# Patient Record
Sex: FEMALE | Race: WHITE | NOT HISPANIC OR LATINO | Employment: OTHER | URBAN - NONMETROPOLITAN AREA
[De-identification: names, ages, dates, MRNs, and addresses within clinical notes are randomized per-mention and may not be internally consistent; named-entity substitution may affect disease eponyms.]

---

## 2018-07-18 ENCOUNTER — APPOINTMENT (OUTPATIENT)
Dept: CT IMAGING | Facility: HOSPITAL | Age: 83
End: 2018-07-18

## 2018-07-18 ENCOUNTER — APPOINTMENT (OUTPATIENT)
Dept: GENERAL RADIOLOGY | Facility: HOSPITAL | Age: 83
End: 2018-07-18

## 2018-07-18 ENCOUNTER — HOSPITAL ENCOUNTER (INPATIENT)
Facility: HOSPITAL | Age: 83
LOS: 4 days | Discharge: HOME HEALTH | End: 2018-07-22
Attending: EMERGENCY MEDICINE | Admitting: FAMILY MEDICINE

## 2018-07-18 DIAGNOSIS — R11.2 NAUSEA AND VOMITING, INTRACTABILITY OF VOMITING NOT SPECIFIED, UNSPECIFIED VOMITING TYPE: ICD-10-CM

## 2018-07-18 DIAGNOSIS — Z74.09 IMPAIRED MOBILITY: ICD-10-CM

## 2018-07-18 DIAGNOSIS — Z78.9 IMPAIRED MOBILITY AND ADLS: ICD-10-CM

## 2018-07-18 DIAGNOSIS — Z74.09 IMPAIRED MOBILITY AND ADLS: ICD-10-CM

## 2018-07-18 DIAGNOSIS — J18.9 PNEUMONIA DUE TO INFECTIOUS ORGANISM, UNSPECIFIED LATERALITY, UNSPECIFIED PART OF LUNG: Primary | ICD-10-CM

## 2018-07-18 LAB
ALBUMIN SERPL-MCNC: 4 G/DL (ref 3.5–5)
ALBUMIN/GLOB SERPL: 1.1 G/DL (ref 1.1–2.5)
ALP SERPL-CCNC: 43 U/L (ref 24–120)
ALT SERPL W P-5'-P-CCNC: <15 U/L (ref 0–54)
AMYLASE SERPL-CCNC: 60 U/L (ref 30–110)
ANION GAP SERPL CALCULATED.3IONS-SCNC: 13 MMOL/L (ref 4–13)
APTT PPP: 33 SECONDS (ref 24.1–34.8)
ARTERIAL PATENCY WRIST A: ABNORMAL
AST SERPL-CCNC: 32 U/L (ref 7–45)
ATMOSPHERIC PRESS: 750 MMHG
BASE EXCESS BLDA CALC-SCNC: 5.1 MMOL/L (ref 0–2)
BASOPHILS # BLD AUTO: 0.05 10*3/MM3 (ref 0–0.2)
BASOPHILS NFR BLD AUTO: 0.4 % (ref 0–2)
BDY SITE: ABNORMAL
BILIRUB SERPL-MCNC: 0.2 MG/DL (ref 0.1–1)
BILIRUB UR QL STRIP: NEGATIVE
BODY TEMPERATURE: 37 C
BUN BLD-MCNC: 24 MG/DL (ref 5–21)
BUN/CREAT SERPL: 28.6 (ref 7–25)
CA-I BLD-MCNC: 4.55 MG/DL (ref 4.6–5.4)
CALCIUM SPEC-SCNC: 9.6 MG/DL (ref 8.4–10.4)
CHLORIDE SERPL-SCNC: 96 MMOL/L (ref 98–110)
CLARITY UR: CLEAR
CO2 SERPL-SCNC: 30 MMOL/L (ref 24–31)
COLOR UR: YELLOW
CREAT BLD-MCNC: 0.84 MG/DL (ref 0.5–1.4)
CRP SERPL-MCNC: 2.71 MG/DL (ref 0–0.99)
D-LACTATE SERPL-SCNC: 1.9 MMOL/L (ref 0.5–2)
DEPRECATED RDW RBC AUTO: 47.5 FL (ref 40–54)
EOSINOPHIL # BLD AUTO: 0.06 10*3/MM3 (ref 0–0.7)
EOSINOPHIL NFR BLD AUTO: 0.4 % (ref 0–4)
ERYTHROCYTE [DISTWIDTH] IN BLOOD BY AUTOMATED COUNT: 14 % (ref 12–15)
GAS FLOW AIRWAY: 2 LPM
GFR SERPL CREATININE-BSD FRML MDRD: 63 ML/MIN/1.73
GLOBULIN UR ELPH-MCNC: 3.6 GM/DL
GLUCOSE BLD-MCNC: 197 MG/DL (ref 70–100)
GLUCOSE UR STRIP-MCNC: NEGATIVE MG/DL
HCO3 BLDA-SCNC: 29.4 MMOL/L (ref 20–26)
HCT VFR BLD AUTO: 35.1 % (ref 37–47)
HGB BLD-MCNC: 11.5 G/DL (ref 12–16)
HGB UR QL STRIP.AUTO: NEGATIVE
HOLD SPECIMEN: NORMAL
IMM GRANULOCYTES # BLD: 0.09 10*3/MM3 (ref 0–0.03)
IMM GRANULOCYTES NFR BLD: 0.7 % (ref 0–5)
INR PPP: 1 (ref 0.91–1.09)
KETONES UR QL STRIP: NEGATIVE
LEUKOCYTE ESTERASE UR QL STRIP.AUTO: NEGATIVE
LIPASE SERPL-CCNC: 74 U/L (ref 23–203)
LYMPHOCYTES # BLD AUTO: 1.74 10*3/MM3 (ref 0.72–4.86)
LYMPHOCYTES NFR BLD AUTO: 12.8 % (ref 15–45)
Lab: ABNORMAL
Lab: ABNORMAL
MAGNESIUM SERPL-MCNC: 1.9 MG/DL (ref 1.4–2.2)
MCH RBC QN AUTO: 30.3 PG (ref 28–32)
MCHC RBC AUTO-ENTMCNC: 32.8 G/DL (ref 33–36)
MCV RBC AUTO: 92.4 FL (ref 82–98)
MODALITY: ABNORMAL
MONOCYTES # BLD AUTO: 0.82 10*3/MM3 (ref 0.19–1.3)
MONOCYTES NFR BLD AUTO: 6 % (ref 4–12)
NEUTROPHILS # BLD AUTO: 10.8 10*3/MM3 (ref 1.87–8.4)
NEUTROPHILS NFR BLD AUTO: 79.7 % (ref 39–78)
NITRITE UR QL STRIP: NEGATIVE
NRBC BLD MANUAL-RTO: 0 /100 WBC (ref 0–0)
PCO2 BLDA: 41.2 MM HG (ref 35–45)
PH BLDA: 7.46 PH UNITS (ref 7.35–7.45)
PH UR STRIP.AUTO: <=5 [PH] (ref 5–8)
PHOSPHATE SERPL-MCNC: 3.2 MG/DL (ref 2.5–4.5)
PLATELET # BLD AUTO: 321 10*3/MM3 (ref 130–400)
PMV BLD AUTO: 10.2 FL (ref 6–12)
PO2 BLDA: 97 MM HG (ref 83–108)
POTASSIUM BLD-SCNC: 3.1 MMOL/L (ref 3.5–5.3)
PROT SERPL-MCNC: 7.6 G/DL (ref 6.3–8.7)
PROT UR QL STRIP: NEGATIVE
PROTHROMBIN TIME: 13.5 SECONDS (ref 11.9–14.6)
RBC # BLD AUTO: 3.8 10*6/MM3 (ref 4.2–5.4)
SAO2 % BLDCOA: 98.5 % (ref 94–99)
SODIUM BLD-SCNC: 139 MMOL/L (ref 135–145)
SP GR UR STRIP: 1.02 (ref 1–1.03)
UROBILINOGEN UR QL STRIP: NORMAL
VENTILATOR MODE: ABNORMAL
WBC NRBC COR # BLD: 13.56 10*3/MM3 (ref 4.8–10.8)
WHOLE BLOOD HOLD SPECIMEN: NORMAL
WHOLE BLOOD HOLD SPECIMEN: NORMAL

## 2018-07-18 PROCEDURE — 25010000002 ENOXAPARIN PER 10 MG: Performed by: NURSE PRACTITIONER

## 2018-07-18 PROCEDURE — 25010000003 POTASSIUM CHLORIDE PER 2 MEQ: Performed by: EMERGENCY MEDICINE

## 2018-07-18 PROCEDURE — 85730 THROMBOPLASTIN TIME PARTIAL: CPT | Performed by: EMERGENCY MEDICINE

## 2018-07-18 PROCEDURE — 80053 COMPREHEN METABOLIC PANEL: CPT | Performed by: EMERGENCY MEDICINE

## 2018-07-18 PROCEDURE — 25010000002 AZITHROMYCIN PER 500 MG: Performed by: NURSE PRACTITIONER

## 2018-07-18 PROCEDURE — 82803 BLOOD GASES ANY COMBINATION: CPT

## 2018-07-18 PROCEDURE — 83690 ASSAY OF LIPASE: CPT | Performed by: FAMILY MEDICINE

## 2018-07-18 PROCEDURE — 82330 ASSAY OF CALCIUM: CPT

## 2018-07-18 PROCEDURE — 94799 UNLISTED PULMONARY SVC/PX: CPT

## 2018-07-18 PROCEDURE — 86140 C-REACTIVE PROTEIN: CPT | Performed by: EMERGENCY MEDICINE

## 2018-07-18 PROCEDURE — 81003 URINALYSIS AUTO W/O SCOPE: CPT | Performed by: EMERGENCY MEDICINE

## 2018-07-18 PROCEDURE — 99285 EMERGENCY DEPT VISIT HI MDM: CPT

## 2018-07-18 PROCEDURE — 71045 X-RAY EXAM CHEST 1 VIEW: CPT

## 2018-07-18 PROCEDURE — 74177 CT ABD & PELVIS W/CONTRAST: CPT

## 2018-07-18 PROCEDURE — 25010000002 IOPAMIDOL 61 % SOLUTION: Performed by: EMERGENCY MEDICINE

## 2018-07-18 PROCEDURE — 83735 ASSAY OF MAGNESIUM: CPT | Performed by: EMERGENCY MEDICINE

## 2018-07-18 PROCEDURE — 25010000002 POTASSIUM CHLORIDE PER 2 MEQ: Performed by: NURSE PRACTITIONER

## 2018-07-18 PROCEDURE — 93010 ELECTROCARDIOGRAM REPORT: CPT | Performed by: INTERNAL MEDICINE

## 2018-07-18 PROCEDURE — 25010000002 CEFTRIAXONE PER 250 MG: Performed by: EMERGENCY MEDICINE

## 2018-07-18 PROCEDURE — 84100 ASSAY OF PHOSPHORUS: CPT | Performed by: EMERGENCY MEDICINE

## 2018-07-18 PROCEDURE — 85610 PROTHROMBIN TIME: CPT | Performed by: EMERGENCY MEDICINE

## 2018-07-18 PROCEDURE — 87040 BLOOD CULTURE FOR BACTERIA: CPT | Performed by: EMERGENCY MEDICINE

## 2018-07-18 PROCEDURE — 25810000003 SODIUM CHLORIDE 0.9 % WITH KCL 20 MEQ 20-0.9 MEQ/L-% SOLUTION: Performed by: NURSE PRACTITIONER

## 2018-07-18 PROCEDURE — 94640 AIRWAY INHALATION TREATMENT: CPT

## 2018-07-18 PROCEDURE — P9612 CATHETERIZE FOR URINE SPEC: HCPCS

## 2018-07-18 PROCEDURE — 83605 ASSAY OF LACTIC ACID: CPT | Performed by: EMERGENCY MEDICINE

## 2018-07-18 PROCEDURE — 93005 ELECTROCARDIOGRAM TRACING: CPT | Performed by: EMERGENCY MEDICINE

## 2018-07-18 PROCEDURE — 36600 WITHDRAWAL OF ARTERIAL BLOOD: CPT

## 2018-07-18 PROCEDURE — 85025 COMPLETE CBC W/AUTO DIFF WBC: CPT | Performed by: EMERGENCY MEDICINE

## 2018-07-18 PROCEDURE — 82150 ASSAY OF AMYLASE: CPT | Performed by: FAMILY MEDICINE

## 2018-07-18 RX ORDER — LEVOFLOXACIN 500 MG/1
500 TABLET, FILM COATED ORAL DAILY
COMMUNITY
Start: 2018-07-11 | End: 2018-07-22 | Stop reason: HOSPADM

## 2018-07-18 RX ORDER — SUCRALFATE ORAL 1 G/10ML
1 SUSPENSION ORAL EVERY 6 HOURS SCHEDULED
Status: DISCONTINUED | OUTPATIENT
Start: 2018-07-18 | End: 2018-07-22 | Stop reason: HOSPADM

## 2018-07-18 RX ORDER — ONDANSETRON 2 MG/ML
4 INJECTION INTRAMUSCULAR; INTRAVENOUS EVERY 6 HOURS PRN
Status: DISCONTINUED | OUTPATIENT
Start: 2018-07-18 | End: 2018-07-19

## 2018-07-18 RX ORDER — SACCHAROMYCES BOULARDII 250 MG
250 CAPSULE ORAL 2 TIMES DAILY
Status: DISCONTINUED | OUTPATIENT
Start: 2018-07-18 | End: 2018-07-22 | Stop reason: HOSPADM

## 2018-07-18 RX ORDER — LISINOPRIL AND HYDROCHLOROTHIAZIDE 12.5; 1 MG/1; MG/1
1 TABLET ORAL DAILY
Status: ON HOLD | COMMUNITY
End: 2022-04-24

## 2018-07-18 RX ORDER — MULTIVIT WITH CALCIUM,IRON,MIN 27MG-0.4MG
1 TABLET ORAL DAILY
COMMUNITY

## 2018-07-18 RX ORDER — POTASSIUM CHLORIDE 29.8 MG/ML
20 INJECTION INTRAVENOUS ONCE
Status: COMPLETED | OUTPATIENT
Start: 2018-07-18 | End: 2018-07-18

## 2018-07-18 RX ORDER — SODIUM CHLORIDE AND POTASSIUM CHLORIDE 150; 900 MG/100ML; MG/100ML
125 INJECTION, SOLUTION INTRAVENOUS CONTINUOUS
Status: DISCONTINUED | OUTPATIENT
Start: 2018-07-18 | End: 2018-07-20

## 2018-07-18 RX ORDER — ONDANSETRON 4 MG/1
4 TABLET, FILM COATED ORAL EVERY 6 HOURS PRN
Status: DISCONTINUED | OUTPATIENT
Start: 2018-07-18 | End: 2018-07-19

## 2018-07-18 RX ORDER — GUAIFENESIN 600 MG/1
600 TABLET, EXTENDED RELEASE ORAL 2 TIMES DAILY
Status: DISCONTINUED | OUTPATIENT
Start: 2018-07-18 | End: 2018-07-22 | Stop reason: HOSPADM

## 2018-07-18 RX ORDER — IPRATROPIUM BROMIDE AND ALBUTEROL SULFATE 2.5; .5 MG/3ML; MG/3ML
3 SOLUTION RESPIRATORY (INHALATION) ONCE
Status: COMPLETED | OUTPATIENT
Start: 2018-07-18 | End: 2018-07-18

## 2018-07-18 RX ORDER — PANTOPRAZOLE SODIUM 40 MG/1
40 TABLET, DELAYED RELEASE ORAL EVERY MORNING
Status: DISCONTINUED | OUTPATIENT
Start: 2018-07-19 | End: 2018-07-22 | Stop reason: HOSPADM

## 2018-07-18 RX ORDER — ONDANSETRON 4 MG/1
4 TABLET, ORALLY DISINTEGRATING ORAL EVERY 6 HOURS PRN
Status: DISCONTINUED | OUTPATIENT
Start: 2018-07-18 | End: 2018-07-19

## 2018-07-18 RX ORDER — MORPHINE SULFATE 2 MG/ML
2 INJECTION, SOLUTION INTRAMUSCULAR; INTRAVENOUS EVERY 4 HOURS PRN
Status: DISCONTINUED | OUTPATIENT
Start: 2018-07-18 | End: 2018-07-21 | Stop reason: RX

## 2018-07-18 RX ORDER — VIT A/VIT C/VIT E/ZINC/COPPER 2148-113
2 TABLET ORAL 2 TIMES DAILY
COMMUNITY
End: 2022-05-26 | Stop reason: HOSPADM

## 2018-07-18 RX ORDER — ESOMEPRAZOLE MAGNESIUM 40 MG/1
40 CAPSULE, DELAYED RELEASE ORAL
Status: ON HOLD | COMMUNITY
End: 2019-09-03

## 2018-07-18 RX ORDER — POTASSIUM CHLORIDE 14.9 MG/ML
20 INJECTION INTRAVENOUS
Status: COMPLETED | OUTPATIENT
Start: 2018-07-18 | End: 2018-07-19

## 2018-07-18 RX ORDER — METOPROLOL SUCCINATE 25 MG/1
25 TABLET, EXTENDED RELEASE ORAL DAILY
Status: DISCONTINUED | OUTPATIENT
Start: 2018-07-18 | End: 2018-07-22 | Stop reason: HOSPADM

## 2018-07-18 RX ORDER — ACETAMINOPHEN 325 MG/1
650 TABLET ORAL EVERY 4 HOURS PRN
Status: DISCONTINUED | OUTPATIENT
Start: 2018-07-18 | End: 2018-07-22 | Stop reason: HOSPADM

## 2018-07-18 RX ORDER — METOPROLOL SUCCINATE 25 MG/1
25 TABLET, EXTENDED RELEASE ORAL DAILY
Status: ON HOLD | COMMUNITY
End: 2019-09-03

## 2018-07-18 RX ORDER — SODIUM CHLORIDE 0.9 % (FLUSH) 0.9 %
10 SYRINGE (ML) INJECTION AS NEEDED
Status: DISCONTINUED | OUTPATIENT
Start: 2018-07-18 | End: 2018-07-22 | Stop reason: HOSPADM

## 2018-07-18 RX ORDER — IPRATROPIUM BROMIDE AND ALBUTEROL SULFATE 2.5; .5 MG/3ML; MG/3ML
3 SOLUTION RESPIRATORY (INHALATION)
Status: DISCONTINUED | OUTPATIENT
Start: 2018-07-18 | End: 2018-07-21

## 2018-07-18 RX ORDER — SODIUM CHLORIDE 0.9 % (FLUSH) 0.9 %
1-10 SYRINGE (ML) INJECTION AS NEEDED
Status: DISCONTINUED | OUTPATIENT
Start: 2018-07-18 | End: 2018-07-22 | Stop reason: HOSPADM

## 2018-07-18 RX ADMIN — IPRATROPIUM BROMIDE AND ALBUTEROL SULFATE 3 ML: 2.5; .5 SOLUTION RESPIRATORY (INHALATION) at 23:41

## 2018-07-18 RX ADMIN — IPRATROPIUM BROMIDE AND ALBUTEROL SULFATE 3 ML: 2.5; .5 SOLUTION RESPIRATORY (INHALATION) at 16:39

## 2018-07-18 RX ADMIN — ACETAMINOPHEN 650 MG: 325 TABLET, FILM COATED ORAL at 23:46

## 2018-07-18 RX ADMIN — SODIUM CHLORIDE 1632 ML: 9 INJECTION, SOLUTION INTRAVENOUS at 16:23

## 2018-07-18 RX ADMIN — POTASSIUM CHLORIDE AND SODIUM CHLORIDE 125 ML/HR: 900; 150 INJECTION, SOLUTION INTRAVENOUS at 22:14

## 2018-07-18 RX ADMIN — POTASSIUM CHLORIDE 20 MEQ: 200 INJECTION, SOLUTION INTRAVENOUS at 22:14

## 2018-07-18 RX ADMIN — Medication 250 MG: at 22:20

## 2018-07-18 RX ADMIN — POTASSIUM CHLORIDE 20 MEQ: 400 INJECTION, SOLUTION INTRAVENOUS at 18:45

## 2018-07-18 RX ADMIN — CEFTRIAXONE SODIUM 1 G: 1 INJECTION, POWDER, FOR SOLUTION INTRAMUSCULAR; INTRAVENOUS at 16:26

## 2018-07-18 RX ADMIN — AZITHROMYCIN MONOHYDRATE 500 MG: 500 INJECTION, POWDER, LYOPHILIZED, FOR SOLUTION INTRAVENOUS at 22:42

## 2018-07-18 RX ADMIN — ENOXAPARIN SODIUM 40 MG: 40 INJECTION SUBCUTANEOUS at 22:20

## 2018-07-18 RX ADMIN — IOPAMIDOL 100 ML: 612 INJECTION, SOLUTION INTRAVENOUS at 17:19

## 2018-07-18 RX ADMIN — GUAIFENESIN 600 MG: 600 TABLET, EXTENDED RELEASE ORAL at 22:21

## 2018-07-18 RX ADMIN — SUCRALFATE 1 G: 1 SUSPENSION ORAL at 22:21

## 2018-07-18 NOTE — ED PROVIDER NOTES
Subjective     Abdominal Pain   Pain location:  Generalized  Pain quality: fullness    Pain quality: not aching, not bloating, not burning, not dull, not heavy, no pressure, not shooting, not squeezing, no stiffness and not tearing    Pain radiates to:  Does not radiate  Pain severity:  Moderate  Onset quality:  Gradual  Timing:  Intermittent  Progression:  Waxing and waning  Chronicity:  New  Context: not alcohol use, not awakening from sleep, not diet changes, not eating, not laxative use, not recent illness, not recent sexual activity, not recent travel, not sick contacts and not suspicious food intake    Relieved by:  Nothing  Worsened by:  Nothing  Ineffective treatments:  None tried  Associated symptoms: vomiting    Associated symptoms: no anorexia, no belching, no chills, no constipation, no diarrhea, no dysuria, no fatigue, no fever, no flatus, no hematemesis, no hematochezia, no hematuria, no melena, no nausea, no shortness of breath, no sore throat, no vaginal bleeding and no vaginal discharge    Risk factors: no NSAID use    Vomiting   The primary symptoms include abdominal pain and vomiting. Primary symptoms do not include fever, fatigue, nausea, diarrhea, melena, hematemesis, hematochezia, dysuria, myalgias, arthralgias or rash.   The illness does not include chills, anorexia, constipation or back pain.       Review of Systems   Constitutional: Negative.  Negative for activity change, appetite change, chills, diaphoresis, fatigue and fever.   HENT: Negative for congestion, drooling, ear pain, facial swelling, hearing loss, sinus pressure and sore throat.    Eyes: Negative.  Negative for discharge.   Respiratory: Negative for shortness of breath.    Gastrointestinal: Positive for abdominal pain and vomiting. Negative for abdominal distention, anorexia, blood in stool, constipation, diarrhea, flatus, hematemesis, hematochezia, melena and nausea.   Endocrine: Negative.  Negative for cold intolerance, heat  intolerance, polydipsia, polyphagia and polyuria.   Genitourinary: Negative.  Negative for dysuria, flank pain, hematuria, urgency, vaginal bleeding and vaginal discharge.   Musculoskeletal: Negative.  Negative for arthralgias, back pain, myalgias and neck stiffness.   Skin: Negative.  Negative for color change, pallor and rash.   Allergic/Immunologic: Negative.    Neurological: Negative.  Negative for dizziness, seizures, speech difficulty, weakness, numbness and headaches.   Hematological: Negative.  Negative for adenopathy.   All other systems reviewed and are negative.      Past Medical History:   Diagnosis Date   • COPD (chronic obstructive pulmonary disease) (CMS/[facility])    • GERD (gastroesophageal reflux disease)    • Hypertension    • Pneumonia    • Tachycardia        No Known Allergies    History reviewed. No pertinent surgical history.    History reviewed. No pertinent family history.    Social History     Social History   • Marital status:      Social History Main Topics   • Smoking status: Never Smoker   • Alcohol use No   • Drug use: No   • Sexual activity: Defer     Other Topics Concern   • Not on file           Objective   Physical Exam   Constitutional: She is oriented to person, place, and time. She appears well-developed and well-nourished.   HENT:   Head: Normocephalic.   Right Ear: External ear normal.   Eyes: Pupils are equal, round, and reactive to light. Conjunctivae are normal.   Neck: Normal range of motion. Neck supple.   Cardiovascular: Normal rate, regular rhythm, normal heart sounds and intact distal pulses.  PMI is not displaced.  Exam reveals no decreased pulses.    No murmur heard.  Pulmonary/Chest: Effort normal. No accessory muscle usage. No tachypnea. No respiratory distress. She has no decreased breath sounds. She has rales. She exhibits no tenderness.   Abdominal: Soft. Bowel sounds are normal. There is tenderness.   Musculoskeletal: Normal range of motion. She exhibits no  edema or tenderness.   Lower extremity exam bilaterally is unremarkable.  There is no right or left calf tenderness .  There is no palpable venous cord.  No obvious difference in the size of the legs.  No pitting edema.  The dorsalis pedis and posterior tibial femoral and popliteal pulses are palpable and +2 bilaterally.  Homans sign is negative   Neurological: She is alert and oriented to person, place, and time. She has normal reflexes. No cranial nerve deficit. Coordination normal.   Skin: Skin is warm. No rash noted. No erythema.   Nursing note and vitals reviewed.      Procedures           ED Course  ED Course as of Jul 18 1749 Wed Jul 18, 2018   1746 Pt with failed out patient treatment  [TS]      ED Course User Index  [TS] Sam Jackson MD                  [facility]      Final diagnoses:   Pneumonia due to infectious organism, unspecified laterality, unspecified part of lung   Nausea and vomiting, intractability of vomiting not specified, unspecified vomiting type            Sam Jackson MD  07/18/18 8908

## 2018-07-18 NOTE — H&P
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  Patient was in her usual state of health until 2-3 weeks ago at which time she was treated by PCP for \"lung inflammation\".  She returned to PCP on 7/11/2018 chest xray revealed pneumonia, ABX was changed to Levaquin.  She then developed nausea with vomiting, abdominal pain, and diarrhea.  Pain is located epigastric region radiating down to lower quads, described as constant, aching. Nausea is constant, vomiting episodic, having trouble keeping down food and liquids. Diarrhea occurs 3-4 times daily. She reports feeling so weak she cant get off stretcher. Patient is admitted for treatment of failed outpatient therapy pneumonia.     Review of Systems   Constitutional: Negative for activity change, appetite change, fatigue and fever.   HENT: Negative for congestion, mouth sores, rhinorrhea, sinus pressure and trouble swallowing.    Respiratory: Positive for shortness of breath (chronic). Negative for cough, chest tightness and wheezing.    Cardiovascular: Negative for chest pain, palpitations and leg swelling.   Gastrointestinal: Positive for abdominal pain (epigastric down to lower abdomen), diarrhea, nausea and vomiting. Negative for abdominal distention and constipation.   Genitourinary: Negative for difficulty urinating, dysuria and frequency.   Musculoskeletal: Negative for arthralgias and myalgias.        Generalized weakness from prolonged illness   Neurological: Negative for dizziness, weakness and light-headedness.   Psychiatric/Behavioral: Negative for agitation and sleep " "disturbance. The patient is not nervous/anxious.         Past Medical History:   Past Medical History:   Diagnosis Date   • COPD (chronic obstructive pulmonary disease) (CMS/HCC)    • GERD (gastroesophageal reflux disease)    • Hypertension    • Pneumonia        Past Surgical History:   Past Surgical History:   Procedure Laterality Date   • CATARACT EXTRACTION, BILATERAL     • THYROID SURGERY         Family History: family history includes Alzheimer's disease in her mother; Heart disease in her father.    Social History:  reports that she has never smoked. She does not have any smokeless tobacco history on file. She reports that she does not drink alcohol or use drugs.    Code Status: Full, if unable to speak for herself her son Nicholas Haddad is POA      Allergies:  No Known Allergies    Medications:  Prior to Admission medications    Medication Sig Start Date End Date Taking? Authorizing Provider   Calcium-Magnesium-Vitamin D (CALCIUM 1200+D3 PO) Take  by mouth.   Yes Historical Provider, MD   esomeprazole (nexIUM) 40 MG capsule Take 40 mg by mouth Every Morning Before Breakfast.   Yes Historical Provider, MD   levoFLOXacin (LEVAQUIN) 500 MG tablet Take 500 mg by mouth Daily. 7/11/18 7/20/18 Yes Historical Provider, MD   lisinopril-hydrochlorothiazide (PRINZIDE,ZESTORETIC) 10-12.5 MG per tablet Take 1 tablet by mouth Daily.   Yes Historical Provider, MD   metoprolol succinate XL (TOPROL-XL) 25 MG 24 hr tablet Take 25 mg by mouth Daily.   Yes Historical Provider, MD   Multiple Vitamins-Minerals (PRESERVISION AREDS PO) Take  by mouth 4 (Four) Times a Day. Twice AM and Twice PM.   Yes Historical Provider, MD   Multiple Vitamins-Minerals (WOMENS ONE DAILY PO) Take  by mouth.   Yes Historical Provider, MD       Objective     BP (!) 112/35   Pulse (!) 126   Temp 99.3 °F (37.4 °C)   Resp 19   Ht 167.6 cm (66\")   Wt 54.4 kg (120 lb)   SpO2 99%   BMI 19.37 kg/m²      Physical Exam   Constitutional: She is " oriented to person, place, and time. She appears well-developed. No distress.   frail   HENT:   Head: Normocephalic and atraumatic.   Eyes: Pupils are equal, round, and reactive to light. Conjunctivae and EOM are normal. No scleral icterus.   Neck: Normal range of motion. No JVD present. No tracheal deviation present.   Cardiovascular: Normal rate, regular rhythm, normal heart sounds and intact distal pulses.  Exam reveals no gallop.    No murmur heard.  Pulmonary/Chest: Effort normal and breath sounds normal. No respiratory distress. She has no wheezes. She has no rales.   Diminished    Abdominal: Soft. Bowel sounds are normal. She exhibits no distension. There is no tenderness. There is no guarding.   Musculoskeletal: Normal range of motion. She exhibits no edema.   Generalized weakness   Neurological: She is alert and oriented to person, place, and time.   No obvious deficits noted.   Skin: Skin is warm and dry. No rash noted. She is not diaphoretic. No erythema. No pallor.   Psychiatric: She has a normal mood and affect. Her behavior is normal.   Vitals reviewed.        Pertinent Data:   Lab Results (last 72 hours)     Procedure Component Value Units Date/Time    Lactic Acid, Plasma [[id number]]  (Normal) Collected:  07/18/18 1559    Specimen:  Blood Updated:  07/18/18 1803     Lactate 1.9 mmol/L     Urinalysis With Culture If Indicated - Urine, Catheter [[id number]]  (Normal) Collected:  07/18/18 1724    Specimen:  Urine from Urine, Catheter Updated:  07/18/18 1751     Color, UA Yellow     Appearance, UA Clear     pH, UA <=5.0     Specific Gravity, UA 1.020     Glucose, UA Negative     Ketones, UA Negative     Bilirubin, UA Negative     Blood, UA Negative     Protein, UA Negative     Leuk Esterase, UA Negative     Nitrite, UA Negative     Urobilinogen, UA 0.2 E.U./dL    Blood Culture Bottle Set [[id number]] Collected:  07/18/18 1559    Specimen:  Blood from Arm, Right Updated:  07/18/18 1700     Extra Tube Hold  for add-ons.     Comment: Auto resulted.       Light Blue Top [[id number]] Collected:  07/18/18 1559    Specimen:  Blood Updated:  07/18/18 1700     Extra Tube hold for add-on     Comment: Auto resulted       Green Top (Gel) [[id number]] Collected:  07/18/18 1559    Specimen:  Blood Updated:  07/18/18 1700     Extra Tube Hold for add-ons.     Comment: Auto resulted.       Lavender Top [[id number]] Collected:  07/18/18 1559    Specimen:  Blood Updated:  07/18/18 1700     Extra Tube hold for add-on     Comment: Auto resulted       Red Top [[id number]] Collected:  07/18/18 1559    Specimen:  Blood Updated:  07/18/18 1700     Extra Tube Hold for add-ons.     Comment: Auto resulted.       Calcium, Ionized [[id number]]  (Abnormal) Collected:  07/18/18 1630    Specimen:  Blood Updated:  07/18/18 1636     Ionized Calcium 4.55 (L) mg/dL      Collected by [id number]    Blood Gas, Arterial [[id number]]  (Abnormal) Collected:  07/18/18 1630    Specimen:  Arterial Blood Updated:  07/18/18 1635     Site Right Brachial     Don's Test N/A     pH, Arterial 7.462 (H) pH units      pCO2, Arterial 41.2 mm Hg      pO2, Arterial 97.0 mm Hg      HCO3, Arterial 29.4 (H) mmol/L      Base Excess, Arterial 5.1 (H) mmol/L      O2 Saturation, Arterial 98.5 %      Temperature 37.0 C      Barometric Pressure for Blood Gas 750 mmHg      Modality Nasal Cannula     Flow Rate 2.0 lpm      Ventilator Mode NA     Collected by [id number]    Blood Culture - Blood, [[id number]] Collected:  07/18/18 1620    Specimen:  Blood from Arm, Left Updated:  07/18/18 1634    Comprehensive Metabolic Panel [[id number]]  (Abnormal) Collected:  07/18/18 1559    Specimen:  Blood Updated:  07/18/18 1627     Glucose 197 (H) mg/dL      BUN 24 (H) mg/dL      Creatinine 0.84 mg/dL      Sodium 139 mmol/L      Potassium 3.1 (L) mmol/L      Chloride 96 (L) mmol/L      CO2 30.0 mmol/L      Calcium 9.6 mg/dL      Total Protein 7.6 g/dL      Albumin 4.00 g/dL      ALT (SGPT) <15 U/L      AST  (SGOT) 32 U/L      Alkaline Phosphatase 43 U/L      Total Bilirubin 0.2 mg/dL      eGFR Non African Amer 63 mL/min/1.73      Globulin 3.6 gm/dL      A/G Ratio 1.1 g/dL      BUN/Creatinine Ratio 28.6 (H)     Anion Gap 13.0 mmol/L     Narrative:       The MDRD GFR formula is only valid for adults with stable renal function between ages 18 and 70.    Magnesium [[id number]]  (Normal) Collected:  07/18/18 1559    Specimen:  Blood Updated:  07/18/18 1627     Magnesium 1.9 mg/dL     Phosphorus [[id number]]  (Normal) Collected:  07/18/18 1559    Specimen:  Blood Updated:  07/18/18 1627     Phosphorus 3.2 mg/dL     C-reactive Protein [[id number]]  (Abnormal) Collected:  07/18/18 1559    Specimen:  Blood Updated:  07/18/18 1627     C-Reactive Protein 2.71 (H) mg/dL     Protime-INR [[id number]]  (Normal) Collected:  07/18/18 1559    Specimen:  Blood Updated:  07/18/18 1619     Protime 13.5 Seconds      INR 1.00    aPTT [[id number]]  (Normal) Collected:  07/18/18 1559    Specimen:  Blood Updated:  07/18/18 1619     PTT 33.0 seconds     CBC Auto Differential [[id number]]  (Abnormal) Collected:  07/18/18 1559    Specimen:  Blood Updated:  07/18/18 1616     WBC 13.56 (H) 10*3/mm3      RBC 3.80 (L) 10*6/mm3      Hemoglobin 11.5 (L) g/dL      Hematocrit 35.1 (L) %      MCV 92.4 fL      MCH 30.3 pg      MCHC 32.8 (L) g/dL      RDW 14.0 %      RDW-SD 47.5 fl      MPV 10.2 fL      Platelets 321 10*3/mm3      Neutrophil % 79.7 (H) %      Lymphocyte % 12.8 (L) %      Monocyte % 6.0 %      Eosinophil % 0.4 %      Basophil % 0.4 %      Immature Grans % 0.7 %      Neutrophils, Absolute 10.80 (H) 10*3/mm3      Lymphocytes, Absolute 1.74 10*3/mm3      Monocytes, Absolute 0.82 10*3/mm3      Eosinophils, Absolute 0.06 10*3/mm3      Basophils, Absolute 0.05 10*3/mm3      Immature Grans, Absolute 0.09 (H) 10*3/mm3      nRBC 0.0 /100 WBC         Imaging Results (last 24 hours)     Procedure Component Value Units Date/Time    CT Abdomen Pelvis With  Contrast [[id number]] Collected:  07/18/18 1720     Updated:  07/18/18 1725    Narrative:       CT ABDOMEN PELVIS W CONTRAST- 7/18/2018 5:05 PM CDT     HISTORY: Abd pain, fever, abscess suspected       COMPARISON: None.      DOSE LENGTH PRODUCT: 242 mGy cm. Automated exposure control was also  utilized to decrease patient radiation dose.     TECHNIQUE: Following the intravenous administration of contrast, helical  CT tomographic images of the abdomen and pelvis were acquired.  Multiplanar reformatted images were provided for review.      FINDINGS:   Dependent changes are seen in the LEFT lung base. Bronchiectasis is  noted in the RIGHT middle lobe.      LIVER: No focal liver lesion. The hepatic vasculature is patent.      BILIARY SYSTEM: Multiple stones are seen in the gallbladder. There is no  pericholecystic fluid or cold bladder distention.      PANCREAS: No focal pancreatic lesion.      SPLEEN: Unremarkable.      KIDNEYS AND ADRENALS: Numerous low-density lesions in bilateral kidneys  most likely represent cysts but some are too small to characterize. The  adrenal glands are unremarkable The ureters are decompressed and normal  in appearance.     RETROPERITONEUM: No mass, lymphadenopathy or hemorrhage.      GI TRACT: Multiple colonic diverticula are present, but there is no wall  thickening or pericolonic stranding to suggest diverticulitis. No  obstruction or wall thickening is seen in the remainder of the  visualized GI tract. The appendix is visualized and unremarkable.     OTHER: There is no mesenteric mass, lymphadenopathy or fluid collection.  The abdominopelvic vasculature is patent. The osseous structures and  soft tissues demonstrate no worrisome lesions.          PELVIS: No mass lesion, fluid collection or significant lymphadenopathy  is seen in the pelvis. The urinary bladder is normal in appearance.       Impression:       1. Cholelithiasis without evidence of acute cholecystitis.  2. Diverticulosis  without evidence of diverticulitis.  3. Chronic changes in the lower lungs.         This report was finalized on 07/18/2018 17:22 by Dr. Eliazar Thomas MD.    XR Chest 1 View [[id number]] Collected:  07/18/18 1623     Updated:  07/18/18 1628    Narrative:       EXAMINATION:  XR CHEST 1 VW-  7/18/2018 4:20 PM CDT     HISTORY: Severe sepsis protocol     COMPARISON: No comparison study.     FINDINGS:  There is an area of infiltrate in the left perihilar region  and the left upper lobe. There is a linear band of scarring projected  over the right hilum. Coarse markings and bronchial wall thickening  appears stable. Heart size is normal.       Impression:       1. Probable left perihilar pneumonia. Follow-up is recommended to  document resolution.  2. Linear scarring projected over the right hilum. Coarse markings and  bronchial wall thickening, stable.        This report was finalized on 07/18/2018 16:25 by Dr. Ramsey Grover MD.          I have personally reviewed and interpreted the radiology studies and ECG obtained at time of admission.     Assessment / Plan     Assessment/Plan:      1. Left perihilar pneumonia - Rocephin, Azithromycin, sputum culture  2. Sepsis - fluid bolus 30ml/kg - 1632ml, antibiotic therapy, monitor lactic acid  3. Hypokalemia - replace potassium, CMP in am, cardiac monitoring  4. Leukocytosis - treat with abx, CBC in am  5. COPD - duonebs 4 times day, continue home O2 at 2 liters, continuous pulse ox, ABG's as needed, mucinex  6. Generalized weakness/deconditioned - PT/OT consult  7. DVT prophylaxis - lovenox ad SCD's  8. Hyperglycemia - A1c in am  9. Failure to thrive from extended illness - Dietitian assessment, lipid panel  10. Nausea/vomiting/diarrhea after starting oral antibiotic - regular diet as tolerated, Zofran, Carafate, GI panel, Florastor  11. Additional labs: TSH in am       I discussed the patient's findings and my recommendations with: Theo Wise MD  Time spent: 40  minutes      Florina Pereira, APRN  07/18/18   6:49 PM

## 2018-07-19 LAB
ADV 40+41 DNA STL QL NAA+NON-PROBE: NOT DETECTED
ALBUMIN SERPL-MCNC: 3 G/DL (ref 3.5–5)
ALBUMIN/GLOB SERPL: 1 G/DL (ref 1.1–2.5)
ALP SERPL-CCNC: 32 U/L (ref 24–120)
ALT SERPL W P-5'-P-CCNC: 19 U/L (ref 0–54)
ANION GAP SERPL CALCULATED.3IONS-SCNC: 8 MMOL/L (ref 4–13)
ARTICHOKE IGE QN: 44 MG/DL (ref 0–99)
AST SERPL-CCNC: 22 U/L (ref 7–45)
ASTRO TYP 1-8 RNA STL QL NAA+NON-PROBE: NOT DETECTED
BASOPHILS # BLD AUTO: 0.02 10*3/MM3 (ref 0–0.2)
BASOPHILS # BLD AUTO: 0.03 10*3/MM3 (ref 0–0.2)
BASOPHILS NFR BLD AUTO: 0.2 % (ref 0–2)
BASOPHILS NFR BLD AUTO: 0.3 % (ref 0–2)
BILIRUB SERPL-MCNC: 0.2 MG/DL (ref 0.1–1)
BUN BLD-MCNC: 14 MG/DL (ref 5–21)
BUN/CREAT SERPL: 19.7 (ref 7–25)
C CAYETANENSIS DNA STL QL NAA+NON-PROBE: NOT DETECTED
C DIFF TOX GENS STL QL NAA+PROBE: NOT DETECTED
CALCIUM SPEC-SCNC: 8.6 MG/DL (ref 8.4–10.4)
CAMPY SP DNA.DIARRHEA STL QL NAA+PROBE: NOT DETECTED
CHLORIDE SERPL-SCNC: 111 MMOL/L (ref 98–110)
CHOLEST SERPL-MCNC: 113 MG/DL (ref 130–200)
CO2 SERPL-SCNC: 25 MMOL/L (ref 24–31)
CREAT BLD-MCNC: 0.71 MG/DL (ref 0.5–1.4)
CRYPTOSP STL CULT: NOT DETECTED
D-LACTATE SERPL-SCNC: 1.6 MMOL/L (ref 0.5–2)
D-LACTATE SERPL-SCNC: 2.2 MMOL/L (ref 0.5–2)
DEPRECATED RDW RBC AUTO: 47.9 FL (ref 40–54)
DEPRECATED RDW RBC AUTO: 48.6 FL (ref 40–54)
E COLI DNA SPEC QL NAA+PROBE: NOT DETECTED
E HISTOLYT AG STL-ACNC: NOT DETECTED
EAEC PAA PLAS AGGR+AATA ST NAA+NON-PRB: NOT DETECTED
EC STX1 + STX2 GENES STL NAA+PROBE: NOT DETECTED
EOSINOPHIL # BLD AUTO: 0.01 10*3/MM3 (ref 0–0.7)
EOSINOPHIL # BLD AUTO: 0.02 10*3/MM3 (ref 0–0.7)
EOSINOPHIL NFR BLD AUTO: 0.1 % (ref 0–4)
EOSINOPHIL NFR BLD AUTO: 0.2 % (ref 0–4)
EPEC EAE GENE STL QL NAA+NON-PROBE: NOT DETECTED
ERYTHROCYTE [DISTWIDTH] IN BLOOD BY AUTOMATED COUNT: 14.1 % (ref 12–15)
ERYTHROCYTE [DISTWIDTH] IN BLOOD BY AUTOMATED COUNT: 14.2 % (ref 12–15)
ETEC LTA+ST1A+ST1B TOX ST NAA+NON-PROBE: NOT DETECTED
G LAMBLIA DNA SPEC QL NAA+PROBE: NOT DETECTED
GFR SERPL CREATININE-BSD FRML MDRD: 77 ML/MIN/1.73
GLOBULIN UR ELPH-MCNC: 2.9 GM/DL
GLUCOSE BLD-MCNC: 132 MG/DL (ref 70–100)
HBA1C MFR BLD: 6.5 %
HCT VFR BLD AUTO: 28.6 % (ref 37–47)
HCT VFR BLD AUTO: 30.3 % (ref 37–47)
HDLC SERPL-MCNC: 34 MG/DL
HGB BLD-MCNC: 9.6 G/DL (ref 12–16)
HGB BLD-MCNC: 9.7 G/DL (ref 12–16)
HOLD SPECIMEN: NORMAL
IMM GRANULOCYTES # BLD: 0.04 10*3/MM3 (ref 0–0.03)
IMM GRANULOCYTES # BLD: 0.06 10*3/MM3 (ref 0–0.03)
IMM GRANULOCYTES NFR BLD: 0.4 % (ref 0–5)
IMM GRANULOCYTES NFR BLD: 0.7 % (ref 0–5)
LDLC/HDLC SERPL: 1.84 {RATIO}
LYMPHOCYTES # BLD AUTO: 1.13 10*3/MM3 (ref 0.72–4.86)
LYMPHOCYTES # BLD AUTO: 1.91 10*3/MM3 (ref 0.72–4.86)
LYMPHOCYTES NFR BLD AUTO: 12.6 % (ref 15–45)
LYMPHOCYTES NFR BLD AUTO: 18 % (ref 15–45)
MCH RBC QN AUTO: 30 PG (ref 28–32)
MCH RBC QN AUTO: 31.5 PG (ref 28–32)
MCHC RBC AUTO-ENTMCNC: 32 G/DL (ref 33–36)
MCHC RBC AUTO-ENTMCNC: 33.6 G/DL (ref 33–36)
MCV RBC AUTO: 93.8 FL (ref 82–98)
MCV RBC AUTO: 93.8 FL (ref 82–98)
MONOCYTES # BLD AUTO: 0.64 10*3/MM3 (ref 0.19–1.3)
MONOCYTES # BLD AUTO: 0.65 10*3/MM3 (ref 0.19–1.3)
MONOCYTES NFR BLD AUTO: 6.1 % (ref 4–12)
MONOCYTES NFR BLD AUTO: 7.1 % (ref 4–12)
NEUTROPHILS # BLD AUTO: 7.11 10*3/MM3 (ref 1.87–8.4)
NEUTROPHILS # BLD AUTO: 7.95 10*3/MM3 (ref 1.87–8.4)
NEUTROPHILS NFR BLD AUTO: 75.1 % (ref 39–78)
NEUTROPHILS NFR BLD AUTO: 79.2 % (ref 39–78)
NOROVIRUS GI+II RNA STL QL NAA+NON-PROBE: NOT DETECTED
NRBC BLD MANUAL-RTO: 0 /100 WBC (ref 0–0)
NRBC BLD MANUAL-RTO: 0 /100 WBC (ref 0–0)
P SHIGELLOIDES DNA STL QL NAA+NON-PROBE: NOT DETECTED
PLATELET # BLD AUTO: 232 10*3/MM3 (ref 130–400)
PLATELET # BLD AUTO: 245 10*3/MM3 (ref 130–400)
PMV BLD AUTO: 10.1 FL (ref 6–12)
PMV BLD AUTO: 9.8 FL (ref 6–12)
POTASSIUM BLD-SCNC: 5.3 MMOL/L (ref 3.5–5.3)
PROT SERPL-MCNC: 5.9 G/DL (ref 6.3–8.7)
RBC # BLD AUTO: 3.05 10*6/MM3 (ref 4.2–5.4)
RBC # BLD AUTO: 3.23 10*6/MM3 (ref 4.2–5.4)
RV RNA STL NAA+PROBE: NOT DETECTED
SALMONELLA DNA SPEC QL NAA+PROBE: NOT DETECTED
SAPO I+II+IV+V RNA STL QL NAA+NON-PROBE: NOT DETECTED
SHIGELLA SP+EIEC IPAH STL QL NAA+PROBE: NOT DETECTED
SODIUM BLD-SCNC: 144 MMOL/L (ref 135–145)
TRIGL SERPL-MCNC: 83 MG/DL (ref 0–149)
TSH SERPL DL<=0.05 MIU/L-ACNC: 0.41 MIU/ML (ref 0.47–4.68)
V CHOLERAE DNA SPEC QL NAA+PROBE: NOT DETECTED
VIBRIO DNA SPEC NAA+PROBE: NOT DETECTED
WBC NRBC COR # BLD: 10.59 10*3/MM3 (ref 4.8–10.8)
WBC NRBC COR # BLD: 8.98 10*3/MM3 (ref 4.8–10.8)
YERSINIA STL CULT: NOT DETECTED

## 2018-07-19 PROCEDURE — 80061 LIPID PANEL: CPT | Performed by: NURSE PRACTITIONER

## 2018-07-19 PROCEDURE — 25010000002 AZITHROMYCIN: Performed by: NURSE PRACTITIONER

## 2018-07-19 PROCEDURE — 97165 OT EVAL LOW COMPLEX 30 MIN: CPT

## 2018-07-19 PROCEDURE — 83605 ASSAY OF LACTIC ACID: CPT | Performed by: FAMILY MEDICINE

## 2018-07-19 PROCEDURE — 25010000002 ENOXAPARIN PER 10 MG: Performed by: NURSE PRACTITIONER

## 2018-07-19 PROCEDURE — 25010000002 DEXAMETHASONE PER 1 MG: Performed by: FAMILY MEDICINE

## 2018-07-19 PROCEDURE — 85025 COMPLETE CBC W/AUTO DIFF WBC: CPT | Performed by: NURSE PRACTITIONER

## 2018-07-19 PROCEDURE — 80053 COMPREHEN METABOLIC PANEL: CPT | Performed by: NURSE PRACTITIONER

## 2018-07-19 PROCEDURE — 25810000003 SODIUM CHLORIDE 0.9 % WITH KCL 20 MEQ 20-0.9 MEQ/L-% SOLUTION: Performed by: NURSE PRACTITIONER

## 2018-07-19 PROCEDURE — 94799 UNLISTED PULMONARY SVC/PX: CPT

## 2018-07-19 PROCEDURE — 25010000002 CEFTRIAXONE 1 G/10ML IV PUSH SYRINGE KIT (PAD): Performed by: NURSE PRACTITIONER

## 2018-07-19 PROCEDURE — G8987 SELF CARE CURRENT STATUS: HCPCS

## 2018-07-19 PROCEDURE — 87999 UNLISTED MICROBIOLOGY PX: CPT | Performed by: NURSE PRACTITIONER

## 2018-07-19 PROCEDURE — 83036 HEMOGLOBIN GLYCOSYLATED A1C: CPT | Performed by: NURSE PRACTITIONER

## 2018-07-19 PROCEDURE — 84443 ASSAY THYROID STIM HORMONE: CPT | Performed by: NURSE PRACTITIONER

## 2018-07-19 PROCEDURE — 25010000002 POTASSIUM CHLORIDE PER 2 MEQ: Performed by: NURSE PRACTITIONER

## 2018-07-19 PROCEDURE — 25010000002 KETOROLAC TROMETHAMINE PER 15 MG: Performed by: FAMILY MEDICINE

## 2018-07-19 PROCEDURE — G8988 SELF CARE GOAL STATUS: HCPCS

## 2018-07-19 PROCEDURE — 85025 COMPLETE CBC W/AUTO DIFF WBC: CPT | Performed by: FAMILY MEDICINE

## 2018-07-19 RX ORDER — DEXAMETHASONE SODIUM PHOSPHATE 4 MG/ML
8 INJECTION, SOLUTION INTRA-ARTICULAR; INTRALESIONAL; INTRAMUSCULAR; INTRAVENOUS; SOFT TISSUE EVERY 6 HOURS
Status: DISCONTINUED | OUTPATIENT
Start: 2018-07-19 | End: 2018-07-19

## 2018-07-19 RX ORDER — ONDANSETRON 4 MG/1
4 TABLET, ORALLY DISINTEGRATING ORAL EVERY 6 HOURS PRN
Status: DISCONTINUED | OUTPATIENT
Start: 2018-07-19 | End: 2018-07-22 | Stop reason: HOSPADM

## 2018-07-19 RX ORDER — ONDANSETRON HCL IN 0.9 % NACL 8 MG/50 ML
8 INTRAVENOUS SOLUTION, PIGGYBACK (ML) INTRAVENOUS EVERY 6 HOURS PRN
Status: DISCONTINUED | OUTPATIENT
Start: 2018-07-19 | End: 2018-07-22 | Stop reason: HOSPADM

## 2018-07-19 RX ORDER — KETOROLAC TROMETHAMINE 15 MG/ML
15 INJECTION, SOLUTION INTRAMUSCULAR; INTRAVENOUS ONCE
Status: COMPLETED | OUTPATIENT
Start: 2018-07-19 | End: 2018-07-19

## 2018-07-19 RX ORDER — DEXAMETHASONE SODIUM PHOSPHATE 10 MG/ML
8 INJECTION INTRAMUSCULAR; INTRAVENOUS EVERY 6 HOURS
Status: DISCONTINUED | OUTPATIENT
Start: 2018-07-19 | End: 2018-07-22 | Stop reason: HOSPADM

## 2018-07-19 RX ORDER — ONDANSETRON 4 MG/1
4 TABLET, FILM COATED ORAL EVERY 6 HOURS PRN
Status: DISCONTINUED | OUTPATIENT
Start: 2018-07-19 | End: 2018-07-22 | Stop reason: HOSPADM

## 2018-07-19 RX ORDER — ONDANSETRON HCL IN 0.9 % NACL 8 MG/50 ML
8 INTRAVENOUS SOLUTION, PIGGYBACK (ML) INTRAVENOUS EVERY 6 HOURS PRN
Status: DISCONTINUED | OUTPATIENT
Start: 2018-07-19 | End: 2018-07-19

## 2018-07-19 RX ADMIN — AZITHROMYCIN MONOHYDRATE 500 MG: 500 INJECTION, POWDER, LYOPHILIZED, FOR SOLUTION INTRAVENOUS at 20:30

## 2018-07-19 RX ADMIN — SUCRALFATE 1 G: 1 SUSPENSION ORAL at 11:09

## 2018-07-19 RX ADMIN — Medication 250 MG: at 20:41

## 2018-07-19 RX ADMIN — SUCRALFATE 1 G: 1 SUSPENSION ORAL at 23:11

## 2018-07-19 RX ADMIN — IPRATROPIUM BROMIDE AND ALBUTEROL SULFATE 3 ML: 2.5; .5 SOLUTION RESPIRATORY (INHALATION) at 19:36

## 2018-07-19 RX ADMIN — DEXAMETHASONE SODIUM PHOSPHATE 8 MG: 10 INJECTION, SOLUTION INTRAMUSCULAR; INTRAVENOUS at 11:09

## 2018-07-19 RX ADMIN — GUAIFENESIN 600 MG: 600 TABLET, EXTENDED RELEASE ORAL at 20:40

## 2018-07-19 RX ADMIN — POTASSIUM CHLORIDE AND SODIUM CHLORIDE 125 ML/HR: 900; 150 INJECTION, SOLUTION INTRAVENOUS at 05:50

## 2018-07-19 RX ADMIN — IPRATROPIUM BROMIDE AND ALBUTEROL SULFATE 3 ML: 2.5; .5 SOLUTION RESPIRATORY (INHALATION) at 13:14

## 2018-07-19 RX ADMIN — SUCRALFATE 1 G: 1 SUSPENSION ORAL at 18:25

## 2018-07-19 RX ADMIN — GUAIFENESIN 600 MG: 600 TABLET, EXTENDED RELEASE ORAL at 10:07

## 2018-07-19 RX ADMIN — Medication 250 MG: at 10:07

## 2018-07-19 RX ADMIN — POTASSIUM CHLORIDE AND SODIUM CHLORIDE 125 ML/HR: 900; 150 INJECTION, SOLUTION INTRAVENOUS at 13:53

## 2018-07-19 RX ADMIN — DEXAMETHASONE SODIUM PHOSPHATE 8 MG: 10 INJECTION, SOLUTION INTRAMUSCULAR; INTRAVENOUS at 18:25

## 2018-07-19 RX ADMIN — IPRATROPIUM BROMIDE AND ALBUTEROL SULFATE 3 ML: 2.5; .5 SOLUTION RESPIRATORY (INHALATION) at 06:19

## 2018-07-19 RX ADMIN — POTASSIUM CHLORIDE AND SODIUM CHLORIDE 125 ML/HR: 900; 150 INJECTION, SOLUTION INTRAVENOUS at 23:08

## 2018-07-19 RX ADMIN — METOPROLOL SUCCINATE 25 MG: 25 TABLET, FILM COATED, EXTENDED RELEASE ORAL at 10:07

## 2018-07-19 RX ADMIN — ENOXAPARIN SODIUM 40 MG: 40 INJECTION SUBCUTANEOUS at 20:40

## 2018-07-19 RX ADMIN — POTASSIUM CHLORIDE 20 MEQ: 200 INJECTION, SOLUTION INTRAVENOUS at 01:09

## 2018-07-19 RX ADMIN — SUCRALFATE 1 G: 1 SUSPENSION ORAL at 05:22

## 2018-07-19 RX ADMIN — Medication 1 G: at 18:25

## 2018-07-19 RX ADMIN — DEXAMETHASONE SODIUM PHOSPHATE 8 MG: 10 INJECTION, SOLUTION INTRAMUSCULAR; INTRAVENOUS at 23:11

## 2018-07-19 RX ADMIN — POTASSIUM CHLORIDE 20 MEQ: 200 INJECTION, SOLUTION INTRAVENOUS at 03:23

## 2018-07-19 RX ADMIN — PANTOPRAZOLE SODIUM 40 MG: 40 TABLET, DELAYED RELEASE ORAL at 05:22

## 2018-07-19 RX ADMIN — KETOROLAC TROMETHAMINE 15 MG: 15 INJECTION, SOLUTION INTRAMUSCULAR; INTRAVENOUS at 04:35

## 2018-07-19 NOTE — PLAN OF CARE
Problem: Patient Care Overview  Goal: Plan of Care Review  Outcome: Ongoing (interventions implemented as appropriate)   07/19/18 2410   Coping/Psychosocial   Plan of Care Reviewed With patient   Plan of Care Review   Progress no change   OTHER   Outcome Summary Initial RDN brooks. Pt tells me her appetite has been poor for approx 2 wks secondary to her feeling ill. She c/o nausea and diarrhea. She is on a cardiac diet and is agreeable to Ensure daily. Educated Pt on importance of good nutrition, even when feeling poorly, and encourage supplement use post d/c. Advised Pt of alt selections, interviewed for preferences, and will continue to follow.

## 2018-07-19 NOTE — PROGRESS NOTES
Discharge Planning Assessment  [facility]     Patient Name: Milton Mae  MRN: [id number]  Today's Date: 7/19/2018    Admit Date: 7/18/2018          Discharge Needs Assessment     Row Name 07/19/18 1527       Living Environment    Lives With child(maty), adult    Name(s) of Who Lives With Patient Nicholas Mae    Current Living Arrangements home/apartment/condo    Primary Care Provided by self;child(maty)    Provides Primary Care For no one    Family Caregiver if Needed child(maty), adult    Family Caregiver Names Nicholas    Quality of Family Relationships involved;helpful;supportive    Able to Return to Prior Arrangements yes       Resource/Environmental Concerns    Resource/Environmental Concerns none       Transition Planning    Patient/Family Anticipates Transition to home with family    Patient/Family Anticipated Services at Transition none    Transportation Anticipated family or friend will provide       Discharge Needs Assessment    Readmission Within the Last 30 Days no previous admission in last 30 days    Concerns to be Addressed adjustment to diagnosis/illness    Equipment Currently Used at Home cane, straight;shower chair;commode;oxygen    Anticipated Changes Related to Illness none    Outpatient/Agency/Support Group Needs homecare agency    Discharge Facility/Level of Care Needs home with home health    Discharge Coordination/Progress Pt states she lives with her son, Nicholas, and plans to return there at d/c. Discussed d/c planning with her and mentioned home health. She said that she wanted her son to decide that and he will be back Friday am. Will check back with him at that time.             Discharge Plan    No documentation.       Destination     No service coordination in this encounter.      Durable Medical Equipment     No service coordination in this encounter.      Dialysis/Infusion     No service coordination in this encounter.      Home Medical Care     No service coordination in this encounter.       Social Care     No service coordination in this encounter.                Demographic Summary    No documentation.           Functional Status    No documentation.           Psychosocial    No documentation.           Abuse/Neglect    No documentation.           Legal    No documentation.           Substance Abuse    No documentation.           Patient Forms    No documentation.         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

## 2018-07-19 NOTE — PROGRESS NOTES
[facility] Medicine Services  INPATIENT PROGRESS NOTE    Patient Name: Milton Mae  Date of Admission: 7/18/2018  Today's Date: 07/19/18  Length of Stay: 1  Primary Care Physician: Javier Ng MD    Subjective   Chief Complaint: Nausea  HPI   Doing ok.  SOA iimproving  Afebrile  Diarrhea improving.  Still nauseated  No black or bloody stools.        Review of Systems   Constitutional: Positive for fatigue. Negative for fever.   HENT: Negative for congestion and ear pain.    Eyes: Negative for pain and visual disturbance.   Respiratory: Positive for cough and shortness of breath. Negative for wheezing.    Cardiovascular: Negative for chest pain and palpitations.   Gastrointestinal: Positive for diarrhea and nausea. Negative for vomiting.   Endocrine: Negative for heat intolerance.   Genitourinary: Negative for dysuria and frequency.   Musculoskeletal: Negative for arthralgias and back pain.   Skin: Negative for rash and wound.   Neurological: Negative for dizziness and light-headedness.   Psychiatric/Behavioral: Negative for confusion. The patient is not nervous/anxious.    All other systems reviewed and are negative.     All pertinent negatives and positives are as above. All other systems have been reviewed and are negative unless otherwise stated.     Objective    Temp:  [96.3 °F (35.7 °C)-99.3 °F (37.4 °C)] 97.6 °F (36.4 °C)  Heart Rate:  [] 90  Resp:  [13-20] 16  BP: (107-135)/(35-70) 120/62  Physical Exam   Constitutional: She is oriented to person, place, and time. She appears well-developed and well-nourished.   HENT:   Head: Normocephalic and atraumatic.   Right Ear: External ear normal.   Left Ear: External ear normal.   Nose: Nose normal.   Mouth/Throat: Oropharynx is clear and moist.   Eyes: Conjunctivae and EOM are normal.   Neck: Normal range of motion. Neck supple.   Cardiovascular: Normal rate, regular rhythm and normal heart sounds.    Pulmonary/Chest:  Effort normal. She has decreased breath sounds. She has rhonchi.   Abdominal: Soft. Bowel sounds are normal. She exhibits no distension. There is generalized tenderness.   Musculoskeletal: Normal range of motion.   Neurological: She is alert and oriented to person, place, and time.   Skin: Skin is warm and dry.   Psychiatric: She has a normal mood and affect. Her speech is normal and behavior is normal. Cognition and memory are normal.           Results Review:  I have reviewed the labs, radiology results, and diagnostic studies.    Laboratory Data:     Results from last 7 days  Lab Units 07/19/18  0430 07/19/18  0038 07/18/18  1559   WBC 10*3/mm3 8.98 10.59 13.56*   HEMOGLOBIN g/dL 9.7* 9.6* 11.5*   HEMATOCRIT % 30.3* 28.6* 35.1*   PLATELETS 10*3/mm3 245 232 321          Results from last 7 days  Lab Units 07/19/18  0656 07/18/18  1559   SODIUM mmol/L 144 139   POTASSIUM mmol/L 5.3 3.1*   CHLORIDE mmol/L 111* 96*   CO2 mmol/L 25.0 30.0   BUN mg/dL 14 24*   CREATININE mg/dL 0.71 0.84   CALCIUM mg/dL 8.6 9.6   BILIRUBIN mg/dL 0.2 0.2   ALK PHOS U/L 32 43   ALT (SGPT) U/L 19 <15   AST (SGOT) U/L 22 32   GLUCOSE mg/dL 132* 197*       Culture Data:   Blood Culture   Date Value Ref Range Status   07/18/2018 No growth at less than 24 hours  Preliminary       Radiology Data:   Imaging Results (last 24 hours)     Procedure Component Value Units Date/Time    CT Abdomen Pelvis With Contrast [[id number]] Collected:  07/18/18 1720     Updated:  07/18/18 1725    Narrative:       CT ABDOMEN PELVIS W CONTRAST- 7/18/2018 5:05 PM CDT     HISTORY: Abd pain, fever, abscess suspected       COMPARISON: None.      DOSE LENGTH PRODUCT: 242 mGy cm. Automated exposure control was also  utilized to decrease patient radiation dose.     TECHNIQUE: Following the intravenous administration of contrast, helical  CT tomographic images of the abdomen and pelvis were acquired.  Multiplanar reformatted images were provided for review.      FINDINGS:    Dependent changes are seen in the LEFT lung base. Bronchiectasis is  noted in the RIGHT middle lobe.      LIVER: No focal liver lesion. The hepatic vasculature is patent.      BILIARY SYSTEM: Multiple stones are seen in the gallbladder. There is no  pericholecystic fluid or cold bladder distention.      PANCREAS: No focal pancreatic lesion.      SPLEEN: Unremarkable.      KIDNEYS AND ADRENALS: Numerous low-density lesions in bilateral kidneys  most likely represent cysts but some are too small to characterize. The  adrenal glands are unremarkable The ureters are decompressed and normal  in appearance.     RETROPERITONEUM: No mass, lymphadenopathy or hemorrhage.      GI TRACT: Multiple colonic diverticula are present, but there is no wall  thickening or pericolonic stranding to suggest diverticulitis. No  obstruction or wall thickening is seen in the remainder of the  visualized GI tract. The appendix is visualized and unremarkable.     OTHER: There is no mesenteric mass, lymphadenopathy or fluid collection.  The abdominopelvic vasculature is patent. The osseous structures and  soft tissues demonstrate no worrisome lesions.          PELVIS: No mass lesion, fluid collection or significant lymphadenopathy  is seen in the pelvis. The urinary bladder is normal in appearance.       Impression:       1. Cholelithiasis without evidence of acute cholecystitis.  2. Diverticulosis without evidence of diverticulitis.  3. Chronic changes in the lower lungs.         This report was finalized on 07/18/2018 17:22 by Dr. Eliazar Thomas MD.    XR Chest 1 View [[id number]] Collected:  07/18/18 1623     Updated:  07/18/18 1628    Narrative:       EXAMINATION:  XR CHEST 1 VW-  7/18/2018 4:20 PM CDT     HISTORY: Severe sepsis protocol     COMPARISON: No comparison study.     FINDINGS:  There is an area of infiltrate in the left perihilar region  and the left upper lobe. There is a linear band of scarring projected  over the right hilum.  Coarse markings and bronchial wall thickening  appears stable. Heart size is normal.       Impression:       1. Probable left perihilar pneumonia. Follow-up is recommended to  document resolution.  2. Linear scarring projected over the right hilum. Coarse markings and  bronchial wall thickening, stable.        This report was finalized on 07/18/2018 16:25 by Dr. Ramsey Grover MD.          I have reviewed the patient's current medications.     Assessment/Plan     Hospital Problem List     Pneumonia due to infectious organism        1.  Sepsis  -IVF  -IV abx  -Blood/sputum cultures P    2.  CAP  -Rocephin/Zithromax  -Cultures P    3.  Hypokalemia  -Resolved  -Telemetry    4.  GERD  -protonix    5.  HTN  -metoprolol  -Lisinopril/HCTZ    6.  COPD  -duonebs    7.  Chronic anemia  -Monitor H&H    8.  Hyperglycemia  -Check A1C    9.  Viral GE with intractable nausea, vomiting, diarrhea  -Zofran  -IVF  -C. Diff negative  -IV Decadron x 1            Discharge Planning: I expect the patient to be discharged to home vs SNF in 2-3 days    Hans Wise MD   07/19/18   11:52 AM

## 2018-07-19 NOTE — PLAN OF CARE
Problem: Patient Care Overview  Goal: Plan of Care Review  Outcome: Ongoing (interventions implemented as appropriate)   07/19/18 0456   Coping/Psychosocial   Plan of Care Reviewed With patient   Plan of Care Review   Progress no change   OTHER   Outcome Summary Patient admitted to floor from ER this shift in stable condition. GI panel sent and Contact Spore precautions initiated. VSS and MD notified of continued positive sepsis screen.        Problem: Fall Risk (Adult)  Goal: Identify Related Risk Factors and Signs and Symptoms  Outcome: Outcome(s) achieved Date Met: 07/19/18    Goal: Absence of Fall  Outcome: Ongoing (interventions implemented as appropriate)      Problem: Skin Injury Risk (Adult)  Goal: Identify Related Risk Factors and Signs and Symptoms  Outcome: Outcome(s) achieved Date Met: 07/19/18    Goal: Skin Health and Integrity  Outcome: Ongoing (interventions implemented as appropriate)      Problem: Pneumonia (Adult)  Goal: Signs and Symptoms of Listed Potential Problems Will be Absent, Minimized or Managed (Pneumonia)  Outcome: Ongoing (interventions implemented as appropriate)

## 2018-07-19 NOTE — PLAN OF CARE
Problem: Patient Care Overview  Goal: Plan of Care Review   07/19/18 1600   Coping/Psychosocial   Plan of Care Reviewed With patient   Plan of Care Review   Progress improving   OTHER   Outcome Summary IV decadron x1, IVF continue, up to BSC, will continue to monitor       Problem: Fall Risk (Adult)  Goal: Absence of Fall  Outcome: Ongoing (interventions implemented as appropriate)      Problem: Skin Injury Risk (Adult)  Goal: Skin Health and Integrity  Outcome: Ongoing (interventions implemented as appropriate)      Problem: Pneumonia (Adult)  Goal: Signs and Symptoms of Listed Potential Problems Will be Absent, Minimized or Managed (Pneumonia)  Outcome: Ongoing (interventions implemented as appropriate)

## 2018-07-19 NOTE — PLAN OF CARE
Problem: Patient Care Overview  Goal: Plan of Care Review  Outcome: Ongoing (interventions implemented as appropriate)   07/19/18 1670   Coping/Psychosocial   Plan of Care Reviewed With patient   Plan of Care Review   Progress no change   OTHER   Outcome Summary OT eval completed. Pt awake and alert in fowlers, reports feeling much weaker than her baseline. She demos decreased strength and endurance w LB ADL at EOB and w transfer to chair. She is unable to walk more than a few feet at this time due to weakness, fatigue, and decreased activity tolerance. Skilled OT required to address these deficits limiting pt safety and independence from her PLOF. OT recommends d/c home w assist and HH for home eval.

## 2018-07-19 NOTE — THERAPY EVALUATION
Acute Care - Occupational Therapy Initial Evaluation  [facility]     Patient Name: Milton Mae  : 1926  MRN: [id number]  Today's Date: 2018  Onset of Illness/Injury or Date of Surgery: 18  Date of Referral to OT: 18  Referring Physician: LUIS MIGUEL Mcpherson    Admit Date: 2018       ICD-10-CM ICD-9-CM   1. Pneumonia due to infectious organism, unspecified laterality, unspecified part of lung J18.9 136.9     484.8   2. Nausea and vomiting, intractability of vomiting not specified, unspecified vomiting type R11.2 787.01   3. Impaired mobility and ADLs Z74.09 799.89     Patient Active Problem List   Diagnosis   • Pneumonia due to infectious organism     Past Medical History:   Diagnosis Date   • COPD (chronic obstructive pulmonary disease) (CMS/HCC)    • GERD (gastroesophageal reflux disease)    • Hypertension    • Pneumonia      Past Surgical History:   Procedure Laterality Date   • CATARACT EXTRACTION, BILATERAL     • THYROID SURGERY            OT ASSESSMENT FLOWSHEET (last 72 hours)      Occupational Therapy Evaluation     Row Name 18 0700                   OT Evaluation Time/Intention    Subjective Information complains of;pain;dyspnea;fatigue;weakness  -        Document Type evaluation  -        Mode of Treatment occupational therapy  -           General Information    Patient Profile Reviewed? yes  -        Onset of Illness/Injury or Date of Surgery 18  -        Referring Physician LUIS MIGUEL Mcpherson  -        Patient Observations alert;cooperative;agree to therapy  -        Patient/Family Observations no family present  -        General Observations of Patient awake and alert in fowlers, no apparent distress, supplemental O2 nc  -        Prior Level of Function independent:;all household mobility;community mobility;ADL's;max assist:;cooking;cleaning  -        Equipment Currently Used at Home commode, bedside;cane, straight;walker,  rolling;oxygen;bath bench  -        Pertinent History of Current Functional Problem presented to ED due to increasing weakness and stomach pain, dx: pneumonia  -        Existing Precautions/Restrictions fall;oxygen therapy device and L/min  -        Limitations/Impairments hearing  -        Equipment Issued to Patient gait belt  -        Risks Reviewed patient:;LOB;nausea/vomiting;dizziness;increased discomfort;change in vital signs;lines disloged  -        Benefits Reviewed patient:;improve function;increase independence;increase strength;increase balance;decrease pain;decrease risk of DVT;improve skin integrity;increase knowledge  -        Barriers to Rehab previous functional deficit  -           Relationship/Environment    Primary Source of Support/Comfort child(maty)  -MK        Lives With child(maty), adult   son  -           Resource/Environmental Concerns    Current Living Arrangements home/apartment/condo  -           Home Main Entrance    Number of Stairs, Main Entrance two  -        Stair Railings, Main Entrance railings on both sides of stairs  -           Cognitive Assessment/Interventions    Additional Documentation Cognitive Assessment/Intervention (Group)  -           Cognitive Assessment/Intervention- PT/OT    Affect/Mental Status (Cognitive) WFL  -        Orientation Status (Cognition) oriented x 4  -MK        Follows Commands (Cognition) WFL  -        Cognitive Function (Cognitive) WFL  -        Personal Safety Interventions fall prevention program maintained;gait belt;nonskid shoes/slippers when out of bed;supervised activity  -           Safety Issues, Functional Mobility    Impairments Affecting Function (Mobility) balance;endurance/activity tolerance;pain;strength;shortness of breath  -           Bed Mobility Assessment/Treatment    Bed Mobility Assessment/Treatment scooting/bridging;supine-sit  -        Scooting/Bridging [place] (Bed Mobility) minimum  assist (75% patient effort)  -        Supine-Sit [place] (Bed Mobility) moderate assist (50% patient effort)  -        Assistive Device (Bed Mobility) bed rails;head of bed elevated  -           Functional Mobility    Functional Mobility- Ind. Level minimum assist (75% patient effort)  -        Functional Mobility- Comment took 4 steps to chair, stated she was unable to walk further due to weakness/fatigue  -           Transfer Assessment/Treatment    Transfer Assessment/Treatment sit-stand transfer;stand-sit transfer  -           Sit-Stand Transfer    Sit-Stand [place] (Transfers) contact guard;minimum assist (75% patient effort)  -           Stand-Sit Transfer    Stand-Sit [place] (Transfers) contact guard  -           ADL Assessment/Intervention    BADL Assessment/Intervention lower body dressing  -           Lower Body Dressing Assessment/Training    Lower Body Dressing Nelson Level don;doff;socks;minimum assist (75% patient effort)  -        Lower Body Dressing Position edge of bed sitting  -           BADL Safety/Performance    Impairments, BADL Safety/Performance balance;endurance/activity tolerance;pain;shortness of breath;strength  -           General ROM    GENERAL ROM COMMENTS AROM WFL BUE  -           General Assessment (Manual Muscle Testing)    Comment, General Manual Muscle Testing (MMT) Assessment 4/5 E  -           Motor Assessment/Interventions    Additional Documentation Balance (Group)  -           Balance    Balance static sitting balance;static standing balance;dynamic sitting balance;dynamic standing balance  -           Static Sitting Balance    Level of [place] (Unsupported Sitting, Static Balance) supervision  -        Sitting Position (Unsupported Sitting, Static Balance) sitting on edge of bed  -           Dynamic Sitting Balance    Level of [place], Reaches Outside Midline (Sitting, Dynamic Balance) contact guard assist   -        Sitting Position, Reaches Outside Midline (Sitting, Dynamic Balance) sitting on edge of bed  -           Static Standing Balance    Level of [place] (Supported Standing, Static Balance) contact guard assist  -           Dynamic Standing Balance    Level of [place], Reaches Outside Midline (Standing, Dynamic Balance) minimal assist, 75% patient effort  -           Sensory Assessment/Intervention    Sensory General Assessment no sensation deficits identified  -           Positioning and Restraints    Pre-Treatment Position in bed  -MK        Post Treatment Position chair  -        In Chair sitting;call light within reach;encouraged to call for assist;with nsg  -           Pain Assessment    Additional Documentation Pain Scale: Numbers Pre/Post-Treatment (Group)  -           Pain Scale: Numbers Pre/Post-Treatment    Pain Scale: Numbers, Pretreatment 5/10  -MK        Pain Scale: Numbers, Post-Treatment 5/10  -MK        Pre/Post Treatment Pain Comment stomach  -        Pain Intervention(s) Repositioned;Ambulation/increased activity  -           Plan of Care Review    Plan of Care Reviewed With patient  -           Clinical Impression (OT)    Date of Referral to OT 07/18/18  -        OT Diagnosis decreased ADL  -        Prognosis (OT Eval) good  -        Patient/Family Goals Statement (OT Eval) increase strength for independence and safety  -        Criteria for Skilled Therapeutic Interventions Met (OT Eval) yes;treatment indicated  -        Rehab Potential (OT Eval) good, to achieve stated therapy goals  -        Therapy Frequency (OT Eval) 5 times/wk  -        Predicted Duration of Therapy Intervention (Therapy Eval) 10 days  -        Care Plan Review (OT) evaluation/treatment results reviewed;care plan/treatment goals reviewed;risks/benefits reviewed;current/potential barriers reviewed;patient/other agree to care plan  -        Anticipated Discharge  Disposition (OT) home with assist;home with 24/7 care  -           Planned OT Interventions    Planned Therapy Interventions (OT Eval) activity tolerance training;BADL retraining;functional balance retraining;occupation/activity based interventions;patient/caregiver education/training;strengthening exercise;transfer/mobility retraining  -           OT Goals    Transfer Goal Selection (OT) transfer, OT goal 1  -MK        Bathing Goal Selection (OT) bathing, OT goal 1  -MK        Dressing Goal Selection (OT) dressing, OT goal 1  -MK           Transfer Goal 1 (OT)    Activity/Assistive Device (Transfer Goal 1, OT) toilet;tub;tub bench  -MK        [place] Level/Cues Needed (Transfer Goal 1, OT) conditional independence  -MK        Time Frame (Transfer Goal 1, OT) 10 days  -MK        Progress/Outcome (Transfer Goal 1, OT) goal ongoing  -           Bathing Goal 1 (OT)    Activity/Assistive Device (Bathing Goal 1, OT) bathing skills, all  -MK        [place] Level/Cues Needed (Bathing Goal 1, OT) contact guard assist  -MK        Time Frame (Bathing Goal 1, OT) 10 days  -MK        Progress/Outcomes (Bathing Goal 1, OT) goal ongoing  -MK           Dressing Goal 1 (OT)    Activity/Assistive Device (Dressing Goal 1, OT) dressing skills, all  -MK        [place]/Cues Needed (Dressing Goal 1, OT) supervision required  -MK        Time Frame (Dressing Goal 1, OT) 10 days  -MK        Progress/Outcome (Dressing Goal 1, OT) goal ongoing  -MK           Living Environment    Home Accessibility stairs to enter home;tub/shower is not walk in  -          User Key  (r) = Recorded By, (t) = Taken By, (c) = Cosigned By    Initials Name Effective Dates    CHRISTIAN Roy OT 05/09/18 -            Occupational Therapy Education     Title: PT OT SLP Therapies (Done)     Topic: Occupational Therapy (Done)     Point: ADL training (Done)     Description: Instruct learner(s) on proper safety adaptation and remediation techniques  during self care or transfers.   Instruct in proper use of assistive devices.   Learning Progress Summary     Learner Status Readiness Method Response Comment Documented by    Patient Done Acceptance E VU transfer training, benefit of increased activity, benefit of OT  07/19/18 0737                      User Key     Initials Effective Dates Name Provider Type Discipline     05/09/18 -  Madyson Roy OT Occupational Therapist OT                  OT Recommendation and Plan  Outcome Summary/Treatment Plan (OT)  Anticipated Discharge Disposition (OT): home with assist, home with 24/7 care  Planned Therapy Interventions (OT Eval): activity tolerance training, BADL retraining, functional balance retraining, occupation/activity based interventions, patient/caregiver education/training, strengthening exercise, transfer/mobility retraining  Therapy Frequency (OT Eval): 5 times/wk  Plan of Care Review  Plan of Care Reviewed With: patient  Plan of Care Reviewed With: patient  Outcome Summary: OT eval completed. Pt awake and alert in fowlers, reports feeling much weaker than her baseline. She demos decreased strength and endurance w LB ADL at EOB and w transfer to chair. She is unable to walk more than a few feet at this time due to weakness, fatigue, and decreased activity tolerance. Skilled OT required to address these deficits limiting pt safety and independence from her PLOF. OT recommends d/c home w assist and HH for home eval.           Outcome Measures     Row Name 07/19/18 0700             How much help from another is currently needed...    Putting on and taking off regular lower body clothing? 3  -MK      Bathing (including washing, rinsing, and drying) 3  -MK      Toileting (which includes using toilet bed pan or urinal) 3  -MK      Putting on and taking off regular upper body clothing 3  -MK      Taking care of personal grooming (such as brushing teeth) 4  -MK      Eating meals 4  -MK      Score 20  -MK          Functional Assessment    Outcome Measure Options AM-PAC 6 Clicks Daily Activity (OT)  -        User Key  (r) = Recorded By, (t) = Taken By, (c) = Cosigned By    Initials Name Provider Type    CHRISTIAN Roy OT Occupational Therapist          Time Calculation:   OT Start Time: 0658  OT Stop Time: 0736  OT Time Calculation (min): 38 min  Therapy Suggested Charges     Code   Minutes Charges    None           Therapy Charges for Today     Code Description Service Date Service Provider Modifiers Qty    81027997460 HC OT EVAL LOW COMPLEXITY 3 7/19/2018 Madyson Roy OT GO 1    [id number]  OT SELFCARE CURRENT 7/19/2018 Madyson Roy OT GO, CJ 1    [id number]  OT SELFCARE PROJECTED 7/19/2018 Madyson Roy OT GO, CI 1          OT G-codes  OT Professional Judgement Used?: Yes  OT Functional Scales Options: AM-PAC 6 Clicks Daily Activity (OT)  Score: 20  Functional Limitation: Self care  Self Care Current Status (): At least 20 percent but less than 40 percent impaired, limited or restricted  Self Care Goal Status (): At least 1 percent but less than 20 percent impaired, limited or restricted    Madyson Roy OT  7/19/2018

## 2018-07-20 LAB
ALBUMIN SERPL-MCNC: 3.1 G/DL (ref 3.5–5)
ALBUMIN/GLOB SERPL: 1.1 G/DL (ref 1.1–2.5)
ALP SERPL-CCNC: 33 U/L (ref 24–120)
ALT SERPL W P-5'-P-CCNC: 21 U/L (ref 0–54)
ANION GAP SERPL CALCULATED.3IONS-SCNC: 10 MMOL/L (ref 4–13)
AST SERPL-CCNC: 39 U/L (ref 7–45)
BACTERIA SPEC RESP CULT: NORMAL
BASOPHILS # BLD AUTO: 0.02 10*3/MM3 (ref 0–0.2)
BASOPHILS NFR BLD AUTO: 0.1 % (ref 0–2)
BILIRUB SERPL-MCNC: <0.1 MG/DL (ref 0.1–1)
BUN BLD-MCNC: 17 MG/DL (ref 5–21)
BUN/CREAT SERPL: 28.3 (ref 7–25)
CALCIUM SPEC-SCNC: 8.7 MG/DL (ref 8.4–10.4)
CHLORIDE SERPL-SCNC: 111 MMOL/L (ref 98–110)
CO2 SERPL-SCNC: 20 MMOL/L (ref 24–31)
CREAT BLD-MCNC: 0.6 MG/DL (ref 0.5–1.4)
DEPRECATED RDW RBC AUTO: 51.2 FL (ref 40–54)
EOSINOPHIL # BLD AUTO: 0 10*3/MM3 (ref 0–0.7)
EOSINOPHIL NFR BLD AUTO: 0 % (ref 0–4)
ERYTHROCYTE [DISTWIDTH] IN BLOOD BY AUTOMATED COUNT: 14.7 % (ref 12–15)
GFR SERPL CREATININE-BSD FRML MDRD: 93 ML/MIN/1.73
GLOBULIN UR ELPH-MCNC: 2.9 GM/DL
GLUCOSE BLD-MCNC: 167 MG/DL (ref 70–100)
GRAM STN SPEC: NORMAL
GRAM STN SPEC: NORMAL
HCT VFR BLD AUTO: 30.6 % (ref 37–47)
HGB BLD-MCNC: 9.6 G/DL (ref 12–16)
IMM GRANULOCYTES # BLD: 0.18 10*3/MM3 (ref 0–0.03)
IMM GRANULOCYTES NFR BLD: 1.2 % (ref 0–5)
L PNEUMO1 AG UR QL IA: NEGATIVE
LYMPHOCYTES # BLD AUTO: 0.69 10*3/MM3 (ref 0.72–4.86)
LYMPHOCYTES NFR BLD AUTO: 4.7 % (ref 15–45)
MCH RBC QN AUTO: 29.6 PG (ref 28–32)
MCHC RBC AUTO-ENTMCNC: 31.4 G/DL (ref 33–36)
MCV RBC AUTO: 94.4 FL (ref 82–98)
MONOCYTES # BLD AUTO: 0.31 10*3/MM3 (ref 0.19–1.3)
MONOCYTES NFR BLD AUTO: 2.1 % (ref 4–12)
NEUTROPHILS # BLD AUTO: 13.54 10*3/MM3 (ref 1.87–8.4)
NEUTROPHILS NFR BLD AUTO: 91.9 % (ref 39–78)
NRBC BLD MANUAL-RTO: 0 /100 WBC (ref 0–0)
PLATELET # BLD AUTO: 263 10*3/MM3 (ref 130–400)
PMV BLD AUTO: 10.2 FL (ref 6–12)
POTASSIUM BLD-SCNC: 4.9 MMOL/L (ref 3.5–5.3)
PROT SERPL-MCNC: 6 G/DL (ref 6.3–8.7)
RBC # BLD AUTO: 3.24 10*6/MM3 (ref 4.2–5.4)
S PNEUM AG SPEC QL LA: NEGATIVE
SODIUM BLD-SCNC: 141 MMOL/L (ref 135–145)
WBC NRBC COR # BLD: 14.74 10*3/MM3 (ref 4.8–10.8)

## 2018-07-20 PROCEDURE — 97535 SELF CARE MNGMENT TRAINING: CPT

## 2018-07-20 PROCEDURE — 94760 N-INVAS EAR/PLS OXIMETRY 1: CPT

## 2018-07-20 PROCEDURE — 87899 AGENT NOS ASSAY W/OPTIC: CPT | Performed by: FAMILY MEDICINE

## 2018-07-20 PROCEDURE — 25010000002 DEXAMETHASONE PER 1 MG: Performed by: FAMILY MEDICINE

## 2018-07-20 PROCEDURE — 94799 UNLISTED PULMONARY SVC/PX: CPT

## 2018-07-20 PROCEDURE — 87205 SMEAR GRAM STAIN: CPT | Performed by: NURSE PRACTITIONER

## 2018-07-20 PROCEDURE — 80053 COMPREHEN METABOLIC PANEL: CPT | Performed by: FAMILY MEDICINE

## 2018-07-20 PROCEDURE — 25010000002 FUROSEMIDE PER 20 MG: Performed by: FAMILY MEDICINE

## 2018-07-20 PROCEDURE — 25010000002 ENOXAPARIN PER 10 MG: Performed by: NURSE PRACTITIONER

## 2018-07-20 PROCEDURE — 25810000003 SODIUM CHLORIDE 0.9 % WITH KCL 20 MEQ 20-0.9 MEQ/L-% SOLUTION: Performed by: NURSE PRACTITIONER

## 2018-07-20 PROCEDURE — 85025 COMPLETE CBC W/AUTO DIFF WBC: CPT | Performed by: FAMILY MEDICINE

## 2018-07-20 RX ORDER — FUROSEMIDE 10 MG/ML
20 INJECTION INTRAMUSCULAR; INTRAVENOUS ONCE
Status: COMPLETED | OUTPATIENT
Start: 2018-07-20 | End: 2018-07-20

## 2018-07-20 RX ORDER — CEFDINIR 300 MG/1
300 CAPSULE ORAL EVERY 12 HOURS SCHEDULED
Status: DISCONTINUED | OUTPATIENT
Start: 2018-07-20 | End: 2018-07-22 | Stop reason: HOSPADM

## 2018-07-20 RX ADMIN — DEXAMETHASONE SODIUM PHOSPHATE 8 MG: 10 INJECTION, SOLUTION INTRAMUSCULAR; INTRAVENOUS at 17:29

## 2018-07-20 RX ADMIN — SUCRALFATE 1 G: 1 SUSPENSION ORAL at 05:29

## 2018-07-20 RX ADMIN — CEFDINIR 300 MG: 300 CAPSULE ORAL at 20:52

## 2018-07-20 RX ADMIN — METOPROLOL SUCCINATE 25 MG: 25 TABLET, FILM COATED, EXTENDED RELEASE ORAL at 08:16

## 2018-07-20 RX ADMIN — GUAIFENESIN 600 MG: 600 TABLET, EXTENDED RELEASE ORAL at 20:52

## 2018-07-20 RX ADMIN — POTASSIUM CHLORIDE AND SODIUM CHLORIDE 125 ML/HR: 900; 150 INJECTION, SOLUTION INTRAVENOUS at 08:17

## 2018-07-20 RX ADMIN — IPRATROPIUM BROMIDE AND ALBUTEROL SULFATE 3 ML: 2.5; .5 SOLUTION RESPIRATORY (INHALATION) at 06:31

## 2018-07-20 RX ADMIN — DEXAMETHASONE SODIUM PHOSPHATE 8 MG: 10 INJECTION, SOLUTION INTRAMUSCULAR; INTRAVENOUS at 11:53

## 2018-07-20 RX ADMIN — Medication 250 MG: at 08:17

## 2018-07-20 RX ADMIN — ENOXAPARIN SODIUM 40 MG: 40 INJECTION SUBCUTANEOUS at 20:52

## 2018-07-20 RX ADMIN — SUCRALFATE 1 G: 1 SUSPENSION ORAL at 17:28

## 2018-07-20 RX ADMIN — PANTOPRAZOLE SODIUM 40 MG: 40 TABLET, DELAYED RELEASE ORAL at 05:31

## 2018-07-20 RX ADMIN — IPRATROPIUM BROMIDE AND ALBUTEROL SULFATE 3 ML: 2.5; .5 SOLUTION RESPIRATORY (INHALATION) at 19:58

## 2018-07-20 RX ADMIN — FUROSEMIDE 20 MG: 10 INJECTION, SOLUTION INTRAVENOUS at 15:43

## 2018-07-20 RX ADMIN — DEXAMETHASONE SODIUM PHOSPHATE 8 MG: 10 INJECTION, SOLUTION INTRAMUSCULAR; INTRAVENOUS at 05:29

## 2018-07-20 RX ADMIN — SUCRALFATE 1 G: 1 SUSPENSION ORAL at 12:26

## 2018-07-20 RX ADMIN — GUAIFENESIN 600 MG: 600 TABLET, EXTENDED RELEASE ORAL at 08:16

## 2018-07-20 RX ADMIN — Medication 250 MG: at 20:52

## 2018-07-20 NOTE — PLAN OF CARE
Problem: Patient Care Overview  Goal: Plan of Care Review  Outcome: Ongoing (interventions implemented as appropriate)   07/20/18 1203   Coping/Psychosocial   Plan of Care Reviewed With patient   Plan of Care Review   Progress improving   OTHER   Outcome Summary Pt CGA for bed mobility and ambulation with RW. Pt min A for transfers. Pt c/o weakness and fatigue. PARKS educated pt and son on benefits of activity and building strength and activity tolerance of pt by allowing pt to assist with simple tasks at home and encouraging more movement. Pt would benefit from RW at discharge and HH OT/PT. Continue OT POC           Home

## 2018-07-20 NOTE — PLAN OF CARE
Problem: Patient Care Overview  Goal: Plan of Care Review  Outcome: Ongoing (interventions implemented as appropriate)   07/20/18 0615   Coping/Psychosocial   Plan of Care Reviewed With patient   Plan of Care Review   Progress no change   OTHER   Outcome Summary VSS, UA still needed, as well as sputum. IVF plus K+ at 125. No c/o pain this shift. Will continue to monitor

## 2018-07-20 NOTE — PROGRESS NOTES
[facility] Medicine Services  INPATIENT PROGRESS NOTE    Patient Name: Milton Mae  Date of Admission: 7/18/2018  Today's Date: 07/20/18  Length of Stay: 2  Primary Care Physician: Javier Ng MD    Subjective   Chief Complaint: Nausea  HPI   Doing well.  Less nauseated.  Ate well for breakfast and lunch.  No SOA.  Tolerating home oxygen  Afebrile.  Bowel movements are more solid  Wants to go home.        Review of Systems   Constitutional: Positive for fatigue. Negative for fever.   HENT: Negative for congestion and ear pain.    Eyes: Negative for pain and visual disturbance.   Respiratory: Negative for cough, shortness of breath and wheezing.    Cardiovascular: Negative for chest pain and palpitations.   Gastrointestinal: Negative for diarrhea, nausea and vomiting.   Endocrine: Negative for heat intolerance.   Genitourinary: Negative for dysuria and frequency.   Musculoskeletal: Negative for arthralgias and back pain.   Skin: Negative for rash and wound.   Neurological: Positive for weakness. Negative for dizziness and light-headedness.   Psychiatric/Behavioral: Negative for confusion. The patient is not nervous/anxious.    All other systems reviewed and are negative.       All pertinent negatives and positives are as above. All other systems have been reviewed and are negative unless otherwise stated.     Objective    Temp:  [97.7 °F (36.5 °C)-99.3 °F (37.4 °C)] 99.3 °F (37.4 °C)  Heart Rate:  [] 96  Resp:  [14-20] 20  BP: (123-135)/(43-49) 135/43  Physical Exam   Constitutional: She is oriented to person, place, and time. She appears well-developed and well-nourished.   HENT:   Head: Normocephalic and atraumatic.   Right Ear: External ear normal.   Left Ear: External ear normal.   Nose: Nose normal.   Mouth/Throat: Oropharynx is clear and moist.   Eyes: Conjunctivae and EOM are normal.   Neck: Normal range of motion. Neck supple.   Cardiovascular: Normal rate,  regular rhythm and normal heart sounds.    Pulmonary/Chest: Effort normal. She has decreased breath sounds. She has rhonchi.   Abdominal: Soft. Bowel sounds are normal. She exhibits no distension. There is no tenderness.   Musculoskeletal: Normal range of motion.   Neurological: She is alert and oriented to person, place, and time.   Skin: Skin is warm and dry.   Psychiatric: She has a normal mood and affect. Her speech is normal and behavior is normal. Cognition and memory are normal.           Results Review:  I have reviewed the labs, radiology results, and diagnostic studies.    Laboratory Data:     Results from last 7 days  Lab Units 07/20/18  1525 07/19/18  0430 07/19/18  0038   WBC 10*3/mm3 14.74* 8.98 10.59   HEMOGLOBIN g/dL 9.6* 9.7* 9.6*   HEMATOCRIT % 30.6* 30.3* 28.6*   PLATELETS 10*3/mm3 263 245 232          Results from last 7 days  Lab Units 07/20/18  1525 07/19/18  0656 07/18/18  1559   SODIUM mmol/L 141 144 139   POTASSIUM mmol/L 4.9 5.3 3.1*   CHLORIDE mmol/L 111* 111* 96*   CO2 mmol/L 20.0* 25.0 30.0   BUN mg/dL 17 14 24*   CREATININE mg/dL 0.60 0.71 0.84   CALCIUM mg/dL 8.7 8.6 9.6   BILIRUBIN mg/dL <0.1* 0.2 0.2   ALK PHOS U/L 33 32 43   ALT (SGPT) U/L 21 19 <15   AST (SGOT) U/L 39 22 32   GLUCOSE mg/dL 167* 132* 197*       Culture Data:   Blood Culture   Date Value Ref Range Status   07/18/2018 No growth at 2 days  Preliminary     Respiratory Culture   Date Value Ref Range Status   07/20/2018 Rejected  Final       Radiology Data:   Imaging Results (last 24 hours)     ** No results found for the last 24 hours. **          I have reviewed the patient's current medications.     Assessment/Plan          1.  Sepsis  -IVF d/cd  -PO abx  -Blood/sputum cultures P, NTD     2.  CAP  -Omnicef  -Cultures P, NTD     3.  Hypokalemia  -Resolved  -Telemetry     4.  GERD  -protonix     5.  HTN  -metoprolol  -Lisinopril/HCTZ     6.  COPD  -duonebs     7.  Chronic anemia  -Monitor H&H     8.   Hyperglycemia  -Check A1C     9.  Viral GE with intractable nausea, vomiting, diarrhea  -Zofran  -IVF  -C. Diff negative  -IV Decadron x 1                         Discharge Planning: I expect the patient to be discharged to home in 1-2 days with home health    Hans Wise MD   07/20/18   5:10 PM

## 2018-07-20 NOTE — PROGRESS NOTES
Continued Stay Note  [facility]     Patient Name: Milton Mae  MRN: [id number]  Today's Date: 7/20/2018    Admit Date: 7/18/2018          Discharge Plan     Row Name 07/20/18 1408       Plan    Plan Home with home health    Patient/Family in Agreement with Plan yes    Plan Comments Met with pt and her son, Nicholas, in the room. Plan is for pt to return home at d/c. They are requesting home health. Discussed options and they want [facility]. Requested orders from Dr. Wise.               Discharge Codes    No documentation.           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

## 2018-07-20 NOTE — THERAPY TREATMENT NOTE
Acute Care - Occupational Therapy Treatment Note  [facility]     Patient Name: Milton Mae  : 1926  MRN: [id number]  Today's Date: 2018  Onset of Illness/Injury or Date of Surgery: 18  Date of Referral to OT: 18  Referring Physician: LUIS MIGUEL Mcpherson    Admit Date: 2018       ICD-10-CM ICD-9-CM   1. Pneumonia due to infectious organism, unspecified laterality, unspecified part of lung J18.9 136.9     484.8   2. Nausea and vomiting, intractability of vomiting not specified, unspecified vomiting type R11.2 787.01   3. Impaired mobility and ADLs Z74.09 799.89     Patient Active Problem List   Diagnosis   • Pneumonia due to infectious organism     Past Medical History:   Diagnosis Date   • COPD (chronic obstructive pulmonary disease) (CMS/HCC)    • GERD (gastroesophageal reflux disease)    • Hypertension    • Pneumonia      Past Surgical History:   Procedure Laterality Date   • CATARACT EXTRACTION, BILATERAL     • THYROID SURGERY         Therapy Treatment          Rehabilitation Treatment Summary     Row Name 18 1106             Treatment Time/Intention    Discipline occupational therapy assistant  -TS      Document Type therapy note (daily note)  -TS      Subjective Information complains of;weakness;fatigue  -TS2      Existing Precautions/Restrictions fall;oxygen therapy device and L/min   3L  -TS3      Equipment Issued to Patient walker, front wheeled  -TS3      Recorded by [TS] CHARLY Hutton/L 18 1106  [TS2] ROXY HuttonA/L 18 1138  [TS3] CHARLY Hutton/L 18 1203      Row Name 18 1106             Cognitive Assessment/Intervention- PT/OT    Personal Safety Interventions fall prevention program maintained;gait belt;nonskid shoes/slippers when out of bed  -TS      Recorded by [TS] CHARLY Hutton/L 18 1203      Row Name 18 1106             Bed Mobility Assessment/Treatment    Bed Mobility  Assessment/Treatment scooting/bridging;supine-sit  -TS      Scooting/Bridging Mineral (Bed Mobility) contact guard  -TS      Supine-Sit Mineral (Bed Mobility) contact guard  -TS      Assistive Device (Bed Mobility) bed rails;head of bed elevated  -TS      Recorded by [TS] CHARLY Hutton/L 07/20/18 1203      Row Name 07/20/18 1106             Functional Mobility    Functional Mobility- Ind. Level contact guard assist  -TS      Functional Mobility- Device rolling walker  -TS      Functional Mobility- Comment pt ambulated toward room door from EOB with RW. Pt made it detention to door but refused to ambulate any further stating she was too weak and just couldn't do it.  -TS      Recorded by [TS] CHARLY Hutton/L 07/20/18 1203      Row Name 07/20/18 1106             Transfer Assessment/Treatment    Transfer Assessment/Treatment sit-stand transfer;stand-sit transfer  -TS      Recorded by [TS] CHARLY Hutton/L 07/20/18 1203      Row Name 07/20/18 1106             Sit-Stand Transfer    Sit-Stand Mineral (Transfers) minimum assist (75% patient effort)  -TS      Assistive Device (Sit-Stand Transfers) walker, front-wheeled  -TS      Recorded by [TS] ROXY HuttonA/L 07/20/18 1203      Row Name 07/20/18 1106             Stand-Sit Transfer    Stand-Sit Mineral (Transfers) stand by assist  -TS      Assistive Device (Stand-Sit Transfers) walker, front-wheeled  -TS      Recorded by [TS] CHARLY Hutton/L 07/20/18 1203      Row Name 07/20/18 1106             ADL Assessment/Intervention    29340 - OT Self Care/Mgmt Minutes 27  -TS      Recorded by [TS] ROXY HuttonA/L 07/20/18 1203      Row Name 07/20/18 1106             Positioning and Restraints    Pre-Treatment Position in bed  -TS      Post Treatment Position chair  -TS      In Chair sitting;call light within reach;encouraged to call for assist;with family/caregiver;with other staff  -TS       Recorded by [TS] CALI Hutton 07/20/18 1203      Row Name 07/20/18 1106             Pain Scale: Numbers Pre/Post-Treatment    Pain Scale: Numbers, Pretreatment 0/10 - no pain  -TS      Recorded by [TS] CHARLY Hutton/L 07/20/18 1203      Row Name 07/20/18 1106             Outcome Summary/Treatment Plan (OT)    Daily Summary of Progress (OT) progress towards functional goals is fair  -TS      Recorded by [TS] CHARLY Hutton/MELCHOR 07/20/18 1203        User Key  (r) = Recorded By, (t) = Taken By, (c) = Cosigned By    Initials Name Effective Dates Discipline    TS CHARLY Hutton/L 08/02/16 -  OT               Occupational Therapy Education     Title: PT OT SLP Therapies (Done)     Topic: Occupational Therapy (Done)     Point: ADL training (Done)     Description: Instruct learner(s) on proper safety adaptation and remediation techniques during self care or transfers.   Instruct in proper use of assistive devices.   Learning Progress Summary     Learner Status Readiness Method Response Comment Documented by    Patient Done Acceptance E VU transfer training, benefit of increased activity, benefit of OT  07/19/18 0747                      User Key     Initials Effective Dates Name Provider Type Discipline     05/09/18 -  Madyson Roy OT Occupational Therapist OT                OT Recommendation and Plan  Outcome Summary/Treatment Plan (OT)  Daily Summary of Progress (OT): progress towards functional goals is fair  Daily Summary of Progress (OT): progress towards functional goals is fair  Plan of Care Review  Plan of Care Reviewed With: patient  Plan of Care Reviewed With: patient  Outcome Summary: Pt CGA for bed mobility and ambulation with RW. Pt min A for transfers. Pt c/o weakness and fatigue. PARKS educated pt and son on benefits of activity and building strength and activity tolerance of pt by allowing pt to assist with simple tasks at home and encouraging more movement. Pt  would benefit from RW at discharge and HH OT/PT. Continue OT POC         Outcome Measures     Row Name 07/20/18 1200 07/19/18 0700          How much help from another is currently needed...    Putting on and taking off regular lower body clothing? 3  -TS 3  -MK     Bathing (including washing, rinsing, and drying) 3  -TS 3  -MK     Toileting (which includes using toilet bed pan or urinal) 3  -TS 3  -MK     Putting on and taking off regular upper body clothing 3  -TS 3  -MK     Taking care of personal grooming (such as brushing teeth) 4  -TS 4  -MK     Eating meals 4  -TS 4  -MK     Score 20  -TS 20  -MK        Functional Assessment    Outcome Measure Options AM-PAC 6 Clicks Daily Activity (OT)  -TS AM-PAC 6 Clicks Daily Activity (OT)  -MK       User Key  (r) = Recorded By, (t) = Taken By, (c) = Cosigned By    Initials Name Provider Type    CHARLY Peterson/L Occupational Therapy Assistant    CHRISTIAN Roy OT Occupational Therapist           Time Calculation:         Time Calculation- OT     Row Name 07/20/18 1205 07/20/18 1106          Time Calculation- OT    OT Start Time 1106  -TS  --     OT Stop Time 1133  -TS  --     OT Time Calculation (min) 27 min  -TS  --     Total Timed Code Minutes- OT 27 minute(s)  -TS  --     OT Received On 07/20/18  -TS  --        Timed Charges    09731 - OT Self Care/Mgmt Minutes  -- 27  -TS       User Key  (r) = Recorded By, (t) = Taken By, (c) = Cosigned By    Initials Name Provider Type     CHARLY Hutton/L Occupational Therapy Assistant           Therapy Suggested Charges     Code   Minutes Charges    97129 (CPT®) Hc Ot Neuromusc Re Education Ea 15 Min      19190 (CPT®) Hc Ot Ther Proc Ea 15 Min      81424 (CPT®) Hc Ot Therapeutic Act Ea 15 Min      29471 (CPT®) Hc Ot Manual Therapy Ea 15 Min      99709 (CPT®) Hc Ot Iontophoresis Ea 15 Min      00884 (CPT®) Hc Ot Elec Stim Ea-Per 15 Min      79574 (CPT®) Hc Ot Ultrasound Ea 15 Min      35273 (CPT®) Hc Ot  Self Care/Mgmt/Train Ea 15 Min 27 2    Total  27 2        Therapy Charges for Today     Code Description Service Date Service Provider Modifiers Qty    27656345074 HC OT SELF CARE/MGMT/TRAIN EA 15 MIN 7/20/2018 CALI Hutton KX 2          OT G-codes  OT Professional Judgement Used?: Yes  OT Functional Scales Options: AM-PAC 6 Clicks Daily Activity (OT)  Score: 20  Functional Limitation: Self care  Self Care Current Status (): At least 20 percent but less than 40 percent impaired, limited or restricted  Self Care Goal Status (): At least 1 percent but less than 20 percent impaired, limited or restricted    CALI Mensah  7/20/2018

## 2018-07-20 NOTE — PLAN OF CARE
Problem: Patient Care Overview  Goal: Plan of Care Review  Outcome: Ongoing (interventions implemented as appropriate)   07/20/18 5900   Coping/Psychosocial   Plan of Care Reviewed With patient   OTHER   Outcome Summary VSS, no c/o of pain, IV abx changed to po, does get SOB after acitivity, had shower and up in chair for a couple of hours today, still having soft loose stools, gi panel neg on 07/19, up x 1 assist, safety maintained       Problem: Fall Risk (Adult)  Goal: Absence of Fall  Outcome: Ongoing (interventions implemented as appropriate)      Problem: Skin Injury Risk (Adult)  Goal: Skin Health and Integrity  Outcome: Ongoing (interventions implemented as appropriate)      Problem: Pneumonia (Adult)  Goal: Signs and Symptoms of Listed Potential Problems Will be Absent, Minimized or Managed (Pneumonia)  Outcome: Ongoing (interventions implemented as appropriate)

## 2018-07-21 LAB
ALBUMIN SERPL-MCNC: 3.3 G/DL (ref 3.5–5)
ALBUMIN/GLOB SERPL: 1.2 G/DL (ref 1.1–2.5)
ALP SERPL-CCNC: 34 U/L (ref 24–120)
ALT SERPL W P-5'-P-CCNC: 24 U/L (ref 0–54)
ANION GAP SERPL CALCULATED.3IONS-SCNC: 12 MMOL/L (ref 4–13)
AST SERPL-CCNC: 45 U/L (ref 7–45)
BASOPHILS # BLD AUTO: 0.02 10*3/MM3 (ref 0–0.2)
BASOPHILS NFR BLD AUTO: 0.1 % (ref 0–2)
BILIRUB SERPL-MCNC: 0.1 MG/DL (ref 0.1–1)
BUN BLD-MCNC: 25 MG/DL (ref 5–21)
BUN/CREAT SERPL: 34.2 (ref 7–25)
CALCIUM SPEC-SCNC: 9.2 MG/DL (ref 8.4–10.4)
CHLORIDE SERPL-SCNC: 107 MMOL/L (ref 98–110)
CO2 SERPL-SCNC: 23 MMOL/L (ref 24–31)
CREAT BLD-MCNC: 0.73 MG/DL (ref 0.5–1.4)
DEPRECATED RDW RBC AUTO: 49.6 FL (ref 40–54)
EOSINOPHIL # BLD AUTO: 0 10*3/MM3 (ref 0–0.7)
EOSINOPHIL NFR BLD AUTO: 0 % (ref 0–4)
ERYTHROCYTE [DISTWIDTH] IN BLOOD BY AUTOMATED COUNT: 14.4 % (ref 12–15)
GFR SERPL CREATININE-BSD FRML MDRD: 75 ML/MIN/1.73
GLOBULIN UR ELPH-MCNC: 2.8 GM/DL
GLUCOSE BLD-MCNC: 242 MG/DL (ref 70–100)
HCT VFR BLD AUTO: 30.4 % (ref 37–47)
HGB BLD-MCNC: 9.8 G/DL (ref 12–16)
IMM GRANULOCYTES # BLD: 0.17 10*3/MM3 (ref 0–0.03)
IMM GRANULOCYTES NFR BLD: 1.2 % (ref 0–5)
LYMPHOCYTES # BLD AUTO: 0.57 10*3/MM3 (ref 0.72–4.86)
LYMPHOCYTES NFR BLD AUTO: 4 % (ref 15–45)
MCH RBC QN AUTO: 29.9 PG (ref 28–32)
MCHC RBC AUTO-ENTMCNC: 32.2 G/DL (ref 33–36)
MCV RBC AUTO: 92.7 FL (ref 82–98)
MONOCYTES # BLD AUTO: 0.22 10*3/MM3 (ref 0.19–1.3)
MONOCYTES NFR BLD AUTO: 1.6 % (ref 4–12)
NEUTROPHILS # BLD AUTO: 13.16 10*3/MM3 (ref 1.87–8.4)
NEUTROPHILS NFR BLD AUTO: 93.1 % (ref 39–78)
NRBC BLD MANUAL-RTO: 0 /100 WBC (ref 0–0)
PLATELET # BLD AUTO: 256 10*3/MM3 (ref 130–400)
PMV BLD AUTO: 10 FL (ref 6–12)
POTASSIUM BLD-SCNC: 4 MMOL/L (ref 3.5–5.3)
PROT SERPL-MCNC: 6.1 G/DL (ref 6.3–8.7)
RBC # BLD AUTO: 3.28 10*6/MM3 (ref 4.2–5.4)
SODIUM BLD-SCNC: 142 MMOL/L (ref 135–145)
WBC NRBC COR # BLD: 14.14 10*3/MM3 (ref 4.8–10.8)

## 2018-07-21 PROCEDURE — 94799 UNLISTED PULMONARY SVC/PX: CPT

## 2018-07-21 PROCEDURE — 87581 M.PNEUMON DNA AMP PROBE: CPT | Performed by: FAMILY MEDICINE

## 2018-07-21 PROCEDURE — 80053 COMPREHEN METABOLIC PANEL: CPT | Performed by: FAMILY MEDICINE

## 2018-07-21 PROCEDURE — 85025 COMPLETE CBC W/AUTO DIFF WBC: CPT | Performed by: FAMILY MEDICINE

## 2018-07-21 PROCEDURE — 25010000002 DEXAMETHASONE PER 1 MG: Performed by: FAMILY MEDICINE

## 2018-07-21 PROCEDURE — G8979 MOBILITY GOAL STATUS: HCPCS

## 2018-07-21 PROCEDURE — 94760 N-INVAS EAR/PLS OXIMETRY 1: CPT

## 2018-07-21 PROCEDURE — 25010000002 ENOXAPARIN PER 10 MG: Performed by: NURSE PRACTITIONER

## 2018-07-21 PROCEDURE — G8978 MOBILITY CURRENT STATUS: HCPCS

## 2018-07-21 PROCEDURE — 97162 PT EVAL MOD COMPLEX 30 MIN: CPT

## 2018-07-21 RX ORDER — FAMOTIDINE 10 MG/ML
20 INJECTION, SOLUTION INTRAVENOUS DAILY
Status: DISCONTINUED | OUTPATIENT
Start: 2018-07-21 | End: 2018-07-22 | Stop reason: HOSPADM

## 2018-07-21 RX ORDER — IPRATROPIUM BROMIDE AND ALBUTEROL SULFATE 2.5; .5 MG/3ML; MG/3ML
3 SOLUTION RESPIRATORY (INHALATION)
Status: DISCONTINUED | OUTPATIENT
Start: 2018-07-21 | End: 2018-07-22 | Stop reason: HOSPADM

## 2018-07-21 RX ORDER — ALUMINA, MAGNESIA, AND SIMETHICONE 2400; 2400; 240 MG/30ML; MG/30ML; MG/30ML
15 SUSPENSION ORAL EVERY 6 HOURS PRN
Status: DISCONTINUED | OUTPATIENT
Start: 2018-07-21 | End: 2018-07-22 | Stop reason: HOSPADM

## 2018-07-21 RX ADMIN — Medication 250 MG: at 08:51

## 2018-07-21 RX ADMIN — GUAIFENESIN 600 MG: 600 TABLET, EXTENDED RELEASE ORAL at 08:51

## 2018-07-21 RX ADMIN — IPRATROPIUM BROMIDE AND ALBUTEROL SULFATE 3 ML: 2.5; .5 SOLUTION RESPIRATORY (INHALATION) at 06:39

## 2018-07-21 RX ADMIN — DEXAMETHASONE SODIUM PHOSPHATE 8 MG: 10 INJECTION, SOLUTION INTRAMUSCULAR; INTRAVENOUS at 10:02

## 2018-07-21 RX ADMIN — Medication 250 MG: at 20:48

## 2018-07-21 RX ADMIN — GUAIFENESIN 600 MG: 600 TABLET, EXTENDED RELEASE ORAL at 20:48

## 2018-07-21 RX ADMIN — IPRATROPIUM BROMIDE AND ALBUTEROL SULFATE 3 ML: 2.5; .5 SOLUTION RESPIRATORY (INHALATION) at 14:14

## 2018-07-21 RX ADMIN — SUCRALFATE 1 G: 1 SUSPENSION ORAL at 16:54

## 2018-07-21 RX ADMIN — DEXAMETHASONE SODIUM PHOSPHATE 8 MG: 10 INJECTION, SOLUTION INTRAMUSCULAR; INTRAVENOUS at 05:42

## 2018-07-21 RX ADMIN — ENOXAPARIN SODIUM 40 MG: 40 INJECTION SUBCUTANEOUS at 20:48

## 2018-07-21 RX ADMIN — PANTOPRAZOLE SODIUM 40 MG: 40 TABLET, DELAYED RELEASE ORAL at 05:42

## 2018-07-21 RX ADMIN — METOPROLOL SUCCINATE 25 MG: 25 TABLET, FILM COATED, EXTENDED RELEASE ORAL at 08:51

## 2018-07-21 RX ADMIN — DEXAMETHASONE SODIUM PHOSPHATE 8 MG: 10 INJECTION, SOLUTION INTRAMUSCULAR; INTRAVENOUS at 16:51

## 2018-07-21 RX ADMIN — SUCRALFATE 1 G: 1 SUSPENSION ORAL at 05:42

## 2018-07-21 RX ADMIN — FAMOTIDINE 20 MG: 10 INJECTION, SOLUTION INTRAVENOUS at 10:02

## 2018-07-21 RX ADMIN — SUCRALFATE 1 G: 1 SUSPENSION ORAL at 11:37

## 2018-07-21 RX ADMIN — CEFDINIR 300 MG: 300 CAPSULE ORAL at 08:51

## 2018-07-21 RX ADMIN — ACETAMINOPHEN 650 MG: 325 TABLET, FILM COATED ORAL at 23:23

## 2018-07-21 RX ADMIN — ONDANSETRON 4 MG: 4 TABLET, FILM COATED ORAL at 23:23

## 2018-07-21 RX ADMIN — DEXAMETHASONE SODIUM PHOSPHATE 8 MG: 10 INJECTION, SOLUTION INTRAMUSCULAR; INTRAVENOUS at 00:05

## 2018-07-21 RX ADMIN — DEXAMETHASONE SODIUM PHOSPHATE 8 MG: 10 INJECTION, SOLUTION INTRAMUSCULAR; INTRAVENOUS at 23:22

## 2018-07-21 RX ADMIN — CEFDINIR 300 MG: 300 CAPSULE ORAL at 20:48

## 2018-07-21 RX ADMIN — IPRATROPIUM BROMIDE AND ALBUTEROL SULFATE 3 ML: 2.5; .5 SOLUTION RESPIRATORY (INHALATION) at 19:29

## 2018-07-21 RX ADMIN — SUCRALFATE 1 G: 1 SUSPENSION ORAL at 00:05

## 2018-07-21 RX ADMIN — SUCRALFATE 1 G: 1 SUSPENSION ORAL at 23:23

## 2018-07-21 NOTE — PROGRESS NOTES
[facility] Medicine Services  INPATIENT PROGRESS NOTE    Patient Name: Milton Mae  Date of Admission: 7/18/2018  Today's Date: 07/21/18  Length of Stay: 3  Primary Care Physician: Javier Ng MD    Subjective   Chief Complaint: Nausea  HPI   Nauseated again this AM, unable to eat breakfast  Not in pain  Not SOA, tolerating home oxygen  Not much of a cough, afebrile        Review of Systems   Constitutional: Positive for fatigue. Negative for fever.   HENT: Negative for congestion and ear pain.    Eyes: Negative for pain and visual disturbance.   Respiratory: Negative for cough, shortness of breath and wheezing.    Cardiovascular: Negative for chest pain and palpitations.   Gastrointestinal: Positive for nausea. Negative for diarrhea and vomiting.   Endocrine: Negative for heat intolerance.   Genitourinary: Negative for dysuria and frequency.   Musculoskeletal: Negative for arthralgias and back pain.   Skin: Negative for rash and wound.   Neurological: Negative for dizziness and light-headedness.   Psychiatric/Behavioral: Negative for confusion. The patient is not nervous/anxious.    All other systems reviewed and are negative.       All pertinent negatives and positives are as above. All other systems have been reviewed and are negative unless otherwise stated.     Objective    Temp:  [97.6 °F (36.4 °C)-98.5 °F (36.9 °C)] 97.6 °F (36.4 °C)  Heart Rate:  [73-98] 79  Resp:  [16-20] 16  BP: (110-128)/(45-63) 119/63  Physical Exam   Constitutional: She is oriented to person, place, and time. She appears well-developed and well-nourished.   HENT:   Head: Normocephalic and atraumatic.   Right Ear: External ear normal.   Left Ear: External ear normal.   Nose: Nose normal.   Mouth/Throat: Oropharynx is clear and moist.   Eyes: Conjunctivae and EOM are normal.   Neck: Normal range of motion. Neck supple.   Cardiovascular: Normal rate, regular rhythm and normal heart sounds.     Pulmonary/Chest: Effort normal. She has decreased breath sounds. She has rhonchi.   Abdominal: Soft. Bowel sounds are normal. She exhibits no distension. There is no tenderness.   Musculoskeletal: Normal range of motion.   Neurological: She is alert and oriented to person, place, and time.   Skin: Skin is warm and dry.   Psychiatric: She has a normal mood and affect. Her speech is normal and behavior is normal. Cognition and memory are normal.           Results Review:  I have reviewed the labs, radiology results, and diagnostic studies.    Laboratory Data:     Results from last 7 days  Lab Units 07/21/18  0843 07/20/18  1525 07/19/18  0430   WBC 10*3/mm3 14.14* 14.74* 8.98   HEMOGLOBIN g/dL 9.8* 9.6* 9.7*   HEMATOCRIT % 30.4* 30.6* 30.3*   PLATELETS 10*3/mm3 256 263 245          Results from last 7 days  Lab Units 07/21/18  0843 07/20/18  1525 07/19/18  0656   SODIUM mmol/L 142 141 144   POTASSIUM mmol/L 4.0 4.9 5.3   CHLORIDE mmol/L 107 111* 111*   CO2 mmol/L 23.0* 20.0* 25.0   BUN mg/dL 25* 17 14   CREATININE mg/dL 0.73 0.60 0.71   CALCIUM mg/dL 9.2 8.7 8.6   BILIRUBIN mg/dL 0.1 <0.1* 0.2   ALK PHOS U/L 34 33 32   ALT (SGPT) U/L 24 21 19   AST (SGOT) U/L 45 39 22   GLUCOSE mg/dL 242* 167* 132*       Culture Data:   Blood Culture   Date Value Ref Range Status   07/18/2018 No growth at 2 days  Preliminary     Respiratory Culture   Date Value Ref Range Status   07/20/2018 Rejected  Final       Radiology Data:   Imaging Results (last 24 hours)     ** No results found for the last 24 hours. **          I have reviewed the patient's current medications.     Assessment/Plan     Hospital Problem List     Pneumonia due to infectious organism        1.  Sepsis  -IVF d/cd  -PO abx  -Blood/sputum cultures P, NTD     2.  CAP  -Omnicef  -Cultures P, NTD     3.  Hypokalemia  -Resolved  -Telemetry     4.  GERD  -protonix     5.  HTN  -metoprolol  -Lisinopril/HCTZ     6.  COPD  -duonebs     7.  Chronic anemia  -Monitor  H&H     8.  Hyperglycemia  -Check A1C     9.  Viral GE with intractable nausea, vomiting, diarrhea  -Zofran  -IVF  -C. Diff negative  -IV Decadron x 1                         Discharge Planning: I expect the patient to be discharged to home in AM    Hans Wise MD   07/21/18   4:00 PM

## 2018-07-21 NOTE — PLAN OF CARE
Problem: Patient Care Overview  Goal: Plan of Care Review  Outcome: Ongoing (interventions implemented as appropriate)   07/21/18 1040   Coping/Psychosocial   Plan of Care Reviewed With patient   OTHER   Outcome Summary PT eval complete. Pt able to ambulatae 20 feet in room using rolling walker. No LOB no rest breaks. Pt conversational during ambulation. Supplemental O2 used. No complaints after return to chair. Pt very pleased with her progress. Skilled PT indicated to improve endurance and increase strength. PT recommend d/c home with assist and HH.

## 2018-07-21 NOTE — PLAN OF CARE
Problem: Patient Care Overview  Goal: Plan of Care Review  Outcome: Ongoing (interventions implemented as appropriate)   07/21/18 0629   Coping/Psychosocial   Plan of Care Reviewed With patient   Plan of Care Review   Progress improving   OTHER   Outcome Summary Patient states feeling better and no c/o pain. VSS. Will continue to monitor.       Problem: Fall Risk (Adult)  Goal: Absence of Fall  Outcome: Ongoing (interventions implemented as appropriate)      Problem: Skin Injury Risk (Adult)  Goal: Skin Health and Integrity  Outcome: Ongoing (interventions implemented as appropriate)      Problem: Pneumonia (Adult)  Goal: Signs and Symptoms of Listed Potential Problems Will be Absent, Minimized or Managed (Pneumonia)  Outcome: Ongoing (interventions implemented as appropriate)

## 2018-07-21 NOTE — THERAPY EVALUATION
Acute Care - Physical Therapy Initial Evaluation  [facility]     Patient Name: Milton Mae  : 1926  MRN: [id number]  Today's Date: 2018   Onset of Illness/Injury or Date of Surgery: 18  Date of Referral to PT: 18  Referring Physician: Dr. Wise      Admit Date: 2018    Visit Dx:     ICD-10-CM ICD-9-CM   1. Pneumonia due to infectious organism, unspecified laterality, unspecified part of lung J18.9 136.9     484.8   2. Nausea and vomiting, intractability of vomiting not specified, unspecified vomiting type R11.2 787.01   3. Impaired mobility and ADLs Z74.09 799.89   4. Impaired mobility Z74.09 799.89     Patient Active Problem List   Diagnosis   • Pneumonia due to infectious organism     Past Medical History:   Diagnosis Date   • COPD (chronic obstructive pulmonary disease) (CMS/HCC)    • GERD (gastroesophageal reflux disease)    • Hypertension    • Pneumonia      Past Surgical History:   Procedure Laterality Date   • CATARACT EXTRACTION, BILATERAL     • THYROID SURGERY          PT ASSESSMENT (last 12 hours)      Physical Therapy Evaluation     Row Name 18 1000          PT Evaluation Time/Intention    Subjective Information nausea/vomiting;weakness;fatigue;dyspnea  -RA     Document Type evaluation  -RA     Mode of Treatment physical therapy  -RA     Row Name 18 1000          General Information    Patient Profile Reviewed? yes  -RA     Onset of Illness/Injury or Date of Surgery 18  -RA     Referring Physician Dr. Wise  -RA     Patient Observations alert;cooperative;agree to therapy  -RA     Patient/Family Observations No famly at bedside  -RA     General Observations of Patient Awake, alert, supplemental O2, sittng in chair  -RA     Prior Level of Function independent:;all household mobility;dressing  -RA     Equipment Currently Used at Home walker, rolling;cane, straight;commode  -RA     Pertinent History of Current Functional Problem presented to ED due to increasing  weakness and stomach pain, dx: pneumonia  -RA     Existing Precautions/Restrictions fall;oxygen therapy device and L/min  -RA     Limitations/Impairments hearing  -RA     Risks Reviewed patient:;LOB;nausea/vomiting;dizziness;increased discomfort;change in vital signs  -RA     Benefits Reviewed patient:;increase independence;improve function;increase strength;increase balance;decrease pain  -RA     Barriers to Rehab none identified  -RA     Row Name 07/21/18 1000          Relationship/Environment    Primary Source of Support/Comfort child(maty)  -RA     Lives With child(maty), adult  -RA     Row Name 07/21/18 1000          Resource/Environmental Concerns    Current Living Arrangements home/apartment/condo  -RA     Row Name 07/21/18 1000          Home Main Entrance    Number of Stairs, Main Entrance two  -RA     Stair Railings, Main Entrance railings on both sides of stairs  -RA     Row Name 07/21/18 1000          Cognitive Assessment/Intervention- PT/OT    Affect/Mental Status (Cognitive) WFL  -RA     Row Name 07/21/18 1000          Transfer Assessment/Treatment    Transfer Assessment/Treatment sit-stand transfer  -RA     Sit-Stand [place] (Transfers) stand by assist  -RA     Stand-Sit [place] (Transfers) stand by assist  -RA     Row Name 07/21/18 1000          Sit-Stand Transfer    Assistive Device (Sit-Stand Transfers) walker, front-wheeled  -RA     Row Name 07/21/18 1000          Stand-Sit Transfer    Assistive Device (Stand-Sit Transfers) walker, front-wheeled  -RA     Row Name 07/21/18 1000          Gait/Stairs Assessment/Training    Gait/Stairs Assessment/Training gait/ambulation independence;gait/ambulation assistive device;distance ambulated  -RA     [place] Level (Gait) contact guard  -RA     Assistive Device (Gait) walker, front-wheeled  -RA     Distance in Feet (Gait) 20  -RA     Row Name 07/21/18 1000          General ROM    GENERAL ROM COMMENTS BLE WFL AROM  -RA     Row Name 07/21/18 1000           General Assessment (Manual Muscle Testing)    Comment, General Manual Muscle Testing (MMT) Assessment BLE 4/5  -RA     Row Name 07/21/18 1000          Static Standing Balance    Level of [place] (Supported Standing, Static Balance) supervision  -RA     Row Name 07/21/18 1000          Pain Scale: Numbers Pre/Post-Treatment    Pain Scale: Numbers, Pretreatment 0/10 - no pain  -RA     Pain Scale: Numbers, Post-Treatment 0/10 - no pain  -RA     Row Name 07/21/18 1000          Plan of Care Review    Plan of Care Reviewed With patient  -RA     Row Name 07/21/18 1000          Physical Therapy Clinical Impression    Date of Referral to PT 07/18/18  -RA     PT Diagnosis (PT Clinical Impression) Generalized weakness  -RA     Prognosis (PT Clinical Impression) Good  -RA     Patient/Family Goals Statement (PT Clinical Impression) Pt wishes to d/c home with HH  -RA     Criteria for Skilled Interventions Met (PT Clinical Impression) yes;treatment indicated  -RA     Pathology/Pathophysiology Noted (Describe Specifically for Each System) musculoskeletal  -RA     Impairments Found (describe specific impairments) aerobic capacity/endurance;gait, locomotion, and balance  -RA     Functional Limitations in Following Categories (Describe Specific Limitations) self-care;home management  -RA     Rehab Potential (PT Clinical Summary) good, to achieve stated therapy goals  -RA     Predicted Duration of Therapy (PT) until d/c  -RA     Care Plan Review (PT) evaluation/treatment results reviewed;patient/other agree to care plan  -RA     Row Name 07/21/18 1000          Physical Therapy Goals    Bed Mobility Goal Selection (PT) bed mobility, PT goal 1  -RA     Gait Training Goal Selection (PT) gait training, PT goal 1  -RA     Row Name 07/21/18 1000          Bed Mobility Goal 1 (PT)    Activity/Assistive Device (Bed Mobility Goal 1, PT) sit to supine;supine to sit  -RA     [place] Level/Cues Needed (Bed Mobility Goal 1, PT)  independent  -RA     Time Frame (Bed Mobility Goal 1, PT) by discharge  -RA     Progress/Outcomes (Bed Mobility Goal 1, PT) goal ongoing  -RA     Row Name 07/21/18 1000          Gait Training Goal 1 (PT)    Activity/Assistive Device (Gait Training Goal 1, PT) gait (walking locomotion);assistive device use  -RA     [place] Level (Gait Training Goal 1, PT) standby assist  -RA     Distance (Gait Goal 1, PT) 100 feet  -RA     Time Frame (Gait Training Goal 1, PT) by discharge  -RA     Progress/Outcome (Gait Training Goal 1, PT) goal ongoing  -RA     Row Name 07/21/18 1000          Positioning and Restraints    Pre-Treatment Position sitting in chair/recliner  -RA     Post Treatment Position chair  -RA     In Chair call light within reach;encouraged to call for assist;sitting  -RA     Row Name 07/21/18 1000          Living Environment    Home Accessibility stairs to enter home  -RA       User Key  (r) = Recorded By, (t) = Taken By, (c) = Cosigned By    Initials Name Provider Type    KADEEM Diaz PT Physical Therapist          Physical Therapy Education     Title: PT OT SLP Therapies (Done)     Topic: Physical Therapy (Done)     Point: Mobility training (Done)    Learning Progress Summary     Learner Status Readiness Method Response Comment Documented by    Patient Done Acceptance E VU Pt educated on importance of therapy. Pt instructed to call for help for OOB activity.  07/21/18 1040          Point: Precautions (Done)    Learning Progress Summary     Learner Status Readiness Method Response Comment Documented by    Patient Done Acceptance E VU Pt educated on importance of therapy. Pt instructed to call for help for OOB activity.  07/21/18 1040                      User Key     Initials Effective Dates Name Provider Type Discipline     04/03/18 -  Maria Diaz PT Physical Therapist PT                PT Recommendation and Plan  Anticipated Discharge Disposition (PT): home with home health, home  with assist  Planned Therapy Interventions (PT Eval): gait training, strengthening, transfer training, bed mobility training, balance training  Therapy Frequency (PT Clinical Impression): daily  Outcome Summary/Treatment Plan (PT)  Anticipated Discharge Disposition (PT): home with home health, home with assist  Plan of Care Reviewed With: patient  Outcome Summary: PT eval complete. Pt able to ambulatae 20 feet in room using rolling walker. No LOB no rest breaks. Pt conversational during ambulation. Supplemental O2 used. No complaints after return to chair. Pt very pleased with her progress. Skilled PT indicated to improve endurance and increase strength. PT recommend d/c home with assist and HH.           Outcome Measures     Row Name 07/21/18 1000 07/20/18 1200 07/19/18 0700       How much help from another person do you currently need...    Turning from your back to your side while in flat bed without using bedrails? 3  -RA  --  --    Moving from lying on back to sitting on the side of a flat bed without bedrails? 3  -RA  --  --    Moving to and from a bed to a chair (including a wheelchair)? 3  -RA  --  --    Standing up from a chair using your arms (e.g., wheelchair, bedside chair)? 3  -RA  --  --    Climbing 3-5 steps with a railing? 2  -RA  --  --    To walk in hospital room? 3  -RA  --  --    AM-PAC 6 Clicks Score 17  -RA  --  --       How much help from another is currently needed...    Putting on and taking off regular lower body clothing?  -- 3  -TS 3  -MK    Bathing (including washing, rinsing, and drying)  -- 3  -TS 3  -MK    Toileting (which includes using toilet bed pan or urinal)  -- 3  -TS 3  -MK    Putting on and taking off regular upper body clothing  -- 3  -TS 3  -MK    Taking care of personal grooming (such as brushing teeth)  -- 4  -TS 4  -MK    Eating meals  -- 4  -TS 4  -MK    Score  -- 20  -TS 20  -MK       Functional Assessment    Outcome Measure Options AM-PAC 6 Clicks Basic Mobility (PT)   -RA AM-PAC 6 Clicks Daily Activity (OT)  -TS AM-PAC 6 Clicks Daily Activity (OT)  -      User Key  (r) = Recorded By, (t) = Taken By, (c) = Cosigned By    Initials Name Provider Type    TS Janae Santa, PARKS/L Occupational Therapy Assistant    CHRISTIAN Roy, OT Occupational Therapist    RA Maria S Joe, PT Physical Therapist           Time Calculation:         PT Charges     Row Name 07/21/18 1043             Time Calculation    Start Time 0951  -RA      Stop Time 1030  -RA      Time Calculation (min) 39 min  -RA        User Key  (r) = Recorded By, (t) = Taken By, (c) = Cosigned By    Initials Name Provider Type    RA Maria S Joe, PT Physical Therapist        Therapy Suggested Charges     Code   Minutes Charges    None           Therapy Charges for Today     Code Description Service Date Service Provider Modifiers Qty    01354884256 HC PT MOBILITY CURRENT 7/21/2018 Maria S Joe, PT GP, CK 1    48775897950 HC PT MOBILITY PROJECTED 7/21/2018 Maria S Joe, PT GP, CJ 1    52000138118 HC PT EVAL MOD COMPLEXITY 3 7/21/2018 Maria S Joe, PT GP, KX 1          PT G-Codes  Outcome Measure Options: AM-PAC 6 Clicks Basic Mobility (PT)  Score: 17  Functional Limitation: Mobility: Walking and moving around  Mobility: Walking and Moving Around Current Status (): At least 40 percent but less than 60 percent impaired, limited or restricted  Mobility: Walking and Moving Around Goal Status (): At least 20 percent but less than 40 percent impaired, limited or restricted      Maria S Joe, PT  7/21/2018

## 2018-07-21 NOTE — PLAN OF CARE
Problem: Patient Care Overview  Goal: Plan of Care Review  Outcome: Ongoing (interventions implemented as appropriate)   07/21/18 9366   Coping/Psychosocial   Plan of Care Reviewed With patient   Plan of Care Review   Progress improving   OTHER   Outcome Summary VSS: no c/o pain; pt had some nausea this am with resolution; pt is up with stand-by and has increased strength; home in am; continue to monitor pt       Problem: Fall Risk (Adult)  Goal: Absence of Fall  Outcome: Ongoing (interventions implemented as appropriate)      Problem: Skin Injury Risk (Adult)  Goal: Skin Health and Integrity  Outcome: Ongoing (interventions implemented as appropriate)      Problem: Pneumonia (Adult)  Goal: Signs and Symptoms of Listed Potential Problems Will be Absent, Minimized or Managed (Pneumonia)  Outcome: Ongoing (interventions implemented as appropriate)

## 2018-07-22 VITALS
WEIGHT: 128.3 LBS | HEIGHT: 66 IN | SYSTOLIC BLOOD PRESSURE: 137 MMHG | RESPIRATION RATE: 16 BRPM | DIASTOLIC BLOOD PRESSURE: 56 MMHG | TEMPERATURE: 98 F | HEART RATE: 82 BPM | BODY MASS INDEX: 20.62 KG/M2 | OXYGEN SATURATION: 98 %

## 2018-07-22 LAB
ALBUMIN SERPL-MCNC: 3.1 G/DL (ref 3.5–5)
ALBUMIN/GLOB SERPL: 1.1 G/DL (ref 1.1–2.5)
ALP SERPL-CCNC: 29 U/L (ref 24–120)
ALT SERPL W P-5'-P-CCNC: 25 U/L (ref 0–54)
ANION GAP SERPL CALCULATED.3IONS-SCNC: 12 MMOL/L (ref 4–13)
AST SERPL-CCNC: 32 U/L (ref 7–45)
BASOPHILS # BLD AUTO: 0.01 10*3/MM3 (ref 0–0.2)
BASOPHILS NFR BLD AUTO: 0.1 % (ref 0–2)
BILIRUB SERPL-MCNC: <0.1 MG/DL (ref 0.1–1)
BUN BLD-MCNC: 32 MG/DL (ref 5–21)
BUN/CREAT SERPL: 43.2 (ref 7–25)
CALCIUM SPEC-SCNC: 8.8 MG/DL (ref 8.4–10.4)
CHLORIDE SERPL-SCNC: 105 MMOL/L (ref 98–110)
CO2 SERPL-SCNC: 23 MMOL/L (ref 24–31)
CREAT BLD-MCNC: 0.74 MG/DL (ref 0.5–1.4)
DEPRECATED RDW RBC AUTO: 48.1 FL (ref 40–54)
EOSINOPHIL # BLD AUTO: 0 10*3/MM3 (ref 0–0.7)
EOSINOPHIL NFR BLD AUTO: 0 % (ref 0–4)
ERYTHROCYTE [DISTWIDTH] IN BLOOD BY AUTOMATED COUNT: 14.2 % (ref 12–15)
GFR SERPL CREATININE-BSD FRML MDRD: 73 ML/MIN/1.73
GLOBULIN UR ELPH-MCNC: 2.9 GM/DL
GLUCOSE BLD-MCNC: 303 MG/DL (ref 70–100)
HCT VFR BLD AUTO: 29.7 % (ref 37–47)
HGB BLD-MCNC: 9.8 G/DL (ref 12–16)
IMM GRANULOCYTES # BLD: 0.14 10*3/MM3 (ref 0–0.03)
IMM GRANULOCYTES NFR BLD: 1.1 % (ref 0–5)
LYMPHOCYTES # BLD AUTO: 0.48 10*3/MM3 (ref 0.72–4.86)
LYMPHOCYTES NFR BLD AUTO: 3.8 % (ref 15–45)
MCH RBC QN AUTO: 30.4 PG (ref 28–32)
MCHC RBC AUTO-ENTMCNC: 33 G/DL (ref 33–36)
MCV RBC AUTO: 92.2 FL (ref 82–98)
MONOCYTES # BLD AUTO: 0.35 10*3/MM3 (ref 0.19–1.3)
MONOCYTES NFR BLD AUTO: 2.8 % (ref 4–12)
NEUTROPHILS # BLD AUTO: 11.68 10*3/MM3 (ref 1.87–8.4)
NEUTROPHILS NFR BLD AUTO: 92.2 % (ref 39–78)
NRBC BLD MANUAL-RTO: 0 /100 WBC (ref 0–0)
PLATELET # BLD AUTO: 237 10*3/MM3 (ref 130–400)
PMV BLD AUTO: 9.8 FL (ref 6–12)
POTASSIUM BLD-SCNC: 3.7 MMOL/L (ref 3.5–5.3)
PROT SERPL-MCNC: 6 G/DL (ref 6.3–8.7)
RBC # BLD AUTO: 3.22 10*6/MM3 (ref 4.2–5.4)
SODIUM BLD-SCNC: 140 MMOL/L (ref 135–145)
WBC NRBC COR # BLD: 12.66 10*3/MM3 (ref 4.8–10.8)

## 2018-07-22 PROCEDURE — 25010000002 DEXAMETHASONE PER 1 MG: Performed by: FAMILY MEDICINE

## 2018-07-22 PROCEDURE — 94760 N-INVAS EAR/PLS OXIMETRY 1: CPT

## 2018-07-22 PROCEDURE — 94799 UNLISTED PULMONARY SVC/PX: CPT

## 2018-07-22 PROCEDURE — 85025 COMPLETE CBC W/AUTO DIFF WBC: CPT | Performed by: FAMILY MEDICINE

## 2018-07-22 PROCEDURE — 80053 COMPREHEN METABOLIC PANEL: CPT | Performed by: FAMILY MEDICINE

## 2018-07-22 RX ORDER — CEFDINIR 300 MG/1
300 CAPSULE ORAL EVERY 12 HOURS SCHEDULED
Qty: 12 CAPSULE | Refills: 0 | Status: SHIPPED | OUTPATIENT
Start: 2018-07-22 | End: 2018-07-27

## 2018-07-22 RX ORDER — ONDANSETRON 4 MG/1
4 TABLET, ORALLY DISINTEGRATING ORAL EVERY 6 HOURS PRN
Qty: 28 TABLET | Refills: 0 | Status: SHIPPED | OUTPATIENT
Start: 2018-07-22

## 2018-07-22 RX ADMIN — PANTOPRAZOLE SODIUM 40 MG: 40 TABLET, DELAYED RELEASE ORAL at 05:24

## 2018-07-22 RX ADMIN — GUAIFENESIN 600 MG: 600 TABLET, EXTENDED RELEASE ORAL at 08:37

## 2018-07-22 RX ADMIN — DEXAMETHASONE SODIUM PHOSPHATE 8 MG: 10 INJECTION, SOLUTION INTRAMUSCULAR; INTRAVENOUS at 05:24

## 2018-07-22 RX ADMIN — FAMOTIDINE 20 MG: 10 INJECTION, SOLUTION INTRAVENOUS at 08:37

## 2018-07-22 RX ADMIN — CEFDINIR 300 MG: 300 CAPSULE ORAL at 08:37

## 2018-07-22 RX ADMIN — Medication 250 MG: at 08:37

## 2018-07-22 RX ADMIN — IPRATROPIUM BROMIDE AND ALBUTEROL SULFATE 3 ML: 2.5; .5 SOLUTION RESPIRATORY (INHALATION) at 06:30

## 2018-07-22 RX ADMIN — DEXAMETHASONE SODIUM PHOSPHATE 8 MG: 10 INJECTION, SOLUTION INTRAMUSCULAR; INTRAVENOUS at 10:33

## 2018-07-22 RX ADMIN — SUCRALFATE 1 G: 1 SUSPENSION ORAL at 05:25

## 2018-07-22 RX ADMIN — METOPROLOL SUCCINATE 25 MG: 25 TABLET, FILM COATED, EXTENDED RELEASE ORAL at 08:37

## 2018-07-22 NOTE — DISCHARGE SUMMARY
[facility] Medicine Services  DISCHARGE SUMMARY       Date of Admission: 7/18/2018  Date of Discharge:  7/22/2018  Primary Care Physician: Javier Ng MD    Presenting Problem/History of Present Illness:  Pneumonia due to infectious organism, unspecified laterality, unspecified part of lung [J18.9]  Pneumonia due to infectious organism, unspecified laterality, unspecified part of lung [J18.9]     Final Discharge Diagnoses:  Hospital Problem List     Pneumonia due to infectious organism      1.  Sepsis  -IVF d/cd  -PO abx  -Blood/sputum cultures P, NTD     2.  CAP  -Omnicef  -Cultures P, NTD     3.  Hypokalemia  -Resolved  -Telemetry     4.  GERD  -protonix     5.  HTN  -metoprolol  -Lisinopril/HCTZ     6.  COPD  -duonebs     7.  Chronic anemia  -Monitor H&H     8.  Hyperglycemia  -Check A1C     9.  Viral GE with intractable nausea, vomiting, diarrhea  -Zofran  -IVF  -C. Diff negative  -IV Decadron x 1    Consults: NA    Procedures Performed: NA    Pertinent Test Results: NA    Chief Complaint on Day of Discharge: Nausea    History of Present Illness on Day of Discharge:   Doing well  No N/V  Tolerating home O2  Wants to go home.    Hospital Course:  The patient is a 89 y.o. female who presented to [facility] with shortness of breath, nausea, vomiting and diarrhea.  She was found to pneumonia.  She was started on IV antibiotics.    She had nausea and abdominal pain.  She was given IV fluids, IV Zofran, and 1 dose of IV decadron.    She improved and was transitioned over to oral antibiotics.  She is tolerating her home oxygen.  She is afebrile.      She and her son are comfortable with being discharged and with the discharge plan.  They were given the chance to ask questions and all of their questions have been answered to their complete satisfaction.    Condition on Discharge:  Stable    Physical Exam on Discharge:  /56 (BP Location: Right arm, Patient  "Position: Sitting)   Pulse 82   Temp 98 °F (36.7 °C) (Temporal Artery )   Resp 16   Ht 167.6 cm (66\")   Wt 58.2 kg (128 lb 4.8 oz)   SpO2 98%   BMI 20.71 kg/m²   Physical Exam   Constitutional: She is oriented to person, place, and time. She appears well-developed and well-nourished. Nasal cannula in place.   HENT:   Head: Normocephalic and atraumatic.   Right Ear: External ear normal.   Left Ear: External ear normal.   Nose: Nose normal.   Mouth/Throat: Oropharynx is clear and moist.   Eyes: Conjunctivae and EOM are normal.   Neck: Normal range of motion. Neck supple.   Cardiovascular: Normal rate, regular rhythm and normal heart sounds.    Pulmonary/Chest: Effort normal. She has decreased breath sounds.   Abdominal: Soft. Bowel sounds are normal. She exhibits no distension. There is no tenderness.   Musculoskeletal: Normal range of motion.   Neurological: She is alert and oriented to person, place, and time.   Skin: Skin is warm and dry.   Psychiatric: She has a normal mood and affect. Her speech is normal and behavior is normal. Cognition and memory are normal.         Discharge Disposition:  Home or Self Care    Discharge Medications:     Discharge Medications      New Medications      Instructions Start Date   cefdinir 300 MG capsule  Commonly known as:  OMNICEF   300 mg, Oral, Every 12 Hours Scheduled      ondansetron ODT 4 MG disintegrating tablet  Commonly known as:  ZOFRAN-ODT   4 mg, Oral, Every 6 Hours PRN         Continue These Medications      Instructions Start Date   CALCIUM 1200+D3 PO   1 tablet, Oral, Daily      esomeprazole 40 MG capsule  Commonly known as:  nexIUM   40 mg, Oral, Every Morning Before Breakfast      lisinopril-hydrochlorothiazide 10-12.5 MG per tablet  Commonly known as:  PRINZIDE,ZESTORETIC   1 tablet, Oral, Daily      metoprolol succinate XL 25 MG 24 hr tablet  Commonly known as:  TOPROL-XL   25 mg, Oral, Daily      WOMENS ONE DAILY PO   1 tablet, Oral, Daily    "   PRESERVISION AREDS PO   2 tablets, Oral, 2 Times Daily, Twice AM and Twice PM.         Stop These Medications    levoFLOXacin 500 MG tablet  Commonly known as:  LEVAQUIN            Discharge Diet: regular    Activity at Discharge: as tolerated    Discharge Care Plan/Instructions: Go to ER for fever or shortness of breath.    Follow-up Appointments:   No future appointments.    Test Results Pending at Discharge: MACARIO Wise MD  07/22/18  10:13 AM    Time: 35 minutes

## 2018-07-22 NOTE — PLAN OF CARE
Problem: Patient Care Overview  Goal: Plan of Care Review  Outcome: Ongoing (interventions implemented as appropriate)    Goal: Individualization and Mutuality  Outcome: Ongoing (interventions implemented as appropriate)    Goal: Discharge Needs Assessment  Outcome: Ongoing (interventions implemented as appropriate)    Goal: Interprofessional Rounds/Family Conf  Outcome: Ongoing (interventions implemented as appropriate)      Problem: Fall Risk (Adult)  Goal: Absence of Fall  Outcome: Ongoing (interventions implemented as appropriate)      Problem: Skin Injury Risk (Adult)  Goal: Skin Health and Integrity  Outcome: Ongoing (interventions implemented as appropriate)      Problem: Pneumonia (Adult)  Goal: Signs and Symptoms of Listed Potential Problems Will be Absent, Minimized or Managed (Pneumonia)  Outcome: Ongoing (interventions implemented as appropriate)

## 2018-07-23 LAB — BACTERIA SPEC AEROBE CULT: NORMAL

## 2018-07-23 NOTE — PROGRESS NOTES
Continued Stay Note   [place]     Patient Name: Milton Mae  MRN: [id number]  Today's Date: 7/23/2018    Admit Date: 7/18/2018          Discharge Plan     Row Name 07/23/18 1008       Plan    Plan [facility]    Final Discharge Disposition Code 06 - home with home health care    Final Note Pt was discharged yesterday with  care orders. Pt preferred [facility] so called Suzanne from  [phone] and provided referral and faxed needed information [phone]. They are aware pt left yesterday.               Discharge Codes    No documentation.       Expected Discharge Date and Time     Expected Discharge Date Expected Discharge Time    Jul 22, 2018             PIPO aMrtel

## 2018-07-23 NOTE — THERAPY DISCHARGE NOTE
Acute Care - Occupational Therapy Discharge Summary  [facility]     Patient Name: Milton Mae  : 1926  MRN: [id number]    Today's Date: 2018  Onset of Illness/Injury or Date of Surgery: 18    Date of Referral to OT: 18  Referring Physician: Dr. Wise      Admit Date: 2018        OT Recommendation and Plan    Visit Dx:    ICD-10-CM ICD-9-CM   1. Pneumonia due to infectious organism, unspecified laterality, unspecified part of lung J18.9 136.9     484.8   2. Nausea and vomiting, intractability of vomiting not specified, unspecified vomiting type R11.2 787.01   3. Impaired mobility and ADLs Z74.09 799.89   4. Impaired mobility Z74.09 799.89                     OT Rehab Goals     Row Name 18 0723             Transfer Goal 1 (OT)    Activity/Assistive Device (Transfer Goal 1, OT) toilet;tub;tub bench  -TS      [place] Level/Cues Needed (Transfer Goal 1, OT) conditional independence  -TS      Time Frame (Transfer Goal 1, OT) 10 days  -TS      Progress/Outcome (Transfer Goal 1, OT) goal not met  -TS         Bathing Goal 1 (OT)    Activity/Assistive Device (Bathing Goal 1, OT) bathing skills, all  -TS      [place] Level/Cues Needed (Bathing Goal 1, OT) contact guard assist  -TS      Time Frame (Bathing Goal 1, OT) 10 days  -TS      Progress/Outcomes (Bathing Goal 1, OT) goal not met  -TS         Dressing Goal 1 (OT)    Activity/Assistive Device (Dressing Goal 1, OT) dressing skills, all  -TS      [place]/Cues Needed (Dressing Goal 1, OT) supervision required  -TS      Time Frame (Dressing Goal 1, OT) 10 days  -TS      Progress/Outcome (Dressing Goal 1, OT) goal not met  -TS        User Key  (r) = Recorded By, (t) = Taken By, (c) = Cosigned By    Initials Name Provider Type Discipline    TS CHARLY Hutton/L Occupational Therapy Assistant OT                Outcome Measures     Row Name 18 1000 18 1200          How much help from another person do you  currently need...    Turning from your back to your side while in flat bed without using bedrails? 3  -RA  --     Moving from lying on back to sitting on the side of a flat bed without bedrails? 3  -RA  --     Moving to and from a bed to a chair (including a wheelchair)? 3  -RA  --     Standing up from a chair using your arms (e.g., wheelchair, bedside chair)? 3  -RA  --     Climbing 3-5 steps with a railing? 2  -RA  --     To walk in hospital room? 3  -RA  --     AM-PAC 6 Clicks Score 17  -RA  --        How much help from another is currently needed...    Putting on and taking off regular lower body clothing?  -- 3  -TS     Bathing (including washing, rinsing, and drying)  -- 3  -TS     Toileting (which includes using toilet bed pan or urinal)  -- 3  -TS     Putting on and taking off regular upper body clothing  -- 3  -TS     Taking care of personal grooming (such as brushing teeth)  -- 4  -TS     Eating meals  -- 4  -TS     Score  -- 20  -TS        Functional Assessment    Outcome Measure Options AM-PAC 6 Clicks Basic Mobility (PT)  -RA AM-PAC 6 Clicks Daily Activity (OT)  -TS       User Key  (r) = Recorded By, (t) = Taken By, (c) = Cosigned By    Initials Name Provider Type    TS CHARLY Hutton/L Occupational Therapy Assistant    RA Maria Diaz, PT Physical Therapist          Therapy Suggested Charges     Code   Minutes Charges    28115 (CPT®) Hc Ot Neuromusc Re Education Ea 15 Min      07342 (CPT®) Hc Ot Ther Proc Ea 15 Min      77314 (CPT®) Hc Ot Therapeutic Act Ea 15 Min      37871 (CPT®) Hc Ot Manual Therapy Ea 15 Min      02026 (CPT®) Hc Ot Iontophoresis Ea 15 Min      66308 (CPT®) Hc Ot Elec Stim Ea-Per 15 Min      55094 (CPT®) Hc Ot Ultrasound Ea 15 Min      36634 (CPT®) Hc Ot Self Care/Mgmt/Train Ea 15 Min 27 2    Total  27 2              OT Discharge Summary  Reason for Discharge: Discharge from facility  Outcomes Achieved: Refer to plan of care for updates on goals achieved  Discharge  Destination: Home with assist      CALI Mensah  7/23/2018

## 2018-07-23 NOTE — DISCHARGE PLACEMENT REQUEST
"Radha Mcclure [phone]  Milton Mae  (89 y.o. Female)     Date of Birth Social Security Number Address Home Phone MRN    01/25/1926  [address] [place] [address] [phone] [id number]    Roman Catholic Marital Status          Anabaptist        Admission Date Admission Type Admitting Provider Attending Provider Department, Room/Bed    7/18/18 Emergency Hans Wise MD  [facility] 4C, 463/1    Discharge Date Discharge Disposition Discharge Destination        7/22/2018 Home or Self Care Home             Attending Provider:  (none)   Allergies:  No Known Allergies    Isolation:  None   Infection:  None   Code Status:  Prior    Ht:  167.6 cm (66\")   Wt:  58.2 kg (128 lb 4.8 oz)    Admission Cmt:  None   Principal Problem:  None                Active Insurance as of 7/18/2018     Primary Coverage     Payor Plan Insurance Group Employer/Plan Group    MEDICARE MEDICARE A & B      Payor Plan Address Payor Plan Phone Number Effective From Effective To    [address] [phone] 1/1/1991     formerly [facility] [id number]       Subscriber Name Subscriber Birth Date Member ID       MILTON MAE 1/25/1926 [id number]           Secondary Coverage     Payor Plan Insurance Group Employer/Plan Group    [employer] MED SUPP AAR HEALTH CARE OPTIONS      Payor Plan Address Payor Plan Phone Number Effective From Effective To    [facility] [phone] 1/1/2018     [address]       [facility] [id number]       Subscriber Name Subscriber Birth Date Member ID       MILTON MAE 1/25/1926 [id number]                 Emergency Contacts      (Rel.) Home Phone Work Phone Mobile Phone    Nicholas Haddad (Son) [phone] -- [phone]               History & Physical      LUIS MIGUEL Lynn at 7/18/2018  5:39 PM     Attestation signed by Hans Wise MD at 7/19/2018  6:35 PM    I personally evaluated and examined the patient in conjunction with LUIS MIGUEL Mcpherson and agree with " "the assessment, treatment plan, and disposition of the patient as recorded by her. My history, exam, and further recommendations are:     S:  89 year old lady with 2-3 days of cough, shortness of breath, nausea and vomiting    O:  Lungs:  Diminished, ronchi    A:  CAP    P:  IV abx, nebs  Blood/sputum cultures        Hans Wise MD  07/19/18  6:34 PM                        [facility] Medicine Services  HISTORY AND PHYSICAL    Date of Admission: 7/18/2018  Primary Care Physician: Javier Ng MD    Subjective     Chief Complaint: nausea/vomiting/diarrhea    History of Present Illness  Milton Mae is a pleasant 89 year old  female with a past medical history of COPD, 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  Patient was in her usual state of health until 2-3 weeks ago at which time she was treated by PCP for \"lung inflammation\".  She returned to PCP on 7/11/2018 chest xray revealed pneumonia, ABX was changed to Levaquin.  She then developed nausea with vomiting, abdominal pain, and diarrhea.  Pain is located epigastric region radiating down to lower quads, described as constant, aching. Nausea is constant, vomiting episodic, having trouble keeping down food and liquids. Diarrhea occurs 3-4 times daily. She reports feeling so weak she cant get off stretcher. Patient is admitted for treatment of failed outpatient therapy pneumonia.     Review of Systems   Constitutional: Negative for activity change, appetite change, fatigue and fever.   HENT: Negative for congestion, mouth sores, rhinorrhea, sinus pressure and trouble swallowing.    Respiratory: Positive for shortness of breath (chronic). Negative for cough, chest tightness and wheezing.    Cardiovascular: Negative for chest pain, palpitations and leg swelling.   Gastrointestinal: Positive for abdominal pain (epigastric down to lower abdomen), diarrhea, nausea and vomiting. " Negative for abdominal distention and constipation.   Genitourinary: Negative for difficulty urinating, dysuria and frequency.   Musculoskeletal: Negative for arthralgias and myalgias.        Generalized weakness from prolonged illness   Neurological: Negative for dizziness, weakness and light-headedness.   Psychiatric/Behavioral: Negative for agitation and sleep disturbance. The patient is not nervous/anxious.         Past Medical History:   Past Medical History:   Diagnosis Date   • COPD (chronic obstructive pulmonary disease) (CMS/[facility])    • GERD (gastroesophageal reflux disease)    • Hypertension    • Pneumonia        Past Surgical History:   Past Surgical History:   Procedure Laterality Date   • CATARACT EXTRACTION, BILATERAL     • THYROID SURGERY         Family History: family history includes Alzheimer's disease in her mother; Heart disease in her father.    Social History:  reports that she has never smoked. She does not have any smokeless tobacco history on file. She reports that she does not drink alcohol or use drugs.    Code Status: Full, if unable to speak for herself her son Nicholas Haddad is POA      Allergies:  No Known Allergies    Medications:  Prior to Admission medications    Medication Sig Start Date End Date Taking? Authorizing Provider   Calcium-Magnesium-Vitamin D (CALCIUM 1200+D3 PO) Take  by mouth.   Yes Historical Provider, MD   esomeprazole (nexIUM) 40 MG capsule Take 40 mg by mouth Every Morning Before Breakfast.   Yes Historical Provider, MD   levoFLOXacin (LEVAQUIN) 500 MG tablet Take 500 mg by mouth Daily. 7/11/18 7/20/18 Yes Historical Provider, MD   lisinopril-hydrochlorothiazide (PRINZIDE,ZESTORETIC) 10-12.5 MG per tablet Take 1 tablet by mouth Daily.   Yes Historical Provider, MD   metoprolol succinate XL (TOPROL-XL) 25 MG 24 hr tablet Take 25 mg by mouth Daily.   Yes Historical Provider, MD   Multiple Vitamins-Minerals (PRESERVISION AREDS PO) Take  by mouth 4 (Four) Times a  "Day. Twice AM and Twice PM.   Yes Historical Provider, MD   Multiple Vitamins-Minerals (WOMENS ONE DAILY PO) Take  by mouth.   Yes Historical Provider, MD       Objective     BP (!) 112/35   Pulse (!) 126   Temp 99.3 °F (37.4 °C)   Resp 19   Ht 167.6 cm (66\")   Wt 54.4 kg (120 lb)   SpO2 99%   BMI 19.37 kg/m²       Physical Exam   Constitutional: She is oriented to person, place, and time. She appears well-developed. No distress.   frail   HENT:   Head: Normocephalic and atraumatic.   Eyes: Pupils are equal, round, and reactive to light. Conjunctivae and EOM are normal. No scleral icterus.   Neck: Normal range of motion. No JVD present. No tracheal deviation present.   Cardiovascular: Normal rate, regular rhythm, normal heart sounds and intact distal pulses.  Exam reveals no gallop.    No murmur heard.  Pulmonary/Chest: Effort normal and breath sounds normal. No respiratory distress. She has no wheezes. She has no rales.   Diminished    Abdominal: Soft. Bowel sounds are normal. She exhibits no distension. There is no tenderness. There is no guarding.   Musculoskeletal: Normal range of motion. She exhibits no edema.   Generalized weakness   Neurological: She is alert and oriented to person, place, and time.   No obvious deficits noted.   Skin: Skin is warm and dry. No rash noted. She is not diaphoretic. No erythema. No pallor.   Psychiatric: She has a normal mood and affect. Her behavior is normal.   Vitals reviewed.        Pertinent Data:   Lab Results (last 72 hours)     Procedure Component Value Units Date/Time    Lactic Acid, Plasma [[id number]]  (Normal) Collected:  07/18/18 1559    Specimen:  Blood Updated:  07/18/18 1803     Lactate 1.9 mmol/L     Urinalysis With Culture If Indicated - Urine, Catheter [[id number]]  (Normal) Collected:  07/18/18 1724    Specimen:  Urine from Urine, Catheter Updated:  07/18/18 1751     Color, UA Yellow     Appearance, UA Clear     pH, UA <=5.0     Specific Gravity, UA 1.020 "     Glucose, UA Negative     Ketones, UA Negative     Bilirubin, UA Negative     Blood, UA Negative     Protein, UA Negative     Leuk Esterase, UA Negative     Nitrite, UA Negative     Urobilinogen, UA 0.2 E.U./dL    Blood Culture Bottle Set [[id number]] Collected:  07/18/18 1559    Specimen:  Blood from Arm, Right Updated:  07/18/18 1700     Extra Tube Hold for add-ons.     Comment: Auto resulted.       Light Blue Top [[id number]] Collected:  07/18/18 1559    Specimen:  Blood Updated:  07/18/18 1700     Extra Tube hold for add-on     Comment: Auto resulted       Green Top (Gel) [[id number]] Collected:  07/18/18 1559    Specimen:  Blood Updated:  07/18/18 1700     Extra Tube Hold for add-ons.     Comment: Auto resulted.       Lavender Top [[id number]] Collected:  07/18/18 1559    Specimen:  Blood Updated:  07/18/18 1700     Extra Tube hold for add-on     Comment: Auto resulted       Red Top [[id number]] Collected:  07/18/18 1559    Specimen:  Blood Updated:  07/18/18 1700     Extra Tube Hold for add-ons.     Comment: Auto resulted.       Calcium, Ionized [[id number]]  (Abnormal) Collected:  07/18/18 1630    Specimen:  Blood Updated:  07/18/18 1636     Ionized Calcium 4.55 (L) mg/dL      Collected by [id number]    Blood Gas, Arterial [[id number]]  (Abnormal) Collected:  07/18/18 1630    Specimen:  Arterial Blood Updated:  07/18/18 1635     Site Right Brachial     Don's Test N/A     pH, Arterial 7.462 (H) pH units      pCO2, Arterial 41.2 mm Hg      pO2, Arterial 97.0 mm Hg      HCO3, Arterial 29.4 (H) mmol/L      Base Excess, Arterial 5.1 (H) mmol/L      O2 Saturation, Arterial 98.5 %      Temperature 37.0 C      Barometric Pressure for Blood Gas 750 mmHg      Modality Nasal Cannula     Flow Rate 2.0 lpm      Ventilator Mode NA     Collected by [id number]    Blood Culture - Blood, [[id number]] Collected:  07/18/18 1620    Specimen:  Blood from Arm, Left Updated:  07/18/18 1634    Comprehensive Metabolic Panel [[id number]]   (Abnormal) Collected:  07/18/18 1559    Specimen:  Blood Updated:  07/18/18 1627     Glucose 197 (H) mg/dL      BUN 24 (H) mg/dL      Creatinine 0.84 mg/dL      Sodium 139 mmol/L      Potassium 3.1 (L) mmol/L      Chloride 96 (L) mmol/L      CO2 30.0 mmol/L      Calcium 9.6 mg/dL      Total Protein 7.6 g/dL      Albumin 4.00 g/dL      ALT (SGPT) <15 U/L      AST (SGOT) 32 U/L      Alkaline Phosphatase 43 U/L      Total Bilirubin 0.2 mg/dL      eGFR Non African Amer 63 mL/min/1.73      Globulin 3.6 gm/dL      A/G Ratio 1.1 g/dL      BUN/Creatinine Ratio 28.6 (H)     Anion Gap 13.0 mmol/L     Narrative:       The MDRD GFR formula is only valid for adults with stable renal function between ages 18 and 70.    Magnesium [[id number]]  (Normal) Collected:  07/18/18 1559    Specimen:  Blood Updated:  07/18/18 1627     Magnesium 1.9 mg/dL     Phosphorus [[id number]]  (Normal) Collected:  07/18/18 1559    Specimen:  Blood Updated:  07/18/18 1627     Phosphorus 3.2 mg/dL     C-reactive Protein [[id number]]  (Abnormal) Collected:  07/18/18 1559    Specimen:  Blood Updated:  07/18/18 1627     C-Reactive Protein 2.71 (H) mg/dL     Protime-INR [[id number]]  (Normal) Collected:  07/18/18 1559    Specimen:  Blood Updated:  07/18/18 1619     Protime 13.5 Seconds      INR 1.00    aPTT [[id number]]  (Normal) Collected:  07/18/18 1559    Specimen:  Blood Updated:  07/18/18 1619     PTT 33.0 seconds     CBC Auto Differential [[id number]]  (Abnormal) Collected:  07/18/18 1559    Specimen:  Blood Updated:  07/18/18 1616     WBC 13.56 (H) 10*3/mm3      RBC 3.80 (L) 10*6/mm3      Hemoglobin 11.5 (L) g/dL      Hematocrit 35.1 (L) %      MCV 92.4 fL      MCH 30.3 pg      MCHC 32.8 (L) g/dL      RDW 14.0 %      RDW-SD 47.5 fl      MPV 10.2 fL      Platelets 321 10*3/mm3      Neutrophil % 79.7 (H) %      Lymphocyte % 12.8 (L) %      Monocyte % 6.0 %      Eosinophil % 0.4 %      Basophil % 0.4 %      Immature Grans % 0.7 %      Neutrophils, Absolute  10.80 (H) 10*3/mm3      Lymphocytes, Absolute 1.74 10*3/mm3      Monocytes, Absolute 0.82 10*3/mm3      Eosinophils, Absolute 0.06 10*3/mm3      Basophils, Absolute 0.05 10*3/mm3      Immature Grans, Absolute 0.09 (H) 10*3/mm3      nRBC 0.0 /100 WBC         Imaging Results (last 24 hours)     Procedure Component Value Units Date/Time    CT Abdomen Pelvis With Contrast [[id number]] Collected:  07/18/18 1720     Updated:  07/18/18 1725    Narrative:       CT ABDOMEN PELVIS W CONTRAST- 7/18/2018 5:05 PM CDT     HISTORY: Abd pain, fever, abscess suspected       COMPARISON: None.      DOSE LENGTH PRODUCT: 242 mGy cm. Automated exposure control was also  utilized to decrease patient radiation dose.     TECHNIQUE: Following the intravenous administration of contrast, helical  CT tomographic images of the abdomen and pelvis were acquired.  Multiplanar reformatted images were provided for review.      FINDINGS:   Dependent changes are seen in the LEFT lung base. Bronchiectasis is  noted in the RIGHT middle lobe.      LIVER: No focal liver lesion. The hepatic vasculature is patent.      BILIARY SYSTEM: Multiple stones are seen in the gallbladder. There is no  pericholecystic fluid or cold bladder distention.      PANCREAS: No focal pancreatic lesion.      SPLEEN: Unremarkable.      KIDNEYS AND ADRENALS: Numerous low-density lesions in bilateral kidneys  most likely represent cysts but some are too small to characterize. The  adrenal glands are unremarkable The ureters are decompressed and normal  in appearance.     RETROPERITONEUM: No mass, lymphadenopathy or hemorrhage.      GI TRACT: Multiple colonic diverticula are present, but there is no wall  thickening or pericolonic stranding to suggest diverticulitis. No  obstruction or wall thickening is seen in the remainder of the  visualized GI tract. The appendix is visualized and unremarkable.     OTHER: There is no mesenteric mass, lymphadenopathy or fluid collection.  The  abdominopelvic vasculature is patent. The osseous structures and  soft tissues demonstrate no worrisome lesions.          PELVIS: No mass lesion, fluid collection or significant lymphadenopathy  is seen in the pelvis. The urinary bladder is normal in appearance.       Impression:       1. Cholelithiasis without evidence of acute cholecystitis.  2. Diverticulosis without evidence of diverticulitis.  3. Chronic changes in the lower lungs.         This report was finalized on 07/18/2018 17:22 by Dr. Eliazar Thomas MD.    XR Chest 1 View [[id number]] Collected:  07/18/18 1623     Updated:  07/18/18 1628    Narrative:       EXAMINATION:  XR CHEST 1 VW-  7/18/2018 4:20 PM CDT     HISTORY: Severe sepsis protocol     COMPARISON: No comparison study.     FINDINGS:  There is an area of infiltrate in the left perihilar region  and the left upper lobe. There is a linear band of scarring projected  over the right hilum. Coarse markings and bronchial wall thickening  appears stable. Heart size is normal.       Impression:       1. Probable left perihilar pneumonia. Follow-up is recommended to  document resolution.  2. Linear scarring projected over the right hilum. Coarse markings and  bronchial wall thickening, stable.        This report was finalized on 07/18/2018 16:25 by Dr. Ramsey Grover MD.          I have personally reviewed and interpreted the radiology studies and ECG obtained at time of admission.     Assessment / Plan     Assessment/Plan:      1. Left perihilar pneumonia - Rocephin, Azithromycin, sputum culture  2. Sepsis - fluid bolus 30ml/kg - 1632ml, antibiotic therapy, monitor lactic acid  3. Hypokalemia - replace potassium, CMP in am, cardiac monitoring  4. Leukocytosis - treat with abx, CBC in am  5. COPD - duonebs 4 times day, continue home O2 at 2 liters, continuous pulse ox, ABG's as needed, mucinex  6. Generalized weakness/deconditioned - PT/OT consult  7. DVT prophylaxis - lovenox ad SCD's  8. Hyperglycemia  - A1c in am  9. Failure to thrive from extended illness - Dietitian assessment, lipid panel  10. Nausea/vomiting/diarrhea after starting oral antibiotic - regular diet as tolerated, Zofran, Carafate, GI panel, Florastor  11. Additional labs: TSH in am       I discussed the patient's findings and my recommendations with: Theo Wise MD  Time spent: 40 minutes      Florina Pereira, APRN  07/18/18   6:49 PM    Electronically signed by Hans Wise MD at 7/19/2018  6:35 PM       Operative/Procedure Notes (last 24 hours) (Notes from 7/22/2018 10:05 AM through 7/23/2018 10:05 AM)     No notes of this type exist for this encounter.        Physician Progress Notes (last 24 hours) (Notes from 7/22/2018 10:05 AM through 7/23/2018 10:05 AM)     No notes of this type exist for this encounter.        Consult Notes (last 24 hours) (Notes from 7/22/2018 10:05 AM through 7/23/2018 10:05 AM)     No notes of this type exist for this encounter.           Discharge Summary      Hans Wise MD at 7/22/2018 10:13 AM              [facility] Medicine Services  DISCHARGE SUMMARY       Date of Admission: 7/18/2018  Date of Discharge:  7/22/2018  Primary Care Physician: Javier Ng MD    Presenting Problem/History of Present Illness:  Pneumonia due to infectious organism, unspecified laterality, unspecified part of lung [J18.9]  Pneumonia due to infectious organism, unspecified laterality, unspecified part of lung [J18.9]     Final Discharge Diagnoses:  Hospital Problem List     Pneumonia due to infectious organism      1.  Sepsis  -IVF d/cd  -PO abx  -Blood/sputum cultures P, NTD     2.  CAP  -Omnicef  -Cultures P, NTD     3.  Hypokalemia  -Resolved  -Telemetry     4.  GERD  -protonix     5.  HTN  -metoprolol  -Lisinopril/HCTZ     6.  COPD  -duonebs     7.  Chronic anemia  -Monitor H&H     8.  Hyperglycemia  -Check A1C     9.  Viral GE with intractable nausea, vomiting,  "diarrhea  -Zofran  -IVF  -C. Diff negative  -IV Decadron x 1    Consults: NA    Procedures Performed: NA    Pertinent Test Results: NA    Chief Complaint on Day of Discharge: Nausea    History of Present Illness on Day of Discharge:   Doing well  No N/V  Tolerating home O2  Wants to go home.    Hospital Course:  The patient is a 89 y.o. female who [facility] to [facility] with shortness of breath, nausea, vomiting and diarrhea.  She was found to pneumonia.  She was started on IV antibiotics.    She had nausea and abdominal pain.  She was given IV fluids, IV Zofran, and 1 dose of IV decadron.    She improved and was transitioned over to oral antibiotics.  She is tolerating her home oxygen.  She is afebrile.      She and her son are comfortable with being discharged and with the discharge plan.  They were given the chance to ask questions and all of their questions have been answered to their complete satisfaction.    Condition on Discharge:  Stable    Physical Exam on Discharge:  /56 (BP Location: Right arm, Patient Position: Sitting)   Pulse 82   Temp 98 °F (36.7 °C) (Temporal Artery )   Resp 16   Ht 167.6 cm (66\")   Wt 58.2 kg (128 lb 4.8 oz)   SpO2 98%   BMI 20.71 kg/m²    Physical Exam   Constitutional: She is oriented to person, place, and time. She appears well-developed and well-nourished. Nasal cannula in place.   HENT:   Head: Normocephalic and atraumatic.   Right Ear: External ear normal.   Left Ear: External ear normal.   Nose: Nose normal.   Mouth/Throat: Oropharynx is clear and moist.   Eyes: Conjunctivae and EOM are normal.   Neck: Normal range of motion. Neck supple.   Cardiovascular: Normal rate, regular rhythm and normal heart sounds.    Pulmonary/Chest: Effort normal. She has decreased breath sounds.   Abdominal: Soft. Bowel sounds are normal. She exhibits no distension. There is no tenderness.   Musculoskeletal: Normal range of motion.   Neurological: She is alert and " oriented to person, place, and time.   Skin: Skin is warm and dry.   Psychiatric: She has a normal mood and affect. Her speech is normal and behavior is normal. Cognition and memory are normal.         Discharge Disposition:  Home or Self Care    Discharge Medications:     Discharge Medications      New Medications      Instructions Start Date   cefdinir 300 MG capsule  Commonly known as:  OMNICEF   300 mg, Oral, Every 12 Hours Scheduled      ondansetron ODT 4 MG disintegrating tablet  Commonly known as:  ZOFRAN-ODT   4 mg, Oral, Every 6 Hours PRN         Continue These Medications      Instructions Start Date   CALCIUM 1200+D3 PO   1 tablet, Oral, Daily      esomeprazole 40 MG capsule  Commonly known as:  nexIUM   40 mg, Oral, Every Morning Before Breakfast      lisinopril-hydrochlorothiazide 10-12.5 MG per tablet  Commonly known as:  PRINZIDE,ZESTORETIC   1 tablet, Oral, Daily      metoprolol succinate XL 25 MG 24 hr tablet  Commonly known as:  TOPROL-XL   25 mg, Oral, Daily      WOMENS ONE DAILY PO   1 tablet, Oral, Daily      PRESERVISION AREDS PO   2 tablets, Oral, 2 Times Daily, Twice AM and Twice PM.         Stop These Medications    levoFLOXacin 500 MG tablet  Commonly known as:  LEVAQUIN            Discharge Diet: regular    Activity at Discharge: as tolerated    Discharge Care Plan/Instructions: Go to ER for fever or shortness of breath.    Follow-up Appointments:   No future appointments.    Test Results Pending at Discharge: MACARIO Wise MD  07/22/18  10:13 AM    Time: 35 minutes          Electronically signed by Hans Wise MD at 7/22/2018 10:37 AM     Ambulatory Referral to Home Health [[id number]] (Order [id number])   Order   Date: 7/22/2018 Department: [address] Ordering/Authorizing: Hans Wise MD   Order History   Outpatient   Date/Time Action Taken User Additional Information   07/22/18 1012 Sign Hans Wise MD    Order Details     Frequency  Duration Priority Order Class   None None Routine Internal Referral   Start Date/Time     Start Date   07/22/18   Order Questions     Question Answer Comment   Face to Face Visit Date 7/22/2018    Follow-up Provider for Plan of Care? I treated the patient in an acute care facility and will not continue treatment after discharge.    Follow-up Provider TREY GARCIA    Reason/Clinical Findings weakness    Describe mobility limitations that make leaving home difficult weakness    Nursing/Therapeutic Services Requested Physical Therapy     Occupational Therapy    PT orders: Total joint pathway     Therapeutic exercise     Gait Training     Transfer training     Strengthening    Weight Bearing Status As Tolerated    Occupational orders: Strengthening     Energy conservation     Activities of daily living    Frequency: 1 Week 1           Source Order Set / Preference List     Preference List   AMB IM REFERRALS [[id number]]   Clinical Indications      ICD-10-CM ICD-9-CM   Impaired mobility and ADLs     Z74.09 799.89   Impaired mobility     Z74.09 799.89   Reprint Order Requisition     AMB REFERRAL TO HOME HEALTH (Order #[id number]) on 7/22/18   Encounter-Level Cardiology Documents:     There are no encounter-level cardiology documents.   Encounter     View Encounter       Order Provider Info         Office phone Pager E-mail   Ordering User Hans Wise [phone] --   Authorizing Provider Hans Wise [phone] --   Attending Provider When Ordered Hans Wise [phone] -- --   Linked Charges     No charges linked to this procedure   Order Transmittal Tracking     AMB REFERRAL TO HOME HEALTH (Order #[id number]) on 7/22/18   Authorized by:  Hans Wise MD  (NPI: [id number])       Lab Component SmartPhrase Guide     AMB REFERRAL TO HOME HEALTH (Order #[id number]) on 7/22/18

## 2018-07-24 NOTE — THERAPY DISCHARGE NOTE
Acute Care - Physical Therapy Discharge Summary  [facility]       Patient Name: Milton Mae  : 1926  MRN: [id number]    Today's Date: 2018  Onset of Illness/Injury or Date of Surgery: 18    Date of Referral to PT: 18  Referring Physician: Dr. Wise      Admit Date: 2018      PT Recommendation and Plan    Visit Dx:    ICD-10-CM ICD-9-CM   1. Pneumonia due to infectious organism, unspecified laterality, unspecified part of lung J18.9 136.9     484.8   2. Nausea and vomiting, intractability of vomiting not specified, unspecified vomiting type R11.2 787.01   3. Impaired mobility and ADLs Z74.09 799.89   4. Impaired mobility Z74.09 799.89             Outcome Measures     Row Name 18 1000             How much help from another person do you currently need...    Turning from your back to your side while in flat bed without using bedrails? 3  -RA      Moving from lying on back to sitting on the side of a flat bed without bedrails? 3  -RA      Moving to and from a bed to a chair (including a wheelchair)? 3  -RA      Standing up from a chair using your arms (e.g., wheelchair, bedside chair)? 3  -RA      Climbing 3-5 steps with a railing? 2  -RA      To walk in hospital room? 3  -RA      AM-PAC 6 Clicks Score 17  -RA         Functional Assessment    Outcome Measure Options AM-PAC 6 Clicks Basic Mobility (PT)  -RA        User Key  (r) = Recorded By, (t) = Taken By, (c) = Cosigned By    Initials Name Provider Type    RA Maria Diaz, PT Physical Therapist            Therapy Suggested Charges     Code   Minutes Charges    None                   PT Rehab Goals     Row Name 18 0833             Bed Mobility Goal 1 (PT)    Activity/Assistive Device (Bed Mobility Goal 1, PT) sit to supine;supine to sit  -CW      [place] Level/Cues Needed (Bed Mobility Goal 1, PT) independent  -CW      Time Frame (Bed Mobility Goal 1, PT) by discharge  -CW      Progress/Outcomes (Bed Mobility Goal 1,  PT) goal not met  -CW         Gait Training Goal 1 (PT)    Activity/Assistive Device (Gait Training Goal 1, PT) gait (walking locomotion);assistive device use  -CW      [place] Level (Gait Training Goal 1, PT) standby assist  -CW      Distance (Gait Goal 1, PT) 100 feet  -CW      Time Frame (Gait Training Goal 1, PT) by discharge  -CW      Progress/Outcome (Gait Training Goal 1, PT) goal not met  -CW        User Key  (r) = Recorded By, (t) = Taken By, (c) = Cosigned By    Initials Name Provider Type Discipline    CW Krystal Nixon PTA Physical Therapy Assistant PT              PT Discharge Summary  Reason for Discharge: Discharge from facility  Outcomes Achieved: Refer to plan of care for updates on goals achieved  Discharge Destination: Home      Krystal Nixon PTA   7/24/2018

## 2018-07-26 LAB — M PNEUMO DNA SPEC QL NAA+PROBE: NEGATIVE

## 2019-08-15 ENCOUNTER — OFFICE VISIT (OUTPATIENT)
Dept: GASTROENTEROLOGY | Facility: CLINIC | Age: 84
End: 2019-08-15

## 2019-08-15 VITALS
TEMPERATURE: 97 F | HEIGHT: 65 IN | SYSTOLIC BLOOD PRESSURE: 110 MMHG | DIASTOLIC BLOOD PRESSURE: 58 MMHG | HEART RATE: 60 BPM | BODY MASS INDEX: 15.83 KG/M2 | WEIGHT: 95 LBS | OXYGEN SATURATION: 96 %

## 2019-08-15 DIAGNOSIS — R10.84 GENERALIZED ABDOMINAL PAIN: Primary | ICD-10-CM

## 2019-08-15 PROCEDURE — 99213 OFFICE O/P EST LOW 20 MIN: CPT | Performed by: NURSE PRACTITIONER

## 2019-08-15 RX ORDER — HYOSCYAMINE SULFATE 0.125 MG
0.12 TABLET ORAL EVERY 6 HOURS PRN
Status: ON HOLD | COMMUNITY
End: 2022-04-25

## 2019-08-15 RX ORDER — TRAZODONE HYDROCHLORIDE 50 MG/1
50 TABLET ORAL NIGHTLY
COMMUNITY

## 2019-08-15 RX ORDER — CYANOCOBALAMIN (VITAMIN B-12) 500 MCG
1 TABLET ORAL DAILY
COMMUNITY

## 2019-08-15 NOTE — PROGRESS NOTES
Chief Complaint   Patient presents with   • Abdominal Pain     having abd. pain constipation black stools.       PCP: Javier Ng MD  REFER: Javier Ng MD    Subjective     HPI    Patients presents to office with complaints of generalized abdominal pain.  Pain is constant and present over 6 weeks.  Difficulty with constipation.  Eating exacerbates stomach pain.  No emesis.  Occasional nausea.  No heartburn.  No bright red blood per rectum, no melena.  She does not take anything to facilitate BM.  No previous csocpe.     Hgb (7/2019) - 10.3, MCV 93     Weight July 2019 (referral note)- 108 lb     Past Medical History:   Diagnosis Date   • COPD (chronic obstructive pulmonary disease) (CMS/[facility])    • GERD (gastroesophageal reflux disease)    • Hypertension    • Pneumonia        Past Surgical History:   Procedure Laterality Date   • CATARACT EXTRACTION, BILATERAL     • THYROID SURGERY         Outpatient Medications Marked as Taking for the 8/15/19 encounter (Office Visit) with Adeel Carranza APRN   Medication Sig Dispense Refill   • Calcium-Magnesium-Vitamin D (CALCIUM 1200+D3 PO) Take 1 tablet by mouth Daily.     • Cyanocobalamin (B-12) 1000 MCG capsule Take  by mouth.     • esomeprazole (nexIUM) 40 MG capsule Take 40 mg by mouth Every Morning Before Breakfast.     • hyoscyamine (LEVSIN) 0.125 MG/ML solution Take  by mouth Every 4 (Four) Hours As Needed for bladder spasms.     • lisinopril-hydrochlorothiazide (PRINZIDE,ZESTORETIC) 10-12.5 MG per tablet Take 1 tablet by mouth Daily.     • metoprolol succinate XL (TOPROL-XL) 25 MG 24 hr tablet Take 25 mg by mouth Daily.     • Multiple Vitamins-Minerals (PRESERVISION AREDS PO) Take 2 tablets by mouth 2 (Two) Times a Day. Twice AM and Twice PM.      • Multiple Vitamins-Minerals (WOMENS ONE DAILY PO) Take 1 tablet by mouth Daily.     • ondansetron ODT (ZOFRAN-ODT) 4 MG disintegrating tablet Take 1 tablet by mouth Every 6 (Six) Hours As Needed for Nausea or  "Vomiting. 28 tablet 0   • Probiotic Product (PROBIOTIC-10 PO) Take  by mouth.     • traZODone (DESYREL) 50 MG tablet Take 50 mg by mouth Every Night.         No Known Allergies    Social History     Socioeconomic History   • Marital status:      Spouse name: Not on file   • Number of children: Not on file   • Years of education: Not on file   • Highest education level: Not on file   Tobacco Use   • Smoking status: Never Smoker   • Smokeless tobacco: Never Used   Substance and Sexual Activity   • Alcohol use: No   • Drug use: No   • Sexual activity: Defer       Family History   Problem Relation Age of Onset   • Alzheimer's disease Mother    • Heart disease Father    • Colon polyps Child    • Colon cancer Neg Hx        Review of Systems   Constitutional: Negative for fatigue, fever and unexpected weight change.   HENT: Negative for hearing loss, sore throat and voice change.    Eyes: Negative for visual disturbance.   Respiratory: Negative for cough, shortness of breath and wheezing.    Cardiovascular: Negative for chest pain and palpitations.   Gastrointestinal: Positive for abdominal pain and constipation. Negative for blood in stool and vomiting.   Endocrine: Negative for polydipsia and polyuria.   Genitourinary: Negative for difficulty urinating, dysuria, hematuria and urgency.   Musculoskeletal: Negative for joint swelling and myalgias.   Skin: Negative for color change, rash and wound.   Neurological: Negative for dizziness, tremors, seizures and syncope.   Hematological: Does not bruise/bleed easily.   Psychiatric/Behavioral: Negative for agitation and confusion. The patient is not nervous/anxious.        Objective     Vitals:    08/15/19 1304   BP: 110/58   Pulse: 60   Temp: 97 °F (36.1 °C)   SpO2: 96%   Weight: 43.1 kg (95 lb)   Height: 165.1 cm (65\")     Body mass index is 15.81 kg/m².    Physical Exam   Constitutional: She is oriented to person, place, and time. She appears well-developed and " well-nourished. She is cooperative.   HENT:   Head: Normocephalic and atraumatic.   Eyes: Conjunctivae are normal. Pupils are equal, round, and reactive to light. No scleral icterus.   Neck: Normal range of motion. Neck supple. No JVD present. No thyroid mass and no thyromegaly present.   Cardiovascular: Normal rate, regular rhythm and normal heart sounds. Exam reveals no gallop and no friction rub.   No murmur heard.  Pulmonary/Chest: Effort normal and breath sounds normal. No accessory muscle usage. No respiratory distress. She has no wheezes. She has no rales.   Abdominal: Soft. Normal appearance and bowel sounds are normal. She exhibits no distension, no ascites and no mass. There is no hepatosplenomegaly. There is no tenderness. There is no rebound and no guarding.   Musculoskeletal: Normal range of motion. She exhibits no edema or tenderness.     Vascular Status -  Her right foot exhibits normal foot vasculature  and no edema. Her left foot exhibits normal foot vasculature  and no edema.  Lymphadenopathy:     She has no cervical adenopathy.   Neurological: She is alert and oriented to person, place, and time. She has normal strength. Gait normal.   Skin: Skin is warm, dry and intact. No rash noted.       Imaging Results (most recent)     None          Body mass index is 15.81 kg/m².    Assessment/Plan     Milton was seen today for abdominal pain.    Diagnoses and all orders for this visit:    Generalized abdominal pain  -     CT Abdomen Pelvis With Contrast; Future        * Surgery not found *    Recommend daily use of Miralax, adjust as needed  Or MOM    In light of patient age and lung disease (chronic o2) I do not feel she is appropriate endoscopic candidate.  This discussed with patient and her son.  Both were in agreement they did not wish to pursue colonoscopy evaluation.      Further recommendation pending result of CT and success of recommended treatment           There are no Patient Instructions on  file for this visit.

## 2019-08-20 ENCOUNTER — HOSPITAL ENCOUNTER (OUTPATIENT)
Dept: CT IMAGING | Facility: HOSPITAL | Age: 84
Discharge: HOME | End: 2019-08-20
Admitting: NURSE PRACTITIONER

## 2019-08-20 DIAGNOSIS — R10.84 GENERALIZED ABDOMINAL PAIN: ICD-10-CM

## 2019-08-20 PROCEDURE — 74177 CT ABD & PELVIS W/CONTRAST: CPT

## 2019-08-20 PROCEDURE — 25010000002 IOPAMIDOL 61 % SOLUTION: Performed by: NURSE PRACTITIONER

## 2019-08-20 PROCEDURE — 82565 ASSAY OF CREATININE: CPT

## 2019-08-20 RX ADMIN — IOPAMIDOL 100 ML: 612 INJECTION, SOLUTION INTRAVENOUS at 12:25

## 2019-08-21 ENCOUNTER — TELEPHONE (OUTPATIENT)
Dept: GASTROENTEROLOGY | Facility: CLINIC | Age: 84
End: 2019-08-21

## 2019-08-21 DIAGNOSIS — R10.84 GENERALIZED ABDOMINAL PAIN: Primary | ICD-10-CM

## 2019-08-21 DIAGNOSIS — R11.0 NAUSEA: ICD-10-CM

## 2019-08-21 LAB — CREAT BLDA-MCNC: 0.8 MG/DL (ref 0.6–1.3)

## 2019-08-21 NOTE — TELEPHONE ENCOUNTER
CT result given to patient     She continues to have abdominal pain, nausea    Will order UGI    Do not recommend EGD or colonoscopy in light of age/lung disease, patient and son verbalized understanding     She is having loose stool, recommend she not take dulcolax and miralax together, adjust dose of miralax if needed

## 2019-08-29 ENCOUNTER — HOSPITAL ENCOUNTER (OUTPATIENT)
Dept: GENERAL RADIOLOGY | Facility: HOSPITAL | Age: 84
Discharge: HOME | End: 2019-08-29
Admitting: NURSE PRACTITIONER

## 2019-08-29 ENCOUNTER — TELEPHONE (OUTPATIENT)
Dept: GASTROENTEROLOGY | Facility: CLINIC | Age: 84
End: 2019-08-29

## 2019-08-29 DIAGNOSIS — R11.0 NAUSEA: ICD-10-CM

## 2019-08-29 PROCEDURE — 74220 X-RAY XM ESOPHAGUS 1CNTRST: CPT

## 2019-08-29 PROCEDURE — A9270 NON-COVERED ITEM OR SERVICE: HCPCS | Performed by: NURSE PRACTITIONER

## 2019-08-29 PROCEDURE — 63710000001 SOD BICARB-CITRIC ACID-SIMETHICONE 2.21-1.53-0.04 G PACK: Performed by: NURSE PRACTITIONER

## 2019-08-29 PROCEDURE — 63710000001 BARIUM SULFATE 98 % RECONSTITUTED SUSPENSION: Performed by: NURSE PRACTITIONER

## 2019-08-29 RX ADMIN — ANTACID/ANTIFLATULENT 1 TABLET: 380; 550; 10; 10 GRANULE, EFFERVESCENT ORAL at 09:58

## 2019-08-29 RX ADMIN — BARIUM SULFATE 20 ML: 980 POWDER, FOR SUSPENSION ORAL at 09:58

## 2019-09-03 ENCOUNTER — APPOINTMENT (OUTPATIENT)
Dept: GENERAL RADIOLOGY | Facility: HOSPITAL | Age: 84
End: 2019-09-03

## 2019-09-03 ENCOUNTER — TELEPHONE (OUTPATIENT)
Dept: GASTROENTEROLOGY | Facility: CLINIC | Age: 84
End: 2019-09-03

## 2019-09-03 ENCOUNTER — APPOINTMENT (OUTPATIENT)
Dept: CT IMAGING | Facility: HOSPITAL | Age: 84
End: 2019-09-03

## 2019-09-03 ENCOUNTER — HOSPITAL ENCOUNTER (INPATIENT)
Facility: HOSPITAL | Age: 84
LOS: 2 days | Discharge: HOME | End: 2019-09-05
Attending: FAMILY MEDICINE | Admitting: FAMILY MEDICINE

## 2019-09-03 DIAGNOSIS — R13.10 DYSPHAGIA, UNSPECIFIED TYPE: Primary | ICD-10-CM

## 2019-09-03 DIAGNOSIS — J43.1 PANLOBULAR EMPHYSEMA (HCC): ICD-10-CM

## 2019-09-03 DIAGNOSIS — Z78.9 IMPAIRED MOBILITY AND ADLS: ICD-10-CM

## 2019-09-03 DIAGNOSIS — Z74.09 IMPAIRED MOBILITY AND ADLS: ICD-10-CM

## 2019-09-03 DIAGNOSIS — J18.9 PNEUMONIA OF BOTH LUNGS DUE TO INFECTIOUS ORGANISM, UNSPECIFIED PART OF LUNG: ICD-10-CM

## 2019-09-03 DIAGNOSIS — Z74.09 IMPAIRED MOBILITY: ICD-10-CM

## 2019-09-03 PROBLEM — I10 HYPERTENSION: Status: ACTIVE | Noted: 2019-09-03

## 2019-09-03 PROBLEM — J69.0 ASPIRATION PNEUMONIA (HCC): Status: ACTIVE | Noted: 2019-09-03

## 2019-09-03 PROBLEM — J47.9 BRONCHIECTASIS (HCC): Status: ACTIVE | Noted: 2019-09-03

## 2019-09-03 PROBLEM — J69.0: Status: ACTIVE | Noted: 2019-09-03

## 2019-09-03 PROBLEM — J44.9 COPD (CHRONIC OBSTRUCTIVE PULMONARY DISEASE) (HCC): Status: ACTIVE | Noted: 2019-09-03

## 2019-09-03 PROBLEM — R47.02 DYSPHASIA: Status: ACTIVE | Noted: 2019-09-03

## 2019-09-03 PROBLEM — K21.9 GERD (GASTROESOPHAGEAL REFLUX DISEASE): Status: ACTIVE | Noted: 2019-09-03

## 2019-09-03 LAB
ALBUMIN SERPL-MCNC: 4.1 G/DL (ref 3.5–5)
ALBUMIN/GLOB SERPL: 1.1 G/DL (ref 1.1–2.5)
ALP SERPL-CCNC: 53 U/L (ref 24–120)
ALT SERPL W P-5'-P-CCNC: 15 U/L (ref 0–54)
ANION GAP SERPL CALCULATED.3IONS-SCNC: 9 MMOL/L (ref 4–13)
AST SERPL-CCNC: 31 U/L (ref 7–45)
BACTERIA UR QL AUTO: ABNORMAL /HPF
BASOPHILS # BLD AUTO: 0.03 10*3/MM3 (ref 0–0.2)
BASOPHILS NFR BLD AUTO: 0.2 % (ref 0–1.5)
BILIRUB SERPL-MCNC: 0.5 MG/DL (ref 0.1–1)
BILIRUB UR QL STRIP: NEGATIVE
BUN BLD-MCNC: 36 MG/DL (ref 5–21)
BUN/CREAT SERPL: 33.3 (ref 7–25)
CALCIUM SPEC-SCNC: 9.8 MG/DL (ref 8.4–10.4)
CHLORIDE SERPL-SCNC: 97 MMOL/L (ref 98–110)
CK SERPL-CCNC: <20 U/L (ref 0–203)
CLARITY UR: CLEAR
CO2 SERPL-SCNC: 34 MMOL/L (ref 24–31)
COLOR UR: ABNORMAL
CREAT BLD-MCNC: 1.08 MG/DL (ref 0.5–1.4)
D-LACTATE SERPL-SCNC: 1.6 MMOL/L (ref 0.5–2)
DEPRECATED RDW RBC AUTO: 50.1 FL (ref 37–54)
EOSINOPHIL # BLD AUTO: 0.01 10*3/MM3 (ref 0–0.4)
EOSINOPHIL NFR BLD AUTO: 0.1 % (ref 0.3–6.2)
ERYTHROCYTE [DISTWIDTH] IN BLOOD BY AUTOMATED COUNT: 14 % (ref 12.3–15.4)
GFR SERPL CREATININE-BSD FRML MDRD: 47 ML/MIN/1.73
GLOBULIN UR ELPH-MCNC: 3.8 GM/DL
GLUCOSE BLD-MCNC: 133 MG/DL (ref 70–100)
GLUCOSE UR STRIP-MCNC: NEGATIVE MG/DL
HCT VFR BLD AUTO: 29.4 % (ref 34–46.6)
HGB BLD-MCNC: 9.3 G/DL (ref 12–15.9)
HGB UR QL STRIP.AUTO: NEGATIVE
HOLD SPECIMEN: NORMAL
HYALINE CASTS UR QL AUTO: ABNORMAL /LPF
IMM GRANULOCYTES # BLD AUTO: 0.06 10*3/MM3 (ref 0–0.05)
IMM GRANULOCYTES NFR BLD AUTO: 0.4 % (ref 0–0.5)
KETONES UR QL STRIP: ABNORMAL
L PNEUMO1 AG UR QL IA: NEGATIVE
LEUKOCYTE ESTERASE UR QL STRIP.AUTO: ABNORMAL
LIPASE SERPL-CCNC: 84 U/L (ref 23–203)
LYMPHOCYTES # BLD AUTO: 1.88 10*3/MM3 (ref 0.7–3.1)
LYMPHOCYTES NFR BLD AUTO: 13.7 % (ref 19.6–45.3)
MAGNESIUM SERPL-MCNC: 2.3 MG/DL (ref 1.4–2.2)
MCH RBC QN AUTO: 30.6 PG (ref 26.6–33)
MCHC RBC AUTO-ENTMCNC: 31.6 G/DL (ref 31.5–35.7)
MCV RBC AUTO: 96.7 FL (ref 79–97)
MONOCYTES # BLD AUTO: 0.92 10*3/MM3 (ref 0.1–0.9)
MONOCYTES NFR BLD AUTO: 6.7 % (ref 5–12)
NEUTROPHILS # BLD AUTO: 10.85 10*3/MM3 (ref 1.7–7)
NEUTROPHILS NFR BLD AUTO: 78.9 % (ref 42.7–76)
NITRITE UR QL STRIP: NEGATIVE
NRBC BLD AUTO-RTO: 0 /100 WBC (ref 0–0.2)
NT-PROBNP SERPL-MCNC: 421 PG/ML (ref 0–1800)
PH UR STRIP.AUTO: 5.5 [PH] (ref 5–8)
PLATELET # BLD AUTO: 309 10*3/MM3 (ref 140–450)
PMV BLD AUTO: 11.4 FL (ref 6–12)
POTASSIUM BLD-SCNC: 4.3 MMOL/L (ref 3.5–5.3)
PROT SERPL-MCNC: 7.9 G/DL (ref 6.3–8.7)
PROT UR QL STRIP: NEGATIVE
RBC # BLD AUTO: 3.04 10*6/MM3 (ref 3.77–5.28)
RBC # UR: ABNORMAL /HPF
REF LAB TEST METHOD: ABNORMAL
S PNEUM AG SPEC QL LA: NEGATIVE
SODIUM BLD-SCNC: 140 MMOL/L (ref 135–145)
SP GR UR STRIP: 1.02 (ref 1–1.03)
SQUAMOUS #/AREA URNS HPF: ABNORMAL /HPF
TROPONIN I SERPL-MCNC: <0.012 NG/ML (ref 0–0.03)
TSH SERPL DL<=0.05 MIU/L-ACNC: 0.45 UIU/ML (ref 0.47–4.68)
UROBILINOGEN UR QL STRIP: ABNORMAL
WBC NRBC COR # BLD: 13.75 10*3/MM3 (ref 3.4–10.8)
WBC UR QL AUTO: ABNORMAL /HPF
WHOLE BLOOD HOLD SPECIMEN: NORMAL
WHOLE BLOOD HOLD SPECIMEN: NORMAL

## 2019-09-03 PROCEDURE — 85025 COMPLETE CBC W/AUTO DIFF WBC: CPT | Performed by: PHYSICIAN ASSISTANT

## 2019-09-03 PROCEDURE — 71045 X-RAY EXAM CHEST 1 VIEW: CPT

## 2019-09-03 PROCEDURE — 99284 EMERGENCY DEPT VISIT MOD MDM: CPT

## 2019-09-03 PROCEDURE — P9612 CATHETERIZE FOR URINE SPEC: HCPCS

## 2019-09-03 PROCEDURE — 94640 AIRWAY INHALATION TREATMENT: CPT

## 2019-09-03 PROCEDURE — 25010000002 AZITHROMYCIN PER 500 MG: Performed by: PHYSICIAN ASSISTANT

## 2019-09-03 PROCEDURE — 81001 URINALYSIS AUTO W/SCOPE: CPT | Performed by: PHYSICIAN ASSISTANT

## 2019-09-03 PROCEDURE — 71275 CT ANGIOGRAPHY CHEST: CPT

## 2019-09-03 PROCEDURE — 94760 N-INVAS EAR/PLS OXIMETRY 1: CPT

## 2019-09-03 PROCEDURE — 83735 ASSAY OF MAGNESIUM: CPT | Performed by: PHYSICIAN ASSISTANT

## 2019-09-03 PROCEDURE — 93005 ELECTROCARDIOGRAM TRACING: CPT | Performed by: PHYSICIAN ASSISTANT

## 2019-09-03 PROCEDURE — 82550 ASSAY OF CK (CPK): CPT | Performed by: PHYSICIAN ASSISTANT

## 2019-09-03 PROCEDURE — 94799 UNLISTED PULMONARY SVC/PX: CPT

## 2019-09-03 PROCEDURE — 74177 CT ABD & PELVIS W/CONTRAST: CPT

## 2019-09-03 PROCEDURE — 83690 ASSAY OF LIPASE: CPT | Performed by: PHYSICIAN ASSISTANT

## 2019-09-03 PROCEDURE — 87899 AGENT NOS ASSAY W/OPTIC: CPT | Performed by: FAMILY MEDICINE

## 2019-09-03 PROCEDURE — 25010000002 IOPAMIDOL 61 % SOLUTION: Performed by: PHYSICIAN ASSISTANT

## 2019-09-03 PROCEDURE — 87040 BLOOD CULTURE FOR BACTERIA: CPT | Performed by: PHYSICIAN ASSISTANT

## 2019-09-03 PROCEDURE — 83880 ASSAY OF NATRIURETIC PEPTIDE: CPT | Performed by: PHYSICIAN ASSISTANT

## 2019-09-03 PROCEDURE — 25010000002 CEFTRIAXONE PER 250 MG: Performed by: PHYSICIAN ASSISTANT

## 2019-09-03 PROCEDURE — 80053 COMPREHEN METABOLIC PANEL: CPT | Performed by: PHYSICIAN ASSISTANT

## 2019-09-03 PROCEDURE — 84484 ASSAY OF TROPONIN QUANT: CPT | Performed by: PHYSICIAN ASSISTANT

## 2019-09-03 PROCEDURE — 93010 ELECTROCARDIOGRAM REPORT: CPT | Performed by: INTERNAL MEDICINE

## 2019-09-03 PROCEDURE — 83605 ASSAY OF LACTIC ACID: CPT | Performed by: PHYSICIAN ASSISTANT

## 2019-09-03 PROCEDURE — 84443 ASSAY THYROID STIM HORMONE: CPT | Performed by: PHYSICIAN ASSISTANT

## 2019-09-03 PROCEDURE — 25010000002 PIPERACILLIN SOD-TAZOBACTAM PER 1 G: Performed by: NURSE PRACTITIONER

## 2019-09-03 RX ORDER — SODIUM CHLORIDE 0.9 % (FLUSH) 0.9 %
10 SYRINGE (ML) INJECTION AS NEEDED
Status: DISCONTINUED | OUTPATIENT
Start: 2019-09-03 | End: 2019-09-05 | Stop reason: HOSPADM

## 2019-09-03 RX ORDER — ACETAMINOPHEN 160 MG/5ML
650 SOLUTION ORAL EVERY 4 HOURS PRN
Status: DISCONTINUED | OUTPATIENT
Start: 2019-09-03 | End: 2019-09-05 | Stop reason: HOSPADM

## 2019-09-03 RX ORDER — ASPIRIN 81 MG/1
81 TABLET ORAL DAILY
Status: ON HOLD | COMMUNITY
End: 2019-09-05

## 2019-09-03 RX ORDER — SODIUM CHLORIDE 9 MG/ML
75 INJECTION, SOLUTION INTRAVENOUS CONTINUOUS
Status: DISCONTINUED | OUTPATIENT
Start: 2019-09-03 | End: 2019-09-04

## 2019-09-03 RX ORDER — NITROFURANTOIN MACROCRYSTALS 100 MG/1
100 CAPSULE ORAL 2 TIMES DAILY
Status: ON HOLD | COMMUNITY
End: 2019-09-05

## 2019-09-03 RX ORDER — METOPROLOL SUCCINATE 25 MG/1
25 TABLET, EXTENDED RELEASE ORAL DAILY
Status: DISCONTINUED | OUTPATIENT
Start: 2019-09-03 | End: 2019-09-05 | Stop reason: HOSPADM

## 2019-09-03 RX ORDER — ACETAMINOPHEN 325 MG/1
650 TABLET ORAL EVERY 4 HOURS PRN
Status: DISCONTINUED | OUTPATIENT
Start: 2019-09-03 | End: 2019-09-05 | Stop reason: HOSPADM

## 2019-09-03 RX ORDER — ONDANSETRON 2 MG/ML
4 INJECTION INTRAMUSCULAR; INTRAVENOUS EVERY 6 HOURS PRN
Status: DISCONTINUED | OUTPATIENT
Start: 2019-09-03 | End: 2019-09-05 | Stop reason: HOSPADM

## 2019-09-03 RX ORDER — HYOSCYAMINE SULFATE 0.125 MG
125 TABLET,DISINTEGRATING ORAL EVERY 4 HOURS PRN
Status: DISCONTINUED | OUTPATIENT
Start: 2019-09-03 | End: 2019-09-05 | Stop reason: HOSPADM

## 2019-09-03 RX ORDER — TRAZODONE HYDROCHLORIDE 50 MG/1
50 TABLET ORAL NIGHTLY
Status: DISCONTINUED | OUTPATIENT
Start: 2019-09-03 | End: 2019-09-05 | Stop reason: HOSPADM

## 2019-09-03 RX ORDER — ALUMINA, MAGNESIA, AND SIMETHICONE 2400; 2400; 240 MG/30ML; MG/30ML; MG/30ML
15 SUSPENSION ORAL EVERY 6 HOURS PRN
Status: DISCONTINUED | OUTPATIENT
Start: 2019-09-03 | End: 2019-09-05 | Stop reason: HOSPADM

## 2019-09-03 RX ORDER — SENNA AND DOCUSATE SODIUM 50; 8.6 MG/1; MG/1
1 TABLET, FILM COATED ORAL 2 TIMES DAILY
Status: DISCONTINUED | OUTPATIENT
Start: 2019-09-03 | End: 2019-09-05 | Stop reason: HOSPADM

## 2019-09-03 RX ORDER — GUAIFENESIN 600 MG/1
1200 TABLET, EXTENDED RELEASE ORAL EVERY 12 HOURS SCHEDULED
Status: DISCONTINUED | OUTPATIENT
Start: 2019-09-03 | End: 2019-09-05 | Stop reason: HOSPADM

## 2019-09-03 RX ORDER — ACETAMINOPHEN 650 MG/1
650 SUPPOSITORY RECTAL EVERY 4 HOURS PRN
Status: DISCONTINUED | OUTPATIENT
Start: 2019-09-03 | End: 2019-09-05 | Stop reason: HOSPADM

## 2019-09-03 RX ORDER — IPRATROPIUM BROMIDE AND ALBUTEROL SULFATE 2.5; .5 MG/3ML; MG/3ML
3 SOLUTION RESPIRATORY (INHALATION)
Status: DISCONTINUED | OUTPATIENT
Start: 2019-09-03 | End: 2019-09-05 | Stop reason: HOSPADM

## 2019-09-03 RX ORDER — PANTOPRAZOLE SODIUM 40 MG/1
40 TABLET, DELAYED RELEASE ORAL
Status: DISCONTINUED | OUTPATIENT
Start: 2019-09-04 | End: 2019-09-05 | Stop reason: HOSPADM

## 2019-09-03 RX ORDER — SODIUM CHLORIDE 0.9 % (FLUSH) 0.9 %
10 SYRINGE (ML) INJECTION EVERY 12 HOURS SCHEDULED
Status: DISCONTINUED | OUTPATIENT
Start: 2019-09-03 | End: 2019-09-05 | Stop reason: HOSPADM

## 2019-09-03 RX ADMIN — SODIUM CHLORIDE 1000 ML: 9 INJECTION, SOLUTION INTRAVENOUS at 22:31

## 2019-09-03 RX ADMIN — SODIUM CHLORIDE 1000 ML: 9 INJECTION, SOLUTION INTRAVENOUS at 18:40

## 2019-09-03 RX ADMIN — TAZOBACTAM SODIUM AND PIPERACILLIN SODIUM 3.38 G: 375; 3 INJECTION, SOLUTION INTRAVENOUS at 20:14

## 2019-09-03 RX ADMIN — IOPAMIDOL 100 ML: 612 INJECTION, SOLUTION INTRAVENOUS at 15:55

## 2019-09-03 RX ADMIN — CEFTRIAXONE SODIUM 1 G: 1 INJECTION, POWDER, FOR SOLUTION INTRAMUSCULAR; INTRAVENOUS at 16:32

## 2019-09-03 RX ADMIN — AZITHROMYCIN DIHYDRATE 500 MG: 500 INJECTION, POWDER, LYOPHILIZED, FOR SOLUTION INTRAVENOUS at 16:46

## 2019-09-03 RX ADMIN — IPRATROPIUM BROMIDE AND ALBUTEROL SULFATE 3 ML: 2.5; .5 SOLUTION RESPIRATORY (INHALATION) at 22:18

## 2019-09-03 NOTE — TELEPHONE ENCOUNTER
Discussed patient with Nicholas (son)    Recommend speech therapy consult due to aspiration during UGI.  Nicholas states Ms Mae has worsening cough since undergoing UGI.  Encouraged him to take her to ER.  Concerns for pneumonia.  He will be taking her to [facility] ER    He wishes to wait on ST consult until patient is stronger  He will call office when he wishes to proceed

## 2019-09-03 NOTE — ED PROVIDER NOTES
Subjective   History of Present Illness  89-year-old female presents with a chief complaint of cough, shortness of breath, nausea.  Patient reports she has been following up with GI for this nausea and dry heaves.  She reports she had a barium swallow and aspirated.  This is roughly 2 weeks ago.  She has had blood-tinged sputum and cough ever since.  No fevers vomiting or diarrhea.  Review of Systems   All other systems reviewed and are negative.      Past Medical History:   Diagnosis Date   • COPD (chronic obstructive pulmonary disease) (CMS/HCC)    • GERD (gastroesophageal reflux disease)    • Hypertension    • Pneumonia        No Known Allergies    Past Surgical History:   Procedure Laterality Date   • CATARACT EXTRACTION, BILATERAL     • THYROID SURGERY         Family History   Problem Relation Age of Onset   • Alzheimer's disease Mother    • Heart disease Father    • Colon polyps Child    • Colon cancer Neg Hx        Social History     Socioeconomic History   • Marital status:      Spouse name: Not on file   • Number of children: Not on file   • Years of education: Not on file   • Highest education level: Not on file   Tobacco Use   • Smoking status: Never Smoker   • Smokeless tobacco: Never Used   Substance and Sexual Activity   • Alcohol use: No   • Drug use: No   • Sexual activity: Defer           Objective   Physical Exam   Constitutional: She is oriented to person, place, and time. She appears well-developed and well-nourished.   HENT:   Head: Normocephalic and atraumatic.   Eyes: EOM are normal. Pupils are equal, round, and reactive to light.   Cardiovascular: Normal rate and regular rhythm.   Pulmonary/Chest: Effort normal. She has rhonchi.   Abdominal: Normal appearance, normal aorta and bowel sounds are normal.   Neurological: She is alert and oriented to person, place, and time.   Skin: Skin is warm and dry. Capillary refill takes less than 2 seconds.   Psychiatric: She has a normal mood and  affect. Her behavior is normal.   Nursing note and vitals reviewed.      Procedures           ED Course  Final Diagnosis: as of Sep 04 1443   Pneumonia of both lungs due to infectious organism, unspecified part of lung        Labs Reviewed   COMPREHENSIVE METABOLIC PANEL - Abnormal; Notable for the following components:       Result Value    Glucose 133 (*)     BUN 36 (*)     Chloride 97 (*)     CO2 34.0 (*)     eGFR Non African Amer 47 (*)     BUN/Creatinine Ratio 33.3 (*)     All other components within normal limits    Narrative:     GFR Normal >60  Chronic Kidney Disease <60  Kidney Failure <15   URINALYSIS W/ CULTURE IF INDICATED - Abnormal; Notable for the following components:    Color, UA Dark Yellow (*)     Ketones, UA 15 mg/dL (1+) (*)     Leuk Esterase, UA Trace (*)     All other components within normal limits   TSH - Abnormal; Notable for the following components:    TSH 0.452 (*)     All other components within normal limits   MAGNESIUM - Abnormal; Notable for the following components:    Magnesium 2.3 (*)     All other components within normal limits   CBC WITH AUTO DIFFERENTIAL - Abnormal; Notable for the following components:    WBC 13.75 (*)     RBC 3.04 (*)     Hemoglobin 9.3 (*)     Hematocrit 29.4 (*)     Neutrophil % 78.9 (*)     Lymphocyte % 13.7 (*)     Eosinophil % 0.1 (*)     Neutrophils, Absolute 10.85 (*)     Monocytes, Absolute 0.92 (*)     Immature Grans, Absolute 0.06 (*)     All other components within normal limits   URINALYSIS, MICROSCOPIC ONLY - Abnormal; Notable for the following components:    RBC, UA 0-2 (*)     WBC, UA 0-2 (*)     All other components within normal limits   BASIC METABOLIC PANEL - Abnormal; Notable for the following components:    Glucose 121 (*)     BUN 26 (*)     Potassium 3.4 (*)     CO2 32.0 (*)     BUN/Creatinine Ratio 30.6 (*)     All other components within normal limits    Narrative:     GFR Normal >60  Chronic Kidney Disease <60  Kidney Failure <15    CBC WITH AUTO DIFFERENTIAL - Abnormal; Notable for the following components:    RBC 2.40 (*)     Hemoglobin 7.4 (*)     Hematocrit 23.2 (*)     Lymphocyte % 15.9 (*)     All other components within normal limits   LEGIONELLA ANTIGEN, URINE - Normal   STREP PNEUMO AG, URINE OR CSF - Normal   LIPASE - Normal   TROPONIN (IN-HOUSE) - Normal   BNP (IN-HOUSE) - Normal   LACTIC ACID, PLASMA - Normal   CK - Normal   BLOOD CULTURE   BLOOD CULTURE   RESPIRATORY CULTURE   RAINBOW DRAW    Narrative:     The following orders were created for panel order Ernul Draw.  Procedure                               Abnormality         Status                     ---------                               -----------         ------                     Light Blue Top[[id number]]                                   Final result               Green Top (Gel)[[id number]]                                  Final result               Lavender Top[[id number]]                                     Final result               Red Top[[id number]]                                          Final result               Ernul Blood Culture Morgan...[[id number]]                      Final result                 Please view results for these tests on the individual orders.   PREPARE RBC   TYPE AND SCREEN   LIGHT BLUE TOP   GREEN TOP   LAVENDER TOP   RED TOP   RAINBOW BLOOD CULTURE BOTTLES - 1 SET   CBC AND DIFFERENTIAL    Narrative:     The following orders were created for panel order CBC & Differential.  Procedure                               Abnormality         Status                     ---------                               -----------         ------                     CBC Auto Differential[[id number]]        Abnormal            Final result                 Please view results for these tests on the individual orders.     CT Abdomen Pelvis With Contrast   Final Result   1. Cholelithiasis.   2. Diverticulosis   3. Left renal cyst.   4. This exam is unchanged from  08/20/2019.            This report was finalized on 09/03/2019 16:10 by Dr. Mike Marte MD.      CT Angiogram Chest With Contrast   Final Result   1. No evidence of embolic disease.   2. Patchy infiltrates in the upper lobes most likely representing   pneumonia.   3. Bronchiectasis. Bronchial wall thickening in the upper lung. Some   mucus plugging is noted   4. Cholelithiasis.           This report was finalized on 09/03/2019 16:06 by Dr. Mike Marte MD.      XR Chest 1 View   Final Result   1. Perihilar and upper lobe interstitial and patchy airspace opacities   more conspicuous compared to the 2018 examination. Chronic changes with   superimposed acute infectious/inflammatory processes not excluded.       This report was finalized on 09/03/2019 16:00 by Dr. Flako Rojas MD.      FL Video Swallow With Speech    (Results Pending)               MDM  Number of Diagnoses or Management Options  Diagnosis management comments: Will admit for bilateral pneumonia, admit to hospitalist, patient stable        Amount and/or Complexity of Data Reviewed  Clinical lab tests: ordered and reviewed  Tests in the radiology section of CPT®: ordered and reviewed  Tests in the medicine section of CPT®: reviewed and ordered  Decide to obtain previous medical records or to obtain history from someone other than the patient: yes    Risk of Complications, Morbidity, and/or Mortality  Presenting problems: moderate  Diagnostic procedures: moderate  Management options: moderate    Patient Progress  Patient progress: stable               Jermaine Hill PA-C  09/04/19 0247       Jermaine Hill PA-C  09/04/19 1445

## 2019-09-03 NOTE — PLAN OF CARE
Problem: Patient Care Overview  Goal: Plan of Care Review  Outcome: Ongoing (interventions implemented as appropriate)   09/03/19 3601   Coping/Psychosocial   Plan of Care Reviewed With patient;family   Plan of Care Review   Progress no change   OTHER   Outcome Summary Pt admitted to floor this shift from ER. Abx given. Bolus infusing. NPO for speech to see. VSS. Safety maintained. Will continue to monitor.       Problem: Fall Risk (Adult)  Goal: Identify Related Risk Factors and Signs and Symptoms  Outcome: Ongoing (interventions implemented as appropriate)    Goal: Absence of Fall  Outcome: Ongoing (interventions implemented as appropriate)      Problem: Skin Injury Risk (Adult)  Goal: Identify Related Risk Factors and Signs and Symptoms  Outcome: Ongoing (interventions implemented as appropriate)    Goal: Skin Health and Integrity  Outcome: Ongoing (interventions implemented as appropriate)      Problem: Pneumonia (Adult)  Goal: Signs and Symptoms of Listed Potential Problems Will be Absent, Minimized or Managed (Pneumonia)  Outcome: Ongoing (interventions implemented as appropriate)

## 2019-09-03 NOTE — H&P
[facility] Medicine Services  HISTORY AND PHYSICAL    Date of Admission: 9/3/2019  Primary Care Physician: Javier Ng MD    Subjective     Chief Complaint: Cough, shortness of breathing, aspiration, chronic nausea    History of Present Illness  Milton Mae is a 89-year-old female with a past medical history of COPD, gastroesophageal reflux disease, hypertension and pneumonia.  Patient recently admitted 7/18- 7-22, 2019 with pneumonia-sepsis.  Since that time patient has been seen by Adeel Carranza, NP with GI, for Gram completed 8/29/2019 that revealed severe aspiration.  Patient has been chewing her food well and swallowing with sips of water however she continues to cough have shortness of breathing and nausea.  She states she has had pneumonia before and told her son she felt she had it again therefore she was brought to [facility] urgency department for evaluation.  Lateral patchy upper lobe infiltrates noted with concerns for aspiration pneumonia.  He also noted to have bronchiectasis with mucous plugging.  White count 13.75, normocytic anemia, GFR 47.  She is extremely weak.  She weighs 43.7 kg.  Discussion regarding risk of ongoing oral intake to include worsening pneumonia and death.  She is agreeable at this time to awaiting speech therapy recommendation.  Family agitated at this provider for allowing patient to make her own decision.  Unhappy with my presentation.  She denies chest pain, abdominal pain however she continues to have shortness of breathing that has improved with oxygen use and nausea.  She reports chronic constipation.  Patient is admitted for further evaluation and treatment.    Review of Systems   10 point review of systems was completed, all negative except for those discussed in HPI    Past Medical History:   Past Medical History:   Diagnosis Date   • COPD (chronic obstructive pulmonary disease) (CMS/[facility])    • GERD (gastroesophageal  reflux disease)    • Hypertension    • Pneumonia        Past Surgical History:   Past Surgical History:   Procedure Laterality Date   • CATARACT EXTRACTION, BILATERAL     • THYROID SURGERY         Family History: family history includes Alzheimer's disease in her mother; Colon polyps in her child; Heart disease in her father.    Social History:  reports that she has never smoked. She has never used smokeless tobacco. She reports that she does not drink alcohol or use drugs.    Code Status: Limited, her son Nicholas Haddad is POA      Allergies:  No Known Allergies    Medications:  Prior to Admission medications    Medication Sig Start Date End Date Taking? Authorizing Provider   hyoscyamine (LEVSIN) 0.125 MG/ML solution Take  by mouth Every 4 (Four) Hours As Needed for bladder spasms.   Yes Cristel Black MD   lisinopril-hydrochlorothiazide (PRINZIDE,ZESTORETIC) 10-12.5 MG per tablet Take 1 tablet by mouth Daily.   Yes Cristel Black MD   metoprolol succinate XL (TOPROL-XL) 25 MG 24 hr tablet Take 25 mg by mouth Daily.   Yes Cristel Black MD   Multiple Vitamins-Minerals (PRESERVISION AREDS PO) Take 2 tablets by mouth 2 (Two) Times a Day. Twice AM and Twice PM.    Yes Cristel Black MD   Multiple Vitamins-Minerals (WOMENS ONE DAILY PO) Take 1 tablet by mouth Daily.   Yes Cristel Black MD   nitrofurantoin (MACRODANTIN) 100 MG capsule Take 100 mg by mouth 2 (Two) Times a Day.   Yes Cristel Black MD   ondansetron ODT (ZOFRAN-ODT) 4 MG disintegrating tablet Take 1 tablet by mouth Every 6 (Six) Hours As Needed for Nausea or Vomiting. 7/22/18  Yes Hans Wise MD   Probiotic Product (PROBIOTIC-10 PO) Take  by mouth.   Yes Cristel Black MD   traZODone (DESYREL) 50 MG tablet Take 50 mg by mouth Every Night.   Yes Cristel Black MD   Calcium-Magnesium-Vitamin D (CALCIUM 1200+D3 PO) Take 1 tablet by mouth Daily.    Cristel Black MD   Cyanocobalamin  "(B-12) 1000 MCG capsule Take  by mouth.    Provider, MD Cristel   esomeprazole (nexIUM) 40 MG capsule Take 40 mg by mouth Every Morning Before Breakfast.    Provider, MD Cristel       Objective     /97 (BP Location: Left arm, Patient Position: Lying)   Pulse 81   Temp 99.5 °F (37.5 °C) (Oral)   Resp 18   Ht 165.1 cm (65\")   Wt 43.7 kg (96 lb 5 oz)   SpO2 98%   BMI 16.03 kg/m²   Physical Exam   Constitutional: She is oriented to person, place, and time. She appears well-developed.   Frail, cachectic   HENT:   Head: Normocephalic and atraumatic.   Eyes: EOM are normal. Pupils are equal, round, and reactive to light.   Neck: Normal range of motion. No JVD present.   Cardiovascular: Normal rate, regular rhythm and normal heart sounds.   No murmur heard.  Pulmonary/Chest: No respiratory distress.   Coarse rhonchi diffuse upper lobes, increased respiratory effort without acute distress   Abdominal: Soft. Bowel sounds are normal. She exhibits no distension.   Musculoskeletal: Normal range of motion. She exhibits deformity (mild kyphosis). She exhibits no edema.   Generalized weakness   Neurological: She is oriented to person, place, and time.   Skin: Skin is dry. There is pallor.   Psychiatric: She has a normal mood and affect. Her behavior is normal.         Pertinent Data:   Lab Results (last 72 hours)     Procedure Component Value Units Date/Time    TSH [[id number]]  (Abnormal) Collected:  09/03/19 1444    Specimen:  Blood Updated:  09/03/19 1649     TSH 0.452 uIU/mL     Urinalysis With Culture If Indicated - Urine, Catheter In/Out [[id number]]  (Abnormal) Collected:  09/03/19 1626    Specimen:  Urine, Catheter In/Out Updated:  09/03/19 1648     Color, UA Dark Yellow     Appearance, UA Clear     pH, UA 5.5     Specific Gravity, UA 1.020     Glucose, UA Negative     Ketones, UA 15 mg/dL (1+)     Bilirubin, UA Negative     Blood, UA Negative     Protein, UA Negative     Leuk Esterase, UA Trace     " Nitrite, UA Negative     Urobilinogen, UA 1.0 E.U./dL    Urinalysis, Microscopic Only - Urine, Catheter In/Out [[id number]]  (Abnormal) Collected:  09/03/19 1626    Specimen:  Urine, Catheter In/Out Updated:  09/03/19 1648     RBC, UA 0-2 /HPF      WBC, UA 0-2 /HPF      Bacteria, UA None Seen /HPF      Squamous Epithelial Cells, UA 0-2 /HPF      Hyaline Casts, UA 3-6 /LPF      Methodology Automated Microscopy    CBC Auto Differential [[id number]]  (Abnormal) Collected:  09/03/19 1444    Specimen:  Blood Updated:  09/03/19 1642     WBC 13.75 10*3/mm3      RBC 3.04 10*6/mm3      Hemoglobin 9.3 g/dL      Hematocrit 29.4 %      MCV 96.7 fL      MCH 30.6 pg      MCHC 31.6 g/dL      RDW 14.0 %      RDW-SD 50.1 fl      MPV 11.4 fL      Platelets 309 10*3/mm3      Neutrophil % 78.9 %      Lymphocyte % 13.7 %      Monocyte % 6.7 %      Eosinophil % 0.1 %      Basophil % 0.2 %      Immature Grans % 0.4 %      Neutrophils, Absolute 10.85 10*3/mm3      Lymphocytes, Absolute 1.88 10*3/mm3      Monocytes, Absolute 0.92 10*3/mm3      Eosinophils, Absolute 0.01 10*3/mm3      Basophils, Absolute 0.03 10*3/mm3      Immature Grans, Absolute 0.06 10*3/mm3      nRBC 0.0 /100 WBC     Blood Culture - Blood, Arm, Left [[id number]] Collected:  09/03/19 1440    Specimen:  Blood from Arm, Left Updated:  09/03/19 1610    Lactic Acid, Plasma [[id number]]  (Normal) Collected:  09/03/19 1521    Specimen:  Blood Updated:  09/03/19 1546     Lactate 1.6 mmol/L     Blood Culture - Blood, Arm, Left [[id number]] Collected:  09/03/19 1521    Specimen:  Blood from Arm, Left Updated:  09/03/19 1532    Troponin [[id number]]  (Normal) Collected:  09/03/19 1444    Specimen:  Blood Updated:  09/03/19 1523     Troponin I <0.012 ng/mL     BNP [[id number]]  (Normal) Collected:  09/03/19 1444    Specimen:  Blood Updated:  09/03/19 1523     proBNP 421.0 pg/mL     Lipase [[id number]]  (Normal) Collected:  09/03/19 1444    Specimen:  Blood Updated:  09/03/19 1512     Lipase  84 U/L     Comprehensive Metabolic Panel [[id number]]  (Abnormal) Collected:  09/03/19 1444    Specimen:  Blood Updated:  09/03/19 1512     Glucose 133 mg/dL      BUN 36 mg/dL      Creatinine 1.08 mg/dL      Sodium 140 mmol/L      Potassium 4.3 mmol/L      Chloride 97 mmol/L      CO2 34.0 mmol/L      Calcium 9.8 mg/dL      Total Protein 7.9 g/dL      Albumin 4.10 g/dL      ALT (SGPT) 15 U/L      AST (SGOT) 31 U/L      Alkaline Phosphatase 53 U/L      Total Bilirubin 0.5 mg/dL      eGFR Non African Amer 47 mL/min/1.73      Globulin 3.8 gm/dL      A/G Ratio 1.1 g/dL      BUN/Creatinine Ratio 33.3     Anion Gap 9.0 mmol/L     Magnesium [[id number]]  (Abnormal) Collected:  09/03/19 1444    Specimen:  Blood Updated:  09/03/19 1512     Magnesium 2.3 mg/dL     CK [[id number]]  (Normal) Collected:  09/03/19 1444    Specimen:  Blood Updated:  09/03/19 1512     Creatine Kinase <20 U/L         Imaging Results (last 24 hours)     Procedure Component Value Units Date/Time    CT Abdomen Pelvis With Contrast [[id number]] Collected:  09/03/19 1606     Updated:  09/03/19 1614    Narrative:       EXAMINATION:   CT ABDOMEN PELVIS W CONTRAST-  9/3/2019 4:06 PM CDT     HISTORY: CT ABDOMEN AND PELVIS WITH CONTRAST 9/3/2019 3:44 PM CDT     HISTORY: Epigastric pain     COMPARISON: 08/20/2019.      DLP: 308 mGy cm     TECHNIQUE: Following the intravenous administration of contrast, helical  CT tomographic images of the abdomen and pelvis were acquired. Coronal  reformatted images were also provided for review.      FINDINGS:   The lung bases and base of the heart are unremarkable.      LIVER: No focal liver lesion. The hepatic vasculature is patent.      BILIARY SYSTEM: The gallbladder is visualized. There are multiple  calculi demonstrated in the gallbladder..      PANCREAS: No focal pancreatic lesion.      SPLEEN: Unremarkable.      KIDNEYS AND ADRENALS: The adrenal glands are visualized. The renal  contours demonstrate uniform and symmetric  excretion of contrast. There  are 2 left renal cyst. One is 4 cm the other is 1.8 cm.. The ureters are  decompressed and normal in appearance.     RETROPERITONEUM: No mass, lymphadenopathy or hemorrhage.      GI TRACT: No evidence of obstruction or bowel wall thickening. Extensive  diverticulosis is noted in the sigmoid colon.     OTHER: There is no mesenteric mass, lymphadenopathy or fluid collection.  The abdominopelvic vasculature is patent. The osseous structures and  soft tissues demonstrate no worrisome lesions.     PELVIS: No mass lesion, fluid collection or significant lymphadenopathy  is seen in the pelvis. The urinary bladder is normal in appearance.       Impression:       1. Cholelithiasis.  2. Diverticulosis  3. Left renal cyst.  4. This exam is unchanged from 08/20/2019.         This report was finalized on 09/03/2019 16:10 by Dr. Mike Marte MD.    CT Angiogram Chest With Contrast [[id number]] Collected:  09/03/19 1600     Updated:  09/03/19 1609    Narrative:       CT ANGIOGRAM CHEST W CONTRAST- 9/3/2019 3:44 PM CDT      HISTORY: soa, cough, cp, tachy, hemoptysis      COMPARISON: None.      DOSE LENGTH PRODUCT: 212 mGy cm. Automated exposure control was also  utilized to decrease patient radiation dose.     TECHNIQUE: Helical tomographic images of the chest were obtained after  the administration of intravenous contrast following angiogram protocol.  Additionally, 3D and multiplanar reformatted images were provided.        FINDINGS:    Pulmonary arteries: There is adequate enhancement of the pulmonary  arteries to evaluate for central and segmental pulmonary emboli. There  are no filling defects within the main, lobar, segmental or visualized  subsegmental pulmonary arteries. The pulmonary arteries are within  normal limits for size.      Aorta and great vessels: The aorta is well opacified and demonstrates no  dissection or aneurysm. The great vessels are normal in appearance.     Visualized neck  base: The imaged portion of the base of the neck appears  unremarkable.      Lungs: Patchy infiltrates are noted in the upper lungs. Most likely is  an inflammatory process.. Chronic bronchiectasis is present. There is  some mucus plugging in distal airways..      Heart: The heart is normal in size. There is no pericardial effusion.      Mediastinum and lymph nodes: No enlarged mediastinal, hilar, or axillary  lymph nodes are present.      Skeletal and soft tissues: The osseous structures of the thorax and  surrounding soft tissues demonstrate no acute process.     Upper abdomen: A prominent cyst is noted upper pole of the left kidney  calculi are present in the gallbladder..        Impression:       1. No evidence of embolic disease.  2. Patchy infiltrates in the upper lobes most likely representing  pneumonia.  3. Bronchiectasis. Bronchial wall thickening in the upper lung. Some  mucus plugging is noted  4. Cholelithiasis.        This report was finalized on 09/03/2019 16:06 by Dr. Mike Marte MD.    XR Chest 1 View [[id number]] Collected:  09/03/19 1558     Updated:  09/03/19 1604    Narrative:       EXAMINATION: XR CHEST 1 VW-. 9/3/2019 3:58 PM CDT     CHEST, ONE VIEW:     HISTORY: Shortness of air     COMPARISON: 07/18/2018 chest radiograph     A single frontal chest radiograph was obtained.     FINDINGS:     There are reticulonodular interstitial and patchy airspace opacities  identified in the upper lobes, suprahilar regions bilaterally more  conspicuous compared to previous studies progression of chronic lung  changes considered as well as superimposed acute infiltrates.     The heart is normal in size without heart failure.     The bony structures are intact.                                     Impression:       1. Perihilar and upper lobe interstitial and patchy airspace opacities  more conspicuous compared to the 2018 examination. Chronic changes with  superimposed acute infectious/inflammatory  processes not excluded.     This report was finalized on 09/03/2019 16:00 by Dr. Flako Rojas MD.        Esophagram 8/29/2019  IMPRESSION:  Aspiration with cough reflex. Exam was therefore terminated prematurely.    I have personally reviewed and interpreted the radiology studies and ECG obtained at time of admission.     Assessment / Plan     Assessment:   Active Hospital Problems    Diagnosis   • **Pneumonia of both upper lobes   • COPD (chronic obstructive pulmonary disease) (CMS/[facility])   • Hypertension   • GERD (gastroesophageal reflux disease)   • Bronchiectasis (CMS/[facility])   • Dysphasia   • Aspiration pneumonia of both upper lobes due to vomit (CMS/[facility])       Plan:   1.  Admit as inpatient  2.  Azithromycin and Zosyn  3.  N.p.o. until seen and evaluated by speech therapy  4.  Consider GI consult-may need PEG placement for tube feedings  5.  Home medications reviewed and restarted as appropriate   6.  Supplemental O2, duo nebs, incentive spirometry, Mucinex  7.  Gentle hydration with normal saline 75 mL/hour  8.  RT to initiate breathing treatment protocol  9.  Additional labs Legionella and strep pneumo urine studies and respiratory culture  9.  Labs in a.m.   10.  DVT prophylaxis with SCDs  11.  OT, PT, and speech therapy in a.m.  12.  Consult nutrition-evaluate malnutrition    I discussed the patient's findings and my recommendations with: Alexsander Chew DO  Time spent: 40 minutes      Florina Pereira, LUIS MIGUEL  09/03/19   6:58 PM

## 2019-09-03 NOTE — TELEPHONE ENCOUNTER
ANTONINA ( SON ) CALLED STATING PATIENT IS VERY WEAK - COUGHING ALL THE TIME.     ANTONINA IS THINKING ABOUT TAKING PATIENT TO ER BUT WANTED TO  TALK WITH YOU FIRST.     HE DID NOT KNOW IF WE HAD ANY PLANS ON DOING ANYTHING ELSE SINCE SHE COULD NOT COMPLETE UGI?

## 2019-09-04 ENCOUNTER — APPOINTMENT (OUTPATIENT)
Dept: GENERAL RADIOLOGY | Facility: HOSPITAL | Age: 84
End: 2019-09-04

## 2019-09-04 PROBLEM — D64.9 ANEMIA: Status: ACTIVE | Noted: 2019-09-04

## 2019-09-04 PROBLEM — R13.12 OROPHARYNGEAL DYSPHAGIA: Status: ACTIVE | Noted: 2019-09-04

## 2019-09-04 LAB
ABO GROUP BLD: NORMAL
ANION GAP SERPL CALCULATED.3IONS-SCNC: 5 MMOL/L (ref 4–13)
BASOPHILS # BLD AUTO: 0.03 10*3/MM3 (ref 0–0.2)
BASOPHILS NFR BLD AUTO: 0.4 % (ref 0–1.5)
BLD GP AB SCN SERPL QL: NEGATIVE
BUN BLD-MCNC: 26 MG/DL (ref 5–21)
BUN/CREAT SERPL: 30.6 (ref 7–25)
CALCIUM SPEC-SCNC: 8.4 MG/DL (ref 8.4–10.4)
CHLORIDE SERPL-SCNC: 104 MMOL/L (ref 98–110)
CO2 SERPL-SCNC: 32 MMOL/L (ref 24–31)
CREAT BLD-MCNC: 0.85 MG/DL (ref 0.5–1.4)
DEPRECATED RDW RBC AUTO: 49.5 FL (ref 37–54)
EOSINOPHIL # BLD AUTO: 0.05 10*3/MM3 (ref 0–0.4)
EOSINOPHIL NFR BLD AUTO: 0.6 % (ref 0.3–6.2)
ERYTHROCYTE [DISTWIDTH] IN BLOOD BY AUTOMATED COUNT: 13.9 % (ref 12.3–15.4)
GFR SERPL CREATININE-BSD FRML MDRD: 62 ML/MIN/1.73
GLUCOSE BLD-MCNC: 121 MG/DL (ref 70–100)
HCT VFR BLD AUTO: 23.2 % (ref 34–46.6)
HCT VFR BLD AUTO: 28.4 % (ref 34–46.6)
HGB BLD-MCNC: 7.4 G/DL (ref 12–15.9)
HGB BLD-MCNC: 9.4 G/DL (ref 12–15.9)
IMM GRANULOCYTES # BLD AUTO: 0.03 10*3/MM3 (ref 0–0.05)
IMM GRANULOCYTES NFR BLD AUTO: 0.4 % (ref 0–0.5)
LYMPHOCYTES # BLD AUTO: 1.24 10*3/MM3 (ref 0.7–3.1)
LYMPHOCYTES NFR BLD AUTO: 15.9 % (ref 19.6–45.3)
MCH RBC QN AUTO: 30.8 PG (ref 26.6–33)
MCHC RBC AUTO-ENTMCNC: 31.9 G/DL (ref 31.5–35.7)
MCV RBC AUTO: 96.7 FL (ref 79–97)
MONOCYTES # BLD AUTO: 0.65 10*3/MM3 (ref 0.1–0.9)
MONOCYTES NFR BLD AUTO: 8.3 % (ref 5–12)
NEUTROPHILS # BLD AUTO: 5.81 10*3/MM3 (ref 1.7–7)
NEUTROPHILS NFR BLD AUTO: 74.4 % (ref 42.7–76)
NRBC BLD AUTO-RTO: 0 /100 WBC (ref 0–0.2)
PLATELET # BLD AUTO: 217 10*3/MM3 (ref 140–450)
PMV BLD AUTO: 11.2 FL (ref 6–12)
POTASSIUM BLD-SCNC: 3.4 MMOL/L (ref 3.5–5.3)
RBC # BLD AUTO: 2.4 10*6/MM3 (ref 3.77–5.28)
RH BLD: POSITIVE
SODIUM BLD-SCNC: 141 MMOL/L (ref 135–145)
T&S EXPIRATION DATE: NORMAL
WBC NRBC COR # BLD: 7.81 10*3/MM3 (ref 3.4–10.8)

## 2019-09-04 PROCEDURE — 97162 PT EVAL MOD COMPLEX 30 MIN: CPT | Performed by: PHYSICAL THERAPIST

## 2019-09-04 PROCEDURE — 97165 OT EVAL LOW COMPLEX 30 MIN: CPT

## 2019-09-04 PROCEDURE — 25010000002 ONDANSETRON PER 1 MG: Performed by: FAMILY MEDICINE

## 2019-09-04 PROCEDURE — 94799 UNLISTED PULMONARY SVC/PX: CPT

## 2019-09-04 PROCEDURE — 85025 COMPLETE CBC W/AUTO DIFF WBC: CPT | Performed by: FAMILY MEDICINE

## 2019-09-04 PROCEDURE — 86901 BLOOD TYPING SEROLOGIC RH(D): CPT | Performed by: FAMILY MEDICINE

## 2019-09-04 PROCEDURE — 87070 CULTURE OTHR SPECIMN AEROBIC: CPT | Performed by: FAMILY MEDICINE

## 2019-09-04 PROCEDURE — 87077 CULTURE AEROBIC IDENTIFY: CPT | Performed by: FAMILY MEDICINE

## 2019-09-04 PROCEDURE — 86900 BLOOD TYPING SEROLOGIC ABO: CPT

## 2019-09-04 PROCEDURE — 80048 BASIC METABOLIC PNL TOTAL CA: CPT | Performed by: FAMILY MEDICINE

## 2019-09-04 PROCEDURE — P9016 RBC LEUKOCYTES REDUCED: HCPCS

## 2019-09-04 PROCEDURE — 86850 RBC ANTIBODY SCREEN: CPT | Performed by: FAMILY MEDICINE

## 2019-09-04 PROCEDURE — 25010000002 PIPERACILLIN SOD-TAZOBACTAM PER 1 G: Performed by: NURSE PRACTITIONER

## 2019-09-04 PROCEDURE — 25010000002 AZITHROMYCIN PER 500 MG: Performed by: FAMILY MEDICINE

## 2019-09-04 PROCEDURE — 86900 BLOOD TYPING SEROLOGIC ABO: CPT | Performed by: FAMILY MEDICINE

## 2019-09-04 PROCEDURE — 94760 N-INVAS EAR/PLS OXIMETRY 1: CPT

## 2019-09-04 PROCEDURE — 74230 X-RAY XM SWLNG FUNCJ C+: CPT

## 2019-09-04 PROCEDURE — 85018 HEMOGLOBIN: CPT | Performed by: FAMILY MEDICINE

## 2019-09-04 PROCEDURE — 86920 COMPATIBILITY TEST SPIN: CPT

## 2019-09-04 PROCEDURE — 85014 HEMATOCRIT: CPT | Performed by: FAMILY MEDICINE

## 2019-09-04 PROCEDURE — 92611 MOTION FLUOROSCOPY/SWALLOW: CPT

## 2019-09-04 PROCEDURE — 36430 TRANSFUSION BLD/BLD COMPNT: CPT

## 2019-09-04 PROCEDURE — 87186 SC STD MICRODIL/AGAR DIL: CPT | Performed by: FAMILY MEDICINE

## 2019-09-04 PROCEDURE — 92610 EVALUATE SWALLOWING FUNCTION: CPT

## 2019-09-04 PROCEDURE — 87205 SMEAR GRAM STAIN: CPT | Performed by: FAMILY MEDICINE

## 2019-09-04 RX ADMIN — BARIUM SULFATE 20 ML: 400 SUSPENSION ORAL at 14:40

## 2019-09-04 RX ADMIN — SODIUM CHLORIDE 75 ML/HR: 9 INJECTION, SOLUTION INTRAVENOUS at 08:25

## 2019-09-04 RX ADMIN — TAZOBACTAM SODIUM AND PIPERACILLIN SODIUM 3.38 G: 375; 3 INJECTION, SOLUTION INTRAVENOUS at 01:28

## 2019-09-04 RX ADMIN — IPRATROPIUM BROMIDE AND ALBUTEROL SULFATE 3 ML: 2.5; .5 SOLUTION RESPIRATORY (INHALATION) at 06:55

## 2019-09-04 RX ADMIN — IPRATROPIUM BROMIDE AND ALBUTEROL SULFATE 3 ML: 2.5; .5 SOLUTION RESPIRATORY (INHALATION) at 20:23

## 2019-09-04 RX ADMIN — SODIUM CHLORIDE, PRESERVATIVE FREE 10 ML: 5 INJECTION INTRAVENOUS at 21:01

## 2019-09-04 RX ADMIN — GUAIFENESIN 1200 MG: 600 TABLET, EXTENDED RELEASE ORAL at 21:36

## 2019-09-04 RX ADMIN — BARIUM SULFATE 20 ML: 400 PASTE ORAL at 14:40

## 2019-09-04 RX ADMIN — TRAZODONE HYDROCHLORIDE 50 MG: 50 TABLET ORAL at 21:36

## 2019-09-04 RX ADMIN — GUAIFENESIN 1200 MG: 600 TABLET, EXTENDED RELEASE ORAL at 11:37

## 2019-09-04 RX ADMIN — TAZOBACTAM SODIUM AND PIPERACILLIN SODIUM 3.38 G: 375; 3 INJECTION, SOLUTION INTRAVENOUS at 08:48

## 2019-09-04 RX ADMIN — IPRATROPIUM BROMIDE AND ALBUTEROL SULFATE 3 ML: 2.5; .5 SOLUTION RESPIRATORY (INHALATION) at 10:42

## 2019-09-04 RX ADMIN — AZITHROMYCIN DIHYDRATE 500 MG: 500 INJECTION, POWDER, LYOPHILIZED, FOR SOLUTION INTRAVENOUS at 21:00

## 2019-09-04 RX ADMIN — ONDANSETRON 4 MG: 2 INJECTION INTRAMUSCULAR; INTRAVENOUS at 02:31

## 2019-09-04 RX ADMIN — BARIUM SULFATE 20 ML: 0.81 POWDER, FOR SUSPENSION ORAL at 14:40

## 2019-09-04 RX ADMIN — ACETAMINOPHEN 650 MG: 325 TABLET, FILM COATED ORAL at 18:34

## 2019-09-04 RX ADMIN — SENNOSIDES AND DOCUSATE SODIUM 1 TABLET: 8.6; 5 TABLET ORAL at 10:24

## 2019-09-04 RX ADMIN — TAZOBACTAM SODIUM AND PIPERACILLIN SODIUM 3.38 G: 375; 3 INJECTION, SOLUTION INTRAVENOUS at 23:25

## 2019-09-04 RX ADMIN — IPRATROPIUM BROMIDE AND ALBUTEROL SULFATE 3 ML: 2.5; .5 SOLUTION RESPIRATORY (INHALATION) at 15:12

## 2019-09-04 NOTE — PLAN OF CARE
Problem: Patient Care Overview  Goal: Plan of Care Review  Outcome: Ongoing (interventions implemented as appropriate)   09/04/19 6184   Coping/Psychosocial   Plan of Care Reviewed With patient;son   OTHER   Outcome Summary Modified barium swallow study completed. Full range of consistencies presented. Pt had premature loss to the vallecula with honey, pudding, and mechanical soft secondary to a delay in swallow initiation. She had decreased hyolaryngeal elevation/excursion, epiglottic inversion, and base of tongue retraction. She exhibited flash penetration 3x with thin barium, with one of those instances occurring while using a chin tuck strategy. She also had premature loss to the pyriforms secondary to a delay when using the chin tuck strategy with thin liquids. No definite aspiration was observed. No other laryngeal penetration was observed. She typically had at least mild pharyngeal residue with most consistencies secondary to weakness with mild to moderate residue in the vallecula with the mechanical soft consistency. Recommend regular solids and thin liquids. Meds whole with applesauce. General feeding and aspiration precautions. SLP will follow up to monitor diet tolerance and to complete swallow exercises with pt.

## 2019-09-04 NOTE — PLAN OF CARE
Problem: Patient Care Overview  Goal: Plan of Care Review  Outcome: Ongoing (interventions implemented as appropriate)   09/04/19 1649   Coping/Psychosocial   Plan of Care Reviewed With patient;family   OTHER   Outcome Summary Initial RDN eval. Pt with decreased appetite and limited intake. Malnutrition assessment completed. Added supplements with all meals. Encouraged intake and will continue to follow.       Problem: Nutrition, Imbalanced: Inadequate Oral Intake (Adult)  Goal: Identify Related Risk Factors and Signs and Symptoms  Outcome: Ongoing (interventions implemented as appropriate)   09/04/19 1649   Nutrition, Imbalanced: Inadequate Oral Intake (Adult)   Related Risk Factors (Nutrition Imbalance, Inadequate Oral Intake) appetite decreased;functional disability;hypercatabolism;chronic illness/infection;satiety early   Signs and Symptoms (Nutrition Imbalance, Inadequate Oral Intake: Signs and Symptoms) loss of subcutaneous fat;muscle mass/strength decreased;weakness/lethargy;weight decreased (percent weight loss, percent usual body weight, body mass index less than 18.5) (Adults)     Goal: Improved Oral Intake  Outcome: Ongoing (interventions implemented as appropriate)   09/04/19 1649   Nutrition, Imbalanced: Inadequate Oral Intake (Adult)   Improved Oral Intake making progress toward outcome     Goal: Prevent Further Weight Loss  Outcome: Ongoing (interventions implemented as appropriate)   09/04/19 1649   Nutrition, Imbalanced: Inadequate Oral Intake (Adult)   Prevent Further Weight Loss making progress toward outcome

## 2019-09-04 NOTE — PLAN OF CARE
Problem: Patient Care Overview  Goal: Plan of Care Review  Outcome: Ongoing (interventions implemented as appropriate)     09/04/19 1100   Physical Therapy Clinical Impression   Functional Level at Time of Evaluation (PT Clinical Impression) Mrs. Mae was slow but stable in transfering to EOB. She had anxiety with standing and transferring to bed side chair due to feeling SOB and trembling. Used rolling walker and gave verbal cues to step around to sit in the chair. She did not have any LOB, but did require CGA/Remigio to control the walker and guide her to chair. Her SpO2 was 100% once in chair but she continued to feel SOB.       09/04/19 1125   Coping/Psychosocial   Plan of Care Reviewed With patient;son   Plan of Care Review   Progress no change

## 2019-09-04 NOTE — PROGRESS NOTES
[facility] Medicine Services  INPATIENT PROGRESS NOTE    Length of Stay: 1  Date of Admission: 9/3/2019  Primary Care Physician: Javier Ng MD    Subjective     Chief Complaint:     Cough, shortness of breath    HPI     Patient's cough is significantly decreased today.  She has no further shortness of breath.  She has a cough that occurs occasionally with occasional clear mucus production today.  Speech therapy has evaluated the patient and has scheduled a video fluoroscopic swallowing study today because of concerns of aspiration with certain food and fluid consistencies.  She has been afebrile since arrival to the floor.  White blood cell count is within normal limits this morning and there is no left shift.  Renal function is within normal limits.  Hemoglobin is 7.4 after hydration.  She will be typed, crossed and infused 1 unit of PRBCs today.  The patient has no specific complaints and has been ambulatory.  She is currently sitting in a chair at bedside.  She has a family member present in the room as well.  Function has improved with hydration.    Review of Systems     All pertinent negatives and positives are as above. All other systems have been reviewed and are negative unless otherwise stated.     Objective    Temp:  [98.1 °F (36.7 °C)-100 °F (37.8 °C)] 98.2 °F (36.8 °C)  Heart Rate:  [] 88  Resp:  [16-20] 16  BP: ()/(37-97) 97/45    Lab Results (last 24 hours)     Procedure Component Value Units Date/Time    Respiratory Culture - Sputum, Cough [[id number]] Collected:  09/04/19 0803    Specimen:  Sputum from Cough Updated:  09/04/19 0803    Basic Metabolic Panel [[id number]]  (Abnormal) Collected:  09/04/19 0540    Specimen:  Blood Updated:  09/04/19 0636     Glucose 121 mg/dL      BUN 26 mg/dL      Creatinine 0.85 mg/dL      Sodium 141 mmol/L      Potassium 3.4 mmol/L      Chloride 104 mmol/L      CO2 32.0 mmol/L      Calcium 8.4 mg/dL      eGFR Non   Amer 62 mL/min/1.73      BUN/Creatinine Ratio 30.6     Anion Gap 5.0 mmol/L     Narrative:       GFR Normal >60  Chronic Kidney Disease <60  Kidney Failure <15    CBC Auto Differential [[id number]]  (Abnormal) Collected:  09/04/19 0540    Specimen:  Blood Updated:  09/04/19 0626     WBC 7.81 10*3/mm3      RBC 2.40 10*6/mm3      Hemoglobin 7.4 g/dL      Hematocrit 23.2 %      MCV 96.7 fL      MCH 30.8 pg      MCHC 31.9 g/dL      RDW 13.9 %      RDW-SD 49.5 fl      MPV 11.2 fL      Platelets 217 10*3/mm3      Neutrophil % 74.4 %      Lymphocyte % 15.9 %      Monocyte % 8.3 %      Eosinophil % 0.6 %      Basophil % 0.4 %      Immature Grans % 0.4 %      Neutrophils, Absolute 5.81 10*3/mm3      Lymphocytes, Absolute 1.24 10*3/mm3      Monocytes, Absolute 0.65 10*3/mm3      Eosinophils, Absolute 0.05 10*3/mm3      Basophils, Absolute 0.03 10*3/mm3      Immature Grans, Absolute 0.03 10*3/mm3      nRBC 0.0 /100 WBC     S. Pneumo Ag Urine or CSF - Urine, Urine, Catheter In/Out [[id number]]  (Normal) Collected:  09/03/19 1626    Specimen:  Urine, Catheter In/Out Updated:  09/03/19 2043     Strep Pneumo Ag Negative    Legionella Antigen, Urine - Urine, Urine, Catheter In/Out [[id number]]  (Normal) Collected:  09/03/19 1626    Specimen:  Urine, Catheter In/Out Updated:  09/03/19 2042     LEGIONELLA ANTIGEN, URINE Negative    TSH [[id number]]  (Abnormal) Collected:  09/03/19 1444    Specimen:  Blood Updated:  09/03/19 1649     TSH 0.452 uIU/mL     Urinalysis With Culture If Indicated - Urine, Catheter In/Out [[id number]]  (Abnormal) Collected:  09/03/19 1626    Specimen:  Urine, Catheter In/Out Updated:  09/03/19 1648     Color, UA Dark Yellow     Appearance, UA Clear     pH, UA 5.5     Specific Gravity, UA 1.020     Glucose, UA Negative     Ketones, UA 15 mg/dL (1+)     Bilirubin, UA Negative     Blood, UA Negative     Protein, UA Negative     Leuk Esterase, UA Trace     Nitrite, UA Negative     Urobilinogen, UA 1.0 E.U./dL     Urinalysis, Microscopic Only - Urine, Catheter In/Out [[id number]]  (Abnormal) Collected:  09/03/19 1626    Specimen:  Urine, Catheter In/Out Updated:  09/03/19 1648     RBC, UA 0-2 /HPF      WBC, UA 0-2 /HPF      Bacteria, UA None Seen /HPF      Squamous Epithelial Cells, UA 0-2 /HPF      Hyaline Casts, UA 3-6 /LPF      Methodology Automated Microscopy    CBC & Differential [[id number]] Collected:  09/03/19 1444    Specimen:  Blood Updated:  09/03/19 1642    Narrative:       The following orders were created for panel order CBC & Differential.  Procedure                               Abnormality         Status                     ---------                               -----------         ------                     CBC Auto Differential[[id number]]        Abnormal            Final result                 Please view results for these tests on the individual orders.    CBC Auto Differential [[id number]]  (Abnormal) Collected:  09/03/19 1444    Specimen:  Blood Updated:  09/03/19 1642     WBC 13.75 10*3/mm3      RBC 3.04 10*6/mm3      Hemoglobin 9.3 g/dL      Hematocrit 29.4 %      MCV 96.7 fL      MCH 30.6 pg      MCHC 31.6 g/dL      RDW 14.0 %      RDW-SD 50.1 fl      MPV 11.4 fL      Platelets 309 10*3/mm3      Neutrophil % 78.9 %      Lymphocyte % 13.7 %      Monocyte % 6.7 %      Eosinophil % 0.1 %      Basophil % 0.2 %      Immature Grans % 0.4 %      Neutrophils, Absolute 10.85 10*3/mm3      Lymphocytes, Absolute 1.88 10*3/mm3      Monocytes, Absolute 0.92 10*3/mm3      Eosinophils, Absolute 0.01 10*3/mm3      Basophils, Absolute 0.03 10*3/mm3      Immature Grans, Absolute 0.06 10*3/mm3      nRBC 0.0 /100 WBC     Blood Culture - Blood, Arm, Left [[id number]] Collected:  09/03/19 1440    Specimen:  Blood from Arm, Left Updated:  09/03/19 1610    [place] Draw [[id number]] Collected:  09/03/19 1440    Specimen:  Blood Updated:  09/03/19 1546    Narrative:       The following orders were created for panel order  [place] Draw.  Procedure                               Abnormality         Status                     ---------                               -----------         ------                     Light Blue Top[[id number]]                                   Final result               Green Top (Gel)[[id number]]                                  Final result               Lavender Top[[id number]]                                     Final result               Red Top[[id number]]                                          Final result               [place] Blood Culture [place]...[[id number]]                      Final result                 Please view results for these tests on the individual orders.    Light Blue Top [[id number]] Collected:  09/03/19 1444    Specimen:  Blood Updated:  09/03/19 1546     Extra Tube hold for add-on     Comment: Auto resulted       Green Top (Gel) [[id number]] Collected:  09/03/19 1444    Specimen:  Blood Updated:  09/03/19 1546     Extra Tube Hold for add-ons.     Comment: Auto resulted.       Lavender Top [[id number]] Collected:  09/03/19 1444    Specimen:  Blood Updated:  09/03/19 1546     Extra Tube hold for add-on     Comment: Auto resulted       Red Top [[id number]] Collected:  09/03/19 1444    Specimen:  Blood Updated:  09/03/19 1546     Extra Tube Hold for add-ons.     Comment: Auto resulted.       [place] Blood Culture Bottle Set [[id number]] Collected:  09/03/19 1440    Specimen:  Blood from Arm, Left Updated:  09/03/19 1546     Extra Tube Hold for add-ons.     Comment: Auto resulted.       Lactic Acid, Plasma [[id number]]  (Normal) Collected:  09/03/19 1521    Specimen:  Blood Updated:  09/03/19 1546     Lactate 1.6 mmol/L     Blood Culture - Blood, Arm, Left [[id number]] Collected:  09/03/19 1521    Specimen:  Blood from Arm, Left Updated:  09/03/19 1532    Troponin [[id number]]  (Normal) Collected:  09/03/19 1444    Specimen:  Blood Updated:  09/03/19 1523     Troponin I <0.012 ng/mL     BNP  [[id number]]  (Normal) Collected:  09/03/19 1444    Specimen:  Blood Updated:  09/03/19 1523     proBNP 421.0 pg/mL     Lipase [[id number]]  (Normal) Collected:  09/03/19 1444    Specimen:  Blood Updated:  09/03/19 1512     Lipase 84 U/L     Comprehensive Metabolic Panel [[id number]]  (Abnormal) Collected:  09/03/19 1444    Specimen:  Blood Updated:  09/03/19 1512     Glucose 133 mg/dL      BUN 36 mg/dL      Creatinine 1.08 mg/dL      Sodium 140 mmol/L      Potassium 4.3 mmol/L      Chloride 97 mmol/L      CO2 34.0 mmol/L      Calcium 9.8 mg/dL      Total Protein 7.9 g/dL      Albumin 4.10 g/dL      ALT (SGPT) 15 U/L      AST (SGOT) 31 U/L      Alkaline Phosphatase 53 U/L      Total Bilirubin 0.5 mg/dL      eGFR Non African Amer 47 mL/min/1.73      Globulin 3.8 gm/dL      A/G Ratio 1.1 g/dL      BUN/Creatinine Ratio 33.3     Anion Gap 9.0 mmol/L     Narrative:       GFR Normal >60  Chronic Kidney Disease <60  Kidney Failure <15    Magnesium [[id number]]  (Abnormal) Collected:  09/03/19 1444    Specimen:  Blood Updated:  09/03/19 1512     Magnesium 2.3 mg/dL     CK [[id number]]  (Normal) Collected:  09/03/19 1444    Specimen:  Blood Updated:  09/03/19 1512     Creatine Kinase <20 U/L           Imaging Results (last 24 hours)     Procedure Component Value Units Date/Time    CT Abdomen Pelvis With Contrast [[id number]] Collected:  09/03/19 1606     Updated:  09/03/19 1614    Narrative:       EXAMINATION:   CT ABDOMEN PELVIS W CONTRAST-  9/3/2019 4:06 PM CDT     HISTORY: CT ABDOMEN AND PELVIS WITH CONTRAST 9/3/2019 3:44 PM CDT     HISTORY: Epigastric pain     COMPARISON: 08/20/2019.      DLP: 308 mGy cm     TECHNIQUE: Following the intravenous administration of contrast, helical  CT tomographic images of the abdomen and pelvis were acquired. Coronal  reformatted images were also provided for review.      FINDINGS:   The lung bases and base of the heart are unremarkable.      LIVER: No focal liver lesion. The hepatic  vasculature is patent.      BILIARY SYSTEM: The gallbladder is visualized. There are multiple  calculi demonstrated in the gallbladder..      PANCREAS: No focal pancreatic lesion.      SPLEEN: Unremarkable.      KIDNEYS AND ADRENALS: The adrenal glands are visualized. The renal  contours demonstrate uniform and symmetric excretion of contrast. There  are 2 left renal cyst. One is 4 cm the other is 1.8 cm.. The ureters are  decompressed and normal in appearance.     RETROPERITONEUM: No mass, lymphadenopathy or hemorrhage.      GI TRACT: No evidence of obstruction or bowel wall thickening. Extensive  diverticulosis is noted in the sigmoid colon.     OTHER: There is no mesenteric mass, lymphadenopathy or fluid collection.  The abdominopelvic vasculature is patent. The osseous structures and  soft tissues demonstrate no worrisome lesions.     PELVIS: No mass lesion, fluid collection or significant lymphadenopathy  is seen in the pelvis. The urinary bladder is normal in appearance.       Impression:       1. Cholelithiasis.  2. Diverticulosis  3. Left renal cyst.  4. This exam is unchanged from 08/20/2019.         This report was finalized on 09/03/2019 16:10 by Dr. Mike Marte MD.    CT Angiogram Chest With Contrast [[id number]] Collected:  09/03/19 1600     Updated:  09/03/19 1609    Narrative:       CT ANGIOGRAM CHEST W CONTRAST- 9/3/2019 3:44 PM CDT      HISTORY: soa, cough, cp, tachy, hemoptysis      COMPARISON: None.      DOSE LENGTH PRODUCT: 212 mGy cm. Automated exposure control was also  utilized to decrease patient radiation dose.     TECHNIQUE: Helical tomographic images of the chest were obtained after  the administration of intravenous contrast following angiogram protocol.  Additionally, 3D and multiplanar reformatted images were provided.        FINDINGS:    Pulmonary arteries: There is adequate enhancement of the pulmonary  arteries to evaluate for central and segmental pulmonary emboli. There  are no  filling defects within the main, lobar, segmental or visualized  subsegmental pulmonary arteries. The pulmonary arteries are within  normal limits for size.      Aorta and great vessels: The aorta is well opacified and demonstrates no  dissection or aneurysm. The great vessels are normal in appearance.     Visualized neck base: The imaged portion of the base of the neck appears  unremarkable.      Lungs: Patchy infiltrates are noted in the upper lungs. Most likely is  an inflammatory process.. Chronic bronchiectasis is present. There is  some mucus plugging in distal airways..      Heart: The heart is normal in size. There is no pericardial effusion.      Mediastinum and lymph nodes: No enlarged mediastinal, hilar, or axillary  lymph nodes are present.      Skeletal and soft tissues: The osseous structures of the thorax and  surrounding soft tissues demonstrate no acute process.     Upper abdomen: A prominent cyst is noted upper pole of the left kidney  calculi are present in the gallbladder..        Impression:       1. No evidence of embolic disease.  2. Patchy infiltrates in the upper lobes most likely representing  pneumonia.  3. Bronchiectasis. Bronchial wall thickening in the upper lung. Some  mucus plugging is noted  4. Cholelithiasis.        This report was finalized on 09/03/2019 16:06 by Dr. Mike Marte MD.    XR Chest 1 View [[id number]] Collected:  09/03/19 1558     Updated:  09/03/19 1604    Narrative:       EXAMINATION: XR CHEST 1 VW-. 9/3/2019 3:58 PM CDT     CHEST, ONE VIEW:     HISTORY: Shortness of air     COMPARISON: 07/18/2018 chest radiograph     A single frontal chest radiograph was obtained.     FINDINGS:     There are reticulonodular interstitial and patchy airspace opacities  identified in the upper lobes, suprahilar regions bilaterally more  conspicuous compared to previous studies progression of chronic lung  changes considered as well as superimposed acute infiltrates.     The heart is  normal in size without heart failure.     The bony structures are intact.                                     Impression:       1. Perihilar and upper lobe interstitial and patchy airspace opacities  more conspicuous compared to the 2018 examination. Chronic changes with  superimposed acute infectious/inflammatory processes not excluded.     This report was finalized on 09/03/2019 16:00 by Dr. Flako Rojas MD.             Intake/Output Summary (Last 24 hours) at 9/4/2019 1107  Last data filed at 9/4/2019 0859  Gross per 24 hour   Intake 2383 ml   Output 400 ml   Net 1983 ml       Physical Exam   Constitutional: She is oriented to person, place, and time. She appears well-developed. She appears cachectic. She is cooperative. She has a sickly appearance. No distress.   HENT:   Head: Normocephalic and atraumatic.   Nose: Nose normal.   Mouth/Throat: Oropharynx is clear and moist.   Eyes: Conjunctivae are normal. No scleral icterus.   Neck: Normal range of motion. Neck supple. No JVD present.   Cardiovascular: Normal rate, regular rhythm and normal heart sounds.   Pulmonary/Chest: Effort normal and breath sounds normal. No respiratory distress.   Abdominal: Soft. Bowel sounds are normal. She exhibits no distension and no mass.   Musculoskeletal: Normal range of motion. She exhibits no edema or tenderness.   Neurological: She is alert and oriented to person, place, and time. Coordination normal.   Skin: Skin is warm and dry. There is pallor.   Psychiatric: She has a normal mood and affect.       Results Review:  I have reviewed the labs, radiology results, and diagnostic studies since my last progress note and made treatment changes reflective of the results.   I have reviewed the current medications.    Assessment/Plan     Active Hospital Problems    Diagnosis   • **Pneumonia of both upper lobes   • COPD (chronic obstructive pulmonary disease) (CMS/HCC)   • Hypertension   • GERD (gastroesophageal reflux disease)   •  Bronchiectasis (CMS/HCC)   • Dysphasia   • Aspiration pneumonia of both upper lobes due to vomit (CMS/HCC)       PLAN:  Type cross and infuse 1 unit PRBCs  H&H post infusion  Discontinue IV fluids  Continue IV antibiotics  VFSS per speech therapy  Probable discharge tomorrow on oral antibiotics    Alexsander Chew DO   09/04/19   11:07 AM

## 2019-09-04 NOTE — PLAN OF CARE
Problem: Patient Care Overview  Goal: Plan of Care Review  Outcome: Ongoing (interventions implemented as appropriate)   09/04/19 9836   Coping/Psychosocial   Plan of Care Reviewed With patient   Plan of Care Review   Progress no change   OTHER   Outcome Summary Pt remains NPO for speech eval. One episode of nausea/dry heaving relieved with PRN IV Zofran. No c/o pain. IV fluids and abx infusing as ordered. VSS. Safety maintained. Will continue to monitor.        Problem: Fall Risk (Adult)  Goal: Identify Related Risk Factors and Signs and Symptoms  Outcome: Outcome(s) achieved Date Met: 09/04/19    Goal: Absence of Fall  Outcome: Ongoing (interventions implemented as appropriate)      Problem: Skin Injury Risk (Adult)  Goal: Identify Related Risk Factors and Signs and Symptoms  Outcome: Outcome(s) achieved Date Met: 09/04/19    Goal: Skin Health and Integrity  Outcome: Outcome(s) achieved Date Met: 09/04/19      Problem: Pneumonia (Adult)  Goal: Signs and Symptoms of Listed Potential Problems Will be Absent, Minimized or Managed (Pneumonia)  Outcome: Ongoing (interventions implemented as appropriate)

## 2019-09-04 NOTE — PLAN OF CARE
Problem: Patient Care Overview  Goal: Plan of Care Review  Outcome: Ongoing (interventions implemented as appropriate)   09/04/19 5486   Coping/Psychosocial   Plan of Care Reviewed With patient   Plan of Care Review   Progress no change   OTHER   Outcome Summary Patient has no c/o ofpain. Up to BSC with 1 assist. H&H was 7.4, 1 unit of PRBC's administered. H&H draw to follow. Family present at bedside.PT/OT consulted. Safety maintained, vss, will follow       Problem: Fall Risk (Adult)  Goal: Absence of Fall  Outcome: Ongoing (interventions implemented as appropriate)      Problem: Pneumonia (Adult)  Goal: Signs and Symptoms of Listed Potential Problems Will be Absent, Minimized or Managed (Pneumonia)  Outcome: Ongoing (interventions implemented as appropriate)      Problem: Nutrition, Imbalanced: Inadequate Oral Intake (Adult)  Goal: Identify Related Risk Factors and Signs and Symptoms  Outcome: Ongoing (interventions implemented as appropriate)    Goal: Improved Oral Intake  Outcome: Ongoing (interventions implemented as appropriate)    Goal: Prevent Further Weight Loss  Outcome: Ongoing (interventions implemented as appropriate)

## 2019-09-04 NOTE — THERAPY EVALUATION
Acute Care - Physical Therapy Initial Evaluation  [facility]     Patient Name: Milton Mae  : 1926  MRN: [id number]  Today's Date: 2019   Onset of Illness/Injury or Date of Surgery: 19  Date of Referral to PT: 19  Referring Physician: Dr. Chew      Admit Date: 9/3/2019    Visit Dx:     ICD-10-CM ICD-9-CM   1. Dysphagia, unspecified type R13.10 787.20   2. Impaired mobility Z74.09 799.89     Patient Active Problem List   Diagnosis   • COPD (chronic obstructive pulmonary disease) (CMS/HCC)   • Hypertension   • GERD (gastroesophageal reflux disease)   • Pneumonia of both upper lobes   • Bronchiectasis (CMS/HCC)   • Anemia   • Oropharyngeal dysphagia     Past Medical History:   Diagnosis Date   • COPD (chronic obstructive pulmonary disease) (CMS/HCC)    • GERD (gastroesophageal reflux disease)    • Hypertension    • Pneumonia      Past Surgical History:   Procedure Laterality Date   • CATARACT EXTRACTION, BILATERAL     • THYROID SURGERY          PT ASSESSMENT (last 12 hours)      Physical Therapy Evaluation     Row Name 19 1100          PT Evaluation Time/Intention    Subjective Information  complains of;weakness;dyspnea  -HR     Document Type  evaluation  -HR     Mode of Treatment  physical therapy  -HR     Patient Effort  good  -HR     Symptoms Noted During/After Treatment  shortness of breath;other (see comments) trembling  -HR     Row Name 19 1100          General Information    Patient Profile Reviewed?  yes  -HR     Onset of Illness/Injury or Date of Surgery  19  -HR     Referring Physician  Dr. Chew  -HR     Patient Observations  alert;cooperative  -HR     Patient/Family Observations  Son in room. Primary caregiver for patient  -HR     General Observations of Patient  Frail looking, elderly lady. O2 per nc at 2L/min. IV to LUE which needed to be secured better so I reinforced the tape  -HR     Prior Level of Function  independent:;min assist:;all household  mobility;gait;transfer;mod assist:;bathing;dependent:;cooking;cleaning;driving  -HR     Equipment Currently Used at Home  walker, rolling;grab bar;shower chair;oxygen  -HR     Pertinent History of Current Functional Problem  She had continuous cough and SOB. She does have a history of aspiration.  -HR     Existing Precautions/Restrictions  fall;oxygen therapy device and L/min  -HR     Limitations/Impairments  hearing  -HR     Equipment Issued to Patient  walker, front wheeled  -HR     Risks Reviewed  patient:;LOB;nausea/vomiting;dizziness;change in vital signs  -HR     Benefits Reviewed  patient:;improve function;increase independence;increase strength  -HR     Barriers to Rehab  previous functional deficit;physical barrier advanced age  -HR     Row Name 09/04/19 1100          Relationship/Environment    Primary Source of Support/Comfort  child(maty) son  -HR     Name of Support/Comfort Primary Source  Rafa  -HR     Lives With  child(maty), adult  -HR     Name(s) of Who Lives With Patient  Rafa  -HR     Row Name 09/04/19 1100          Resource/Environmental Concerns    Current Living Arrangements  home/apartment/condo  -HR     Resource/Environmental Concerns  none  -HR     Row Name 09/04/19 1100          Living Environment    Living Arrangements  house  -HR     Home Accessibility  wheelchair accessible;tub/shower is not walk in  -HR     Row Name 09/04/19 1100          Cognitive Assessment/Intervention- PT/OT    Affect/Mental Status (Cognitive)  WNL  -HR     Orientation Status (Cognition)  oriented x 4  -HR     Follows Commands (Cognition)  WNL  -HR     Cognitive Function (Cognitive)  WNL  -HR     Personal Safety Interventions  fall prevention program maintained;nonskid shoes/slippers when out of bed;supervised activity  -HR     Cognitive Assessment/Intervention Comment  She has no cognitive issue  -HR     Row Name 09/04/19 1100          Safety Issues, Functional Mobility    Impairments Affecting Function (Mobility)   shortness of breath;strength;balance;endurance/activity tolerance  -HR     Row Name 09/04/19 1100          Bed Mobility Assessment/Treatment    Bed Mobility Assessment/Treatment  supine-sit;sit-supine  -HR     Supine-Sit Gordon (Bed Mobility)  conditional independence  -HR     Sit-Supine Gordon (Bed Mobility)  conditional independence  -HR     Row Name 09/04/19 1100          Transfer Assessment/Treatment    Transfer Assessment/Treatment  sit-stand transfer;bed-chair transfer  -HR     Bed-Chair Gordon (Transfers)  contact guard;minimum assist (75% patient effort)  -HR     Assistive Device (Bed-Chair Transfers)  walker, front-wheeled  -HR     Sit-Stand Gordon (Transfers)  contact guard;verbal cues;set up;1 person to manage equipment;1 person assist  -HR     Row Name 09/04/19 1100          Sit-Stand Transfer    Assistive Device (Sit-Stand Transfers)  walker, front-wheeled  -HR     Row Name 09/04/19 1100          Gait/Stairs Assessment/Training    Gait/Stairs Assessment/Training  gait/ambulation independence;gait/ambulation assistive device;distance ambulated  -HR     Gordon Level (Gait)  set up;verbal cues;contact guard;minimum assist (75% patient effort);1 person assist;1 person to manage equipment  -HR     Assistive Device (Gait)  walker, front-wheeled  -HR     Distance in Feet (Gait)  5  -HR     Deviations/Abnormal Patterns (Gait)  stride length decreased;parlu decreased  -HR     Comment (Gait/Stairs)  Trembling arms with transfer and taking steps. Needed reassurance as she moved. Had feeling of being unable to get her breath but O2 sat was % once in chair  -HR     Row Name 09/04/19 1100          General ROM    GENERAL ROM COMMENTS  BLE AROM WFL  -HR     Row Name 09/04/19 1100          MMT (Manual Muscle Testing)    General MMT Comments  Grossly 4-/5 throughout BLE  -HR     Row Name 09/04/19 1100          Pain Assessment    Additional Documentation  Pain Scale: Numbers  Pre/Post-Treatment (Group)  -HR     Row Name 09/04/19 1100          Pain Scale: Numbers Pre/Post-Treatment    Pain Scale: Numbers, Pretreatment  0/10 - no pain  -HR     Pain Scale: Numbers, Post-Treatment  0/10 - no pain  -HR     Row Name 09/04/19 1100          Physical Therapy Clinical Impression    Date of Referral to PT  09/03/19  -HR     PT Diagnosis (PT Clinical Impression)  impaired mobility  -HR     Functional Level at Time of Evaluation (PT Clinical Impression)  Mrs. Mae was slow but stable in transfering to EOB. She had anxiety with standing and transferring to bed side chair due to feeling SOB and trembling. Used rolling walker and gave verbal cues to step around to sit in the chair. She did not have any LOB, but did require CGA/Remigio to control the walker and guide her to chair. Her SpO2 was 100% once in chair but she continued to feel SOB.   -HR     Criteria for Skilled Interventions Met (PT Clinical Impression)  yes;treatment indicated  -HR     Pathology/Pathophysiology Noted (Describe Specifically for Each System)  musculoskeletal;pulmonary  -HR     Impairments Found (describe specific impairments)  gait, locomotion, and balance;ventilation and respiration/gas exchange  -HR     Rehab Potential (PT Clinical Summary)  good, to achieve stated therapy goals  -HR     Predicted Duration of Therapy (PT)  until discharge  -HR     Care Plan Review (PT)  care plan/treatment goals reviewed  -HR     Care Plan Review, Other Participant (PT Clinical Impression)  son  -HR     Row Name 09/04/19 1100          Vital Signs    O2 Delivery Pre Treatment  supplemental O2  -HR     Post SpO2 (%)  100  -HR     O2 Delivery Post Treatment  supplemental O2  -HR     Post Patient Position  Sitting  -HR     Row Name 09/04/19 1100          Physical Therapy Goals    Bed Mobility Goal Selection (PT)  bed mobility, PT goal 1  -HR     Transfer Goal Selection (PT)  transfer, PT goal 1  -HR     Gait Training Goal Selection (PT)  gait  training, PT goal 1  -HR     Row Name 09/04/19 1100          Bed Mobility Goal 1 (PT)    Activity/Assistive Device (Bed Mobility Goal 1, PT)  bed mobility activities, all  -HR     [facility] Level/Cues Needed (Bed Mobility Goal 1, PT)  independent  -HR     Time Frame (Bed Mobility Goal 1, PT)  by discharge  -HR     Row Name 09/04/19 1100          Transfer Goal 1 (PT)    Activity/Assistive Device (Transfer Goal 1, PT)  sit-to-stand/stand-to-sit;bed-to-chair/chair-to-bed;walker, rolling  -HR     [facility] Level/Cues Needed (Transfer Goal 1, PT)  conditional independence;supervision required  -HR     Time Frame (Transfer Goal 1, PT)  by discharge  -HR     Row Name 09/04/19 1100          Gait Training Goal 1 (PT)    Activity/Assistive Device (Gait Training Goal 1, PT)  gait (walking locomotion);assistive device use;walker, rolling;increase endurance/gait distance  -HR     [facility] Level (Gait Training Goal 1, PT)  contact guard assist  -HR     Distance (Gait Goal 1, PT)  100  -HR     Time Frame (Gait Training Goal 1, PT)  by discharge  -HR     Row Name 09/04/19 1100          Positioning and Restraints    Pre-Treatment Position  in bed  -HR     Post Treatment Position  chair  -HR     In Chair  reclined;call light within reach;with family/caregiver  -HR       User Key  (r) = Recorded By, (t) = Taken By, (c) = Cosigned By    Initials Name Provider Type    HR Cat Vernon, PT, DPT, CLT-KATHLEEN Physical Therapist          PT Recommendation and Plan  Anticipated Discharge Disposition (PT): home with home health, home with 24/7 care  Planned Therapy Interventions (PT Eval): bed mobility training, strengthening, transfer training, gait training  Therapy Frequency (PT Clinical Impression): daily(1-2X/day)  Outcome Summary/Treatment Plan (PT)  Anticipated Discharge Disposition (PT): home with home health, home with 24/7 care  Plan of Care Reviewed With: patient, son  Progress: no change     Time Calculation:      Therapy Charges for Today     Code Description Service Date Service Provider Modifiers Qty    [id number] HC PT EVAL MOD COMPLEXITY 4 9/4/2019 Cat Vernon, PT, DPT, TARAS GP 1          PT G-Codes  AM-PAC 6 Clicks Score (PT): 19      Cat Vernon, PT, DPT, TARAS  9/4/2019

## 2019-09-04 NOTE — PROGRESS NOTES
Discharge Planning Assessment  [facility]     Patient Name: Milton Mae  MRN: [id number]  Today's Date: 9/4/2019    Admit Date: 9/3/2019    Discharge Needs Assessment     Row Name 09/04/19 1120       Living Environment    Lives With  child(maty), adult    Name(s) of Who Lives With Patient  SonJim    Current Living Arrangements  home/apartment/condo    Primary Care Provided by  self;child(maty)    Provides Primary Care For  no one    Family Caregiver if Needed  child(maty), adult    Family Caregiver Names  Nicholas    Quality of Family Relationships  supportive;involved;helpful    Able to Return to Prior Arrangements  yes       Resource/Environmental Concerns    Resource/Environmental Concerns  none    Transportation Concerns  car, none       Transition Planning    Patient/Family Anticipates Transition to  home with family    Patient/Family Anticipated Services at Transition  none    Transportation Anticipated  family or friend will provide       Discharge Needs Assessment    Readmission Within the Last 30 Days  no previous admission in last 30 days    Concerns to be Addressed  denies needs/concerns at this time    Equipment Currently Used at Home  oxygen;walker, rolling;commode;shower chair    Anticipated Changes Related to Illness  none    Equipment Needed After Discharge  none    Current Discharge Risk  chronically ill        Discharge Plan     Row Name 09/04/19 1122       Plan    Plan  Home    Patient/Family in Agreement with Plan  yes    Plan Comments  Spoke with pt to assess for home needs. Pt lives at home with son and plans same.  Pt has needed DME, does not feel HH care needed. Pt does have O2 at home continuously via [employer].  Pt does have PCP and RX coverage.  She is not interested in being followed by [facility] at home.          Destination      No service coordination in this encounter.      Durable Medical Equipment      No service coordination in this encounter.      Dialysis/Infusion      No service  coordination in this encounter.      Home Medical Care      No service coordination in this encounter.      Therapy      No service coordination in this encounter.      Community Resources      No service coordination in this encounter.          Demographic Summary    No documentation.       Functional Status    No documentation.       Psychosocial    No documentation.       Abuse/Neglect    No documentation.       Legal    No documentation.       Substance Abuse    No documentation.       Patient Forms    No documentation.           PIPO Martel

## 2019-09-04 NOTE — PLAN OF CARE
Problem: Patient Care Overview  Goal: Plan of Care Review  Outcome: Ongoing (interventions implemented as appropriate)   09/04/19 1138   Coping/Psychosocial   Plan of Care Reviewed With patient   Plan of Care Review   Progress no change   OTHER   Outcome Summary OT eval completed. Pt demos decreased activity toelrance and endurance with minimal activity. Presents with significantly increased WOB and cough taking steps from bed to chair requiring seated rest. O2 sat remained % with activity on 2L nc. Performs LB dressing with S, no difficulty reaching feet for task. OT indicated to address endurance/activity tolerance and strength in context of ADl and functional mobility. Recommend d/c to SNF vs home w 24/7 care and HH pending progress.

## 2019-09-04 NOTE — THERAPY EVALUATION
Acute Care - Occupational Therapy Initial Evaluation  [facility]     Patient Name: Milton Mae  : 1926  MRN: [id number]  Today's Date: 2019  Onset of Illness/Injury or Date of Surgery: 19  Date of Referral to OT: 19  Referring Physician: Dr. Chew    Admit Date: 9/3/2019       ICD-10-CM ICD-9-CM   1. Dysphagia, unspecified type R13.10 787.20   2. Impaired mobility Z74.09 799.89   3. Impaired mobility and ADLs Z74. 799.89     Patient Active Problem List   Diagnosis   • COPD (chronic obstructive pulmonary disease) (CMS/HCC)   • Hypertension   • GERD (gastroesophageal reflux disease)   • Pneumonia of both upper lobes   • Bronchiectasis (CMS/HCC)   • Anemia   • Oropharyngeal dysphagia     Past Medical History:   Diagnosis Date   • COPD (chronic obstructive pulmonary disease) (CMS/HCC)    • GERD (gastroesophageal reflux disease)    • Hypertension    • Pneumonia      Past Surgical History:   Procedure Laterality Date   • CATARACT EXTRACTION, BILATERAL     • THYROID SURGERY            OT ASSESSMENT FLOWSHEET (last 12 hours)      Occupational Therapy Evaluation     Row Name 19 1013                   OT Evaluation Time/Intention    Subjective Information  complains of;weakness;dyspnea  -MW        Document Type  evaluation  -MW        Mode of Treatment  occupational therapy  -MW        Comment  pt reports significant difficulty catching her breathing following transfer to chair, O2 sats remained 100% on supplemental O2  -MW           General Information    Patient Profile Reviewed?  yes  -MW        Onset of Illness/Injury or Date of Surgery  19  -MW        Referring Physician  Dr. Chew  -MW        Patient Observations  alert;cooperative;agree to therapy  -MW        Patient/Family Observations  son present  -MW        General Observations of Patient  awake and alert in fowlers, 2L O2 nc, IV infusing  -MW        Prior Level of Function  independent:;dressing;min assist:;bathing;all  household mobility;transfer;dependent:;cooking;cleaning  -MW        Equipment Currently Used at Home  walker, rolling;grab bar;shower chair;oxygen handheld shower head, oxygen full time  -MW        Pertinent History of Current Functional Problem  Dx: B upper lobe aspiration pneumonia; previous admission 7/18-22 for pneumonia following a fall  -MW        Existing Precautions/Restrictions  fall;oxygen therapy device and L/min  -MW        Risks Reviewed  patient:;LOB;nausea/vomiting;dizziness;increased discomfort;change in vital signs;increased drainage;lines disloged  -MW        Benefits Reviewed  patient:;improve function;increase independence;increase strength;increase balance;decrease pain;decrease risk of DVT;improve skin integrity;increase knowledge  -MW           Relationship/Environment    Primary Source of Support/Comfort  child(maty)  -MW        Name of Support/Comfort Primary Source  son  -MW        Lives With  child(maty), adult  -MW           Resource/Environmental Concerns    Current Living Arrangements  home/apartment/condo  -MW           Cognitive Assessment/Interventions    Additional Documentation  Cognitive Assessment/Intervention (Group)  -MW           Cognitive Assessment/Intervention- PT/OT    Affect/Mental Status (Cognitive)  WFL  -MW        Orientation Status (Cognition)  oriented x 4  -MW        Follows Commands (Cognition)  WFL  -MW        Cognitive Function (Cognitive)  WFL  -MW        Personal Safety Interventions  fall prevention program maintained;gait belt;nonskid shoes/slippers when out of bed;supervised activity  -MW           Safety Issues, Functional Mobility    Impairments Affecting Function (Mobility)  balance;endurance/activity tolerance;shortness of breath;strength  -MW           Bed Mobility Assessment/Treatment    Bed Mobility Assessment/Treatment  supine-sit  -MW        Supine-Sit [place] (Bed Mobility)  supervision  -MW        Assistive Device (Bed Mobility)  head of bed  elevated  -           Functional Mobility    Functional Mobility- Ind. Level  contact guard assist;verbal cues required  -        Functional Mobility- Device  rolling walker  -        Functional Mobility- Comment  increased time to take a few steps from bed to chair  -           Transfer Assessment/Treatment    Transfer Assessment/Treatment  sit-stand transfer;stand-sit transfer  -           Sit-Stand Transfer    Sit-Stand [place] (Transfers)  contact guard  -        Assistive Device (Sit-Stand Transfers)  walker, front-wheeled  -           Stand-Sit Transfer    Stand-Sit [place] (Transfers)  contact guard;verbal cues  -        Assistive Device (Stand-Sit Transfers)  walker, front-wheeled  -           ADL Assessment/Intervention    BADL Assessment/Intervention  lower body dressing  -           Lower Body Dressing Assessment/Training    Lower Body Dressing [place] Level  don;doff;socks;supervision  -        Lower Body Dressing Position  supported sitting  -           BADL Safety/Performance    Impairments, BADL Safety/Performance  balance;endurance/activity tolerance;shortness of breath;strength  -           General ROM    GENERAL ROM COMMENTS  BUE AROM WFL  -           MMT (Manual Muscle Testing)    General MMT Comments  BUE functionally 4-/5  -MW           Motor Assessment/Interventions    Additional Documentation  Balance (Group)  -MW           Balance    Balance  static sitting balance;static standing balance  -           Static Sitting Balance    Level of [place] (Unsupported Sitting, Static Balance)  supervision  -        Sitting Position (Unsupported Sitting, Static Balance)  sitting on edge of bed  -           Static Standing Balance    Level of [place] (Supported Standing, Static Balance)  contact guard assist  -        Assistive Device Utilized (Supported Standing, Static Balance)  walker, rolling  -           Sensory Assessment/Intervention     Sensory General Assessment  no sensation deficits identified  -MW           Positioning and Restraints    Pre-Treatment Position  in bed  -MW        Post Treatment Position  chair  -MW        In Chair  reclined;call light within reach;encouraged to call for assist;with family/caregiver;legs elevated  -MW           Pain Assessment    Additional Documentation  Pain Scale: Numbers Pre/Post-Treatment (Group)  -MW           Pain Scale: Numbers Pre/Post-Treatment    Pain Scale: Numbers, Pretreatment  0/10 - no pain  -MW        Pain Scale: Numbers, Post-Treatment  0/10 - no pain  -MW           Plan of Care Review    Plan of Care Reviewed With  patient;son  -MW           Clinical Impression (OT)    Date of Referral to OT  09/03/19  -MW        OT Diagnosis  decreased ADL  -MW        Prognosis (OT Eval)  good  -MW        Patient/Family Goals Statement (OT Eval)  increase endurance/activity tolerance  -MW        Criteria for Skilled Therapeutic Interventions Met (OT Eval)  yes;treatment indicated  -MW        Rehab Potential (OT Eval)  good, to achieve stated therapy goals  -MW        Therapy Frequency (OT Eval)  3 times/wk  -MW        Care Plan Review (OT)  evaluation/treatment results reviewed;care plan/treatment goals reviewed;risks/benefits reviewed;current/potential barriers reviewed;patient/other agree to care plan  -MW        Anticipated Discharge Disposition (OT)  skilled nursing facility;home with 24/7 care;home with home health  -MW           Vital Signs    Post SpO2 (%)  100  -MW        O2 Delivery Post Treatment  supplemental O2  -MW        Post Patient Position  Sitting  -MW           Planned OT Interventions    Planned Therapy Interventions (OT Eval)  activity tolerance training;BADL retraining;functional balance retraining;occupation/activity based interventions;patient/caregiver education/training;strengthening exercise;transfer/mobility retraining  -MW           OT Goals    Transfer Goal Selection (OT)   transfer, OT goal 1  -MW        Bathing Goal Selection (OT)  bathing, OT goal 1  -MW        Dressing Goal Selection (OT)  --  -MW        Toileting Goal Selection (OT)  toileting, OT goal 1  -MW           Transfer Goal 1 (OT)    Activity/Assistive Device (Transfer Goal 1, OT)  sit-to-stand/stand-to-sit;bed-to-chair/chair-to-bed;toilet;tub  -MW        Routt Level/Cues Needed (Transfer Goal 1, OT)  standby assist  -MW        Time Frame (Transfer Goal 1, OT)  long term goal (LTG);by discharge  -MW        Progress/Outcome (Transfer Goal 1, OT)  goal ongoing  -MW           Bathing Goal 1 (OT)    Activity/Assistive Device (Bathing Goal 1, OT)  bathing skills, all;shower chair  -MW        Routt Level/Cues Needed (Bathing Goal 1, OT)  standby assist  -MW        Time Frame (Bathing Goal 1, OT)  long term goal (LTG);by discharge  -MW        Progress/Outcomes (Bathing Goal 1, OT)  goal ongoing  -MW           Toileting Goal 1 (OT)    Activity/Device (Toileting Goal 1, OT)  toileting skills, all;commode  -MW        Routt Level/Cues Needed (Toileting Goal 1, OT)  contact guard assist  -MW        Time Frame (Toileting Goal 1, OT)  long term goal (LTG);by discharge  -MW        Progress/Outcome (Toileting Goal 1, OT)  goal ongoing  -MW           Living Environment    Home Accessibility  tub/shower is not walk in;wheelchair accessible  -MW          User Key  (r) = Recorded By, (t) = Taken By, (c) = Cosigned By    Initials Name Effective Dates    Madyson Mc, OTR/L 08/28/18 -          Occupational Therapy Education     Title: PT OT SLP Therapies (Done)     Topic: Occupational Therapy (Done)     Point: ADL training (Done)     Description: Instruct learner(s) on proper safety adaptation and remediation techniques during self care or transfers.   Instruct in proper use of assistive devices.    Learning Progress Summary           Patient Acceptance, E, VU by RAFY at 9/4/2019 11:41 AM    Comment:  ADl, transfer  training, benefit of increased activity, benefit of OOB activity, pursed lip breathing, OT POC   Family Acceptance, E, VU by  at 9/4/2019 11:41 AM    Comment:  ADl, transfer training, benefit of increased activity, benefit of OOB activity, pursed lip breathing, OT POC                               User Key     Initials Effective Dates Name Provider Type Discipline     08/28/18 -  Madyson Mcghee, OTR/L Occupational Therapist OT                  OT Recommendation and Plan  Outcome Summary/Treatment Plan (OT)  Anticipated Discharge Disposition (OT): skilled nursing facility, home with 24/7 care, home with home health  Planned Therapy Interventions (OT Eval): activity tolerance training, BADL retraining, functional balance retraining, occupation/activity based interventions, patient/caregiver education/training, strengthening exercise, transfer/mobility retraining  Therapy Frequency (OT Eval): 3 times/wk  Plan of Care Review  Plan of Care Reviewed With: patient  Plan of Care Reviewed With: patient  Outcome Summary: OT eval completed. Pt demos decreased activity toelrance and endurance with minimal activity. Presents with significantly increased WOB and cough taking steps from bed to chair requiring seated rest. O2 sat remained % with activity on 2L nc. Performs LB dressing with S, no difficulty reaching feet for task. OT indicated to address endurance/activity tolerance and strength in context of ADl and functional mobility. Recommend d/c to SNF vs home w 24/7 care and HH pending progress.    Outcome Measures     Row Name 09/04/19 1100             How much help from another is currently needed...    Putting on and taking off regular lower body clothing?  3  -MW      Bathing (including washing, rinsing, and drying)  3  -MW      Toileting (which includes using toilet bed pan or urinal)  3  -MW      Putting on and taking off regular upper body clothing  3  -MW      Taking care of personal grooming (such as  brushing teeth)  4  -MW      Eating meals  4  -MW      AM-PAC 6 Clicks Score (OT)  20  -MW         Functional Assessment    Outcome Measure Options  AM-PAC 6 Clicks Daily Activity (OT)  -MW        User Key  (r) = Recorded By, (t) = Taken By, (c) = Cosigned By    Initials Name Provider Type    Madyson Mc OTR/L Occupational Therapist          Time Calculation:   Time Calculation- OT     Row Name 09/04/19 1142             Time Calculation- OT    OT Start Time  1005  -MW      OT Stop Time  1045  -MW      OT Time Calculation (min)  40 min  -MW      OT Received On  09/04/19  -MW      OT Goal Re-Cert Due Date  09/14/19  -MW        User Key  (r) = Recorded By, (t) = Taken By, (c) = Cosigned By    Initials Name Provider Type    Madyson Mc, OTR/L Occupational Therapist        Therapy Charges for Today     Code Description Service Date Service Provider Modifiers Qty    [id number] HC OT EVAL LOW COMPLEXITY 3 9/4/2019 Madyson Mcghee OTR/L GO 1               CECILE Ramírez/MELCHOR  9/4/2019

## 2019-09-04 NOTE — PROGRESS NOTES
Malnutrition Severity Assessment    Patient Name:  Milton Mae  YOB: 1926  MRN: [id number]  Admit Date:  9/3/2019    Patient meets criteria for : Severe Malnutrition    Comments:  If in agreement with malnutrition assessment, please attest documentation. Thanks.     Malnutrition Severity Assessment  Malnutrition Type: Chronic Disease - Related Malnutrition     Malnutrition Type (last 8 hours)      Malnutrition Severity Assessment     Row Name 09/04/19 1650       Malnutrition Severity Assessment    Malnutrition Type  Chronic Disease - Related Malnutrition    Row Name 09/04/19 1650       Insufficient Energy Intake     Insufficient Energy Intake   <75% of est. energy requirement for > or equal to 3 months    Row Name 09/04/19 1650       Unintentional Weight Loss     Unintentional Weight Loss   Weight loss greater than 10% in six months 25 lbs (20%) in 6 months    Row Name 09/04/19 1650       Muscle Loss    Loss of Muscle Mass Findings  Moderate    [place] Region  Severe - deep hollowing/scooping, lack of muscle to touch, facial bones well defined    Clavicle Bone Region  Severe - protruding prominent bone    Acromion Bone Region  Severe - squared shoulders, bones, and acromion process protrusion prominent    Scapular Bone Region  Severe - prominent bones, depressions easily visible between ribs, scapula, spine, shoulders    Dorsal Hand Region  Severe - prominent depression    Posterior Calf Region  Severe - thin with very little definition/firmness    Row Name 09/04/19 1650       Fat Loss    Subcutaneous Fat Loss Findings  Severe    Orbital Region   Moderate -  somewhat hollowness, slightly dark circles    Upper Arm Region  Severe - mostly skin, very little space between folds, fingers touch    Thoracic & Lumbar Region  Severe - ribs visible with prominent depressions, iliac crest very prominent    Row Name 09/04/19 1650       Criteria Met (Must meet criteria for severity in at least 2 of these  categories: M Wasting, Fat Loss, Fluid, Secondary Signs, Wt. Status, Intake)    Patient meets criteria for   Severe Malnutrition          Electronically signed by:  Cammy Gómez RDN, LD  09/04/19 4:51 PM

## 2019-09-04 NOTE — THERAPY EVALUATION
Acute Care - Speech Language Pathology   Swallow Initial Evaluation [facility]     Patient Name: Milton Mae  : 1926  MRN: [id number]  Today's Date: 2019  Onset of Illness/Injury or Date of Surgery: 19     Referring Physician: Dr. Chew      Admit Date: 9/3/2019  Clinical bedside swallow evaluation completed. Full range of consistencies except mechanical soft solids were presented. No overt s/s of aspiration were observed. Pt had adequate mastication and oral clearance of the regular solid consistency. Pt did have aspiration documented during a recent barium swallow.   Recommend:  1. Starting a regular solid diet and nectar thick liquids until a modified barium swallow study can be completed in radiology to further assess the pt's pharyngeal swallow function.   2. Meds whole with applesaue or thickened liquids.   3. Ok for water between meals.   Sarah Walker CCC-SLP 2019 11:01 AM    Visit Dx:     ICD-10-CM ICD-9-CM   1. Dysphagia, unspecified type R13.10 787.20     Patient Active Problem List   Diagnosis   • COPD (chronic obstructive pulmonary disease) (CMS/HCC)   • Hypertension   • GERD (gastroesophageal reflux disease)   • Pneumonia of both upper lobes   • Bronchiectasis (CMS/HCC)   • Dysphasia   • Aspiration pneumonia of both upper lobes due to vomit (CMS/HCC)     Past Medical History:   Diagnosis Date   • COPD (chronic obstructive pulmonary disease) (CMS/HCC)    • GERD (gastroesophageal reflux disease)    • Hypertension    • Pneumonia      Past Surgical History:   Procedure Laterality Date   • CATARACT EXTRACTION, BILATERAL     • THYROID SURGERY          SWALLOW EVALUATION (last 72 hours)      SLP Adult Swallow Evaluation     Row Name 19 0923                   Rehab Evaluation    Document Type  evaluation  -MB        Subjective Information  no complaints  -MB        Patient Observations  alert;cooperative  -MB        Patient/Family Observations  Son present  -MB            General Information    Patient Profile Reviewed  yes  -MB        Pertinent History Of Current Problem  CT angiogram chest: patchy infiltrates in upper lobes most likely representing pneumonia, aspiration of barium during recent esophagram. Hx of COPD, GERD, HTN, pneumonia  -MB        Current Method of Nutrition  NPO  -MB        Precautions/Limitations, Vision  WFL;for purposes of eval  -MB        Precautions/Limitations, Hearing  hearing impairment, bilaterally  -MB        Prior Level of Function-Communication  WFL  -MB        Prior Level of Function-Swallowing  no diet consistency restrictions  -MB        Plans/Goals Discussed with  patient and family  -MB        Barriers to Rehab  hearing deficit  -MB        Patient's Goals for Discharge  return to PO diet  -MB        Family Goals for Discharge  patient able to return to PO diet  -MB           Pain Assessment    Additional Documentation  Pain Scale: FACES Pre/Post-Treatment (Group)  -MB           Pain Scale: FACES Pre/Post-Treatment    Pain: FACES Scale, Pretreatment  0-->no hurt  -MB           Oral Motor and Function    Dentition Assessment  upper dentures/partial in place;lower dentures/partial in place;other (see comments) bottom dentures move with mouth movement  -MB        Secretion Management  WNL/WFL  -MB        Mucosal Quality  moist, healthy  -MB        Volitional Swallow  WFL  -MB        Volitional Cough  WFL  -MB           Oral Musculature and Cranial Nerve Assessment    Oral Motor General Assessment  WFL  -MB           General Eating/Swallowing Observations    Eating/Swallowing Skills  fed by SLP;self-fed  -MB        Positioning During Eating  upright in bed;upright 90 degree  -MB        Utensils Used  spoon;straw  -MB        Consistencies Trialed  regular textures;pureed;thin liquids;nectar/syrup-thick liquids;honey-thick liquids  -MB           Clinical Swallow Eval    Oral Prep Phase  WFL  -MB        Oral Transit  WFL  -MB        Oral Residue  WFL   -MB        Pharyngeal Phase  no overt signs/symptoms of pharyngeal impairment  -MB        Esophageal Phase  unremarkable  -MB        Clinical Swallow Evaluation Summary  Full range of consistencies except mechanical soft solids were presented. No overt s/s of aspiration were observed. Pt had adequate mastication and oral clearance of the regular solid consistency. Pt did have aspiration documented during a recent barium swallow.   -MB           Clinical Impression    SLP Swallowing Diagnosis  functional oral phase;other (see comments) r/o pharynngeal dysphagia  -MB        Functional Impact  risk of aspiration/pneumonia  -MB           Recommendations    SLP Diet Recommendation  regular textures;nectar thick liquids  -MB        Recommended Diagnostics  VFSS (MBS)  -MB        Recommended Precautions and Strategies  upright posture during/after eating;small bites of food and sips of liquid;alternate between small bites of food and sips of liquid  -MB        SLP Rec. for Method of Medication Administration  meds whole;with thick liquids;with pudding or applesauce  -MB        Monitor for Signs of Aspiration  yes;cough;gurgly voice;throat clearing;pneumonia  -MB        Anticipated Dischage Disposition  unknown  -MB          User Key  (r) = Recorded By, (t) = Taken By, (c) = Cosigned By    Initials Name Effective Dates    Sarah Lopez, CCC-SLP 08/02/16 -           EDUCATION  The patient has been educated in the following areas:   Dysphagia (Swallowing Impairment).    SLP Recommendation and Plan  SLP Swallowing Diagnosis: functional oral phase, other (see comments)(r/o pharynngeal dysphagia)  SLP Diet Recommendation: regular textures, nectar thick liquids  Recommended Precautions and Strategies: upright posture during/after eating, small bites of food and sips of liquid, alternate between small bites of food and sips of liquid  SLP Rec. for Method of Medication Administration: meds whole, with thick liquids, with  pudding or applesauce     Monitor for Signs of Aspiration: yes, cough, gurgly voice, throat clearing, pneumonia  Recommended Diagnostics: VFSS ([facility] [place])     Anticipated Dischage Disposition: unknown                Plan of Care Reviewed With: patient, son  Outcome Summary: Clinical bedside swallow evaluation completed. Full range of consistencies except mechanical soft solids were presented. No overt s/s of aspiration were observed. Pt had adequate mastication and oral clearance of the regular solid consistency. Pt did have aspiration documented during a recent barium swallow. Recommend starting a regular solid diet and nectar thick liquids until a modified barium swallow study can be completed in radiology to further assess the pt's pharyngeal swallow function. Meds whole with applesaue or thickened liquids. Ok for water between meals.            Time Calculation:   Time Calculation- SLP     Row Name 09/04/19 1100             Time Calculation- SLP    SLP Start Time  0923  -MB      SLP Stop Time  1019  -MB      SLP Time Calculation (min)  56 min  -MB      SLP Received On  09/04/19  -MB        User Key  (r) = Recorded By, (t) = Taken By, (c) = Cosigned By    Initials Name Provider Type    Sarah Lopez CCC-SLP Speech and Language Pathologist          Therapy Charges for Today     Code Description Service Date Service Provider Modifiers Qty    [id number]  ST EVAL ORAL PHARYNG SWALLOW 4 9/4/2019 Sarah Walker CCC-SLP GN 1               MARLEY Parks  9/4/2019

## 2019-09-04 NOTE — ACP (ADVANCE CARE PLANNING)
Date of [facility] ACP interview: 9/4/2019  Location of interview: Pt's room     [facility] ACP Facilitator: Claudia Whitten RN  Referral Source: nurse  Present for facilitation: Patient and Healthcare Agent - Son Nicholas    SUMMARY OF ADVANCE CARE PLANNING DISCUSSION:  Milton visited for  consult for [facility] facilitation. We reviewed purpose and goals for advance care planning. She and Nicholas states that they have completed her Living Will and POA previously and did not request the consult. They did confirm that the document had never been presented here to be scanned into her medical record. They were encouraged to do so at some future convenient time.    Milton describes cultural, Taoism, spiritual or personal practices/beliefs that are important to her or might help her choose the care wanted, or not wanted: she is a believer.    Advance care/living will document finished during this facilitation? no    Time spent on preparation, facilitation and documentation was under 30 minutes.    RECOMMENDATIONS/FOLLOW-UP:  Already has a completed document. Encouraged to bring copy for her EMR    CONSULT/NOTE ROUTED  yes    Claudia Whitten, RN

## 2019-09-04 NOTE — MBS/VFSS/FEES
Acute Care - Speech Language Pathology   Swallow Initial Evaluation [facility]     Patient Name: Milton Mae  : 1926  MRN: [id number]  Today's Date: 2019  Onset of Illness/Injury or Date of Surgery: 19     Referring Physician: Dr. Chew      Admit Date: 9/3/2019  SPEECH-LANGUAGE PATHOLOGY EVALUTION - VFSS  Subjective: The patient was seen on this date for a VFSS(Videofluoroscopic Swallowing Study).  Patient was alert and cooperative.    Significant history: aspiration during esophagram  Objective: Risks/benefits were reviewed with the patient and family, and consent was obtained. The study was completed with SLP and Radiologist present. The patient was seen in lateral view(s). Textures given included thin liquid, nectar thick liquid, honey thick liquid, puree consistency, mechanical soft consistency and regular consistency.  Assessment: Difficulties were noted with thin liquid, nectar thick liquid, honey thick liquid, puree consistency, mechanical soft consistency and regular consistency, characterized by premature loss to the vallecula with honey, pudding, and mechanical soft secondary to a delay in swallow initiation. She had decreased hyolaryngeal elevation/excursion, epiglottic inversion, and base of tongue retraction. She exhibited flash penetration 3x with thin barium, with one of those instances occurring while using a chin tuck strategy. She also had premature loss to the pyriforms secondary to a delay when using the chin tuck strategy with thin liquids. No definite aspiration was observed. No other laryngeal penetration was observed. She typically had at least mild pharyngeal residue with most consistencies secondary to weakness with mild to moderate residue in the vallecula with the mechanical soft consistency.   SLP Findings: Patient presents with mild pharyngeal dysphagia.   Recommendations: Diet Textures: thin liquid, regular consistency food. Medications should be taken whole with  beverly.  Recommended Strategies: Upright for PO, small bites and sips, double swallow with all consistencies, alternate liquids and solids and supervision with all PO. Oral care before breakfast, after all meals and PRN.   Dysphagia therapy is recommended.   Sarah Walker CCC-SLP 9/4/2019 4:38 PM    Visit Dx:     ICD-10-CM ICD-9-CM   1. Dysphagia, unspecified type R13.10 787.20   2. Impaired mobility Z74.09 799.89   3. Impaired mobility and ADLs Z74.09 799.89   4. Pneumonia of both lungs due to infectious organism, unspecified part of lung J18.9 483.8     Patient Active Problem List   Diagnosis   • COPD (chronic obstructive pulmonary disease) (CMS/HCC)   • Hypertension   • GERD (gastroesophageal reflux disease)   • Pneumonia of both upper lobes   • Bronchiectasis (CMS/HCC)   • Anemia   • Oropharyngeal dysphagia     Past Medical History:   Diagnosis Date   • COPD (chronic obstructive pulmonary disease) (CMS/HCC)    • GERD (gastroesophageal reflux disease)    • Hypertension    • Pneumonia      Past Surgical History:   Procedure Laterality Date   • CATARACT EXTRACTION, BILATERAL     • THYROID SURGERY          SWALLOW EVALUATION (last 72 hours)      SLP Adult Swallow Evaluation     Row Name 09/04/19 1432 09/04/19 0923                Rehab Evaluation    Document Type  evaluation  -MB  evaluation  -MB       Subjective Information  no complaints  -MB  no complaints  -MB       Patient Observations  alert;cooperative  -MB  alert;cooperative  -MB       Patient/Family Observations  Spoke with son following study  -MB  Son present  -MB          General Information    Patient Profile Reviewed  yes  -MB  yes  -MB       Pertinent History Of Current Problem  CT angiogram chest: patchy infiltrates in upper lobes most likely representing pneumonia, aspiration of barium during recent esophagram. Hx of COPD, GERD, HTN, pneumonia  -MB  CT angiogram chest: patchy infiltrates in upper lobes most likely representing pneumonia,  aspiration of barium during recent esophagram. Hx of COPD, GERD, HTN, pneumonia  -MB       Current Method of Nutrition  regular textures;nectar/syrup-thick liquids  -MB  NPO  -MB       Precautions/Limitations, Vision  WFL;for purposes of eval  -MB  WFL;for purposes of eval  -MB       Precautions/Limitations, Hearing  hearing impairment, bilaterally  -MB  hearing impairment, bilaterally  -MB       Prior Level of Function-Communication  WFL  -MB  WFL  -MB       Prior Level of Function-Swallowing  no diet consistency restrictions  -MB  no diet consistency restrictions  -MB       Plans/Goals Discussed with  patient and family  -MB  patient and family  -MB       Barriers to Rehab  hearing deficit  -MB  hearing deficit  -MB       Patient's Goals for Discharge  patient did not state  -MB  return to PO diet  -MB       Family Goals for Discharge  family did not state  -MB  patient able to return to PO diet  -MB          Pain Assessment    Additional Documentation  --  Pain Scale: FACES Pre/Post-Treatment (Group)  -MB          Pain Scale: FACES Pre/Post-Treatment    Pain: FACES Scale, Pretreatment  0-->no hurt  -MB  0-->no hurt  -MB          Oral Motor and Function    Dentition Assessment  upper dentures/partial in place;lower dentures/partial in place;other (see comments)  -MB  upper dentures/partial in place;lower dentures/partial in place;other (see comments) bottom dentures move with mouth movement  -MB       Secretion Management  WNL/WFL  -MB  WNL/WFL  -MB       Mucosal Quality  moist, healthy  -MB  moist, healthy  -MB       Volitional Swallow  --  WFL  -MB       Volitional Cough  --  WFL  -MB          Oral Musculature and Cranial Nerve Assessment    Oral Motor General Assessment  --  WFL  -MB          General Eating/Swallowing Observations    Eating/Swallowing Skills  --  fed by SLP;self-fed  -MB       Positioning During Eating  --  upright in bed;upright 90 degree  -MB       Utensils Used  --  spoon;straw  -MB        Consistencies Trialed  --  regular textures;pureed;thin liquids;nectar/syrup-thick liquids;honey-thick liquids  -MB          Clinical Swallow Eval    Oral Prep Phase  --  WFL  -MB       Oral Transit  --  WFL  -MB       Oral Residue  --  WFL  -MB       Pharyngeal Phase  --  no overt signs/symptoms of pharyngeal impairment  -MB       Esophageal Phase  --  unremarkable  -MB       Clinical Swallow Evaluation Summary  --  Full range of consistencies except mechanical soft solids were presented. No overt s/s of aspiration were observed. Pt had adequate mastication and oral clearance of the regular solid consistency. Pt did have aspiration documented during a recent barium swallow.   -MB          MBS/VFSS    Utensils Used  spoon;straw  -MB  --       Consistencies Trialed  regular textures;soft textures;thin liquids;nectar/syrup-thick liquids;honey-thick liquids;pudding thick  -MB  --          MBS/VFSS Interpretation    Oral Prep Phase  WFL  -MB  --       Oral Transit Phase  WFL  -MB  --       Oral Residue  WFL  -MB  --       VFSS Summary  Full range of consistencies presented. Pt had premature loss to the vallecula with honey, pudding, and mechanical soft secondary to a delay in swallow initiation. She had decreased hyolaryngeal elevation/excursion, epiglottic inversion, and base of tongue retraction. She exhibited flash penetration 3x with thin barium, with one of those instances occurring while using a chin tuck strategy. She also had premature loss to the pyriforms secondary to a delay when using the chin tuck strategy with thin liquids. No definite aspiration was observed. No other laryngeal penetration was observed. She typically had at least mild pharyngeal residue with most consistencies secondary to weakness with mild to moderate residue in the vallecula with the mechanical soft consistency.   -MB  --          Initiation of Pharyngeal Swallow    Initiation of Pharyngeal Swallow  bolus in valleculae;bolus in  pyriform sinuses  -MB  --       Pharyngeal Phase  impaired pharyngeal phase of swallowing  -MB  --       Penetration During the Swallow  thin liquids  -MB  --       Pharyngeal Residue  all consistencies tested  -MB  --       Attempted Compensatory Maneuvers  chin tuck  -MB  --       Response to Attempted Compensatory Maneuvers  did not prevent penetration;did not reduce residue  -MB  --          Clinical Impression    SLP Swallowing Diagnosis  functional oral phase;mild;pharyngeal dysfunction  -MB  functional oral phase;other (see comments) r/o pharynngeal dysphagia  -MB       Functional Impact  risk of aspiration/pneumonia  -MB  risk of aspiration/pneumonia  -MB       Rehab Potential/Prognosis, Swallowing  good, to achieve stated therapy goals  -MB  --       Swallow Criteria for Skilled Therapeutic Interventions Met  demonstrates skilled criteria  -MB  --          Recommendations    Therapy Frequency (Swallow)  3 days per week  -MB  --       Predicted Duration Therapy Intervention (Days)  until discharge  -MB  --       SLP Diet Recommendation  regular textures;thin liquids  -MB  regular textures;nectar thick liquids  -MB       Recommended Diagnostics  --  VFSS (MBS)  -MB       Recommended Precautions and Strategies  upright posture during/after eating;small bites of food and sips of liquid;alternate between small bites of food and sips of liquid  -MB  upright posture during/after eating;small bites of food and sips of liquid;alternate between small bites of food and sips of liquid  -MB       SLP Rec. for Method of Medication Administration  meds whole;with pudding or applesauce  -MB  meds whole;with thick liquids;with pudding or applesauce  -MB       Monitor for Signs of Aspiration  yes;cough;gurgly voice;throat clearing  -MB  yes;cough;gurgly voice;throat clearing;pneumonia  -MB       Anticipated Dischage Disposition  unknown  -MB  unknown  -MB          Swallow Goals (SLP)    Oral Nutrition/Hydration Goal  Selection (SLP)  oral nutrition/hydration, SLP goal 1  -MB  --       Pharyngeal Strengthening Exercise Goal Selection (SLP)  pharyngeal strengthening exercise, SLP goal 1  -MB  --       Additional Documentation  pharyngeal strengthening exercise goal selection (SLP)  -MB  --          Oral Nutrition/Hydration Goal 1 (SLP)    Oral Nutrition/Hydration Goal 1, SLP  Pt will tolerate least restrictive diet with no overt s/s of aspiration.   -MB  --       Time Frame (Oral Nutrition/Hydration Goal 1, SLP)  by discharge  -MB  --       Barriers (Oral Nutrition/Hydration Goal 1, SLP)  n/a  -MB  --       Progress/Outcomes (Oral Nutrition/Hydration Goal 1, SLP)  goal ongoing  -MB  --          Pharyngeal Strengthening Exercise Goal 1 (SLP)    Activity (Pharyngeal Strengthening Goal 1, SLP)  increase timing;increase superior movement of the hyolaryngeal complex;increase anterior movement of the hyolaryngeal complex;increase squeeze/positive pressure generation  -MB  --       Increase Timing  gustatory stimulation (sour/cold)  -MB  --       Increase Superior Movement of the Hyolaryngeal Complex  Mendelsohn;falsetto  -MB  --       Increase Anterior Movement of the Hyolaryngeal Complex  shaker  -MB  --       Increase Squeeze/Positive Pressure Generation  hard effortful swallow;shaker  -MB  --       [place]/Accuracy (Pharyngeal Strengthening Goal 1, SLP)  independently (over 90% accuracy)  -MB  --       Time Frame (Pharyngeal Strengthening Goal 1, SLP)  short term goal (STG);by discharge  -MB  --       Barriers (Pharyngeal Strengthening Goal 1, SLP)  n/a  -MB  --       Progress/Outcomes (Pharyngeal Strengthening Goal 1, SLP)  goal ongoing  -MB  --         User Key  (r) = Recorded By, (t) = Taken By, (c) = Cosigned By    Initials Name Effective Dates    Sarah Lopez, CCC-SLP 08/02/16 -           EDUCATION  The patient has been educated in the following areas:   Dysphagia (Swallowing Impairment).    SLP Recommendation and  Plan  SLP Swallowing Diagnosis: functional oral phase, mild, pharyngeal dysfunction  SLP Diet Recommendation: regular textures, thin liquids  Recommended Precautions and Strategies: upright posture during/after eating, small bites of food and sips of liquid, alternate between small bites of food and sips of liquid  SLP Rec. for Method of Medication Administration: meds whole, with pudding or applesauce     Monitor for Signs of Aspiration: yes, cough, gurgly voice, throat clearing  Recommended Diagnostics: VFSS (MBS)  Swallow Criteria for Skilled Therapeutic Interventions Met: demonstrates skilled criteria  Anticipated Dischage Disposition: unknown  Rehab Potential/Prognosis, Swallowing: good, to achieve stated therapy goals  Therapy Frequency (Swallow): 3 days per week  Predicted Duration Therapy Intervention (Days): until discharge       Plan of Care Reviewed With: patient, son  Outcome Summary: Modified barium swallow study completed. Full range of consistencies presented. Pt had premature loss to the vallecula with honey, pudding, and mechanical soft secondary to a delay in swallow initiation. She had decreased hyolaryngeal elevation/excursion, epiglottic inversion, and base of tongue retraction. She exhibited flash penetration 3x with thin barium, with one of those instances occurring while using a chin tuck strategy. She also had premature loss to the pyriforms secondary to a delay when using the chin tuck strategy with thin liquids. No definite aspiration was observed. No other laryngeal penetration was observed. She typically had at least mild pharyngeal residue with most consistencies secondary to weakness with mild to moderate residue in the vallecula with the mechanical soft consistency. Recommend regular solids and thin liquids. Meds whole with applesauce. General feeding and aspiration precautions. SLP will follow up to monitor diet tolerance and to complete swallow exercises with pt.     SLP GOALS      Row Name 09/04/19 1432             Oral Nutrition/Hydration Goal 1 (SLP)    Oral Nutrition/Hydration Goal 1, SLP  Pt will tolerate least restrictive diet with no overt s/s of aspiration.   -MB      Time Frame (Oral Nutrition/Hydration Goal 1, SLP)  by discharge  -MB      Barriers (Oral Nutrition/Hydration Goal 1, SLP)  n/a  -MB      Progress/Outcomes (Oral Nutrition/Hydration Goal 1, SLP)  goal ongoing  -MB         Pharyngeal Strengthening Exercise Goal 1 (SLP)    Activity (Pharyngeal Strengthening Goal 1, SLP)  increase timing;increase superior movement of the hyolaryngeal complex;increase anterior movement of the hyolaryngeal complex;increase squeeze/positive pressure generation  -MB      Increase Timing  gustatory stimulation (sour/cold)  -MB      Increase Superior Movement of the Hyolaryngeal Complex  Mendelsohn;falsetto  -MB      Increase Anterior Movement of the Hyolaryngeal Complex  shaker  -MB      Increase Squeeze/Positive Pressure Generation  hard effortful swallow;shaker  -MB      [place]/Accuracy (Pharyngeal Strengthening Goal 1, SLP)  independently (over 90% accuracy)  -MB      Time Frame (Pharyngeal Strengthening Goal 1, SLP)  short term goal (STG);by discharge  -MB      Barriers (Pharyngeal Strengthening Goal 1, SLP)  n/a  -MB      Progress/Outcomes (Pharyngeal Strengthening Goal 1, SLP)  goal ongoing  -MB        User Key  (r) = Recorded By, (t) = Taken By, (c) = Cosigned By    Initials Name Provider Type    Sarah Lopez CCC-SLP Speech and Language Pathologist             Time Calculation:   Time Calculation- SLP     Row Name 09/04/19 1637 09/04/19 1100          Time Calculation- SLP    SLP Start Time  1432  -MB  0923  -MB     SLP Stop Time  1540  -MB  1019  -MB     SLP Time Calculation (min)  68 min  -MB  56 min  -MB     SLP Received On  09/04/19  -MB  09/04/19  -MB     SLP Goal Re-Cert Due Date  09/14/19  -MB  --       User Key  (r) = Recorded By, (t) = Taken By, (c) = Cosigned By     Initials Name Provider Type    Sarah Lopez CCC-SLP Speech and Language Pathologist          Therapy Charges for Today     Code Description Service Date Service Provider Modifiers Qty    59401162677 HC ST EVAL ORAL PHARYNG SWALLOW 4 9/4/2019 Sarah Walker CCC-SLP GN 1    [id number]  ST MOTION FLUORO EVAL SWALLOW 5 9/4/2019 Sarah Walker CCC-SLP GN 1               MARLEY Parks  9/4/2019

## 2019-09-04 NOTE — PLAN OF CARE
Problem: Patient Care Overview  Goal: Plan of Care Review  Outcome: Ongoing (interventions implemented as appropriate)   09/04/19 105   Coping/Psychosocial   Plan of Care Reviewed With patient;son   OTHER   Outcome Summary Clinical bedside swallow evaluation completed. Full range of consistencies except mechanical soft solids were presented. No overt s/s of aspiration were observed. Pt had adequate mastication and oral clearance of the regular solid consistency. Pt did have aspiration documented during a recent barium swallow. Recommend starting a regular solid diet and nectar thick liquids until a modified barium swallow study can be completed in radiology to further assess the pt's pharyngeal swallow function. Meds whole with applesaue or thickened liquids. Ok for water between meals.

## 2019-09-04 NOTE — NURSING NOTE
Patient has azithromycin and zosyn ordered. Rocephin re timed once for 2100. Per Gregory in pharmacy give both when blood products has finished. Pharmacy will adjust times.

## 2019-09-05 VITALS
TEMPERATURE: 97.7 F | HEART RATE: 88 BPM | HEIGHT: 65 IN | WEIGHT: 96.31 LBS | DIASTOLIC BLOOD PRESSURE: 52 MMHG | SYSTOLIC BLOOD PRESSURE: 120 MMHG | RESPIRATION RATE: 16 BRPM | OXYGEN SATURATION: 95 % | BODY MASS INDEX: 16.05 KG/M2

## 2019-09-05 LAB
ABO + RH BLD: NORMAL
BH BB BLOOD EXPIRATION DATE: NORMAL
BH BB BLOOD TYPE BARCODE: 6200
BH BB DISPENSE STATUS: NORMAL
BH BB PRODUCT CODE: NORMAL
BH BB UNIT NUMBER: NORMAL
CROSSMATCH INTERPRETATION: NORMAL
UNIT  ABO: NORMAL
UNIT  RH: NORMAL

## 2019-09-05 PROCEDURE — 97116 GAIT TRAINING THERAPY: CPT

## 2019-09-05 PROCEDURE — 94799 UNLISTED PULMONARY SVC/PX: CPT

## 2019-09-05 PROCEDURE — 25010000002 PIPERACILLIN SOD-TAZOBACTAM PER 1 G: Performed by: NURSE PRACTITIONER

## 2019-09-05 PROCEDURE — 97535 SELF CARE MNGMENT TRAINING: CPT

## 2019-09-05 PROCEDURE — 94760 N-INVAS EAR/PLS OXIMETRY 1: CPT

## 2019-09-05 PROCEDURE — 97110 THERAPEUTIC EXERCISES: CPT

## 2019-09-05 RX ORDER — AMOXICILLIN AND CLAVULANATE POTASSIUM 875; 125 MG/1; MG/1
1 TABLET, FILM COATED ORAL 2 TIMES DAILY
Qty: 10 TABLET | Refills: 0 | Status: ON HOLD | OUTPATIENT
Start: 2019-09-05 | End: 2022-04-25

## 2019-09-05 RX ORDER — GUAIFENESIN 600 MG/1
1200 TABLET, EXTENDED RELEASE ORAL EVERY 12 HOURS SCHEDULED
Qty: 20 TABLET | Refills: 0 | Status: SHIPPED | OUTPATIENT
Start: 2019-09-05

## 2019-09-05 RX ORDER — ESOMEPRAZOLE MAGNESIUM 40 MG/1
40 CAPSULE, DELAYED RELEASE ORAL
Status: ON HOLD | COMMUNITY
End: 2022-04-24

## 2019-09-05 RX ORDER — ALBUTEROL SULFATE 90 UG/1
2 AEROSOL, METERED RESPIRATORY (INHALATION) EVERY 4 HOURS PRN
COMMUNITY

## 2019-09-05 RX ORDER — METOPROLOL SUCCINATE 25 MG/1
25 TABLET, EXTENDED RELEASE ORAL DAILY
Status: ON HOLD | COMMUNITY
End: 2022-04-25

## 2019-09-05 RX ADMIN — IPRATROPIUM BROMIDE AND ALBUTEROL SULFATE 3 ML: 2.5; .5 SOLUTION RESPIRATORY (INHALATION) at 10:39

## 2019-09-05 RX ADMIN — SENNOSIDES AND DOCUSATE SODIUM 1 TABLET: 8.6; 5 TABLET ORAL at 08:26

## 2019-09-05 RX ADMIN — GUAIFENESIN 1200 MG: 600 TABLET, EXTENDED RELEASE ORAL at 08:26

## 2019-09-05 RX ADMIN — SODIUM CHLORIDE, PRESERVATIVE FREE 10 ML: 5 INJECTION INTRAVENOUS at 08:26

## 2019-09-05 RX ADMIN — METOPROLOL SUCCINATE 25 MG: 25 TABLET, FILM COATED, EXTENDED RELEASE ORAL at 08:27

## 2019-09-05 RX ADMIN — TAZOBACTAM SODIUM AND PIPERACILLIN SODIUM 3.38 G: 375; 3 INJECTION, SOLUTION INTRAVENOUS at 06:48

## 2019-09-05 RX ADMIN — IPRATROPIUM BROMIDE AND ALBUTEROL SULFATE 3 ML: 2.5; .5 SOLUTION RESPIRATORY (INHALATION) at 06:34

## 2019-09-05 RX ADMIN — PANTOPRAZOLE SODIUM 40 MG: 40 TABLET, DELAYED RELEASE ORAL at 06:48

## 2019-09-05 NOTE — THERAPY DISCHARGE NOTE
Acute Care - Physical Therapy Discharge Summary  [facility]       Patient Name: Milton Mae  : 1926  MRN: [id number]    Today's Date: 2019  Onset of Illness/Injury or Date of Surgery: 19    Date of Referral to PT: 19  Referring Physician: Dr. Chew      Admit Date: 9/3/2019      PT Recommendation and Plan    Visit Dx:    ICD-10-CM ICD-9-CM   1. Dysphagia, unspecified type R13.10 787.20   2. Impaired mobility Z74.09 799.89   3. Impaired mobility and ADLs Z74.09 799.89   4. Pneumonia of both lungs due to infectious organism, unspecified part of lung J18.9 483.8   5. Panlobular emphysema (CMS/[facility]) J43.1 492.8       Outcome Measures     Row Name 19 1107 19 1100          How much help from another is currently needed...    Putting on and taking off regular lower body clothing?  3  -MM  3  -MW     Bathing (including washing, rinsing, and drying)  3  -MM  3  -MW     Toileting (which includes using toilet bed pan or urinal)  3  -MM  3  -MW     Putting on and taking off regular upper body clothing  4  -MM  3  -MW     Taking care of personal grooming (such as brushing teeth)  4  -MM  4  -MW     Eating meals  4  -MM  4  -MW     AM-PAC 6 Clicks Score (OT)  21  -MM  20  -MW        Functional Assessment    Outcome Measure Options  --  AM-PAC 6 Clicks Daily Activity (OT)  -MW       User Key  (r) = Recorded By, (t) = Taken By, (c) = Cosigned By    Initials Name Provider Type    Madyson Mc, OTR/L Occupational Therapist    MM Ana Nogueira COTA/L Occupational Therapy Assistant          PT Charges     Row Name 19 1019             Time Calculation    Start Time  928  -AE      Stop Time  958  -AE      Time Calculation (min)  30 min  -AE      PT Received On  19  -AE      PT Goal Re-Cert Due Date  19  -AE         Time Calculation- PT    Total Timed Code Minutes- PT  30 minute(s)  -AE         Timed Charges    88317 - Gait Training Minutes   30  -AE        User Key   (r) = Recorded By, (t) = Taken By, (c) = Cosigned By    Initials Name Provider Type    Clare Miramontes, PTA Physical Therapy Assistant          Rehab Goal Summary     Row Name 09/05/19 1535 09/05/19 1107          Physical Therapy Goals    Bed Mobility Goal Selection (PT)  bed mobility, PT goal 1  -AB  --     Transfer Goal Selection (PT)  transfer, PT goal 1  -AB  --     Gait Training Goal Selection (PT)  gait training, PT goal 1  -AB  --        Bed Mobility Goal 1 (PT)    Activity/Assistive Device (Bed Mobility Goal 1, PT)  bed mobility activities, all  -AB  --     [place] Level/Cues Needed (Bed Mobility Goal 1, PT)  independent  -AB  --     Time Frame (Bed Mobility Goal 1, PT)  by discharge  -AB  --     Progress/Outcomes (Bed Mobility Goal 1, PT)  goal not met  -AB  --        Transfer Goal 1 (PT)    Activity/Assistive Device (Transfer Goal 1, PT)  sit-to-stand/stand-to-sit;bed-to-chair/chair-to-bed;walker, rolling  -AB  --     [place] Level/Cues Needed (Transfer Goal 1, PT)  conditional independence;supervision required  -AB  --     Time Frame (Transfer Goal 1, PT)  by discharge  -AB  --     Progress/Outcome (Transfer Goal 1, PT)  goal not met  -AB  --        Gait Training Goal 1 (PT)    Activity/Assistive Device (Gait Training Goal 1, PT)  gait (walking locomotion);assistive device use;walker, rolling;increase endurance/gait distance  -AB  --     [place] Level (Gait Training Goal 1, PT)  contact guard assist  -AB  --     Distance (Gait Goal 1, PT)  100  -AB  --     Time Frame (Gait Training Goal 1, PT)  by discharge  -AB  --     Progress/Outcome (Gait Training Goal 1, PT)  goal not met  -AB  --        Transfer Goal 1 (OT)    Activity/Assistive Device (Transfer Goal 1, OT)  --  sit-to-stand/stand-to-sit;bed-to-chair/chair-to-bed;toilet;tub  -MM     [place] Level/Cues Needed (Transfer Goal 1, OT)  --  standby assist  -MM     Time Frame (Transfer Goal 1, OT)  --  long term goal (LTG);by  discharge  -MM     Progress/Outcome (Transfer Goal 1, OT)  --  goal met  -MM        Bathing Goal 1 (OT)    Activity/Assistive Device (Bathing Goal 1, OT)  --  bathing skills, all;shower chair  -MM     [place] Level/Cues Needed (Bathing Goal 1, OT)  --  standby assist  -MM     Time Frame (Bathing Goal 1, OT)  --  long term goal (LTG);by discharge  -MM     Progress/Outcomes (Bathing Goal 1, OT)  --  goal met  -MM        Toileting Goal 1 (OT)    Activity/Device (Toileting Goal 1, OT)  --  toileting skills, all;commode  -MM     [place] Level/Cues Needed (Toileting Goal 1, OT)  --  contact guard assist  -MM     Time Frame (Toileting Goal 1, OT)  --  long term goal (LTG);by discharge  -MM     Progress/Outcome (Toileting Goal 1, OT)  --  goal met Anticipate based on bathing task  -MM       User Key  (r) = Recorded By, (t) = Taken By, (c) = Cosigned By    Initials Name Provider Type Discipline    AB Gayle Piper, PTA Physical Therapy Assistant PT    MM Ana Nogueira COTA/L Occupational Therapy Assistant OT              PT Discharge Summary  Anticipated Discharge Disposition (PT): home  Reason for Discharge: Discharge from facility  Outcomes Achieved: Refer to plan of care for updates on goals achieved  Discharge Destination: Home      Gayle Piper PTA   9/5/2019

## 2019-09-05 NOTE — PROGRESS NOTES
Continued Stay Note   [facility]     Patient Name: Milton Mae  MRN: [id number]  Today's Date: 9/5/2019    Admit Date: 9/3/2019    Discharge Plan     Row Name 09/05/19 5265       Plan    Plan  Home    Patient/Family in Agreement with Plan  yes    Final Discharge Disposition Code  01 - home or self-care    Final Note  Pt is being discharged home today. Order for Home o2 at @2LPM, per pt and Shonda at [facility] he already has this available.  Pt also has order for rollator walker, informed Shonda at [facility] that will get this to pt. [phone]        Discharge Codes    No documentation.       Expected Discharge Date and Time     Expected Discharge Date Expected Discharge Time    Sep 5, 2019             PIPO Martel

## 2019-09-05 NOTE — DISCHARGE PLACEMENT REQUEST
"Radha Mcclure [phone]  Milton Mae (89 y.o. Female)     Date of Birth Social Security Number Address Home Phone MRN    01/25/1926  [address] [facility] [phone] [id number]    Faith Marital Status          Muslim        Admission Date Admission Type Admitting Provider Attending Provider Department, Room/Bed    9/3/19 Emergency Alexsander Chew DO Robinson, Maurice S,  [address], 493/1    Discharge Date Discharge Disposition Discharge Destination         Home or Self Care              Attending Provider:  Alexsander Chew DO    Allergies:  No Known Allergies    Isolation:  None   Infection:  None   Code Status:  No CPR    Ht:  165.1 cm (65\")   Wt:  43.7 kg (96 lb 5 oz)    Admission Cmt:  None   Principal Problem:  Pneumonia of both upper lobes [J18.9]                 Active Insurance as of 9/3/2019     Primary Coverage     Payor Plan Insurance Group Employer/Plan Group    MEDICARE MEDICARE A & B      Payor Plan Address Payor Plan Phone Number Payor Plan Fax Number Effective Dates    [address] [phone]  1/1/1991 - None Entered    formerly [facility] [address]       Subscriber Name Subscriber Birth Date Member ID       MILTON MAE 1/25/1926 [id number]           Secondary Coverage     Payor Plan Insurance Group Employer/Plan Group    AARP MED SUPP AARP HEALTH CARE OPTIONS      Payor Plan Address Payor Plan Phone Number Payor Plan Fax Number Effective Dates    [facility] [phone]  1/1/2018 - None Entered    [address]       [facility] [id number]       Subscriber Name Subscriber Birth Date Member ID       MILTON MAE 1/25/1926 [id number]                 Emergency Contacts      (Rel.) Home Phone Work Phone Mobile Phone    Nicholas Haddad (Son) [phone] -- [phone]        [address] [address]  Dept. Phone:  [phone]  Dept. Fax:   Date Ordered: Sep 5, 2019 " "        Patient:  Milton Mae MRN:  [id number]   [address] [place] [address] :  1926  SSN:    Phone: [phone] Sex:  F     Weight: 43.7 kg (96 lb 5 oz)         Ht Readings from Last 1 Encounters:   19 165.1 cm (65\")         Miscellaneous DME   (Order ID: [id number])    Diagnosis:  Impaired mobility (Z74.09 [ICD-10-CM] 799.89 [ICD-9-CM])  Panlobular emphysema (CMS/HCC) (J43.1 [ICD-10-CM] 492.8 [ICD-9-CM])   Quantity:  1     Type of DME: rollator  Length of Need (99 Months = Lifetime): 99 Months = Lifetime        Authorizing Provider's Phone: [phone]   Authorizing Provider:Alexsander Chew DO  Authorizing Provider's NPI: [id number]  Order Entered By: Alexsander Chew DO 2019 11:59 AM     Electronically signed by: Alexsander Chew DO 2019 11:59 AM        [address] [address]  Dept. Phone:  [phone]  Dept. Fax:   Date Ordered: Sep 5, 2019         Patient:  Milton Mae MRN:  [id number]   [address] [place] [address] :  1926  SSN:    Phone: [phone] Sex:  F     Weight: 43.7 kg (96 lb 5 oz)         Ht Readings from Last 1 Encounters:   19 165.1 cm (65\")         Home Oxygen Therapy          (Order ID: [id number])    Diagnosis:  Panlobular emphysema (CMS/HCC) (J43.1 [ICD-10-CM] 492.8 [ICD-9-CM])   Quantity:  1     Delivery Modality: Nasal Cannula  Liters Per Minute: 2  Duration: Continuous  Equipment:  Oxygen Concentrator &  &  Portable Gaseous Oxygen System & Portable Oxygen Contents Gaseous &  Conserving Regulator  Length of Need (99 Months = Lifetime): 99 Months = Lifetime        Authorizing Provider's Phone: [phone]   Authorizing Provider:Alexsander Chew DO  Authorizing Provider's NPI: [id number]  Order Entered By: Alexsander Chew DO 2019 11:59 AM     Electronically signed by: Alexsander Chew DO 2019 11:59 AM    "            History & Physical      Florina Pereira APRN at 9/3/2019  6:17 PM     Attestation signed by Alexsander Chew DO at 9/4/2019  7:27 AM    I personally evaluated and examined the patient in conjunction with LUIS MIGUEL Andrade and agree with the assessment, treatment plan, and disposition of the patient as recorded by her. My history, exam, and further recommendations are:     Plan:   1.  Admit as inpatient  2.  Azithromycin and Zosyn  3.  N.p.o. until seen and evaluated by speech therapy  4.  Consider GI consult-may need PEG placement for tube feedings  5.  Home medications reviewed and restarted as appropriate   6.  Supplemental O2, duo nebs, incentive spirometry, Mucinex  7.  Gentle hydration with normal saline 75 mL/hour  8.  RT to initiate breathing treatment protocol  9.  Additional labs Legionella and strep pneumo urine studies and respiratory culture  9.  Labs in a.m.   10.  DVT prophylaxis with SCDs  11.  OT, PT, and speech therapy in a.m.  12.  Consult nutrition-evaluate malnutrition    Alexsander Chew DO  09/04/19  7:26 AM                        [facility]  HISTORY AND PHYSICAL    Date of Admission: 9/3/2019  Primary Care Physician: Javier Ng MD    Subjective     Chief Complaint: Cough, shortness of breathing, aspiration, chronic nausea    History of Present Illness  Milton Mae is a 89-year-old female with a past medical history of COPD, gastroesophageal reflux disease, hypertension and pneumonia.  Patient recently admitted 7/18- 7-22, 2019 with pneumonia-sepsis.  Since that time patient has been seen by Adeel Carranza NP with GI, for Gram completed 8/29/2019 that revealed severe aspiration.  Patient has been chewing her food well and swallowing with sips of water however she continues to cough have shortness of breathing and nausea.  She states she has had pneumonia before and told her son she felt she had it again therefore she  was brought to [facility] urgency department for evaluation.  Lateral patchy upper lobe infiltrates noted with concerns for aspiration pneumonia.  He also noted to have bronchiectasis with mucous plugging.  White count 13.75, normocytic anemia, GFR 47.  She is extremely weak.  She weighs 43.7 kg.  Discussion regarding risk of ongoing oral intake to include worsening pneumonia and death.  She is agreeable at this time to awaiting speech therapy recommendation.  Family agitated at this provider for allowing patient to make her own decision.  Unhappy with my presentation.  She denies chest pain, abdominal pain however she continues to have shortness of breathing that has improved with oxygen use and nausea.  She reports chronic constipation.  Patient is admitted for further evaluation and treatment.    Review of Systems   10 point review of systems was completed, all negative except for those discussed in HPI    Past Medical History:   Past Medical History:   Diagnosis Date   • COPD (chronic obstructive pulmonary disease) (CMS/HCC)    • GERD (gastroesophageal reflux disease)    • Hypertension    • Pneumonia        Past Surgical History:   Past Surgical History:   Procedure Laterality Date   • CATARACT EXTRACTION, BILATERAL     • THYROID SURGERY         Family History: family history includes Alzheimer's disease in her mother; Colon polyps in her child; Heart disease in her father.    Social History:  reports that she has never smoked. She has never used smokeless tobacco. She reports that she does not drink alcohol or use drugs.    Code Status: Limited, her son Nicholas Haddad is POA      Allergies:  No Known Allergies    Medications:  Prior to Admission medications    Medication Sig Start Date End Date Taking? Authorizing Provider   hyoscyamine (LEVSIN) 0.125 MG/ML solution Take  by mouth Every 4 (Four) Hours As Needed for bladder spasms.   Yes Provider, MD Cristel   lisinopril-hydrochlorothiazide  "(PRINZIDE,ZESTORETIC) 10-12.5 MG per tablet Take 1 tablet by mouth Daily.   Yes Cristel Black MD   metoprolol succinate XL (TOPROL-XL) 25 MG 24 hr tablet Take 25 mg by mouth Daily.   Yes Cristel Black MD   Multiple Vitamins-Minerals (PRESERVISION AREDS PO) Take 2 tablets by mouth 2 (Two) Times a Day. Twice AM and Twice PM.    Yes Cristel Black MD   Multiple Vitamins-Minerals (WOMENS ONE DAILY PO) Take 1 tablet by mouth Daily.   Yes Cristel Black MD   nitrofurantoin (MACRODANTIN) 100 MG capsule Take 100 mg by mouth 2 (Two) Times a Day.   Yes Cristel Black MD   ondansetron ODT (ZOFRAN-ODT) 4 MG disintegrating tablet Take 1 tablet by mouth Every 6 (Six) Hours As Needed for Nausea or Vomiting. 7/22/18  Yes Hans Wise MD   Probiotic Product (PROBIOTIC-10 PO) Take  by mouth.   Yes Cristel Black MD   traZODone (DESYREL) 50 MG tablet Take 50 mg by mouth Every Night.   Yes Cristel Black MD   Calcium-Magnesium-Vitamin D (CALCIUM 1200+D3 PO) Take 1 tablet by mouth Daily.    Cristel Black MD   Cyanocobalamin (B-12) 1000 MCG capsule Take  by mouth.    Cristel Black MD   esomeprazole (nexIUM) 40 MG capsule Take 40 mg by mouth Every Morning Before Breakfast.    Cristel Black MD       Objective     /97 (BP Location: Left arm, Patient Position: Lying)   Pulse 81   Temp 99.5 °F (37.5 °C) (Oral)   Resp 18   Ht 165.1 cm (65\")   Wt 43.7 kg (96 lb 5 oz)   SpO2 98%   BMI 16.03 kg/m²    Physical Exam   Constitutional: She is oriented to person, place, and time. She appears well-developed.   Frail, cachectic   HENT:   Head: Normocephalic and atraumatic.   Eyes: EOM are normal. Pupils are equal, round, and reactive to light.   Neck: Normal range of motion. No JVD present.   Cardiovascular: Normal rate, regular rhythm and normal heart sounds.   No murmur heard.  Pulmonary/Chest: No respiratory distress.   Coarse rhonchi diffuse upper " lobes, increased respiratory effort without acute distress   Abdominal: Soft. Bowel sounds are normal. She exhibits no distension.   Musculoskeletal: Normal range of motion. She exhibits deformity (mild kyphosis). She exhibits no edema.   Generalized weakness   Neurological: She is oriented to person, place, and time.   Skin: Skin is dry. There is pallor.   Psychiatric: She has a normal mood and affect. Her behavior is normal.         Pertinent Data:   Lab Results (last 72 hours)     Procedure Component Value Units Date/Time    TSH [[id number]]  (Abnormal) Collected:  09/03/19 1444    Specimen:  Blood Updated:  09/03/19 1649     TSH 0.452 uIU/mL     Urinalysis With Culture If Indicated - Urine, Catheter In/Out [[id number]]  (Abnormal) Collected:  09/03/19 1626    Specimen:  Urine, Catheter In/Out Updated:  09/03/19 1648     Color, UA Dark Yellow     Appearance, UA Clear     pH, UA 5.5     Specific Gravity, UA 1.020     Glucose, UA Negative     Ketones, UA 15 mg/dL (1+)     Bilirubin, UA Negative     Blood, UA Negative     Protein, UA Negative     Leuk Esterase, UA Trace     Nitrite, UA Negative     Urobilinogen, UA 1.0 E.U./dL    Urinalysis, Microscopic Only - Urine, Catheter In/Out [[id number]]  (Abnormal) Collected:  09/03/19 1626    Specimen:  Urine, Catheter In/Out Updated:  09/03/19 1648     RBC, UA 0-2 /HPF      WBC, UA 0-2 /HPF      Bacteria, UA None Seen /HPF      Squamous Epithelial Cells, UA 0-2 /HPF      Hyaline Casts, UA 3-6 /LPF      Methodology Automated Microscopy    CBC Auto Differential [[id number]]  (Abnormal) Collected:  09/03/19 1444    Specimen:  Blood Updated:  09/03/19 1642     WBC 13.75 10*3/mm3      RBC 3.04 10*6/mm3      Hemoglobin 9.3 g/dL      Hematocrit 29.4 %      MCV 96.7 fL      MCH 30.6 pg      MCHC 31.6 g/dL      RDW 14.0 %      RDW-SD 50.1 fl      MPV 11.4 fL      Platelets 309 10*3/mm3      Neutrophil % 78.9 %      Lymphocyte % 13.7 %      Monocyte % 6.7 %      Eosinophil % 0.1 %       Basophil % 0.2 %      Immature Grans % 0.4 %      Neutrophils, Absolute 10.85 10*3/mm3      Lymphocytes, Absolute 1.88 10*3/mm3      Monocytes, Absolute 0.92 10*3/mm3      Eosinophils, Absolute 0.01 10*3/mm3      Basophils, Absolute 0.03 10*3/mm3      Immature Grans, Absolute 0.06 10*3/mm3      nRBC 0.0 /100 WBC     Blood Culture - Blood, Arm, Left [[id number]] Collected:  09/03/19 1440    Specimen:  Blood from Arm, Left Updated:  09/03/19 1610    Lactic Acid, Plasma [[id number]]  (Normal) Collected:  09/03/19 1521    Specimen:  Blood Updated:  09/03/19 1546     Lactate 1.6 mmol/L     Blood Culture - Blood, Arm, Left [[id number]] Collected:  09/03/19 1521    Specimen:  Blood from Arm, Left Updated:  09/03/19 1532    Troponin [[id number]]  (Normal) Collected:  09/03/19 1444    Specimen:  Blood Updated:  09/03/19 1523     Troponin I <0.012 ng/mL     BNP [[id number]]  (Normal) Collected:  09/03/19 1444    Specimen:  Blood Updated:  09/03/19 1523     proBNP 421.0 pg/mL     Lipase [[id number]]  (Normal) Collected:  09/03/19 1444    Specimen:  Blood Updated:  09/03/19 1512     Lipase 84 U/L     Comprehensive Metabolic Panel [[id number]]  (Abnormal) Collected:  09/03/19 1444    Specimen:  Blood Updated:  09/03/19 1512     Glucose 133 mg/dL      BUN 36 mg/dL      Creatinine 1.08 mg/dL      Sodium 140 mmol/L      Potassium 4.3 mmol/L      Chloride 97 mmol/L      CO2 34.0 mmol/L      Calcium 9.8 mg/dL      Total Protein 7.9 g/dL      Albumin 4.10 g/dL      ALT (SGPT) 15 U/L      AST (SGOT) 31 U/L      Alkaline Phosphatase 53 U/L      Total Bilirubin 0.5 mg/dL      eGFR Non African Amer 47 mL/min/1.73      Globulin 3.8 gm/dL      A/G Ratio 1.1 g/dL      BUN/Creatinine Ratio 33.3     Anion Gap 9.0 mmol/L     Magnesium [[id number]]  (Abnormal) Collected:  09/03/19 1444    Specimen:  Blood Updated:  09/03/19 1512     Magnesium 2.3 mg/dL     CK [[id number]]  (Normal) Collected:  09/03/19 1444    Specimen:  Blood Updated:  09/03/19 1512      Creatine Kinase <20 U/L         Imaging Results (last 24 hours)     Procedure Component Value Units Date/Time    CT Abdomen Pelvis With Contrast [[id number]] Collected:  09/03/19 1606     Updated:  09/03/19 1614    Narrative:       EXAMINATION:   CT ABDOMEN PELVIS W CONTRAST-  9/3/2019 4:06 PM CDT     HISTORY: CT ABDOMEN AND PELVIS WITH CONTRAST 9/3/2019 3:44 PM CDT     HISTORY: Epigastric pain     COMPARISON: 08/20/2019.      DLP: 308 mGy cm     TECHNIQUE: Following the intravenous administration of contrast, helical  CT tomographic images of the abdomen and pelvis were acquired. Coronal  reformatted images were also provided for review.      FINDINGS:   The lung bases and base of the heart are unremarkable.      LIVER: No focal liver lesion. The hepatic vasculature is patent.      BILIARY SYSTEM: The gallbladder is visualized. There are multiple  calculi demonstrated in the gallbladder..      PANCREAS: No focal pancreatic lesion.      SPLEEN: Unremarkable.      KIDNEYS AND ADRENALS: The adrenal glands are visualized. The renal  contours demonstrate uniform and symmetric excretion of contrast. There  are 2 left renal cyst. One is 4 cm the other is 1.8 cm.. The ureters are  decompressed and normal in appearance.     RETROPERITONEUM: No mass, lymphadenopathy or hemorrhage.      GI TRACT: No evidence of obstruction or bowel wall thickening. Extensive  diverticulosis is noted in the sigmoid colon.     OTHER: There is no mesenteric mass, lymphadenopathy or fluid collection.  The abdominopelvic vasculature is patent. The osseous structures and  soft tissues demonstrate no worrisome lesions.     PELVIS: No mass lesion, fluid collection or significant lymphadenopathy  is seen in the pelvis. The urinary bladder is normal in appearance.       Impression:       1. Cholelithiasis.  2. Diverticulosis  3. Left renal cyst.  4. This exam is unchanged from 08/20/2019.         This report was finalized on 09/03/2019 16:10 by   Mike Marte MD.    CT Angiogram Chest With Contrast [[id number]] Collected:  09/03/19 1600     Updated:  09/03/19 1609    Narrative:       CT ANGIOGRAM CHEST W CONTRAST- 9/3/2019 3:44 PM CDT      HISTORY: soa, cough, cp, tachy, hemoptysis      COMPARISON: None.      DOSE LENGTH PRODUCT: 212 mGy cm. Automated exposure control was also  utilized to decrease patient radiation dose.     TECHNIQUE: Helical tomographic images of the chest were obtained after  the administration of intravenous contrast following angiogram protocol.  Additionally, 3D and multiplanar reformatted images were provided.        FINDINGS:    Pulmonary arteries: There is adequate enhancement of the pulmonary  arteries to evaluate for central and segmental pulmonary emboli. There  are no filling defects within the main, lobar, segmental or visualized  subsegmental pulmonary arteries. The pulmonary arteries are within  normal limits for size.      Aorta and great vessels: The aorta is well opacified and demonstrates no  dissection or aneurysm. The great vessels are normal in appearance.     Visualized neck base: The imaged portion of the base of the neck appears  unremarkable.      Lungs: Patchy infiltrates are noted in the upper lungs. Most likely is  an inflammatory process.. Chronic bronchiectasis is present. There is  some mucus plugging in distal airways..      Heart: The heart is normal in size. There is no pericardial effusion.      Mediastinum and lymph nodes: No enlarged mediastinal, hilar, or axillary  lymph nodes are present.      Skeletal and soft tissues: The osseous structures of the thorax and  surrounding soft tissues demonstrate no acute process.     Upper abdomen: A prominent cyst is noted upper pole of the left kidney  calculi are present in the gallbladder..        Impression:       1. No evidence of embolic disease.  2. Patchy infiltrates in the upper lobes most likely representing  pneumonia.  3. Bronchiectasis. Bronchial  wall thickening in the upper lung. Some  mucus plugging is noted  4. Cholelithiasis.        This report was finalized on 09/03/2019 16:06 by Dr. Mike Marte MD.    XR Chest 1 View [[id number]] Collected:  09/03/19 1558     Updated:  09/03/19 1604    Narrative:       EXAMINATION: XR CHEST 1 VW-. 9/3/2019 3:58 PM CDT     CHEST, ONE VIEW:     HISTORY: Shortness of air     COMPARISON: 07/18/2018 chest radiograph     A single frontal chest radiograph was obtained.     FINDINGS:     There are reticulonodular interstitial and patchy airspace opacities  identified in the upper lobes, suprahilar regions bilaterally more  conspicuous compared to previous studies progression of chronic lung  changes considered as well as superimposed acute infiltrates.     The heart is normal in size without heart failure.     The bony structures are intact.                                     Impression:       1. Perihilar and upper lobe interstitial and patchy airspace opacities  more conspicuous compared to the 2018 examination. Chronic changes with  superimposed acute infectious/inflammatory processes not excluded.     This report was finalized on 09/03/2019 16:00 by Dr. Flako Rojas MD.        Esophagram 8/29/2019  IMPRESSION:  Aspiration with cough reflex. Exam was therefore terminated prematurely.    I have personally reviewed and interpreted the radiology studies and ECG obtained at time of admission.     Assessment / Plan     Assessment:   Active Hospital Problems    Diagnosis   • **Pneumonia of both upper lobes   • COPD (chronic obstructive pulmonary disease) (CMS/HCC)   • Hypertension   • GERD (gastroesophageal reflux disease)   • Bronchiectasis (CMS/HCC)   • Dysphasia   • Aspiration pneumonia of both upper lobes due to vomit (CMS/HCC)       Plan:   1.  Admit as inpatient  2.  Azithromycin and Zosyn  3.  N.p.o. until seen and evaluated by speech therapy  4.  Consider GI consult-may need PEG placement for tube feedings  5.  Home  medications reviewed and restarted as appropriate   6.  Supplemental O2, duo nebs, incentive spirometry, Mucinex  7.  Gentle hydration with normal saline 75 mL/hour  8.  RT to initiate breathing treatment protocol  9.  Additional labs Legionella and strep pneumo urine studies and respiratory culture  9.  Labs in a.m.   10.  DVT prophylaxis with SCDs  11.  OT, PT, and speech therapy in a.m.  12.  Consult nutrition-evaluate malnutrition    I discussed the patient's findings and my recommendations with: Alexsander Chew DO  Time spent: 40 minutes      LUIS MIGUEL Gardner  09/03/19   6:58 PM    Electronically signed by Alexsander Chew DO at 9/4/2019  7:27 AM          Discharge Summary      Alexsander Chew DO at 9/5/2019 11:59 AM              [facility] Medicine Services  DISCHARGE SUMMARY       Date of Admission: 9/3/2019  Date of Discharge:  9/5/2019  Primary Care Physician: Javier Ng MD    Discharge Diagnoses:  Patient Active Problem List   Diagnosis   • COPD (chronic obstructive pulmonary disease) (CMS/HCC)   • Hypertension   • GERD (gastroesophageal reflux disease)   • Pneumonia of both upper lobes   • Bronchiectasis (CMS/HCC)   • Anemia   • Oropharyngeal dysphagia         Presenting Problem/History of Present Illness:  Aspiration pneumonia (CMS/HCC) [J69.0]     Chief Complaint on Day of Discharge:   No complaint    History of Present Illness on Day of Discharge:   Patient's cough and shortness of breath is significantly improved.  There is no clinical evidence of pneumonia.  Patient's O2 sat dropped to 88% with exertion today.  She qualifies for home oxygen and that will be ordered.  She has requested a Rollator for home use.  Patient's hemoglobin is significantly improved after blood administration.  Swallowing study performed yesterday showed no evidence of aspiration.  Sputum culture shows only a light growth of Candida albicans and gram-negative bacilli  likely colonizers.  Cultures show no growth.  Legionella and strep urinary antigens were negative.  Patient is stable for discharge home on oral antibiotics for additional 5 days.    Hospital Course  Milton Mae is a 89-year-old female with a past medical history of COPD, gastroesophageal reflux disease, hypertension and pneumonia.  Patient recently admitted 7/18- 7-22, 2019 with pneumonia-sepsis.  Since that time patient has been seen by Adeel Carranza, NP with GI, for Gram completed 8/29/2019 that revealed severe aspiration.  Patient has been chewing her food well and swallowing with sips of water however she continues to cough have shortness of breathing and nausea.  She states she has had pneumonia before and told her son she felt she had it again therefore she was brought to [facility] urgency department for evaluation.  Lateral patchy upper lobe infiltrates noted with concerns for aspiration pneumonia.  He also noted to have bronchiectasis with mucous plugging.  White count 13.75, normocytic anemia, GFR 47.  She is extremely weak.  She weighs 43.7 kg.  Discussion regarding risk of ongoing oral intake to include worsening pneumonia and death.  She is agreeable at this time to awaiting speech therapy recommendation.  Family agitated at this provider for allowing patient to make her own decision.  Unhappy with my presentation.  She denies chest pain, abdominal pain however she continues to have shortness of breathing that has improved with oxygen use and nausea.  She reports chronic constipation.  Patient is admitted for further evaluation and treatment.    Plan:   1.  Admit as inpatient  2.  Azithromycin and Zosyn  3.  N.p.o. until seen and evaluated by speech therapy  4.  Consider GI consult-may need PEG placement for tube feedings  5.  Home medications reviewed and restarted as appropriate   6.  Supplemental O2, duo nebs, incentive spirometry, Mucinex  7.  Gentle hydration with normal saline 75  mL/hour  8.  RT to initiate breathing treatment protocol  9.  Additional labs Legionella and strep pneumo urine studies and respiratory culture  9.  Labs in a.m.   10.  DVT prophylaxis with SCDs  11.  OT, PT, and speech therapy in a.m.  12.  Consult nutrition-evaluate malnutrition    After admission the patient's cough is already significantly improved and she was no longer short of breath.  Clear mucus was noted with cough.  Globin was noted to be significantly low at 7.4 and the patient was typed, crossed and transfused 1 unit of packed red blood cells.  Hemoglobin increased to 9.4 thereafter.  The patient remained afebrile throughout her stay with no clinical evidence of pneumonia.  Swallowing study performed by speech therapy showed no significant aspiration.     Patient's O2 sat dropped to 88% with exertion today.  She qualifies for home oxygen and that will be ordered.  She has requested a Rollator for home use.  Patient's hemoglobin is significantly improved after blood administration.  Swallowing study performed yesterday showed no evidence of aspiration.  Sputum culture shows only a light growth of Candida albicans and gram-negative bacilli likely colonizers.  Cultures show no growth.  Legionella and strep urinary antigens were negative.  Patient is stable for discharge home on oral antibiotics for additional 5 days.      Pertinent Test Results:   Imaging Results (last 7 days)     Procedure Component Value Units Date/Time    FL Video Swallow With Speech [[id number]] Collected:  09/04/19 1511     Updated:  09/04/19 1517    Narrative:       EXAMINATION:  FL VIDEO SWALLOW W SPEECH-  9/4/2019 2:32 PM CDT     HISTORY: dysphagia; R13.10-Dysphagia, unspecified; Z74.09-Other reduced  mobility; Z74.09-Other reduced mobility; J18.9-Pneumonia, unspecified  organism      COMPARISON: No comparison study.     TECHNIQUE:      Image number: 1.     Fluoroscopy time: 1.6 minutes     FINDINGS:      The oral and pharyngeal  phase of swallowing was evaluated with multiple  barium consistencies.     Mild premature loss with minimal vallecular pooling/stasis observed.     Minimal flash penetration with the thinnest barium consistency noted  without aspiration.       Impression:       1. Minimal flash penetration with the thinnest barium consistency  without aspiration.  This report was finalized on 09/04/2019 15:13 by Dr. Flako Rojas MD.    CT Abdomen Pelvis With Contrast [[id number]] Collected:  09/03/19 1606     Updated:  09/03/19 1614    Narrative:       EXAMINATION:   CT ABDOMEN PELVIS W CONTRAST-  9/3/2019 4:06 PM CDT     HISTORY: CT ABDOMEN AND PELVIS WITH CONTRAST 9/3/2019 3:44 PM CDT     HISTORY: Epigastric pain     COMPARISON: 08/20/2019.      DLP: 308 mGy cm     TECHNIQUE: Following the intravenous administration of contrast, helical  CT tomographic images of the abdomen and pelvis were acquired. Coronal  reformatted images were also provided for review.      FINDINGS:   The lung bases and base of the heart are unremarkable.      LIVER: No focal liver lesion. The hepatic vasculature is patent.      BILIARY SYSTEM: The gallbladder is visualized. There are multiple  calculi demonstrated in the gallbladder..      PANCREAS: No focal pancreatic lesion.      SPLEEN: Unremarkable.      KIDNEYS AND ADRENALS: The adrenal glands are visualized. The renal  contours demonstrate uniform and symmetric excretion of contrast. There  are 2 left renal cyst. One is 4 cm the other is 1.8 cm.. The ureters are  decompressed and normal in appearance.     RETROPERITONEUM: No mass, lymphadenopathy or hemorrhage.      GI TRACT: No evidence of obstruction or bowel wall thickening. Extensive  diverticulosis is noted in the sigmoid colon.     OTHER: There is no mesenteric mass, lymphadenopathy or fluid collection.  The abdominopelvic vasculature is patent. The osseous structures and  soft tissues demonstrate no worrisome lesions.     PELVIS: No mass  lesion, fluid collection or significant lymphadenopathy  is seen in the pelvis. The urinary bladder is normal in appearance.       Impression:       1. Cholelithiasis.  2. Diverticulosis  3. Left renal cyst.  4. This exam is unchanged from 08/20/2019.         This report was finalized on 09/03/2019 16:10 by Dr. Mike Marte MD.    CT Angiogram Chest With Contrast [[id number]] Collected:  09/03/19 1600     Updated:  09/03/19 1609    Narrative:       CT ANGIOGRAM CHEST W CONTRAST- 9/3/2019 3:44 PM CDT      HISTORY: soa, cough, cp, tachy, hemoptysis      COMPARISON: None.      DOSE LENGTH PRODUCT: 212 mGy cm. Automated exposure control was also  utilized to decrease patient radiation dose.     TECHNIQUE: Helical tomographic images of the chest were obtained after  the administration of intravenous contrast following angiogram protocol.  Additionally, 3D and multiplanar reformatted images were provided.        FINDINGS:    Pulmonary arteries: There is adequate enhancement of the pulmonary  arteries to evaluate for central and segmental pulmonary emboli. There  are no filling defects within the main, lobar, segmental or visualized  subsegmental pulmonary arteries. The pulmonary arteries are within  normal limits for size.      Aorta and great vessels: The aorta is well opacified and demonstrates no  dissection or aneurysm. The great vessels are normal in appearance.     Visualized neck base: The imaged portion of the base of the neck appears  unremarkable.      Lungs: Patchy infiltrates are noted in the upper lungs. Most likely is  an inflammatory process.. Chronic bronchiectasis is present. There is  some mucus plugging in distal airways..      Heart: The heart is normal in size. There is no pericardial effusion.      Mediastinum and lymph nodes: No enlarged mediastinal, hilar, or axillary  lymph nodes are present.      Skeletal and soft tissues: The osseous structures of the thorax and  surrounding soft tissues  demonstrate no acute process.     Upper abdomen: A prominent cyst is noted upper pole of the left kidney  calculi are present in the gallbladder..        Impression:       1. No evidence of embolic disease.  2. Patchy infiltrates in the upper lobes most likely representing  pneumonia.  3. Bronchiectasis. Bronchial wall thickening in the upper lung. Some  mucus plugging is noted  4. Cholelithiasis.        This report was finalized on 09/03/2019 16:06 by Dr. Mike Marte MD.    XR Chest 1 View [[id number]] Collected:  09/03/19 1558     Updated:  09/03/19 1604    Narrative:       EXAMINATION: XR CHEST 1 VW-. 9/3/2019 3:58 PM CDT     CHEST, ONE VIEW:     HISTORY: Shortness of air     COMPARISON: 07/18/2018 chest radiograph     A single frontal chest radiograph was obtained.     FINDINGS:     There are reticulonodular interstitial and patchy airspace opacities  identified in the upper lobes, suprahilar regions bilaterally more  conspicuous compared to previous studies progression of chronic lung  changes considered as well as superimposed acute infiltrates.     The heart is normal in size without heart failure.     The bony structures are intact.                                     Impression:       1. Perihilar and upper lobe interstitial and patchy airspace opacities  more conspicuous compared to the 2018 examination. Chronic changes with  superimposed acute infectious/inflammatory processes not excluded.     This report was finalized on 09/03/2019 16:00 by Dr. Flako Rojas MD.        Lab Results (last 7 days)     Procedure Component Value Units Date/Time    Respiratory Culture - Sputum, Cough [[id number]]  (Abnormal) Collected:  09/04/19 0803    Specimen:  Sputum from Cough Updated:  09/05/19 1046     Respiratory Culture Light growth (2+) Candida albicans      Light growth (2+) Gram Negative Bacilli      Light growth (2+) Normal Respiratory Luma    Hemoglobin & Hematocrit, Blood [[id number]]  (Abnormal) Collected:   09/04/19 1922    Specimen:  Blood Updated:  09/04/19 1954     Hemoglobin 9.4 g/dL      Hematocrit 28.4 %     Blood Culture - Blood, Arm, Left [[id number]] Collected:  09/03/19 1440    Specimen:  Blood from Arm, Left Updated:  09/04/19 1615     Blood Culture No growth at 24 hours    Blood Culture - Blood, Arm, Left [[id number]] Collected:  09/03/19 1521    Specimen:  Blood from Arm, Left Updated:  09/04/19 1545     Blood Culture No growth at 24 hours    Basic Metabolic Panel [[id number]]  (Abnormal) Collected:  09/04/19 0540    Specimen:  Blood Updated:  09/04/19 0636     Glucose 121 mg/dL      BUN 26 mg/dL      Creatinine 0.85 mg/dL      Sodium 141 mmol/L      Potassium 3.4 mmol/L      Chloride 104 mmol/L      CO2 32.0 mmol/L      Calcium 8.4 mg/dL      eGFR Non African Amer 62 mL/min/1.73      BUN/Creatinine Ratio 30.6     Anion Gap 5.0 mmol/L     Narrative:       GFR Normal >60  Chronic Kidney Disease <60  Kidney Failure <15    CBC Auto Differential [[id number]]  (Abnormal) Collected:  09/04/19 0540    Specimen:  Blood Updated:  09/04/19 0626     WBC 7.81 10*3/mm3      RBC 2.40 10*6/mm3      Hemoglobin 7.4 g/dL      Hematocrit 23.2 %      MCV 96.7 fL      MCH 30.8 pg      MCHC 31.9 g/dL      RDW 13.9 %      RDW-SD 49.5 fl      MPV 11.2 fL      Platelets 217 10*3/mm3      Neutrophil % 74.4 %      Lymphocyte % 15.9 %      Monocyte % 8.3 %      Eosinophil % 0.6 %      Basophil % 0.4 %      Immature Grans % 0.4 %      Neutrophils, Absolute 5.81 10*3/mm3      Lymphocytes, Absolute 1.24 10*3/mm3      Monocytes, Absolute 0.65 10*3/mm3      Eosinophils, Absolute 0.05 10*3/mm3      Basophils, Absolute 0.03 10*3/mm3      Immature Grans, Absolute 0.03 10*3/mm3      nRBC 0.0 /100 WBC     S. Pneumo Ag Urine or CSF - Urine, Urine, Catheter In/Out [[id number]]  (Normal) Collected:  09/03/19 1626    Specimen:  Urine, Catheter In/Out Updated:  09/03/19 2043     Strep Pneumo Ag Negative    Legionella Antigen, Urine - Urine, Urine,  Catheter In/Out [[id number]]  (Normal) Collected:  09/03/19 1626    Specimen:  Urine, Catheter In/Out Updated:  09/03/19 2042     LEGIONELLA ANTIGEN, URINE Negative    TSH [[id number]]  (Abnormal) Collected:  09/03/19 1444    Specimen:  Blood Updated:  09/03/19 1649     TSH 0.452 uIU/mL     Urinalysis With Culture If Indicated - Urine, Catheter In/Out [[id number]]  (Abnormal) Collected:  09/03/19 1626    Specimen:  Urine, Catheter In/Out Updated:  09/03/19 1648     Color, UA Dark Yellow     Appearance, UA Clear     pH, UA 5.5     Specific Gravity, UA 1.020     Glucose, UA Negative     Ketones, UA 15 mg/dL (1+)     Bilirubin, UA Negative     Blood, UA Negative     Protein, UA Negative     Leuk Esterase, UA Trace     Nitrite, UA Negative     Urobilinogen, UA 1.0 E.U./dL    Urinalysis, Microscopic Only - Urine, Catheter In/Out [[id number]]  (Abnormal) Collected:  09/03/19 1626    Specimen:  Urine, Catheter In/Out Updated:  09/03/19 1648     RBC, UA 0-2 /HPF      WBC, UA 0-2 /HPF      Bacteria, UA None Seen /HPF      Squamous Epithelial Cells, UA 0-2 /HPF      Hyaline Casts, UA 3-6 /LPF      Methodology Automated Microscopy    CBC & Differential [[id number]] Collected:  09/03/19 1444    Specimen:  Blood Updated:  09/03/19 1642    Narrative:       The following orders were created for panel order CBC & Differential.  Procedure                               Abnormality         Status                     ---------                               -----------         ------                     CBC Auto Differential[[id number]]        Abnormal            Final result                 Please view results for these tests on the individual orders.    CBC Auto Differential [[id number]]  (Abnormal) Collected:  09/03/19 1444    Specimen:  Blood Updated:  09/03/19 1642     WBC 13.75 10*3/mm3      RBC 3.04 10*6/mm3      Hemoglobin 9.3 g/dL      Hematocrit 29.4 %      MCV 96.7 fL      MCH 30.6 pg      MCHC 31.6 g/dL      RDW 14.0 %      RDW-SD  50.1 fl      MPV 11.4 fL      Platelets 309 10*3/mm3      Neutrophil % 78.9 %      Lymphocyte % 13.7 %      Monocyte % 6.7 %      Eosinophil % 0.1 %      Basophil % 0.2 %      Immature Grans % 0.4 %      Neutrophils, Absolute 10.85 10*3/mm3      Lymphocytes, Absolute 1.88 10*3/mm3      Monocytes, Absolute 0.92 10*3/mm3      Eosinophils, Absolute 0.01 10*3/mm3      Basophils, Absolute 0.03 10*3/mm3      Immature Grans, Absolute 0.06 10*3/mm3      nRBC 0.0 /100 WBC     [place] Draw [[id number]] Collected:  09/03/19 1440    Specimen:  Blood Updated:  09/03/19 1546    Narrative:       The following orders were created for panel order [place] Draw.  Procedure                               Abnormality         Status                     ---------                               -----------         ------                     Light Blue Top[[id number]]                                   Final result               Green Top (Gel)[[id number]]                                  Final result               Lavender Top[[id number]]                                     Final result               Red Top[[id number]]                                          Final result               [place] Blood Culture Morgan...[[id number]]                      Final result                 Please view results for these tests on the individual orders.    Light Blue Top [[id number]] Collected:  09/03/19 1444    Specimen:  Blood Updated:  09/03/19 1546     Extra Tube hold for add-on     Comment: Auto resulted       Green Top (Gel) [[id number]] Collected:  09/03/19 1444    Specimen:  Blood Updated:  09/03/19 1546     Extra Tube Hold for add-ons.     Comment: Auto resulted.       Lavender Top [[id number]] Collected:  09/03/19 1444    Specimen:  Blood Updated:  09/03/19 1546     Extra Tube hold for add-on     Comment: Auto resulted       Red Top [[id number]] Collected:  09/03/19 1444    Specimen:  Blood Updated:  09/03/19 1546     Extra Tube Hold for add-ons.     Comment:  "Auto resulted.       [place] Blood Culture Bottle Set [[id number]] Collected:  09/03/19 1440    Specimen:  Blood from Arm, Left Updated:  09/03/19 1546     Extra Tube Hold for add-ons.     Comment: Auto resulted.       Lactic Acid, Plasma [[id number]]  (Normal) Collected:  09/03/19 1521    Specimen:  Blood Updated:  09/03/19 1546     Lactate 1.6 mmol/L     Troponin [[id number]]  (Normal) Collected:  09/03/19 1444    Specimen:  Blood Updated:  09/03/19 1523     Troponin I <0.012 ng/mL     BNP [[id number]]  (Normal) Collected:  09/03/19 1444    Specimen:  Blood Updated:  09/03/19 1523     proBNP 421.0 pg/mL     Lipase [[id number]]  (Normal) Collected:  09/03/19 1444    Specimen:  Blood Updated:  09/03/19 1512     Lipase 84 U/L     Comprehensive Metabolic Panel [[id number]]  (Abnormal) Collected:  09/03/19 1444    Specimen:  Blood Updated:  09/03/19 1512     Glucose 133 mg/dL      BUN 36 mg/dL      Creatinine 1.08 mg/dL      Sodium 140 mmol/L      Potassium 4.3 mmol/L      Chloride 97 mmol/L      CO2 34.0 mmol/L      Calcium 9.8 mg/dL      Total Protein 7.9 g/dL      Albumin 4.10 g/dL      ALT (SGPT) 15 U/L      AST (SGOT) 31 U/L      Alkaline Phosphatase 53 U/L      Total Bilirubin 0.5 mg/dL      eGFR Non African Amer 47 mL/min/1.73      Globulin 3.8 gm/dL      A/G Ratio 1.1 g/dL      BUN/Creatinine Ratio 33.3     Anion Gap 9.0 mmol/L     Narrative:       GFR Normal >60  Chronic Kidney Disease <60  Kidney Failure <15    Magnesium [[id number]]  (Abnormal) Collected:  09/03/19 1444    Specimen:  Blood Updated:  09/03/19 1512     Magnesium 2.3 mg/dL     CK [[id number]]  (Normal) Collected:  09/03/19 1444    Specimen:  Blood Updated:  09/03/19 1512     Creatine Kinase <20 U/L             Condition on Discharge:    Stable    Physical Exam on Discharge:  /52 (BP Location: Right arm, Patient Position: Lying)   Pulse 88   Temp 97.7 °F (36.5 °C) (Oral)   Resp 16   Ht 165.1 cm (65\")   Wt 43.7 kg (96 lb 5 oz)   SpO2 95%   " BMI 16.03 kg/m²    Physical Exam  Constitutional: She is oriented to person, place, and time. She appears well-developed. She appears cachectic. She is cooperative. No distress.   HENT:   Head: Normocephalic and atraumatic.   Nose: Nose normal.   Mouth/Throat: Oropharynx is clear and moist.   Eyes: Conjunctivae are normal. No scleral icterus.   Neck: Normal range of motion. Neck supple. No JVD present.   Cardiovascular: Normal rate, regular rhythm and normal heart sounds.   Pulmonary/Chest: Effort normal and breath sounds normal. No respiratory distress.   Abdominal: Soft. Bowel sounds are normal. She exhibits no distension and no mass.   Musculoskeletal: Normal range of motion. She exhibits no edema or tenderness.   Neurological: She is alert and oriented to person, place, and time. Coordination normal.   Skin: Skin is warm and dry.    Psychiatric: She has a normal mood and affect.     Discharge Disposition:  Home or Self Care    Discharge Medications:     Discharge Medications      New Medications      Instructions Start Date   amoxicillin-clavulanate 875-125 MG per tablet  Commonly known as:  AUGMENTIN   1 tablet, Oral, 2 Times Daily      guaiFENesin 600 MG 12 hr tablet  Commonly known as:  MUCINEX   1,200 mg, Oral, Every 12 Hours Scheduled         Continue These Medications      Instructions Start Date   albuterol sulfate  (90 Base) MCG/ACT inhaler  Commonly known as:  PROVENTIL HFA;VENTOLIN HFA;PROAIR HFA   2 puffs, Inhalation, Every 4 Hours PRN      CALCIUM 1200+D3 PO   1 tablet, Oral, Daily      cyanocobalamin 500 MCG tablet  Commonly known as:  VITAMIN B-12   1 tablet, Oral, Daily      esomeprazole 40 MG capsule  Commonly known as:  nexIUM   40 mg, Oral, Every Morning Before Breakfast      hyoscyamine 0.125 MG tablet  Commonly known as:  ANASPAZ,LEVSIN   0.125 mg, Oral, Every 6 Hours PRN      lisinopril-hydrochlorothiazide 10-12.5 MG per tablet  Commonly known as:  PRINZIDE,ZESTORETIC   1 tablet,  Oral, Daily      metoprolol succinate XL 25 MG 24 hr tablet  Commonly known as:  TOPROL-XL   25 mg, Oral, Daily      ondansetron ODT 4 MG disintegrating tablet  Commonly known as:  ZOFRAN-ODT   4 mg, Oral, Every 6 Hours PRN      PROBIOTIC-10 capsule   1 capsule, Oral, Daily      traZODone 50 MG tablet  Commonly known as:  DESYREL   50 mg, Oral, Nightly      WOMENS ONE DAILY tablet   1 tablet, Oral, Daily      PRESERVISION AREDS tablet   2 tablets, Oral, 2 Times Daily             Discharge Diet:   Diet Instructions     Diet: Regular; Thin      Discharge Diet:  Regular    Fluid Consistency:  Thin          Discharge Care Plan / Instructions:   Discharge home    Activity at Discharge:   Activity Instructions     Activity as Tolerated            Follow-up Appointments:  Follow-up with her family physician, Dr. Ng, next week       Alexsander Chew DO  09/05/19  11:59 AM    Time: Discharge Less than 30 min    Please note that portions of this note may have been completed with a voice recognition program. Efforts were made to edit the dictations, but occasionally words are mistranscribed.            Electronically signed by Alexsander Chew DO at 9/5/2019 12:05 PM

## 2019-09-05 NOTE — PLAN OF CARE
Problem: Patient Care Overview  Goal: Plan of Care Review  Outcome: Ongoing (interventions implemented as appropriate)   09/05/19 1107   Coping/Psychosocial   Plan of Care Reviewed With patient;family   Plan of Care Review   Progress improving   OTHER   Outcome Summary Pt up in chair on room air. O2 dropped to 88% with mobility SBA to bathroom. 1L O2 applied and bathing task performed with SBA. I UB dress. S LB dressing. Set up grooming tasks seated. Educated on BUE ther ex for increased lung expansion, strength and endurance for ADL tasks. Educated on use of rollator: recommend as pt fatigues with short mobility. Pt to d/c home with son today.

## 2019-09-05 NOTE — DISCHARGE SUMMARY
[facility]  DISCHARGE SUMMARY       Date of Admission: 9/3/2019  Date of Discharge:  9/5/2019  Primary Care Physician: Javier Ng MD    Discharge Diagnoses:  Patient Active Problem List   Diagnosis   • COPD (chronic obstructive pulmonary disease) (CMS/[facility])   • Hypertension   • GERD (gastroesophageal reflux disease)   • Pneumonia of both upper lobes   • Bronchiectasis (CMS/[facility])   • Anemia   • Oropharyngeal dysphagia         Presenting Problem/History of Present Illness:  Aspiration pneumonia (CMS/[facility]) [J69.0]     Chief Complaint on Day of Discharge:   No complaint    History of Present Illness on Day of Discharge:   Patient's cough and shortness of breath is significantly improved.  There is no clinical evidence of pneumonia.  Patient's O2 sat dropped to 88% with exertion today.  She qualifies for home oxygen and that will be ordered.  She has requested a Rollator for home use.  Patient's hemoglobin is significantly improved after blood administration.  Swallowing study performed yesterday showed no evidence of aspiration.  Sputum culture shows only a light growth of Candida albicans and gram-negative bacilli likely colonizers.  Cultures show no growth.  Legionella and strep urinary antigens were negative.  Patient is stable for discharge home on oral antibiotics for additional 5 days.    Hospital Course  Milton Mae is a 89-year-old female with a past medical history of COPD, gastroesophageal reflux disease, hypertension and pneumonia.  Patient recently admitted 7/18- 7-22, 2019 with pneumonia-sepsis.  Since that time patient has been seen by Adeel Carranza, NP with GI, for Gram completed 8/29/2019 that revealed severe aspiration.  Patient has been chewing her food well and swallowing with sips of water however she continues to cough have shortness of breathing and nausea.  She states she has had pneumonia before and told her son she felt she had it again  therefore she was brought to [facility] urgency department for evaluation.  Lateral patchy upper lobe infiltrates noted with concerns for aspiration pneumonia.  He also noted to have bronchiectasis with mucous plugging.  White count 13.75, normocytic anemia, GFR 47.  She is extremely weak.  She weighs 43.7 kg.  Discussion regarding risk of ongoing oral intake to include worsening pneumonia and death.  She is agreeable at this time to awaiting speech therapy recommendation.  Family agitated at this provider for allowing patient to make her own decision.  Unhappy with my presentation.  She denies chest pain, abdominal pain however she continues to have shortness of breathing that has improved with oxygen use and nausea.  She reports chronic constipation.  Patient is admitted for further evaluation and treatment.    Plan:   1.  Admit as inpatient  2.  Azithromycin and Zosyn  3.  N.p.o. until seen and evaluated by speech therapy  4.  Consider GI consult-may need PEG placement for tube feedings  5.  Home medications reviewed and restarted as appropriate   6.  Supplemental O2, duo nebs, incentive spirometry, Mucinex  7.  Gentle hydration with normal saline 75 mL/hour  8.  RT to initiate breathing treatment protocol  9.  Additional labs Legionella and strep pneumo urine studies and respiratory culture  9.  Labs in a.m.   10.  DVT prophylaxis with SCDs  11.  OT, PT, and speech therapy in a.m.  12.  Consult nutrition-evaluate malnutrition    After admission the patient's cough is already significantly improved and she was no longer short of breath.  Clear mucus was noted with cough.  Globin was noted to be significantly low at 7.4 and the patient was typed, crossed and transfused 1 unit of packed red blood cells.  Hemoglobin increased to 9.4 thereafter.  The patient remained afebrile throughout her stay with no clinical evidence of pneumonia.  Swallowing study performed by speech therapy showed no significant  aspiration.     Patient's O2 sat dropped to 88% with exertion today.  She qualifies for home oxygen and that will be ordered.  She has requested a Rollator for home use.  Patient's hemoglobin is significantly improved after blood administration.  Swallowing study performed yesterday showed no evidence of aspiration.  Sputum culture shows only a light growth of Candida albicans and gram-negative bacilli likely colonizers.  Cultures show no growth.  Legionella and strep urinary antigens were negative.  Patient is stable for discharge home on oral antibiotics for additional 5 days.      Pertinent Test Results:   Imaging Results (last 7 days)     Procedure Component Value Units Date/Time    FL Video Swallow With Speech [[id number]] Collected:  09/04/19 1511     Updated:  09/04/19 1517    Narrative:       EXAMINATION:  FL VIDEO SWALLOW W SPEECH-  9/4/2019 2:32 PM CDT     HISTORY: dysphagia; R13.10-Dysphagia, unspecified; Z74.09-Other reduced  mobility; Z74.09-Other reduced mobility; J18.9-Pneumonia, unspecified  organism      COMPARISON: No comparison study.     TECHNIQUE:      Image number: 1.     Fluoroscopy time: 1.6 minutes     FINDINGS:      The oral and pharyngeal phase of swallowing was evaluated with multiple  barium consistencies.     Mild premature loss with minimal vallecular pooling/stasis observed.     Minimal flash penetration with the thinnest barium consistency noted  without aspiration.       Impression:       1. Minimal flash penetration with the thinnest barium consistency  without aspiration.  This report was finalized on 09/04/2019 15:13 by Dr. Flako Rojas MD.    CT Abdomen Pelvis With Contrast [[id number]] Collected:  09/03/19 1606     Updated:  09/03/19 1614    Narrative:       EXAMINATION:   CT ABDOMEN PELVIS W CONTRAST-  9/3/2019 4:06 PM CDT     HISTORY: CT ABDOMEN AND PELVIS WITH CONTRAST 9/3/2019 3:44 PM CDT     HISTORY: Epigastric pain     COMPARISON: 08/20/2019.      DLP: 308 mGy cm      TECHNIQUE: Following the intravenous administration of contrast, helical  CT tomographic images of the abdomen and pelvis were acquired. Coronal  reformatted images were also provided for review.      FINDINGS:   The lung bases and base of the heart are unremarkable.      LIVER: No focal liver lesion. The hepatic vasculature is patent.      BILIARY SYSTEM: The gallbladder is visualized. There are multiple  calculi demonstrated in the gallbladder..      PANCREAS: No focal pancreatic lesion.      SPLEEN: Unremarkable.      KIDNEYS AND ADRENALS: The adrenal glands are visualized. The renal  contours demonstrate uniform and symmetric excretion of contrast. There  are 2 left renal cyst. One is 4 cm the other is 1.8 cm.. The ureters are  decompressed and normal in appearance.     RETROPERITONEUM: No mass, lymphadenopathy or hemorrhage.      GI TRACT: No evidence of obstruction or bowel wall thickening. Extensive  diverticulosis is noted in the sigmoid colon.     OTHER: There is no mesenteric mass, lymphadenopathy or fluid collection.  The abdominopelvic vasculature is patent. The osseous structures and  soft tissues demonstrate no worrisome lesions.     PELVIS: No mass lesion, fluid collection or significant lymphadenopathy  is seen in the pelvis. The urinary bladder is normal in appearance.       Impression:       1. Cholelithiasis.  2. Diverticulosis  3. Left renal cyst.  4. This exam is unchanged from 08/20/2019.         This report was finalized on 09/03/2019 16:10 by Dr. Mike Marte MD.    CT Angiogram Chest With Contrast [[id number]] Collected:  09/03/19 1600     Updated:  09/03/19 1609    Narrative:       CT ANGIOGRAM CHEST W CONTRAST- 9/3/2019 3:44 PM CDT      HISTORY: soa, cough, cp, tachy, hemoptysis      COMPARISON: None.      DOSE LENGTH PRODUCT: 212 mGy cm. Automated exposure control was also  utilized to decrease patient radiation dose.     TECHNIQUE: Helical tomographic images of the chest were obtained  after  the administration of intravenous contrast following angiogram protocol.  Additionally, 3D and multiplanar reformatted images were provided.        FINDINGS:    Pulmonary arteries: There is adequate enhancement of the pulmonary  arteries to evaluate for central and segmental pulmonary emboli. There  are no filling defects within the main, lobar, segmental or visualized  subsegmental pulmonary arteries. The pulmonary arteries are within  normal limits for size.      Aorta and great vessels: The aorta is well opacified and demonstrates no  dissection or aneurysm. The great vessels are normal in appearance.     Visualized neck base: The imaged portion of the base of the neck appears  unremarkable.      Lungs: Patchy infiltrates are noted in the upper lungs. Most likely is  an inflammatory process.. Chronic bronchiectasis is present. There is  some mucus plugging in distal airways..      Heart: The heart is normal in size. There is no pericardial effusion.      Mediastinum and lymph nodes: No enlarged mediastinal, hilar, or axillary  lymph nodes are present.      Skeletal and soft tissues: The osseous structures of the thorax and  surrounding soft tissues demonstrate no acute process.     Upper abdomen: A prominent cyst is noted upper pole of the left kidney  calculi are present in the gallbladder..        Impression:       1. No evidence of embolic disease.  2. Patchy infiltrates in the upper lobes most likely representing  pneumonia.  3. Bronchiectasis. Bronchial wall thickening in the upper lung. Some  mucus plugging is noted  4. Cholelithiasis.        This report was finalized on 09/03/2019 16:06 by Dr. Mike Marte MD.    XR Chest 1 View [[id number]] Collected:  09/03/19 1558     Updated:  09/03/19 1604    Narrative:       EXAMINATION: XR CHEST 1 VW-. 9/3/2019 3:58 PM CDT     CHEST, ONE VIEW:     HISTORY: Shortness of air     COMPARISON: 07/18/2018 chest radiograph     A single frontal chest radiograph was  obtained.     FINDINGS:     There are reticulonodular interstitial and patchy airspace opacities  identified in the upper lobes, suprahilar regions bilaterally more  conspicuous compared to previous studies progression of chronic lung  changes considered as well as superimposed acute infiltrates.     The heart is normal in size without heart failure.     The bony structures are intact.                                     Impression:       1. Perihilar and upper lobe interstitial and patchy airspace opacities  more conspicuous compared to the 2018 examination. Chronic changes with  superimposed acute infectious/inflammatory processes not excluded.     This report was finalized on 09/03/2019 16:00 by Dr. Flako Rojas MD.        Lab Results (last 7 days)     Procedure Component Value Units Date/Time    Respiratory Culture - Sputum, Cough [[id number]]  (Abnormal) Collected:  09/04/19 0803    Specimen:  Sputum from Cough Updated:  09/05/19 1046     Respiratory Culture Light growth (2+) Candida albicans      Light growth (2+) Gram Negative Bacilli      Light growth (2+) Normal Respiratory Luma    Hemoglobin & Hematocrit, Blood [[id number]]  (Abnormal) Collected:  09/04/19 1922    Specimen:  Blood Updated:  09/04/19 1954     Hemoglobin 9.4 g/dL      Hematocrit 28.4 %     Blood Culture - Blood, Arm, Left [[id number]] Collected:  09/03/19 1440    Specimen:  Blood from Arm, Left Updated:  09/04/19 1615     Blood Culture No growth at 24 hours    Blood Culture - Blood, Arm, Left [[id number]] Collected:  09/03/19 1521    Specimen:  Blood from Arm, Left Updated:  09/04/19 1545     Blood Culture No growth at 24 hours    Basic Metabolic Panel [[id number]]  (Abnormal) Collected:  09/04/19 0540    Specimen:  Blood Updated:  09/04/19 0636     Glucose 121 mg/dL      BUN 26 mg/dL      Creatinine 0.85 mg/dL      Sodium 141 mmol/L      Potassium 3.4 mmol/L      Chloride 104 mmol/L      CO2 32.0 mmol/L      Calcium 8.4 mg/dL      eGFR Non   Amer 62 mL/min/1.73      BUN/Creatinine Ratio 30.6     Anion Gap 5.0 mmol/L     Narrative:       GFR Normal >60  Chronic Kidney Disease <60  Kidney Failure <15    CBC Auto Differential [[id number]]  (Abnormal) Collected:  09/04/19 0540    Specimen:  Blood Updated:  09/04/19 0626     WBC 7.81 10*3/mm3      RBC 2.40 10*6/mm3      Hemoglobin 7.4 g/dL      Hematocrit 23.2 %      MCV 96.7 fL      MCH 30.8 pg      MCHC 31.9 g/dL      RDW 13.9 %      RDW-SD 49.5 fl      MPV 11.2 fL      Platelets 217 10*3/mm3      Neutrophil % 74.4 %      Lymphocyte % 15.9 %      Monocyte % 8.3 %      Eosinophil % 0.6 %      Basophil % 0.4 %      Immature Grans % 0.4 %      Neutrophils, Absolute 5.81 10*3/mm3      Lymphocytes, Absolute 1.24 10*3/mm3      Monocytes, Absolute 0.65 10*3/mm3      Eosinophils, Absolute 0.05 10*3/mm3      Basophils, Absolute 0.03 10*3/mm3      Immature Grans, Absolute 0.03 10*3/mm3      nRBC 0.0 /100 WBC     S. Pneumo Ag Urine or CSF - Urine, Urine, Catheter In/Out [[id number]]  (Normal) Collected:  09/03/19 1626    Specimen:  Urine, Catheter In/Out Updated:  09/03/19 2043     Strep Pneumo Ag Negative    Legionella Antigen, Urine - Urine, Urine, Catheter In/Out [[id number]]  (Normal) Collected:  09/03/19 1626    Specimen:  Urine, Catheter In/Out Updated:  09/03/19 2042     LEGIONELLA ANTIGEN, URINE Negative    TSH [[id number]]  (Abnormal) Collected:  09/03/19 1444    Specimen:  Blood Updated:  09/03/19 1649     TSH 0.452 uIU/mL     Urinalysis With Culture If Indicated - Urine, Catheter In/Out [[id number]]  (Abnormal) Collected:  09/03/19 1626    Specimen:  Urine, Catheter In/Out Updated:  09/03/19 1648     Color, UA Dark Yellow     Appearance, UA Clear     pH, UA 5.5     Specific Gravity, UA 1.020     Glucose, UA Negative     Ketones, UA 15 mg/dL (1+)     Bilirubin, UA Negative     Blood, UA Negative     Protein, UA Negative     Leuk Esterase, UA Trace     Nitrite, UA Negative     Urobilinogen, UA 1.0 E.U./dL     Urinalysis, Microscopic Only - Urine, Catheter In/Out [[id number]]  (Abnormal) Collected:  09/03/19 1626    Specimen:  Urine, Catheter In/Out Updated:  09/03/19 1648     RBC, UA 0-2 /HPF      WBC, UA 0-2 /HPF      Bacteria, UA None Seen /HPF      Squamous Epithelial Cells, UA 0-2 /HPF      Hyaline Casts, UA 3-6 /LPF      Methodology Automated Microscopy    CBC & Differential [[id number]] Collected:  09/03/19 1444    Specimen:  Blood Updated:  09/03/19 1642    Narrative:       The following orders were created for panel order CBC & Differential.  Procedure                               Abnormality         Status                     ---------                               -----------         ------                     CBC Auto Differential[[id number]]        Abnormal            Final result                 Please view results for these tests on the individual orders.    CBC Auto Differential [[id number]]  (Abnormal) Collected:  09/03/19 1444    Specimen:  Blood Updated:  09/03/19 1642     WBC 13.75 10*3/mm3      RBC 3.04 10*6/mm3      Hemoglobin 9.3 g/dL      Hematocrit 29.4 %      MCV 96.7 fL      MCH 30.6 pg      MCHC 31.6 g/dL      RDW 14.0 %      RDW-SD 50.1 fl      MPV 11.4 fL      Platelets 309 10*3/mm3      Neutrophil % 78.9 %      Lymphocyte % 13.7 %      Monocyte % 6.7 %      Eosinophil % 0.1 %      Basophil % 0.2 %      Immature Grans % 0.4 %      Neutrophils, Absolute 10.85 10*3/mm3      Lymphocytes, Absolute 1.88 10*3/mm3      Monocytes, Absolute 0.92 10*3/mm3      Eosinophils, Absolute 0.01 10*3/mm3      Basophils, Absolute 0.03 10*3/mm3      Immature Grans, Absolute 0.06 10*3/mm3      nRBC 0.0 /100 WBC     [facility] Draw [[id number]] Collected:  09/03/19 1440    Specimen:  Blood Updated:  09/03/19 1546    Narrative:       The following orders were created for panel order [facility] Draw.  Procedure                               Abnormality         Status                     ---------                                -----------         ------                     Light Blue Top[[id number]]                                   Final result               Green Top (Gel)[[id number]]                                  Final result               Lavender Top[[id number]]                                     Final result               Red Top[[id number]]                                          Final result               [facility] Blood Culture [facility]...[[id number]]                      Final result                 Please view results for these tests on the individual orders.    Light Blue Top [[id number]] Collected:  09/03/19 1444    Specimen:  Blood Updated:  09/03/19 1546     Extra Tube hold for add-on     Comment: Auto resulted       Green Top (Gel) [[id number]] Collected:  09/03/19 1444    Specimen:  Blood Updated:  09/03/19 1546     Extra Tube Hold for add-ons.     Comment: Auto resulted.       Lavender Top [[id number]] Collected:  09/03/19 1444    Specimen:  Blood Updated:  09/03/19 1546     Extra Tube hold for add-on     Comment: Auto resulted       Red Top [[id number]] Collected:  09/03/19 1444    Specimen:  Blood Updated:  09/03/19 1546     Extra Tube Hold for add-ons.     Comment: Auto resulted.       [facility] Blood Culture Bottle Set [[id number]] Collected:  09/03/19 1440    Specimen:  Blood from Arm, Left Updated:  09/03/19 1546     Extra Tube Hold for add-ons.     Comment: Auto resulted.       Lactic Acid, Plasma [[id number]]  (Normal) Collected:  09/03/19 1521    Specimen:  Blood Updated:  09/03/19 1546     Lactate 1.6 mmol/L     Troponin [[id number]]  (Normal) Collected:  09/03/19 1444    Specimen:  Blood Updated:  09/03/19 1523     Troponin I <0.012 ng/mL     BNP [[id number]]  (Normal) Collected:  09/03/19 1444    Specimen:  Blood Updated:  09/03/19 1523     proBNP 421.0 pg/mL     Lipase [[id number]]  (Normal) Collected:  09/03/19 1444    Specimen:  Blood Updated:  09/03/19 1512     Lipase 84 U/L     Comprehensive Metabolic Panel [[id number]]   "(Abnormal) Collected:  09/03/19 1444    Specimen:  Blood Updated:  09/03/19 1512     Glucose 133 mg/dL      BUN 36 mg/dL      Creatinine 1.08 mg/dL      Sodium 140 mmol/L      Potassium 4.3 mmol/L      Chloride 97 mmol/L      CO2 34.0 mmol/L      Calcium 9.8 mg/dL      Total Protein 7.9 g/dL      Albumin 4.10 g/dL      ALT (SGPT) 15 U/L      AST (SGOT) 31 U/L      Alkaline Phosphatase 53 U/L      Total Bilirubin 0.5 mg/dL      eGFR Non African Amer 47 mL/min/1.73      Globulin 3.8 gm/dL      A/G Ratio 1.1 g/dL      BUN/Creatinine Ratio 33.3     Anion Gap 9.0 mmol/L     Narrative:       GFR Normal >60  Chronic Kidney Disease <60  Kidney Failure <15    Magnesium [[id number]]  (Abnormal) Collected:  09/03/19 1444    Specimen:  Blood Updated:  09/03/19 1512     Magnesium 2.3 mg/dL     CK [[id number]]  (Normal) Collected:  09/03/19 1444    Specimen:  Blood Updated:  09/03/19 1512     Creatine Kinase <20 U/L             Condition on Discharge:    Stable    Physical Exam on Discharge:  /52 (BP Location: Right arm, Patient Position: Lying)   Pulse 88   Temp 97.7 °F (36.5 °C) (Oral)   Resp 16   Ht 165.1 cm (65\")   Wt 43.7 kg (96 lb 5 oz)   SpO2 95%   BMI 16.03 kg/m²   Physical Exam  Constitutional: She is oriented to person, place, and time. She appears well-developed. She appears cachectic. She is cooperative. No distress.   HENT:   Head: Normocephalic and atraumatic.   Nose: Nose normal.   Mouth/Throat: Oropharynx is clear and moist.   Eyes: Conjunctivae are normal. No scleral icterus.   Neck: Normal range of motion. Neck supple. No JVD present.   Cardiovascular: Normal rate, regular rhythm and normal heart sounds.   Pulmonary/Chest: Effort normal and breath sounds normal. No respiratory distress.   Abdominal: Soft. Bowel sounds are normal. She exhibits no distension and no mass.   Musculoskeletal: Normal range of motion. She exhibits no edema or tenderness.   Neurological: She is alert and oriented to person, " place, and time. Coordination normal.   Skin: Skin is warm and dry.    Psychiatric: She has a normal mood and affect.     Discharge Disposition:  Home or Self Care    Discharge Medications:     Discharge Medications      New Medications      Instructions Start Date   amoxicillin-clavulanate 875-125 MG per tablet  Commonly known as:  AUGMENTIN   1 tablet, Oral, 2 Times Daily      guaiFENesin 600 MG 12 hr tablet  Commonly known as:  MUCINEX   1,200 mg, Oral, Every 12 Hours Scheduled         Continue These Medications      Instructions Start Date   albuterol sulfate  (90 Base) MCG/ACT inhaler  Commonly known as:  PROVENTIL HFA;VENTOLIN HFA;PROAIR HFA   2 puffs, Inhalation, Every 4 Hours PRN      CALCIUM 1200+D3 PO   1 tablet, Oral, Daily      cyanocobalamin 500 MCG tablet  Commonly known as:  VITAMIN B-12   1 tablet, Oral, Daily      esomeprazole 40 MG capsule  Commonly known as:  nexIUM   40 mg, Oral, Every Morning Before Breakfast      hyoscyamine 0.125 MG tablet  Commonly known as:  ANASPAZ,LEVSIN   0.125 mg, Oral, Every 6 Hours PRN      lisinopril-hydrochlorothiazide 10-12.5 MG per tablet  Commonly known as:  PRINZIDE,ZESTORETIC   1 tablet, Oral, Daily      metoprolol succinate XL 25 MG 24 hr tablet  Commonly known as:  TOPROL-XL   25 mg, Oral, Daily      ondansetron ODT 4 MG disintegrating tablet  Commonly known as:  ZOFRAN-ODT   4 mg, Oral, Every 6 Hours PRN      PROBIOTIC-10 capsule   1 capsule, Oral, Daily      traZODone 50 MG tablet  Commonly known as:  DESYREL   50 mg, Oral, Nightly      WOMENS ONE DAILY tablet   1 tablet, Oral, Daily      PRESERVISION AREDS tablet   2 tablets, Oral, 2 Times Daily             Discharge Diet:   Diet Instructions     Diet: Regular; Thin      Discharge Diet:  Regular    Fluid Consistency:  Thin          Discharge Care Plan / Instructions:   Discharge home    Activity at Discharge:   Activity Instructions     Activity as Tolerated            Follow-up  Appointments:  Follow-up with her family physician, Dr. Ng, next week       Alexsander Chew,   09/05/19  11:59 AM    Time: Discharge Less than 30 min    Please note that portions of this note may have been completed with a voice recognition program. Efforts were made to edit the dictations, but occasionally words are mistranscribed.

## 2019-09-05 NOTE — PLAN OF CARE
Problem: Patient Care Overview  Goal: Plan of Care Review  Outcome: Ongoing (interventions implemented as appropriate)   09/05/19 1016   Coping/Psychosocial   Plan of Care Reviewed With patient   Plan of Care Review   Progress improving   OTHER   Outcome Summary Pt was Independent for bed mobility and cga-sba to stand. Pt was cga-sba to walk 30ft plus 40ft x 1 standing rest with RW. Pt was on 1 L in room but needed 2L to ambulate. Pt's 02 stayed at 92% with gait. Pt and family state she was on 2L o2 at home. Pt would benefit from continued PT.

## 2019-09-05 NOTE — PLAN OF CARE
Problem: Patient Care Overview  Goal: Plan of Care Review  Outcome: Ongoing (interventions implemented as appropriate)   09/05/19 0337   Coping/Psychosocial   Plan of Care Reviewed With patient   Plan of Care Review   Progress no change   OTHER   Outcome Summary pt has no pain; hemoglobin after unit of blood was 9.4; pt has rested well this shift; VSS; safety maintained; will continue to monitor     Goal: Interprofessional Rounds/Family Conf  Outcome: Ongoing (interventions implemented as appropriate)      Problem: Fall Risk (Adult)  Goal: Absence of Fall  Outcome: Ongoing (interventions implemented as appropriate)   09/05/19 0337   Fall Risk (Adult)   Absence of Fall making progress toward outcome       Problem: Skin Injury Risk (Adult)  Goal: Skin Health and Integrity  Outcome: Ongoing (interventions implemented as appropriate)   09/05/19 0337   Skin Injury Risk (Adult)   Skin Health and Integrity making progress toward outcome       Problem: Pneumonia (Adult)  Goal: Signs and Symptoms of Listed Potential Problems Will be Absent, Minimized or Managed (Pneumonia)  Outcome: Ongoing (interventions implemented as appropriate)   09/05/19 0337   Goal/Outcome Evaluation   Problems Assessed (Pneumonia) all   Problems Present (Pneumonia) infection progression       Problem: Nutrition, Imbalanced: Inadequate Oral Intake (Adult)  Goal: Identify Related Risk Factors and Signs and Symptoms  Outcome: Outcome(s) achieved Date Met: 09/05/19    Goal: Improved Oral Intake  Outcome: Ongoing (interventions implemented as appropriate)   09/05/19 0337   Nutrition, Imbalanced: Inadequate Oral Intake (Adult)   Improved Oral Intake making progress toward outcome     Goal: Prevent Further Weight Loss  Outcome: Ongoing (interventions implemented as appropriate)   09/05/19 0337   Nutrition, Imbalanced: Inadequate Oral Intake (Adult)   Prevent Further Weight Loss making progress toward outcome

## 2019-09-05 NOTE — THERAPY DISCHARGE NOTE
Acute Care - Occupational Therapy Treatment Note/Discharge  [facility]     Patient Name: Milton Mae  : 1926  MRN: [id number]  Today's Date: 2019  Onset of Illness/Injury or Date of Surgery: 19  Date of Referral to OT: 19  Referring Physician: Dr. Chew      Admit Date: 9/3/2019    Visit Dx:     ICD-10-CM ICD-9-CM   1. Dysphagia, unspecified type R13.10 787.20   2. Impaired mobility Z74.09 799.89   3. Impaired mobility and ADLs Z74.09 799.89   4. Pneumonia of both lungs due to infectious organism, unspecified part of lung J18.9 483.8   5. Panlobular emphysema (CMS/[facility]) J43.1 492.8     Patient Active Problem List   Diagnosis   • COPD (chronic obstructive pulmonary disease) (CMS/[facility])   • Hypertension   • GERD (gastroesophageal reflux disease)   • Pneumonia of both upper lobes   • Bronchiectasis (CMS/[facility])   • Anemia   • Oropharyngeal dysphagia       Therapy Treatment    Rehabilitation Treatment Summary     Row Name 19 1107 19 0953          Treatment Time/Intention    Discipline  occupational therapy assistant  -MM  occupational therapy assistant  -MM     Document Type  therapy note (daily note)  -MM  --     Subjective Information  complains of;dyspnea  -MM  --     Mode of Treatment  individual therapy;occupational therapy  -MM  --     Patient/Family Observations  son   -MM  --     Patient Effort  excellent  -MM  --     Comment  on room air: with fxl mobility>bathroom pt O2 deSAT to 88. 1L O2 applied increased to 94%  (Significant)   -MM  with PTA: declined OT after PT d/t fatigue   -MM     Recorded by [MM] Ana Nogueira COTA/L 19 1338 [MM] Ana Nogueira COTA/L 19 0959     Row Name 19 1107             Vital Signs    Intra SpO2 (%)  88  -MM      O2 Delivery Intra Treatment  room air  -MM      Post SpO2 (%)  94  -MM      O2 Delivery Post Treatment  supplemental O2 1L  -MM      Intra Patient Position  Sitting  -MM      Post Patient Position  Sitting  -MM       Recorded by [MM] Ana Nogueira COTA/L 09/05/19 1338      Row Name 09/05/19 1107             Cognitive Assessment/Intervention- PT/OT    Personal Safety Interventions  fall prevention program maintained;gait belt;nonskid shoes/slippers when out of bed;supervised activity  -MM      Recorded by [MM] Ana Nogueira COTA/L 09/05/19 1338      Row Name 09/05/19 1107             Safety Issues, Functional Mobility    Impairments Affecting Function (Mobility)  endurance/activity tolerance;shortness of breath;strength  -MM      Recorded by [MM] Ana Nogueira COTA/L 09/05/19 1338      Row Name 09/05/19 1107             Bed Mobility Assessment/Treatment    Comment (Bed Mobility)  in chair   -MM      Recorded by [MM] Ana Nogueira COTA/L 09/05/19 1338      Row Name 09/05/19 1107             Functional Mobility    Functional Mobility- Ind. Level  verbal cues required;standby assist  -MM      Functional Mobility- Device  rolling walker  -MM      Functional Mobility- Comment  increased time: fearful of falling   -MM      Recorded by [MM] Ana Nogueira COTA/L 09/05/19 1338      Row Name 09/05/19 1107             Transfer Assessment/Treatment    Transfer Assessment/Treatment  sit-stand transfer;stand-sit transfer;shower transfer  -MM      Recorded by [MM] Ana Nogueira COTA/L 09/05/19 1338      Row Name 09/05/19 1107             Sit-Stand Transfer    Sit-Stand [place] (Transfers)  stand by assist  -MM      Assistive Device (Sit-Stand Transfers)  walker, front-wheeled  -MM      Recorded by [MM] Ana Nogueira COTA/L 09/05/19 1338      Row Name 09/05/19 1107             Stand-Sit Transfer    Stand-Sit [place] (Transfers)  stand by assist;verbal cues  -MM      Assistive Device (Stand-Sit Transfers)  walker, front-wheeled  -MM      Recorded by [MM] Ana Nogueira COTA/L 09/05/19 1338      Row Name 09/05/19 1107             Shower Transfer    Type (Shower Transfer)  stand pivot/stand step   -MM      [place] Level (Shower Transfer)  verbal cues;stand by assist  -MM      Assistive Device (Shower Transfer)  walker, front-wheeled;shower chair;grab bars/tub rail  -MM      Recorded by [MM] Ana Nogueira COTA/L 09/05/19 1338      Row Name 09/05/19 1107             ADL Assessment/Intervention    BADL Assessment/Intervention  bathing;upper body dressing;lower body dressing;grooming  -MM      Recorded by [MM] Ana Nogueira COTA/L 09/05/19 1338      Row Name 09/05/19 1107             Bathing Assessment/Intervention    Bathing [place] Level  bathing skills;set up;standby assist  -MM      Bathing Position  supported sitting;supported standing  -MM      Comment (Bathing)  assist with handheld shower for rinsing: pt needs cues for rest breaks   -MM      Recorded by [MM] Ana Nogueira COTA/L 09/05/19 1338      Row Name 09/05/19 1107             Upper Body Dressing Assessment/Training    Upper Body Dressing [place] Level  don;independent;pull-over garment  -MM      Upper Body Dressing Position  supported sitting  -MM      Comment (Upper Body Dressing)  able to manage O2 tubing   -MM      Recorded by [MM] Ana Nogueira COTA/L 09/05/19 1338      Row Name 09/05/19 1107             Lower Body Dressing Assessment/Training    Lower Body Dressing [place] Level  don;doff;shoes/slippers;supervision  -MM      Lower Body Dressing Position  supported sitting  -MM      Recorded by [MM] Ana Nogueira COTA/L 09/05/19 1338      Row Name 09/05/19 1107             Grooming Assessment/Training    [place] Level (Grooming)  wash face, hands;hair care, combing/brushing;independent  -MM      Grooming Position  supported sitting  -MM      Recorded by [MM] Ana Nogueira COTA/L 09/05/19 1338      Row Name 09/05/19 1107             BADL Safety/Performance    Impairments, BADL Safety/Performance  endurance/activity tolerance;balance;shortness of breath;strength  -MM      Skilled BADL  Treatment/Intervention  BADL process/adaptation training;compensatory training;energy conservation;environmental modifications  -MM      Progress in BADL Status  improvement noted  -MM      Recorded by [MM] Ana Nogueira COTA/L 09/05/19 1338      Row Name 09/05/19 1107             Motor Skills Assessment/Interventions    Additional Documentation  Therapeutic Exercise (Group);Therapeutic Exercise Interventions (Group)  -MM      Recorded by [MM] Ana Nogueira COTA/L 09/05/19 1340      Row Name 09/05/19 1107             Therapeutic Exercise    Comment (Therapeutic Exercise)  BUE stretching for increased lung expansion   -MM      Recorded by [MM] Ana Nogueira COTA/L 09/05/19 1340      Row Name 09/05/19 1107             Positioning and Restraints    Pre-Treatment Position  sitting in chair/recliner  -MM      Post Treatment Position  chair  -MM      In Chair  sitting;encouraged to call for assist;call light within reach;with family/caregiver  -MM      Recorded by [MM] Ana Nogueira COTA/L 09/05/19 1338      Row Name 09/05/19 1107             Pain Scale: Numbers Pre/Post-Treatment    Pain Scale: Numbers, Pretreatment  0/10 - no pain  -MM      Pain Scale: Numbers, Post-Treatment  0/10 - no pain  -MM      Recorded by [MM] Ana Nogueira COTA/L 09/05/19 1338      Row Name 09/05/19 1107             Outcome Summary/Treatment Plan (OT)    Daily Summary of Progress (OT)  progress toward functional goals is good  -MM      Barriers to Overall Progress (OT)  SOA and endurance   -MM      Anticipated Equipment Needs at Discharge (OT)  rollator  -MM      Recorded by [MM] Ana Nogueira COTA/MELCHOR 09/05/19 1338        User Key  (r) = Recorded By, (t) = Taken By, (c) = Cosigned By    Initials Name Effective Dates Discipline    MM Ana Nogueira COTA/L 10/15/18 -  OT             Rehab Goal Summary     Row Name 09/05/19 1107             Transfer Goal 1 (OT)    Activity/Assistive Device (Transfer Goal 1, OT)   sit-to-stand/stand-to-sit;bed-to-chair/chair-to-bed;toilet;tub  -MM      [place] Level/Cues Needed (Transfer Goal 1, OT)  standby assist  -MM      Time Frame (Transfer Goal 1, OT)  long term goal (LTG);by discharge  -MM      Progress/Outcome (Transfer Goal 1, OT)  goal met  -MM         Bathing Goal 1 (OT)    Activity/Assistive Device (Bathing Goal 1, OT)  bathing skills, all;shower chair  -MM      [place] Level/Cues Needed (Bathing Goal 1, OT)  standby assist  -MM      Time Frame (Bathing Goal 1, OT)  long term goal (LTG);by discharge  -MM      Progress/Outcomes (Bathing Goal 1, OT)  goal met  -MM         Toileting Goal 1 (OT)    Activity/Device (Toileting Goal 1, OT)  toileting skills, all;commode  -MM      [place] Level/Cues Needed (Toileting Goal 1, OT)  contact guard assist  -MM      Time Frame (Toileting Goal 1, OT)  long term goal (LTG);by discharge  -MM      Progress/Outcome (Toileting Goal 1, OT)  goal met Anticipate based on bathing task  -MM        User Key  (r) = Recorded By, (t) = Taken By, (c) = Cosigned By    Initials Name Provider Type Discipline    Ana Sandoval COTA/L Occupational Therapy Assistant OT          Occupational Therapy Education     Title: PT OT SLP Therapies (In Progress)     Topic: Occupational Therapy (Done)     Point: ADL training (Done)     Description: Instruct learner(s) on proper safety adaptation and remediation techniques during self care or transfers.   Instruct in proper use of assistive devices.    Learning Progress Summary           Patient Acceptance, E,TB, VU by MM at 9/5/2019  1:39 PM    Comment:  need for OT, UE ther ex to increase lung capacity    Acceptance, E, VU by RAFY at 9/4/2019 11:41 AM    Comment:  ADl, transfer training, benefit of increased activity, benefit of OOB activity, pursed lip breathing, OT POC   Family Acceptance, E,TB, VU by MM at 9/5/2019  1:39 PM    Comment:  need for OT, UE ther ex to increase lung capacity    Acceptance, E,  VU by RAFY at 9/4/2019 11:41 AM    Comment:  ADl, transfer training, benefit of increased activity, benefit of OOB activity, pursed lip breathing, OT POC                               User Key     Initials Effective Dates Name Provider Type Discipline     08/28/18 -  Madyson Mcghee, OTR/L Occupational Therapist OT    MM 10/15/18 -  Ana Nogueira COTA/L Occupational Therapy Assistant OT                OT Recommendation and Plan  Outcome Summary/Treatment Plan (OT)  Daily Summary of Progress (OT): progress toward functional goals is good  Barriers to Overall Progress (OT): SOA and endurance   Anticipated Equipment Needs at Discharge (OT): rollator  Daily Summary of Progress (OT): progress toward functional goals is good  Plan of Care Review  Plan of Care Reviewed With: patient, family  Plan of Care Reviewed With: patient, family  Outcome Summary: Pt up in chair on room air. O2 dropped to 88% with mobility SBA to bathroom. 1L O2 applied and bathing task performed with SBA. I UB dress. S LB dressing. Set up grooming tasks seated. Educated on BUE ther ex for increased lung expansion, strength and endurance for ADL tasks. Pt to d/c home with son today.     Outcome Measures     Row Name 09/05/19 1107 09/04/19 1100          How much help from another is currently needed...    Putting on and taking off regular lower body clothing?  3  -MM  3  -MW     Bathing (including washing, rinsing, and drying)  3  -MM  3  -MW     Toileting (which includes using toilet bed pan or urinal)  3  -MM  3  -MW     Putting on and taking off regular upper body clothing  4  -MM  3  -MW     Taking care of personal grooming (such as brushing teeth)  4  -MM  4  -MW     Eating meals  4  -MM  4  -MW     AM-PAC 6 Clicks Score (OT)  21  -MM  20  -MW        Functional Assessment    Outcome Measure Options  --  AM-PAC 6 Clicks Daily Activity (OT)  -MW       User Key  (r) = Recorded By, (t) = Taken By, (c) = Cosigned By    Initials Name Provider Type     Madyson Mc, OTR/L Occupational Therapist    MM Ana Nogueira COTA/L Occupational Therapy Assistant           Time Calculation:    Time Calculation- OT     Row Name 09/05/19 1107 09/05/19 1019          Time Calculation- OT    OT Start Time  1107  -MM  --     OT Stop Time  1203  -MM  --     OT Time Calculation (min)  56 min  -MM  --     Total Timed Code Minutes- OT  56 minute(s)  -MM  --     OT Received On  09/05/19  -MM  --        Timed Charges    16845 - OT Therapeutic Exercise Minutes  10  -MM  --     29256 - Gait Training Minutes   --  30  -AE     70375 - OT Self Care/Mgmt Minutes  46  -MM  --       User Key  (r) = Recorded By, (t) = Taken By, (c) = Cosigned By    Initials Name Provider Type    AE Clare Asher, PTA Physical Therapy Assistant    MM Ana Nogueira COTA/L Occupational Therapy Assistant        Therapy Suggested Charges     Code   Minutes Charges    78289 (CPT®) Hc Ot Neuromusc Re Education Ea 15 Min      03582 (CPT®) Hc Ot Ther Proc Ea 15 Min 10 1    99338 (CPT®) Hc Ot Therapeutic Act Ea 15 Min      51791 (CPT®) Hc Ot Manual Therapy Ea 15 Min      36313 (CPT®) Hc Ot Iontophoresis Ea 15 Min      92203 (CPT®) Hc Ot Elec Stim Ea-Per 15 Min      83365 (CPT®) Hc Ot Ultrasound Ea 15 Min      59186 (CPT®) Hc Ot Self Care/Mgmt/Train Ea 15 Min 46 3    Total  56 4        Therapy Charges for Today     Code Description Service Date Service Provider Modifiers Qty    21829681936 HC OT SELF CARE/MGMT/TRAIN EA 15 MIN 9/5/2019 Ana Nogueira COTA/L GO 3    79727689639 HC OT THER PROC EA 15 MIN 9/5/2019 Ana Nogueira COTA/L GO 1               OT Discharge Summary  Reason for Discharge: Discharge from facility  Outcomes Achieved: Refer to plan of care for updates on goals achieved  Discharge Destination: Home with assist    CALI Bell  9/5/2019

## 2019-09-05 NOTE — THERAPY TREATMENT NOTE
Patient Name: Milton Mae  : 1926    MRN: [id number]                              Today's Date: 2019       Admit Date: 9/3/2019    Visit Dx:     ICD-10-CM ICD-9-CM   1. Dysphagia, unspecified type R13.10 787.20   2. Impaired mobility Z74.09 799.89   3. Impaired mobility and ADLs Z74.09 799.89   4. Pneumonia of both lungs due to infectious organism, unspecified part of lung J18.9 483.8     Patient Active Problem List   Diagnosis   • COPD (chronic obstructive pulmonary disease) (CMS/HCC)   • Hypertension   • GERD (gastroesophageal reflux disease)   • Pneumonia of both upper lobes   • Bronchiectasis (CMS/[facility])   • Anemia   • Oropharyngeal dysphagia     Past Medical History:   Diagnosis Date   • COPD (chronic obstructive pulmonary disease) (CMS/HCC)    • GERD (gastroesophageal reflux disease)    • Hypertension    • Pneumonia      Past Surgical History:   Procedure Laterality Date   • CATARACT EXTRACTION, BILATERAL     • THYROID SURGERY       General Information     Row Name 19          PT Evaluation Time/Intention    Document Type  therapy note (daily note)  -AE     Mode of Treatment  physical therapy  -AE     Row Name 19          General Information    Existing Precautions/Restrictions  fall;oxygen therapy device and L/min  -AE       User Key  (r) = Recorded By, (t) = Taken By, (c) = Cosigned By    Initials Name Provider Type    AE Clare Asher PTA Physical Therapy Assistant        Mobility     Row Name 19          Bed Mobility Assessment/Treatment    Supine-Sit [place] (Bed Mobility)  conditional independence  -AE     Sit-Supine [place] (Bed Mobility)  conditional independence  -AE     Assistive Device (Bed Mobility)  head of bed elevated  -AE     Row Name 19          Bed-Chair Transfer    Bed-Chair [place] (Transfers)  contact guard  -AE     Assistive Device (Bed-Chair Transfers)  walker, front-wheeled  -AE     Row Name 19           Sit-Stand Transfer    Sit-Stand [place] (Transfers)  contact guard  -AE     Row Name 09/05/19 0928          Gait/Stairs Assessment/Training    Gait/Stairs Assessment/Training  gait/ambulation assistive device  -AE     [place] Level (Gait)  contact guard  -AE     Assistive Device (Gait)  walker, front-wheeled  -AE     Distance in Feet (Gait)  30 plus 40 x 1 standing rest  -AE     Deviations/Abnormal Patterns (Gait)  stride length decreased;parul decreased  -AE       User Key  (r) = Recorded By, (t) = Taken By, (c) = Cosigned By    Initials Name Provider Type    Clare Miramontes PTA Physical Therapy Assistant        Obj/Interventions     Row Name 09/05/19 0928          Static Standing Balance    Level of [place] (Supported Standing, Static Balance)  standby assist  -AE     Row Name 09/05/19 0928          Dynamic Standing Balance    Level of [place], Reaches Outside Midline (Standing, Dynamic Balance)  contact guard assist  -AE       User Key  (r) = Recorded By, (t) = Taken By, (c) = Cosigned By    Initials Name Provider Type    Clare Miramontes PTA Physical Therapy Assistant        Goals/Plan    No documentation.       Clinical Impression     Row Name 09/05/19 0928          Pain Scale: Numbers Pre/Post-Treatment    Pain Scale: Numbers, Pretreatment  0/10 - no pain  -AE     Row Name 09/05/19 0928          Vital Signs    Pre SpO2 (%)  93  -AE     O2 Delivery Pre Treatment  supplemental O2 1L  -AE     Intra SpO2 (%)  92  -AE     O2 Delivery Intra Treatment  supplemental O2 2L  -AE     Post SpO2 (%)  94  -AE     O2 Delivery Post Treatment  supplemental O2 1L  -AE     Row Name 09/05/19 0928          Positioning and Restraints    Pre-Treatment Position  in bed  -AE     Post Treatment Position  chair  -AE     In Chair  sitting;call light within reach  -AE       User Key  (r) = Recorded By, (t) = Taken By, (c) = Cosigned By    Initials Name Provider Type    Clare Miramontes PTA Physical  Therapy Assistant        Outcome Measures    No documentation.       Physical Therapy Education     Title: PT OT SLP Therapies (In Progress)     Topic: Physical Therapy (In Progress)     Point: Mobility training (Done)     Learning Progress Summary           Patient KECIA Arriaga VU by AE at 9/5/2019 10:15 AM    Comment:  aniya AVELAR's                               User Key     Initials Effective Dates Name Provider Type Discipline    AE 06/22/15 -  Clare Asher PTA Physical Therapy Assistant PT              PT Recommendation and Plan     Plan of Care Reviewed With: patient  Progress: improving  Outcome Summary: Pt was Independent for bed mobility and cga-sba to stand. Pt was cga-sba to walk 30fft plus 40ft x 1 standing rest     Time Calculation:   PT Charges     Row Name 09/05/19 1019             Time Calculation    Start Time  0928  -AE      Stop Time  0958  -AE      Time Calculation (min)  30 min  -AE      PT Received On  09/05/19  -AE      PT Goal Re-Cert Due Date  09/13/19  -AE         Time Calculation- PT    Total Timed Code Minutes- PT  30 minute(s)  -AE         Timed Charges    14471 - Gait Training Minutes   30  -AE        User Key  (r) = Recorded By, (t) = Taken By, (c) = Cosigned By    Initials Name Provider Type    AE Clare Asher PTA Physical Therapy Assistant        Therapy Charges for Today     Code Description Service Date Service Provider Modifiers Qty    19202167727 HC GAIT TRAINING EA 15 MIN 9/5/2019 Clare Asher PTA GP 2          PT G-Codes  Outcome Measure Options: AM-PAC 6 Clicks Daily Activity (OT)  AM-PAC 6 Clicks Score (PT): 19  AM-PAC 6 Clicks Score (OT): 20    Clare Asher PTA  9/5/2019

## 2019-09-06 ENCOUNTER — READMISSION MANAGEMENT (OUTPATIENT)
Dept: CALL CENTER | Facility: HOSPITAL | Age: 84
End: 2019-09-06

## 2019-09-06 NOTE — OUTREACH NOTE
Prep Survey      Responses   Facility patient discharged from?  [facility]   Is patient eligible?  Yes   Discharge diagnosis  COPD, HTN, GERD, Pneumonia of both upper lobes, bronchiectasis, anemia, oropharyngeal dysphagia   Does the patient have one of the following disease processes/diagnoses(primary or secondary)?  COPD/Pneumonia   Does the patient have Home health ordered?  No   Is there a DME ordered?  Yes   What DME was ordered?  [employer] for home O2   Comments regarding appointments  pt. will be notified of appointment   Prep survey completed?  Yes          Kanwal Torre RN

## 2019-09-07 ENCOUNTER — READMISSION MANAGEMENT (OUTPATIENT)
Dept: CALL CENTER | Facility: HOSPITAL | Age: 84
End: 2019-09-07

## 2019-09-07 ENCOUNTER — NURSE TRIAGE (OUTPATIENT)
Dept: CALL CENTER | Facility: HOSPITAL | Age: 84
End: 2019-09-07

## 2019-09-07 LAB
BACTERIA SPEC RESP CULT: ABNORMAL
GRAM STN SPEC: ABNORMAL

## 2019-09-07 NOTE — TELEPHONE ENCOUNTER
"Caller states that his mother was recently discharged on a thickened liquid diet due to aspiration.  She was admitted  Again on 9/3 and had another swallow study.  Epic records reviewed and speech therapy note reviewed with caller.  Speech therapy saw no aspiration and recommended thin liquids, small bites, but to take meds with puree consistency.  Caller verbalizes understanding.  Questions answered.    Reason for Disposition  • Health Information question, no triage required and triager able to answer question    Additional Information  • Negative: [1] Caller is not with the adult (patient) AND [2] reporting urgent symptoms  • Negative: Lab result questions  • Negative: Medication questions  • Negative: Caller cannot be reached by phone  • Negative: Caller has already spoken to PCP or another triager  • Negative: RN needs further essential information from caller in order to complete triage  • Negative: Requesting regular office appointment  • Negative: [1] Caller requesting NON-URGENT health information AND [2] PCP's office is the best resource  • Negative: General information question, no triage required and triager able to answer question  • Negative: Question about upcoming scheduled test, no triage required and triager able to answer question  • Negative: [1] Caller is not with the adult (patient) AND [2] probable NON-URGENT symptoms    Answer Assessment - Initial Assessment Questions  1. REASON FOR CALL or QUESTION: \"What is your reason for calling today?\" or \"How can I best help you?\" or \"What question do you have that I can help answer?\"      We are confused about her discharge diet.    Protocols used: INFORMATION ONLY CALL-ADULT-      "

## 2019-09-07 NOTE — OUTREACH NOTE
COPD/PN Week 1 Survey      Responses   Facility patient discharged from?  [facility]   Does the patient have one of the following disease processes/diagnoses(primary or secondary)?  COPD/Pneumonia   Is there a successful TCM telephone encounter documented?  No   Was the primary reason for admission:  Pneumonia   Week 1 attempt successful?  Yes   Call start time  1624   Call end time  1637   Discharge diagnosis  COPD, HTN, GERD, Pneumonia of both upper lobes, bronchiectasis, anemia, oropharyngeal dysphagia   Is patient permission given to speak with other caregiver?  Yes   List who call center can speak with  son   Person spoke with today (if not patient) and relationship  son   Meds reviewed with patient/caregiver?  Yes   Is the patient having any side effects they believe may be caused by any medication additions or changes?  No   Does the patient have all medications ordered at discharge?  Yes   Is the patient taking all medications as directed (includes completed medication regime)?  Yes   Does the patient have a primary care provider?   Yes   Does the patient have an appointment with their PCP or pulmonologist within 7 days of discharge?  Yes   Has the patient kept scheduled appointments due by today?  N/A   Has home health visited the patient within 72 hours of discharge?  N/A   What DME was ordered?  [facility] for home O2   Has all DME been delivered?  Yes   Did the patient receive a copy of their discharge instructions?  Yes   Nursing interventions  Reviewed instructions with patient, Educated on MyChart   What is the patient's perception of their health status since discharge?  Improving   Are the patient's immunizations up to date?   Yes   Is the patient/caregiver able to teach back the hierarchy of who to call/visit for symptoms/problems? PCP, Specialist, Home health nurse, Urgent Care, ED, 911  Yes   Is the patient/caregiver able to teach back signs and symptoms of worsening condition:  Fever/chills, Shortness  of breath, Chest pain   Is the patient/caregiver able to teach back importance of completing antibiotic course of treatment?  Yes   Week 1 call completed?  Yes   Wrap up additional comments  Doing well.          Milagros Rodarte, RN

## 2019-09-08 LAB
BACTERIA SPEC AEROBE CULT: NORMAL
BACTERIA SPEC AEROBE CULT: NORMAL

## 2019-09-16 ENCOUNTER — READMISSION MANAGEMENT (OUTPATIENT)
Dept: CALL CENTER | Facility: HOSPITAL | Age: 84
End: 2019-09-16

## 2019-09-16 NOTE — OUTREACH NOTE
COPD/PN Week 2 Survey      Responses   Facility patient discharged from?  [facility]   Does the patient have one of the following disease processes/diagnoses(primary or secondary)?  COPD/Pneumonia   Was the primary reason for admission:  Pneumonia   Week 2 attempt successful?  Yes   Call start time  1210   Call end time  1215   Discharge diagnosis  COPD, Pneumonia of both upper lobes, bronchiectasis, anemia, oropharyngeal dysphagia   Is patient permission given to speak with other caregiver?  Yes   List who call center can speak with  Rafa archuletas reviewed with patient/caregiver?  Yes   Is the patient taking all medications as directed (includes completed medication regime)?  Yes   Does the patient have a primary care provider?   Yes   Comments regarding PCP  Javier Ng MD   Has the patient kept scheduled appointments due by today?  Yes   Has home health visited the patient within 72 hours of discharge?  N/A   What DME was ordered?  [facility] for home O2   Has all DME been delivered?  Yes   Psychosocial issues?  No   Did the patient receive a copy of their discharge instructions?  Yes   Nursing interventions  Reviewed instructions with patient   What is the patient's perception of their health status since discharge?  Improving   Is the patient/caregiver able to teach back the hierarchy of who to call/visit for symptoms/problems? PCP, Specialist, Home health nurse, Urgent Care, ED, 911  Yes   Is the patient able to teach back COPD zones?  Yes   Nursing interventions  Education provided on various zones   Patient reports what zone on this call?  Green Zone   Green Zone  Reports doing well, Usual activity and exercise level, Appetite is good   Green Zone interventions:  Take daily medications, Use oxygen as prescribed, Avoid indoor/outdoor triggers, Continue regular exercise/diet plan   Is the patient/caregiver able to teach back signs and symptoms of worsening condition:  Fever/chills, Shortness of breath,  Chest pain   Is the patient/caregiver able to teach back importance of completing antibiotic course of treatment?  Yes   Week 2 call completed?  Yes          Kanwal Arrieta RN

## 2019-09-19 NOTE — THERAPY DISCHARGE NOTE
Acute Care - Speech Language Pathology Discharge Summary  [facility]       Patient Name: Milton Mae  : 1926  MRN: [id number]    Today's Date: 2019  Onset of Illness/Injury or Date of Surgery: 19       Referring Physician: Dr. Chew        Admit Date: 9/3/2019      SLP Recommendation and Plan  Regular solids and thin liquids    Visit Dx:    ICD-10-CM ICD-9-CM   1. Dysphagia, unspecified type R13.10 787.20   2. Impaired mobility Z74.09 799.89   3. Impaired mobility and ADLs Z74.09 799.89   4. Pneumonia of both lungs due to infectious organism, unspecified part of lung J18.9 483.8   5. Panlobular emphysema (CMS/[facility]) J43.1 492.8               SLP GOALS     Row Name 19 1300             Oral Nutrition/Hydration Goal 1 (SLP)    Oral Nutrition/Hydration Goal 1, SLP  Pt will tolerate least restrictive diet with no overt s/s of aspiration.   -MB      Time Frame (Oral Nutrition/Hydration Goal 1, SLP)  by discharge  -MB      Barriers (Oral Nutrition/Hydration Goal 1, SLP)  n/a  -MB      Progress/Outcomes (Oral Nutrition/Hydration Goal 1, SLP)  goal not met  -MB         Pharyngeal Strengthening Exercise Goal 1 (SLP)    Activity (Pharyngeal Strengthening Goal 1, SLP)  increase timing;increase superior movement of the hyolaryngeal complex;increase anterior movement of the hyolaryngeal complex;increase squeeze/positive pressure generation  -MB      Increase Timing  gustatory stimulation (sour/cold)  -MB      Increase Superior Movement of the Hyolaryngeal Complex  Mendelsohn;falsetto  -MB      Increase Anterior Movement of the Hyolaryngeal Complex  shaker  -MB      Increase Squeeze/Positive Pressure Generation  hard effortful swallow;shaker  -MB      Rogers/Accuracy (Pharyngeal Strengthening Goal 1, SLP)  independently (over 90% accuracy)  -MB      Time Frame (Pharyngeal Strengthening Goal 1, SLP)  short term goal (STG);by discharge  -MB      Barriers (Pharyngeal Strengthening Goal 1, SLP)  n/a   -MB      Progress/Outcomes (Pharyngeal Strengthening Goal 1, SLP)  goal not met  -MB        User Key  (r) = Recorded By, (t) = Taken By, (c) = Cosigned By    Initials Name Provider Type    Sarah Lopez CCC-SLP Speech and Language Pathologist                  SLP Discharge Summary  Anticipated Dischage Disposition: unknown  Reason for Discharge: discharge from this facility  Progress Toward Achieving Short/long Term Goals: goals not met within established timelines  Discharge Destination: home      Sarah Walker CCC-SLP  9/19/2019

## 2019-09-25 ENCOUNTER — READMISSION MANAGEMENT (OUTPATIENT)
Dept: CALL CENTER | Facility: HOSPITAL | Age: 84
End: 2019-09-25

## 2019-09-25 NOTE — OUTREACH NOTE
"COPD/PN Week 3 Survey      Responses   Facility patient discharged from?  [place]   Does the patient have one of the following disease processes/diagnoses(primary or secondary)?  COPD/Pneumonia   Was the primary reason for admission:  Pneumonia   Week 3 attempt successful?  Yes   Call start time  1234   Call end time  1241   Discharge diagnosis  COPD, Pneumonia of both upper lobes, bronchiectasis, anemia, oropharyngeal dysphagia   Meds reviewed with patient/caregiver?  Yes   Is the patient having any side effects they believe may be caused by any medication additions or changes?  No   Does the patient have all medications ordered at discharge?  Yes   Is the patient taking all medications as directed (includes completed medication regime)?  Yes   Does the patient have a primary care provider?   Yes   Does the patient have an appointment with their PCP or pulmonologist within 7 days of discharge?  Yes   Has the patient kept scheduled appointments due by today?  Yes   Has home health visited the patient within 72 hours of discharge?  N/A   Psychosocial issues?  No   Comments  pt states has a \"cold' since seeing PCP for f/u, has O2 pulse ox, readings are WNL's, lives with son, who is very attentive and helpful according to pt   Did the patient receive a copy of their discharge instructions?  Yes   Nursing interventions  Reviewed instructions with patient   What is the patient's perception of their health status since discharge?  Improving   Is the patient/caregiver able to teach back the hierarchy of who to call/visit for symptoms/problems? PCP, Specialist, Home health nurse, Urgent Care, ED, 911  Yes   Is the patient/caregiver able to teach back signs and symptoms of worsening condition:  Fever/chills, Shortness of breath, Chest pain   Is the patient/caregiver able to teach back importance of completing antibiotic course of treatment?  Yes   Week 3 call completed?  Yes          Zena Hung RN  "

## 2021-04-04 ENCOUNTER — APPOINTMENT (OUTPATIENT)
Dept: CT IMAGING | Facility: HOSPITAL | Age: 86
End: 2021-04-04

## 2021-04-04 ENCOUNTER — APPOINTMENT (OUTPATIENT)
Dept: GENERAL RADIOLOGY | Facility: HOSPITAL | Age: 86
End: 2021-04-04

## 2021-04-04 ENCOUNTER — HOSPITAL ENCOUNTER (EMERGENCY)
Facility: HOSPITAL | Age: 86
Discharge: HOME | End: 2021-04-04
Attending: EMERGENCY MEDICINE | Admitting: EMERGENCY MEDICINE

## 2021-04-04 VITALS
SYSTOLIC BLOOD PRESSURE: 148 MMHG | BODY MASS INDEX: 15.74 KG/M2 | OXYGEN SATURATION: 96 % | RESPIRATION RATE: 20 BRPM | HEART RATE: 93 BPM | WEIGHT: 94.5 LBS | TEMPERATURE: 98.5 F | HEIGHT: 65 IN | DIASTOLIC BLOOD PRESSURE: 72 MMHG

## 2021-04-04 DIAGNOSIS — R04.2 HEMOPTYSIS: ICD-10-CM

## 2021-04-04 DIAGNOSIS — J18.9 PNEUMONIA OF BOTH LUNGS DUE TO INFECTIOUS ORGANISM, UNSPECIFIED PART OF LUNG: Primary | ICD-10-CM

## 2021-04-04 LAB
ALBUMIN SERPL-MCNC: 3.8 G/DL (ref 3.5–5.2)
ALBUMIN/GLOB SERPL: 1 G/DL
ALP SERPL-CCNC: 57 U/L (ref 39–117)
ALT SERPL W P-5'-P-CCNC: 13 U/L (ref 1–33)
ANION GAP SERPL CALCULATED.3IONS-SCNC: 9 MMOL/L (ref 5–15)
AST SERPL-CCNC: 22 U/L (ref 1–32)
BASOPHILS # BLD AUTO: 0.05 10*3/MM3 (ref 0–0.2)
BASOPHILS NFR BLD AUTO: 0.5 % (ref 0–1.5)
BILIRUB SERPL-MCNC: <0.2 MG/DL (ref 0–1.2)
BUN SERPL-MCNC: 39 MG/DL (ref 8–23)
BUN/CREAT SERPL: 38.2 (ref 7–25)
CALCIUM SPEC-SCNC: 9.9 MG/DL (ref 8.2–9.6)
CHLORIDE SERPL-SCNC: 102 MMOL/L (ref 98–107)
CO2 SERPL-SCNC: 30 MMOL/L (ref 22–29)
CREAT SERPL-MCNC: 1.02 MG/DL (ref 0.57–1)
D DIMER PPP FEU-MCNC: 1.07 MG/L (FEU) (ref 0–0.5)
DEPRECATED RDW RBC AUTO: 48.2 FL (ref 37–54)
EOSINOPHIL # BLD AUTO: 0.22 10*3/MM3 (ref 0–0.4)
EOSINOPHIL NFR BLD AUTO: 2.3 % (ref 0.3–6.2)
ERYTHROCYTE [DISTWIDTH] IN BLOOD BY AUTOMATED COUNT: 13.4 % (ref 12.3–15.4)
GFR SERPL CREATININE-BSD FRML MDRD: 50 ML/MIN/1.73
GLOBULIN UR ELPH-MCNC: 3.9 GM/DL
GLUCOSE SERPL-MCNC: 119 MG/DL (ref 65–99)
HCT VFR BLD AUTO: 29.6 % (ref 34–46.6)
HGB BLD-MCNC: 9.6 G/DL (ref 12–15.9)
IMM GRANULOCYTES # BLD AUTO: 0.03 10*3/MM3 (ref 0–0.05)
IMM GRANULOCYTES NFR BLD AUTO: 0.3 % (ref 0–0.5)
INR PPP: 1.07 (ref 0.91–1.09)
LYMPHOCYTES # BLD AUTO: 1.72 10*3/MM3 (ref 0.7–3.1)
LYMPHOCYTES NFR BLD AUTO: 17.9 % (ref 19.6–45.3)
MCH RBC QN AUTO: 31.5 PG (ref 26.6–33)
MCHC RBC AUTO-ENTMCNC: 32.4 G/DL (ref 31.5–35.7)
MCV RBC AUTO: 97 FL (ref 79–97)
MONOCYTES # BLD AUTO: 0.64 10*3/MM3 (ref 0.1–0.9)
MONOCYTES NFR BLD AUTO: 6.7 % (ref 5–12)
NEUTROPHILS NFR BLD AUTO: 6.93 10*3/MM3 (ref 1.7–7)
NEUTROPHILS NFR BLD AUTO: 72.3 % (ref 42.7–76)
NRBC BLD AUTO-RTO: 0 /100 WBC (ref 0–0.2)
NT-PROBNP SERPL-MCNC: 347.9 PG/ML (ref 0–1800)
PLATELET # BLD AUTO: 270 10*3/MM3 (ref 140–450)
PMV BLD AUTO: 10.7 FL (ref 6–12)
POTASSIUM SERPL-SCNC: 4.3 MMOL/L (ref 3.5–5.2)
PROT SERPL-MCNC: 7.7 G/DL (ref 6–8.5)
PROTHROMBIN TIME: 13.5 SECONDS (ref 11.9–14.6)
RBC # BLD AUTO: 3.05 10*6/MM3 (ref 3.77–5.28)
SODIUM SERPL-SCNC: 141 MMOL/L (ref 136–145)
WBC # BLD AUTO: 9.59 10*3/MM3 (ref 3.4–10.8)

## 2021-04-04 PROCEDURE — 85610 PROTHROMBIN TIME: CPT | Performed by: EMERGENCY MEDICINE

## 2021-04-04 PROCEDURE — 85379 FIBRIN DEGRADATION QUANT: CPT | Performed by: EMERGENCY MEDICINE

## 2021-04-04 PROCEDURE — 99284 EMERGENCY DEPT VISIT MOD MDM: CPT

## 2021-04-04 PROCEDURE — 36415 COLL VENOUS BLD VENIPUNCTURE: CPT

## 2021-04-04 PROCEDURE — 0 IOPAMIDOL PER 1 ML: Performed by: EMERGENCY MEDICINE

## 2021-04-04 PROCEDURE — 83880 ASSAY OF NATRIURETIC PEPTIDE: CPT | Performed by: EMERGENCY MEDICINE

## 2021-04-04 PROCEDURE — 71045 X-RAY EXAM CHEST 1 VIEW: CPT

## 2021-04-04 PROCEDURE — 85025 COMPLETE CBC W/AUTO DIFF WBC: CPT | Performed by: EMERGENCY MEDICINE

## 2021-04-04 PROCEDURE — 80053 COMPREHEN METABOLIC PANEL: CPT | Performed by: EMERGENCY MEDICINE

## 2021-04-04 PROCEDURE — 71275 CT ANGIOGRAPHY CHEST: CPT

## 2021-04-04 RX ORDER — SODIUM CHLORIDE 0.9 % (FLUSH) 0.9 %
10 SYRINGE (ML) INJECTION AS NEEDED
Status: DISCONTINUED | OUTPATIENT
Start: 2021-04-04 | End: 2021-04-04 | Stop reason: HOSPADM

## 2021-04-04 RX ORDER — CEFDINIR 300 MG/1
300 CAPSULE ORAL 2 TIMES DAILY
Qty: 14 CAPSULE | Refills: 0 | Status: ON HOLD | OUTPATIENT
Start: 2021-04-04 | End: 2022-04-24

## 2021-04-04 RX ORDER — AZITHROMYCIN 250 MG/1
TABLET, FILM COATED ORAL
Qty: 6 TABLET | Refills: 0 | Status: ON HOLD | OUTPATIENT
Start: 2021-04-04 | End: 2022-04-25

## 2021-04-04 RX ADMIN — IOPAMIDOL 100 ML: 755 INJECTION, SOLUTION INTRAVENOUS at 18:53

## 2021-04-04 NOTE — ED PROVIDER NOTES
Subjective   89-year-old female presents to the ED with complaint of hemoptysis and and shortness of breath.  History of COPD, GERD, hypertension.  She reports onset of hemoptysis 2 to 3 weeks ago.  She coughs up dime sized clots daily.  She also complains of mild shortness of breath.  Has not had any wheezing, fever, sore throat, rhinorrhea, lower extremity swelling.  No history of DVT or PE and not on any anticoagulation.  Rates her symptoms as moderate severity with no aggravating alleviating factors.      History provided by:  Patient      Review of Systems   All other systems reviewed and are negative.      Past Medical History:   Diagnosis Date   • COPD (chronic obstructive pulmonary disease) (CMS/HCC)    • GERD (gastroesophageal reflux disease)    • Hypertension    • Pneumonia        No Known Allergies    Past Surgical History:   Procedure Laterality Date   • CATARACT EXTRACTION, BILATERAL     • THYROID SURGERY         Family History   Problem Relation Age of Onset   • Alzheimer's disease Mother    • Heart disease Father    • Colon polyps Child    • Colon cancer Neg Hx        Social History     Socioeconomic History   • Marital status:      Spouse name: Not on file   • Number of children: Not on file   • Years of education: Not on file   • Highest education level: Not on file   Tobacco Use   • Smoking status: Never Smoker   • Smokeless tobacco: Never Used   Substance and Sexual Activity   • Alcohol use: No   • Drug use: No   • Sexual activity: Defer           Objective   Physical Exam  Vitals and nursing note reviewed.   Constitutional:       General: She is not in acute distress.     Appearance: Normal appearance. She is normal weight.   HENT:      Head: Normocephalic and atraumatic.      Nose: Nose normal.      Mouth/Throat:      Mouth: Mucous membranes are moist.      Pharynx: Oropharynx is clear. No oropharyngeal exudate or posterior oropharyngeal erythema.   Eyes:      Extraocular Movements:  Extraocular movements intact.      Conjunctiva/sclera: Conjunctivae normal.      Pupils: Pupils are equal, round, and reactive to light.   Cardiovascular:      Rate and Rhythm: Regular rhythm. Tachycardia present.      Pulses: Normal pulses.      Heart sounds: Normal heart sounds.   Pulmonary:      Effort: Pulmonary effort is normal.      Breath sounds: Normal breath sounds. No wheezing, rhonchi or rales.   Abdominal:      General: Abdomen is flat. Bowel sounds are normal. There is no distension.      Palpations: Abdomen is soft.      Tenderness: There is no abdominal tenderness.   Musculoskeletal:         General: No tenderness. Normal range of motion.      Cervical back: Normal range of motion and neck supple. No rigidity. No muscular tenderness.      Right lower leg: No edema.      Left lower leg: No edema.   Skin:     General: Skin is warm and dry.      Capillary Refill: Capillary refill takes less than 2 seconds.      Findings: No rash.   Neurological:      General: No focal deficit present.      Mental Status: She is alert and oriented to person, place, and time. Mental status is at baseline.      Cranial Nerves: No cranial nerve deficit.      Sensory: No sensory deficit.      Motor: No weakness.   Psychiatric:         Mood and Affect: Mood normal.         Behavior: Behavior normal.         Thought Content: Thought content normal.         Procedures       XR Chest 1 View    Result Date: 4/4/2021  EXAMINATION: XR CHEST 1 VW- 4/4/2021 5:52 PM CDT  HISTORY: cough, hemoptysis.  REPORT: A frontal view of the chest was obtained.  COMPARISON: Chest x-ray 9/3/2019. Chest x-ray 7/18/2018  There are coarse central interstitial opacities and parenchymal opacities in the upper lung zones which are unchanged compared with chest x-rays from 2019, compatible with chronic fibrosis. No superimposed evidence of acute pneumonia. Heart size is normal. No pneumothorax or pleural effusion is identified. There moderate changes of  COPD. The osseous structures are unremarkable.      Extensive pulmonary fibrosis, greatest in the upper lung zones, similar to chest x-rays from 9/3/2019. No definite superimposed acute infiltrate is identified. This report was finalized on 04/04/2021 17:54 by Dr. Meño Méndez MD.    CT Angiogram Chest    Result Date: 4/4/2021  EXAMINATION: CT ANGIOGRAM CHEST- 4/4/2021 7:15 PM CDT  HISTORY: Dyspnea, hemoptysis, coughing up blood.  DOSE: 134 mGycm (Automatic exposure control technique was implemented in an effort to keep the radiation dose as low as possible without compromising image quality)  REPORT: Spiral CT of the chest was performed after administration of intravenous contrast from the thoracic inlet through the upper abdomen using CTA protocol. Reconstructed 3-D, coronal and sagittal images were also reviewed.  Comparison: CTA chest 9/3/2019.  The contrast bolus is satisfactory, there is mild respiratory motion artifact. The central pulmonary arteries are normal caliber, without filling defects. Smaller peripheral pulmonary artery branches are not ideally evaluated due to motion. There is ectasia of the ascending aorta, which has a maximum diameter 3.3 cm. No aortic dissection is identified. Heart size is normal. There is no evidence of right heart strain. There are borderline enlarged precarinal lymph nodes, partially calcified. No measurable lymphadenopathy is identified. Review of lung windows demonstrates dense bands of infiltrate with bronchial wall thickening in both lungs, in the left upper lobe, there is associated mild right (which appears stable. This extends to the anterior pleural surface in the left upper lobe and appears to have progressed somewhat. There is increased focal subpleural infiltrate in the right upper lobe laterally image 35. Chronic interstitial infiltrate in the right upper lobe anteriorly on axial images 30 through 48 is unchanged. The right lower lobe there is a new area of  nodular infiltrate partially obscured by respiratory motion but visible on axial image 79, with a maximum thickness of 9 mm, with more diffuse reticulonodular interstitial opacities in the same area, also new. There is also subpleural nodule in the left lower lobe which is new, axial image 88. This measures 1.4 cm. There is of pneumonia are favored over a neoplastic process but follow-up is recommended. There are tubular densities in the right middle lobe which are stable to increased and appear represent dilated bronchi with inspissated material. There are dense bands of suspected fibrosis within the medial aspect of the lingula and right middle lobe which appears essentially unchanged. The visualized upper abdomen shows a benign-appearing cyst at the upper pole left kidney that measures 3.5 cm. There is a second cyst in the posterior left kidney on the last 2 images, incompletely imaged but measuring at least 2 cm. This is likely benign. Review of bone windows shows no acute abnormality.      1. Respiratory motion artifact is limiting, however no evidence of acute pulmonary embolism is identified. 2. Extensive chronic infiltrates with bilateral bandlike areas of pulmonary fibrosis, bronchiectasis and bronchial wall thickening, with new areas of infiltrate in both lower lobes, greater on the right, with associated subpleural nodularity. This likely represents areas of acute pneumonia lower lobes. The pattern is not typical of COVID-19 infection, though atypical infection including mycobacterial etiologies are not excluded. 3. 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 Consider short interval follow-up CT chest in 3 months to exclude underlying neoplasm associated with the subpleural nodularity in both lung bases     This report was finalized on 04/04/2021 19:30 by Dr. Wilder  MD Dev.    Lab Results (last 24 hours)     Procedure Component Value Units Date/Time    CBC & Differential [[id number]]  (Abnormal) Collected: 04/04/21 1723    Specimen: Blood Updated: 04/04/21 1738    Narrative:      The following orders were created for panel order CBC & Differential.  Procedure                               Abnormality         Status                     ---------                               -----------         ------                     CBC Auto Differential[[id number]]        Abnormal            Final result                 Please view results for these tests on the individual orders.    Comprehensive Metabolic Panel [[id number]]  (Abnormal) Collected: 04/04/21 1723    Specimen: Blood Updated: 04/04/21 1759     Glucose 119 mg/dL      BUN 39 mg/dL      Creatinine 1.02 mg/dL      Sodium 141 mmol/L      Potassium 4.3 mmol/L      Chloride 102 mmol/L      CO2 30.0 mmol/L      Calcium 9.9 mg/dL      Total Protein 7.7 g/dL      Albumin 3.80 g/dL      ALT (SGPT) 13 U/L      AST (SGOT) 22 U/L      Alkaline Phosphatase 57 U/L      Total Bilirubin <0.2 mg/dL      eGFR Non African Amer 50 mL/min/1.73      Globulin 3.9 gm/dL      A/G Ratio 1.0 g/dL      BUN/Creatinine Ratio 38.2     Anion Gap 9.0 mmol/L     Narrative:      GFR Normal >60  Chronic Kidney Disease <60  Kidney Failure <15      Protime-INR [[id number]]  (Normal) Collected: 04/04/21 1723    Specimen: Blood Updated: 04/04/21 1757     Protime 13.5 Seconds      INR 1.07    D-dimer, Quantitative [[id number]]  (Abnormal) Collected: 04/04/21 1723    Specimen: Blood Updated: 04/04/21 1757     D-Dimer, Quantitative 1.07 mg/L (FEU)     Narrative:      Reference Range is 0-0.50 mg/L FEU. However, results <0.50 mg/L FEU tends to rule out DVT or PE. Results >0.50 mg/L FEU are not useful in predicting absence or presence of DVT or PE.      BNP [[id number]]  (Normal) Collected: 04/04/21 1723    Specimen: Blood Updated: 04/04/21 1757     proBNP 347.9 pg/mL      Narrative:      Among patients with dyspnea, NT-proBNP is highly sensitive for the detection of acute congestive heart failure. In addition NT-proBNP of <300 pg/ml effectively rules out acute congestive heart failure with 99% negative predictive value.    Results may be falsely decreased if patient taking Biotin.      CBC Auto Differential [[id number]]  (Abnormal) Collected: 04/04/21 1723    Specimen: Blood Updated: 04/04/21 1738     WBC 9.59 10*3/mm3      RBC 3.05 10*6/mm3      Hemoglobin 9.6 g/dL      Hematocrit 29.6 %      MCV 97.0 fL      MCH 31.5 pg      MCHC 32.4 g/dL      RDW 13.4 %      RDW-SD 48.2 fl      MPV 10.7 fL      Platelets 270 10*3/mm3      Neutrophil % 72.3 %      Lymphocyte % 17.9 %      Monocyte % 6.7 %      Eosinophil % 2.3 %      Basophil % 0.5 %      Immature Grans % 0.3 %      Neutrophils, Absolute 6.93 10*3/mm3      Lymphocytes, Absolute 1.72 10*3/mm3      Monocytes, Absolute 0.64 10*3/mm3      Eosinophils, Absolute 0.22 10*3/mm3      Basophils, Absolute 0.05 10*3/mm3      Immature Grans, Absolute 0.03 10*3/mm3      nRBC 0.0 /100 WBC         ED Course  ED Course as of Apr 04 2036   Sun Apr 04, 2021 2033 No evidence for PE, no evidence for mass.  Patient does have findings concerning for Boop as well as acute lower lobar pneumonia.  Her oxygen saturation are 90% on her home dose of oxygen.  She is not in any respiratory distress.  No fever.  He is a good candidate for trial of outpatient treatment.  Recommend she follow-up with her pulmonologist as an outpatient.    [AW]      ED Course User Index  [AW] Richard Pandey MD                                           MDM  Number of Diagnoses or Management Options  Hemoptysis: new and requires workup  Pneumonia of both lungs due to infectious organism, unspecified part of lung: new and requires workup     Amount and/or Complexity of Data Reviewed  Clinical lab tests: reviewed  Tests in the radiology section of CPT®: reviewed    Risk of  Complications, Morbidity, and/or Mortality  Presenting problems: high  Diagnostic procedures: high  Management options: high    Patient Progress  Patient progress: improved      Final diagnoses:   Pneumonia of both lungs due to infectious organism, unspecified part of lung   Hemoptysis       ED Disposition  ED Disposition     ED Disposition Condition Comment    Discharge Stable           Javier Ng MD  [address]  [facility] [id number] [phone]    In 2 days           Medication List      New Prescriptions    azithromycin 250 MG tablet  Commonly known as: Zithromax Z-Wilmer  Take 2 tablets by mouth on day 1, then 1 tablet daily on days 2-5     cefdinir 300 MG capsule  Commonly known as: OMNICEF  Take 1 capsule by mouth 2 (Two) Times a Day.           Where to Get Your Medications      These medications were sent to [facility] - [facility] - [address] [place] [address] - [phone]  - [phone] [address] [address]    Phone: [phone]   · azithromycin 250 MG tablet  · cefdinir 300 MG capsule          Richard Pandey MD  04/04/21 2036

## 2022-04-24 ENCOUNTER — APPOINTMENT (OUTPATIENT)
Dept: CT IMAGING | Facility: HOSPITAL | Age: 87
End: 2022-04-24

## 2022-04-24 ENCOUNTER — APPOINTMENT (OUTPATIENT)
Dept: GENERAL RADIOLOGY | Facility: HOSPITAL | Age: 87
End: 2022-04-24

## 2022-04-24 ENCOUNTER — HOSPITAL ENCOUNTER (INPATIENT)
Facility: HOSPITAL | Age: 87
LOS: 5 days | Discharge: HOME HEALTH | End: 2022-04-29
Attending: FAMILY MEDICINE | Admitting: INTERNAL MEDICINE

## 2022-04-24 DIAGNOSIS — J18.9 PNEUMONIA OF BOTH LUNGS DUE TO INFECTIOUS ORGANISM, UNSPECIFIED PART OF LUNG: Primary | ICD-10-CM

## 2022-04-24 DIAGNOSIS — Z78.9 DECREASED ACTIVITIES OF DAILY LIVING (ADL): ICD-10-CM

## 2022-04-24 DIAGNOSIS — J84.89 BOOP (BRONCHIOLITIS OBLITERANS WITH ORGANIZING PNEUMONIA): ICD-10-CM

## 2022-04-24 DIAGNOSIS — Z74.09 IMPAIRED FUNCTIONAL MOBILITY AND ACTIVITY TOLERANCE: ICD-10-CM

## 2022-04-24 PROBLEM — R53.1 GENERALIZED WEAKNESS: Status: ACTIVE | Noted: 2022-04-24

## 2022-04-24 PROBLEM — E43 SEVERE MALNUTRITION: Status: ACTIVE | Noted: 2022-04-24

## 2022-04-24 PROBLEM — B34.8 INFECTION DUE TO PARAINFLUENZA VIRUS 3: Status: ACTIVE | Noted: 2022-04-24

## 2022-04-24 LAB
ALBUMIN SERPL-MCNC: 3.6 G/DL (ref 3.5–5.2)
ALBUMIN/GLOB SERPL: 0.9 G/DL
ALP SERPL-CCNC: 40 U/L (ref 39–117)
ALT SERPL W P-5'-P-CCNC: 10 U/L (ref 1–33)
ANION GAP SERPL CALCULATED.3IONS-SCNC: 11 MMOL/L (ref 5–15)
ARTERIAL PATENCY WRIST A: POSITIVE
AST SERPL-CCNC: 22 U/L (ref 1–32)
ATMOSPHERIC PRESS: 751 MMHG
B PARAPERT DNA SPEC QL NAA+PROBE: NOT DETECTED
B PERT DNA SPEC QL NAA+PROBE: NOT DETECTED
BASE EXCESS BLDA CALC-SCNC: 6.3 MMOL/L (ref 0–2)
BASOPHILS # BLD AUTO: 0.03 10*3/MM3 (ref 0–0.2)
BASOPHILS NFR BLD AUTO: 0.4 % (ref 0–1.5)
BDY SITE: ABNORMAL
BILIRUB SERPL-MCNC: 0.3 MG/DL (ref 0–1.2)
BODY TEMPERATURE: 37 C
BUN SERPL-MCNC: 24 MG/DL (ref 8–23)
BUN/CREAT SERPL: 33.8 (ref 7–25)
C PNEUM DNA NPH QL NAA+NON-PROBE: NOT DETECTED
CALCIUM SPEC-SCNC: 9.6 MG/DL (ref 8.2–9.6)
CHLORIDE SERPL-SCNC: 98 MMOL/L (ref 98–107)
CO2 SERPL-SCNC: 30 MMOL/L (ref 22–29)
CREAT SERPL-MCNC: 0.71 MG/DL (ref 0.57–1)
D DIMER PPP FEU-MCNC: 1.09 MG/L (FEU) (ref 0–0.5)
D-LACTATE SERPL-SCNC: 1.1 MMOL/L (ref 0.5–2)
DEPRECATED RDW RBC AUTO: 50.2 FL (ref 37–54)
EGFRCR SERPLBLD CKD-EPI 2021: 77.9 ML/MIN/1.73
EOSINOPHIL # BLD AUTO: 0.04 10*3/MM3 (ref 0–0.4)
EOSINOPHIL NFR BLD AUTO: 0.5 % (ref 0.3–6.2)
ERYTHROCYTE [DISTWIDTH] IN BLOOD BY AUTOMATED COUNT: 13.8 % (ref 12.3–15.4)
FLUAV SUBTYP SPEC NAA+PROBE: NOT DETECTED
FLUBV RNA ISLT QL NAA+PROBE: NOT DETECTED
GLOBULIN UR ELPH-MCNC: 3.9 GM/DL
GLUCOSE SERPL-MCNC: 127 MG/DL (ref 65–99)
HADV DNA SPEC NAA+PROBE: NOT DETECTED
HCO3 BLDA-SCNC: 30.7 MMOL/L (ref 20–26)
HCOV 229E RNA SPEC QL NAA+PROBE: NOT DETECTED
HCOV HKU1 RNA SPEC QL NAA+PROBE: NOT DETECTED
HCOV NL63 RNA SPEC QL NAA+PROBE: NOT DETECTED
HCOV OC43 RNA SPEC QL NAA+PROBE: NOT DETECTED
HCT VFR BLD AUTO: 31.8 % (ref 34–46.6)
HGB BLD-MCNC: 10.2 G/DL (ref 12–15.9)
HMPV RNA NPH QL NAA+NON-PROBE: NOT DETECTED
HPIV1 RNA ISLT QL NAA+PROBE: NOT DETECTED
HPIV2 RNA SPEC QL NAA+PROBE: NOT DETECTED
HPIV3 RNA NPH QL NAA+PROBE: DETECTED
HPIV4 P GENE NPH QL NAA+PROBE: NOT DETECTED
IMM GRANULOCYTES # BLD AUTO: 0.04 10*3/MM3 (ref 0–0.05)
IMM GRANULOCYTES NFR BLD AUTO: 0.5 % (ref 0–0.5)
LIPASE SERPL-CCNC: 26 U/L (ref 13–60)
LYMPHOCYTES # BLD AUTO: 0.66 10*3/MM3 (ref 0.7–3.1)
LYMPHOCYTES NFR BLD AUTO: 8.4 % (ref 19.6–45.3)
Lab: ABNORMAL
M PNEUMO IGG SER IA-ACNC: NOT DETECTED
MCH RBC QN AUTO: 31.6 PG (ref 26.6–33)
MCHC RBC AUTO-ENTMCNC: 32.1 G/DL (ref 31.5–35.7)
MCV RBC AUTO: 98.5 FL (ref 79–97)
MODALITY: ABNORMAL
MONOCYTES # BLD AUTO: 0.57 10*3/MM3 (ref 0.1–0.9)
MONOCYTES NFR BLD AUTO: 7.3 % (ref 5–12)
NEUTROPHILS NFR BLD AUTO: 6.49 10*3/MM3 (ref 1.7–7)
NEUTROPHILS NFR BLD AUTO: 82.9 % (ref 42.7–76)
NRBC BLD AUTO-RTO: 0 /100 WBC (ref 0–0.2)
PCO2 BLDA: 43 MM HG (ref 35–45)
PCO2 TEMP ADJ BLD: 43 MM HG (ref 35–45)
PH BLDA: 7.46 PH UNITS (ref 7.35–7.45)
PH, TEMP CORRECTED: 7.46 PH UNITS (ref 7.35–7.45)
PLATELET # BLD AUTO: 262 10*3/MM3 (ref 140–450)
PMV BLD AUTO: 10.8 FL (ref 6–12)
PO2 BLDA: 59.7 MM HG (ref 83–108)
PO2 TEMP ADJ BLD: 59.7 MM HG (ref 83–108)
POTASSIUM SERPL-SCNC: 4.1 MMOL/L (ref 3.5–5.2)
PROT SERPL-MCNC: 7.5 G/DL (ref 6–8.5)
RBC # BLD AUTO: 3.23 10*6/MM3 (ref 3.77–5.28)
RHINOVIRUS RNA SPEC NAA+PROBE: NOT DETECTED
RSV RNA NPH QL NAA+NON-PROBE: NOT DETECTED
SAO2 % BLDCOA: 92.7 % (ref 94–99)
SARS-COV-2 RNA NPH QL NAA+NON-PROBE: NOT DETECTED
SODIUM SERPL-SCNC: 139 MMOL/L (ref 136–145)
TROPONIN T SERPL-MCNC: <0.01 NG/ML (ref 0–0.03)
TROPONIN T SERPL-MCNC: <0.01 NG/ML (ref 0–0.03)
VENTILATOR MODE: ABNORMAL
WBC NRBC COR # BLD: 7.83 10*3/MM3 (ref 3.4–10.8)

## 2022-04-24 PROCEDURE — 99285 EMERGENCY DEPT VISIT HI MDM: CPT

## 2022-04-24 PROCEDURE — 93005 ELECTROCARDIOGRAM TRACING: CPT | Performed by: FAMILY MEDICINE

## 2022-04-24 PROCEDURE — 25010000002 METHYLPREDNISOLONE PER 40 MG: Performed by: INTERNAL MEDICINE

## 2022-04-24 PROCEDURE — 94640 AIRWAY INHALATION TREATMENT: CPT

## 2022-04-24 PROCEDURE — 94799 UNLISTED PULMONARY SVC/PX: CPT

## 2022-04-24 PROCEDURE — 36600 WITHDRAWAL OF ARTERIAL BLOOD: CPT

## 2022-04-24 PROCEDURE — 25010000002 AZITHROMYCIN PER 500 MG: Performed by: FAMILY MEDICINE

## 2022-04-24 PROCEDURE — 93010 ELECTROCARDIOGRAM REPORT: CPT | Performed by: INTERNAL MEDICINE

## 2022-04-24 PROCEDURE — 71250 CT THORAX DX C-: CPT

## 2022-04-24 PROCEDURE — 25010000002 ENOXAPARIN PER 10 MG: Performed by: INTERNAL MEDICINE

## 2022-04-24 PROCEDURE — 87040 BLOOD CULTURE FOR BACTERIA: CPT | Performed by: FAMILY MEDICINE

## 2022-04-24 PROCEDURE — 94664 DEMO&/EVAL PT USE INHALER: CPT

## 2022-04-24 PROCEDURE — 85379 FIBRIN DEGRADATION QUANT: CPT | Performed by: FAMILY MEDICINE

## 2022-04-24 PROCEDURE — 71045 X-RAY EXAM CHEST 1 VIEW: CPT

## 2022-04-24 PROCEDURE — 84484 ASSAY OF TROPONIN QUANT: CPT | Performed by: FAMILY MEDICINE

## 2022-04-24 PROCEDURE — 0202U NFCT DS 22 TRGT SARS-COV-2: CPT | Performed by: FAMILY MEDICINE

## 2022-04-24 PROCEDURE — 82803 BLOOD GASES ANY COMBINATION: CPT

## 2022-04-24 PROCEDURE — 83690 ASSAY OF LIPASE: CPT | Performed by: FAMILY MEDICINE

## 2022-04-24 PROCEDURE — 99223 1ST HOSP IP/OBS HIGH 75: CPT | Performed by: INTERNAL MEDICINE

## 2022-04-24 PROCEDURE — 25010000002 CEFTRIAXONE PER 250 MG: Performed by: FAMILY MEDICINE

## 2022-04-24 PROCEDURE — 85025 COMPLETE CBC W/AUTO DIFF WBC: CPT | Performed by: FAMILY MEDICINE

## 2022-04-24 PROCEDURE — 80053 COMPREHEN METABOLIC PANEL: CPT | Performed by: FAMILY MEDICINE

## 2022-04-24 PROCEDURE — 83605 ASSAY OF LACTIC ACID: CPT | Performed by: FAMILY MEDICINE

## 2022-04-24 RX ORDER — GUAIFENESIN 600 MG/1
1200 TABLET, EXTENDED RELEASE ORAL EVERY 12 HOURS SCHEDULED
Status: DISCONTINUED | OUTPATIENT
Start: 2022-04-24 | End: 2022-04-29 | Stop reason: HOSPADM

## 2022-04-24 RX ORDER — ACETAMINOPHEN 325 MG/1
650 TABLET ORAL EVERY 4 HOURS PRN
Status: DISCONTINUED | OUTPATIENT
Start: 2022-04-24 | End: 2022-04-29 | Stop reason: HOSPADM

## 2022-04-24 RX ORDER — METHYLPREDNISOLONE SODIUM SUCCINATE 40 MG/ML
40 INJECTION, POWDER, LYOPHILIZED, FOR SOLUTION INTRAMUSCULAR; INTRAVENOUS EVERY 12 HOURS
Status: COMPLETED | OUTPATIENT
Start: 2022-04-24 | End: 2022-04-26

## 2022-04-24 RX ORDER — SODIUM CHLORIDE 0.9 % (FLUSH) 0.9 %
10 SYRINGE (ML) INJECTION AS NEEDED
Status: DISCONTINUED | OUTPATIENT
Start: 2022-04-24 | End: 2022-04-29 | Stop reason: HOSPADM

## 2022-04-24 RX ORDER — SODIUM CHLORIDE 0.9 % (FLUSH) 0.9 %
10 SYRINGE (ML) INJECTION EVERY 12 HOURS SCHEDULED
Status: DISCONTINUED | OUTPATIENT
Start: 2022-04-24 | End: 2022-04-29 | Stop reason: HOSPADM

## 2022-04-24 RX ORDER — IPRATROPIUM BROMIDE AND ALBUTEROL SULFATE 2.5; .5 MG/3ML; MG/3ML
3 SOLUTION RESPIRATORY (INHALATION)
Status: DISCONTINUED | OUTPATIENT
Start: 2022-04-24 | End: 2022-04-28

## 2022-04-24 RX ORDER — BUDESONIDE AND FORMOTEROL FUMARATE DIHYDRATE 160; 4.5 UG/1; UG/1
2 AEROSOL RESPIRATORY (INHALATION)
Status: DISCONTINUED | OUTPATIENT
Start: 2022-04-24 | End: 2022-04-29 | Stop reason: HOSPADM

## 2022-04-24 RX ORDER — PANTOPRAZOLE SODIUM 40 MG/10ML
40 INJECTION, POWDER, LYOPHILIZED, FOR SOLUTION INTRAVENOUS
Status: DISCONTINUED | OUTPATIENT
Start: 2022-04-24 | End: 2022-04-25

## 2022-04-24 RX ORDER — IPRATROPIUM BROMIDE AND ALBUTEROL SULFATE 2.5; .5 MG/3ML; MG/3ML
3 SOLUTION RESPIRATORY (INHALATION) EVERY 6 HOURS PRN
Status: DISCONTINUED | OUTPATIENT
Start: 2022-04-24 | End: 2022-04-29 | Stop reason: HOSPADM

## 2022-04-24 RX ORDER — METOPROLOL SUCCINATE 25 MG/1
25 TABLET, EXTENDED RELEASE ORAL DAILY
Status: DISCONTINUED | OUTPATIENT
Start: 2022-04-25 | End: 2022-04-27

## 2022-04-24 RX ORDER — ONDANSETRON 2 MG/ML
4 INJECTION INTRAMUSCULAR; INTRAVENOUS EVERY 6 HOURS PRN
Status: DISCONTINUED | OUTPATIENT
Start: 2022-04-24 | End: 2022-04-29 | Stop reason: HOSPADM

## 2022-04-24 RX ADMIN — AZITHROMYCIN DIHYDRATE 500 MG: 500 INJECTION, POWDER, LYOPHILIZED, FOR SOLUTION INTRAVENOUS at 17:08

## 2022-04-24 RX ADMIN — IPRATROPIUM BROMIDE AND ALBUTEROL SULFATE 3 ML: .5; 3 SOLUTION RESPIRATORY (INHALATION) at 21:34

## 2022-04-24 RX ADMIN — Medication 10 ML: at 21:02

## 2022-04-24 RX ADMIN — METHYLPREDNISOLONE SODIUM SUCCINATE 40 MG: 40 INJECTION, POWDER, FOR SOLUTION INTRAMUSCULAR; INTRAVENOUS at 18:44

## 2022-04-24 RX ADMIN — PANTOPRAZOLE SODIUM 40 MG: 40 INJECTION, POWDER, FOR SOLUTION INTRAVENOUS at 18:48

## 2022-04-24 RX ADMIN — BUDESONIDE AND FORMOTEROL FUMARATE DIHYDRATE 2 PUFF: 160; 4.5 AEROSOL RESPIRATORY (INHALATION) at 21:35

## 2022-04-24 RX ADMIN — ENOXAPARIN SODIUM 30 MG: 30 INJECTION SUBCUTANEOUS at 18:44

## 2022-04-24 RX ADMIN — GUAIFENESIN 1200 MG: 600 TABLET, EXTENDED RELEASE ORAL at 20:54

## 2022-04-24 RX ADMIN — CEFTRIAXONE SODIUM 1 G: 10 INJECTION, POWDER, FOR SOLUTION INTRAVENOUS at 17:14

## 2022-04-25 LAB
ANION GAP SERPL CALCULATED.3IONS-SCNC: 13 MMOL/L (ref 5–15)
BASOPHILS # BLD AUTO: 0.04 10*3/MM3 (ref 0–0.2)
BASOPHILS NFR BLD AUTO: 0.5 % (ref 0–1.5)
BUN SERPL-MCNC: 26 MG/DL (ref 8–23)
BUN/CREAT SERPL: 32.9 (ref 7–25)
CALCIUM SPEC-SCNC: 9 MG/DL (ref 8.2–9.6)
CHLORIDE SERPL-SCNC: 99 MMOL/L (ref 98–107)
CO2 SERPL-SCNC: 26 MMOL/L (ref 22–29)
CREAT SERPL-MCNC: 0.79 MG/DL (ref 0.57–1)
DEPRECATED RDW RBC AUTO: 49.8 FL (ref 37–54)
EGFRCR SERPLBLD CKD-EPI 2021: 68.6 ML/MIN/1.73
EOSINOPHIL # BLD AUTO: 0.01 10*3/MM3 (ref 0–0.4)
EOSINOPHIL NFR BLD AUTO: 0.1 % (ref 0.3–6.2)
ERYTHROCYTE [DISTWIDTH] IN BLOOD BY AUTOMATED COUNT: 13.7 % (ref 12.3–15.4)
GLUCOSE SERPL-MCNC: 168 MG/DL (ref 65–99)
HCT VFR BLD AUTO: 30.1 % (ref 34–46.6)
HGB BLD-MCNC: 9.5 G/DL (ref 12–15.9)
IMM GRANULOCYTES # BLD AUTO: 0.04 10*3/MM3 (ref 0–0.05)
IMM GRANULOCYTES NFR BLD AUTO: 0.5 % (ref 0–0.5)
L PNEUMO1 AG UR QL IA: NEGATIVE
LYMPHOCYTES # BLD AUTO: 0.69 10*3/MM3 (ref 0.7–3.1)
LYMPHOCYTES NFR BLD AUTO: 9.3 % (ref 19.6–45.3)
MAGNESIUM SERPL-MCNC: 1.8 MG/DL (ref 1.7–2.3)
MCH RBC QN AUTO: 31.6 PG (ref 26.6–33)
MCHC RBC AUTO-ENTMCNC: 31.6 G/DL (ref 31.5–35.7)
MCV RBC AUTO: 100 FL (ref 79–97)
MONOCYTES # BLD AUTO: 0.48 10*3/MM3 (ref 0.1–0.9)
MONOCYTES NFR BLD AUTO: 6.5 % (ref 5–12)
NEUTROPHILS NFR BLD AUTO: 6.12 10*3/MM3 (ref 1.7–7)
NEUTROPHILS NFR BLD AUTO: 83.1 % (ref 42.7–76)
NRBC BLD AUTO-RTO: 0 /100 WBC (ref 0–0.2)
PHOSPHATE SERPL-MCNC: 3.6 MG/DL (ref 2.5–4.5)
PLATELET # BLD AUTO: 247 10*3/MM3 (ref 140–450)
PMV BLD AUTO: 10.7 FL (ref 6–12)
POTASSIUM SERPL-SCNC: 4.3 MMOL/L (ref 3.5–5.2)
PROCALCITONIN SERPL-MCNC: 0.15 NG/ML (ref 0–0.25)
RBC # BLD AUTO: 3.01 10*6/MM3 (ref 3.77–5.28)
S PNEUM AG SPEC QL LA: NEGATIVE
SODIUM SERPL-SCNC: 138 MMOL/L (ref 136–145)
WBC NRBC COR # BLD: 7.38 10*3/MM3 (ref 3.4–10.8)

## 2022-04-25 PROCEDURE — 94799 UNLISTED PULMONARY SVC/PX: CPT

## 2022-04-25 PROCEDURE — 25010000002 METHYLPREDNISOLONE PER 40 MG: Performed by: INTERNAL MEDICINE

## 2022-04-25 PROCEDURE — 83735 ASSAY OF MAGNESIUM: CPT | Performed by: INTERNAL MEDICINE

## 2022-04-25 PROCEDURE — 25010000002 CEFTRIAXONE PER 250 MG: Performed by: INTERNAL MEDICINE

## 2022-04-25 PROCEDURE — 97165 OT EVAL LOW COMPLEX 30 MIN: CPT | Performed by: OCCUPATIONAL THERAPIST

## 2022-04-25 PROCEDURE — 25010000002 ENOXAPARIN PER 10 MG: Performed by: INTERNAL MEDICINE

## 2022-04-25 PROCEDURE — 97161 PT EVAL LOW COMPLEX 20 MIN: CPT

## 2022-04-25 PROCEDURE — 25010000002 AZITHROMYCIN PER 500 MG: Performed by: INTERNAL MEDICINE

## 2022-04-25 PROCEDURE — 87899 AGENT NOS ASSAY W/OPTIC: CPT | Performed by: INTERNAL MEDICINE

## 2022-04-25 PROCEDURE — 94640 AIRWAY INHALATION TREATMENT: CPT

## 2022-04-25 PROCEDURE — 80048 BASIC METABOLIC PNL TOTAL CA: CPT | Performed by: INTERNAL MEDICINE

## 2022-04-25 PROCEDURE — 85025 COMPLETE CBC W/AUTO DIFF WBC: CPT | Performed by: INTERNAL MEDICINE

## 2022-04-25 PROCEDURE — 84145 PROCALCITONIN (PCT): CPT | Performed by: INTERNAL MEDICINE

## 2022-04-25 PROCEDURE — 99232 SBSQ HOSP IP/OBS MODERATE 35: CPT | Performed by: INTERNAL MEDICINE

## 2022-04-25 PROCEDURE — 84100 ASSAY OF PHOSPHORUS: CPT | Performed by: INTERNAL MEDICINE

## 2022-04-25 PROCEDURE — 36415 COLL VENOUS BLD VENIPUNCTURE: CPT | Performed by: INTERNAL MEDICINE

## 2022-04-25 RX ORDER — DEXTROMETHORPHAN POLISTIREX 30 MG/5ML
60 SUSPENSION ORAL EVERY 12 HOURS SCHEDULED
Status: DISCONTINUED | OUTPATIENT
Start: 2022-04-25 | End: 2022-04-29 | Stop reason: HOSPADM

## 2022-04-25 RX ORDER — IPRATROPIUM BROMIDE AND ALBUTEROL SULFATE 2.5; .5 MG/3ML; MG/3ML
3 SOLUTION RESPIRATORY (INHALATION) EVERY 6 HOURS PRN
COMMUNITY
End: 2022-08-17 | Stop reason: SDUPTHER

## 2022-04-25 RX ORDER — ACETAMINOPHEN 500 MG
500 TABLET ORAL EVERY 4 HOURS PRN
COMMUNITY

## 2022-04-25 RX ORDER — OMEPRAZOLE 20 MG/1
20 CAPSULE, DELAYED RELEASE ORAL DAILY
Status: ON HOLD | COMMUNITY
End: 2022-05-24

## 2022-04-25 RX ORDER — SODIUM CHLORIDE, SODIUM LACTATE, POTASSIUM CHLORIDE, CALCIUM CHLORIDE 600; 310; 30; 20 MG/100ML; MG/100ML; MG/100ML; MG/100ML
75 INJECTION, SOLUTION INTRAVENOUS CONTINUOUS
Status: DISPENSED | OUTPATIENT
Start: 2022-04-25 | End: 2022-04-26

## 2022-04-25 RX ORDER — BENZONATATE 100 MG/1
200 CAPSULE ORAL 3 TIMES DAILY
Status: DISCONTINUED | OUTPATIENT
Start: 2022-04-25 | End: 2022-04-29 | Stop reason: HOSPADM

## 2022-04-25 RX ORDER — PANTOPRAZOLE SODIUM 40 MG/1
40 TABLET, DELAYED RELEASE ORAL
Status: DISCONTINUED | OUTPATIENT
Start: 2022-04-26 | End: 2022-04-29 | Stop reason: HOSPADM

## 2022-04-25 RX ORDER — BENZONATATE 200 MG/1
200 CAPSULE ORAL 3 TIMES DAILY PRN
Status: ON HOLD | COMMUNITY
End: 2022-04-29 | Stop reason: SDUPTHER

## 2022-04-25 RX ADMIN — METOPROLOL SUCCINATE 25 MG: 25 TABLET, EXTENDED RELEASE ORAL at 08:15

## 2022-04-25 RX ADMIN — DEXTROMETHORPHAN POLISTIREX 60 MG: 30 SUSPENSION ORAL at 20:21

## 2022-04-25 RX ADMIN — BUDESONIDE AND FORMOTEROL FUMARATE DIHYDRATE 2 PUFF: 160; 4.5 AEROSOL RESPIRATORY (INHALATION) at 19:08

## 2022-04-25 RX ADMIN — IPRATROPIUM BROMIDE AND ALBUTEROL SULFATE 3 ML: .5; 3 SOLUTION RESPIRATORY (INHALATION) at 05:49

## 2022-04-25 RX ADMIN — PANTOPRAZOLE SODIUM 40 MG: 40 INJECTION, POWDER, FOR SOLUTION INTRAVENOUS at 06:06

## 2022-04-25 RX ADMIN — CEFTRIAXONE SODIUM 1 G: 10 INJECTION, POWDER, FOR SOLUTION INTRAVENOUS at 16:57

## 2022-04-25 RX ADMIN — BENZONATATE 200 MG: 100 CAPSULE ORAL at 13:46

## 2022-04-25 RX ADMIN — SODIUM CHLORIDE, POTASSIUM CHLORIDE, SODIUM LACTATE AND CALCIUM CHLORIDE 75 ML/HR: 600; 310; 30; 20 INJECTION, SOLUTION INTRAVENOUS at 13:49

## 2022-04-25 RX ADMIN — METHYLPREDNISOLONE SODIUM SUCCINATE 40 MG: 40 INJECTION, POWDER, FOR SOLUTION INTRAMUSCULAR; INTRAVENOUS at 16:58

## 2022-04-25 RX ADMIN — ENOXAPARIN SODIUM 30 MG: 30 INJECTION SUBCUTANEOUS at 16:59

## 2022-04-25 RX ADMIN — DEXTROMETHORPHAN POLISTIREX 60 MG: 30 SUSPENSION ORAL at 13:46

## 2022-04-25 RX ADMIN — BUDESONIDE AND FORMOTEROL FUMARATE DIHYDRATE 2 PUFF: 160; 4.5 AEROSOL RESPIRATORY (INHALATION) at 05:49

## 2022-04-25 RX ADMIN — GUAIFENESIN 1200 MG: 600 TABLET, EXTENDED RELEASE ORAL at 08:15

## 2022-04-25 RX ADMIN — BENZONATATE 200 MG: 100 CAPSULE ORAL at 20:21

## 2022-04-25 RX ADMIN — Medication 10 ML: at 20:21

## 2022-04-25 RX ADMIN — Medication 10 ML: at 08:15

## 2022-04-25 RX ADMIN — IPRATROPIUM BROMIDE AND ALBUTEROL SULFATE 3 ML: .5; 3 SOLUTION RESPIRATORY (INHALATION) at 10:35

## 2022-04-25 RX ADMIN — METHYLPREDNISOLONE SODIUM SUCCINATE 40 MG: 40 INJECTION, POWDER, FOR SOLUTION INTRAMUSCULAR; INTRAVENOUS at 06:06

## 2022-04-25 RX ADMIN — AZITHROMYCIN DIHYDRATE 500 MG: 500 INJECTION, POWDER, LYOPHILIZED, FOR SOLUTION INTRAVENOUS at 16:57

## 2022-04-25 RX ADMIN — BENZONATATE 200 MG: 100 CAPSULE ORAL at 17:11

## 2022-04-25 RX ADMIN — IPRATROPIUM BROMIDE AND ALBUTEROL SULFATE 3 ML: .5; 3 SOLUTION RESPIRATORY (INHALATION) at 19:08

## 2022-04-25 RX ADMIN — GUAIFENESIN 1200 MG: 600 TABLET, EXTENDED RELEASE ORAL at 20:21

## 2022-04-26 LAB
ANION GAP SERPL CALCULATED.3IONS-SCNC: 8 MMOL/L (ref 5–15)
BUN SERPL-MCNC: 24 MG/DL (ref 8–23)
BUN/CREAT SERPL: 33.3 (ref 7–25)
CALCIUM SPEC-SCNC: 8.7 MG/DL (ref 8.2–9.6)
CHLORIDE SERPL-SCNC: 102 MMOL/L (ref 98–107)
CO2 SERPL-SCNC: 28 MMOL/L (ref 22–29)
CREAT SERPL-MCNC: 0.72 MG/DL (ref 0.57–1)
DEPRECATED RDW RBC AUTO: 48.6 FL (ref 37–54)
EGFRCR SERPLBLD CKD-EPI 2021: 76.6 ML/MIN/1.73
ERYTHROCYTE [DISTWIDTH] IN BLOOD BY AUTOMATED COUNT: 13.4 % (ref 12.3–15.4)
GLUCOSE SERPL-MCNC: 198 MG/DL (ref 65–99)
HCT VFR BLD AUTO: 28.1 % (ref 34–46.6)
HGB BLD-MCNC: 9.1 G/DL (ref 12–15.9)
MCH RBC QN AUTO: 31.9 PG (ref 26.6–33)
MCHC RBC AUTO-ENTMCNC: 32.4 G/DL (ref 31.5–35.7)
MCV RBC AUTO: 98.6 FL (ref 79–97)
PLATELET # BLD AUTO: 238 10*3/MM3 (ref 140–450)
PMV BLD AUTO: 10.8 FL (ref 6–12)
POTASSIUM SERPL-SCNC: 3.9 MMOL/L (ref 3.5–5.2)
QT INTERVAL: 322 MS
QTC INTERVAL: 429 MS
RBC # BLD AUTO: 2.85 10*6/MM3 (ref 3.77–5.28)
SODIUM SERPL-SCNC: 138 MMOL/L (ref 136–145)
WBC NRBC COR # BLD: 9.89 10*3/MM3 (ref 3.4–10.8)

## 2022-04-26 PROCEDURE — 97110 THERAPEUTIC EXERCISES: CPT

## 2022-04-26 PROCEDURE — 97535 SELF CARE MNGMENT TRAINING: CPT

## 2022-04-26 PROCEDURE — 25010000002 ENOXAPARIN PER 10 MG: Performed by: INTERNAL MEDICINE

## 2022-04-26 PROCEDURE — 94799 UNLISTED PULMONARY SVC/PX: CPT

## 2022-04-26 PROCEDURE — 25010000002 METHYLPREDNISOLONE PER 40 MG: Performed by: NURSE PRACTITIONER

## 2022-04-26 PROCEDURE — 80048 BASIC METABOLIC PNL TOTAL CA: CPT | Performed by: NURSE PRACTITIONER

## 2022-04-26 PROCEDURE — 99232 SBSQ HOSP IP/OBS MODERATE 35: CPT | Performed by: INTERNAL MEDICINE

## 2022-04-26 PROCEDURE — 25010000002 CEFTRIAXONE PER 250 MG: Performed by: INTERNAL MEDICINE

## 2022-04-26 PROCEDURE — 25010000002 AZITHROMYCIN PER 500 MG: Performed by: INTERNAL MEDICINE

## 2022-04-26 PROCEDURE — 94640 AIRWAY INHALATION TREATMENT: CPT

## 2022-04-26 PROCEDURE — 97530 THERAPEUTIC ACTIVITIES: CPT

## 2022-04-26 PROCEDURE — 25010000002 METHYLPREDNISOLONE PER 40 MG: Performed by: INTERNAL MEDICINE

## 2022-04-26 PROCEDURE — 85027 COMPLETE CBC AUTOMATED: CPT | Performed by: NURSE PRACTITIONER

## 2022-04-26 RX ORDER — PREDNISONE 20 MG/1
40 TABLET ORAL DAILY
Status: DISCONTINUED | OUTPATIENT
Start: 2022-04-27 | End: 2022-04-28

## 2022-04-26 RX ORDER — ENOXAPARIN SODIUM 100 MG/ML
30 INJECTION SUBCUTANEOUS EVERY 24 HOURS
Status: DISCONTINUED | OUTPATIENT
Start: 2022-04-26 | End: 2022-04-29 | Stop reason: HOSPADM

## 2022-04-26 RX ORDER — LIDOCAINE 50 MG/G
2 PATCH TOPICAL
Status: DISCONTINUED | OUTPATIENT
Start: 2022-04-26 | End: 2022-04-27

## 2022-04-26 RX ADMIN — BENZONATATE 200 MG: 100 CAPSULE ORAL at 20:35

## 2022-04-26 RX ADMIN — METHYLPREDNISOLONE SODIUM SUCCINATE 40 MG: 40 INJECTION, POWDER, FOR SOLUTION INTRAMUSCULAR; INTRAVENOUS at 05:49

## 2022-04-26 RX ADMIN — BENZONATATE 200 MG: 100 CAPSULE ORAL at 16:16

## 2022-04-26 RX ADMIN — GUAIFENESIN 1200 MG: 600 TABLET, EXTENDED RELEASE ORAL at 20:35

## 2022-04-26 RX ADMIN — GUAIFENESIN 1200 MG: 600 TABLET, EXTENDED RELEASE ORAL at 09:26

## 2022-04-26 RX ADMIN — IPRATROPIUM BROMIDE AND ALBUTEROL SULFATE 3 ML: .5; 3 SOLUTION RESPIRATORY (INHALATION) at 10:43

## 2022-04-26 RX ADMIN — AZITHROMYCIN DIHYDRATE 500 MG: 500 INJECTION, POWDER, LYOPHILIZED, FOR SOLUTION INTRAVENOUS at 17:48

## 2022-04-26 RX ADMIN — Medication 10 ML: at 09:27

## 2022-04-26 RX ADMIN — PANTOPRAZOLE SODIUM 40 MG: 40 TABLET, DELAYED RELEASE ORAL at 05:49

## 2022-04-26 RX ADMIN — BUDESONIDE AND FORMOTEROL FUMARATE DIHYDRATE 2 PUFF: 160; 4.5 AEROSOL RESPIRATORY (INHALATION) at 05:48

## 2022-04-26 RX ADMIN — METHYLPREDNISOLONE SODIUM SUCCINATE 40 MG: 40 INJECTION, POWDER, FOR SOLUTION INTRAMUSCULAR; INTRAVENOUS at 17:52

## 2022-04-26 RX ADMIN — IPRATROPIUM BROMIDE AND ALBUTEROL SULFATE 3 ML: .5; 3 SOLUTION RESPIRATORY (INHALATION) at 15:09

## 2022-04-26 RX ADMIN — BENZONATATE 200 MG: 100 CAPSULE ORAL at 09:26

## 2022-04-26 RX ADMIN — BUDESONIDE AND FORMOTEROL FUMARATE DIHYDRATE 2 PUFF: 160; 4.5 AEROSOL RESPIRATORY (INHALATION) at 18:11

## 2022-04-26 RX ADMIN — DEXTROMETHORPHAN POLISTIREX 60 MG: 30 SUSPENSION ORAL at 09:26

## 2022-04-26 RX ADMIN — IPRATROPIUM BROMIDE AND ALBUTEROL SULFATE 3 ML: .5; 3 SOLUTION RESPIRATORY (INHALATION) at 18:11

## 2022-04-26 RX ADMIN — ENOXAPARIN SODIUM 30 MG: 30 INJECTION SUBCUTANEOUS at 17:49

## 2022-04-26 RX ADMIN — METOPROLOL SUCCINATE 25 MG: 25 TABLET, EXTENDED RELEASE ORAL at 09:26

## 2022-04-26 RX ADMIN — CEFTRIAXONE SODIUM 1 G: 10 INJECTION, POWDER, FOR SOLUTION INTRAVENOUS at 17:49

## 2022-04-26 RX ADMIN — DEXTROMETHORPHAN POLISTIREX 60 MG: 30 SUSPENSION ORAL at 20:35

## 2022-04-26 RX ADMIN — IPRATROPIUM BROMIDE AND ALBUTEROL SULFATE 3 ML: .5; 3 SOLUTION RESPIRATORY (INHALATION) at 05:48

## 2022-04-27 LAB
ANION GAP SERPL CALCULATED.3IONS-SCNC: 6 MMOL/L (ref 5–15)
BUN SERPL-MCNC: 28 MG/DL (ref 8–23)
BUN/CREAT SERPL: 45.2 (ref 7–25)
CALCIUM SPEC-SCNC: 9.1 MG/DL (ref 8.2–9.6)
CHLORIDE SERPL-SCNC: 104 MMOL/L (ref 98–107)
CO2 SERPL-SCNC: 31 MMOL/L (ref 22–29)
CREAT SERPL-MCNC: 0.62 MG/DL (ref 0.57–1)
DEPRECATED RDW RBC AUTO: 48.6 FL (ref 37–54)
EGFRCR SERPLBLD CKD-EPI 2021: 81.6 ML/MIN/1.73
ERYTHROCYTE [DISTWIDTH] IN BLOOD BY AUTOMATED COUNT: 13.5 % (ref 12.3–15.4)
GLUCOSE SERPL-MCNC: 111 MG/DL (ref 65–99)
HCT VFR BLD AUTO: 29.3 % (ref 34–46.6)
HGB BLD-MCNC: 9.3 G/DL (ref 12–15.9)
MCH RBC QN AUTO: 31.2 PG (ref 26.6–33)
MCHC RBC AUTO-ENTMCNC: 31.7 G/DL (ref 31.5–35.7)
MCV RBC AUTO: 98.3 FL (ref 79–97)
PLATELET # BLD AUTO: 242 10*3/MM3 (ref 140–450)
PMV BLD AUTO: 11.1 FL (ref 6–12)
POTASSIUM SERPL-SCNC: 3.7 MMOL/L (ref 3.5–5.2)
RBC # BLD AUTO: 2.98 10*6/MM3 (ref 3.77–5.28)
SODIUM SERPL-SCNC: 141 MMOL/L (ref 136–145)
WBC NRBC COR # BLD: 10.84 10*3/MM3 (ref 3.4–10.8)

## 2022-04-27 PROCEDURE — 25010000002 ENOXAPARIN PER 10 MG: Performed by: INTERNAL MEDICINE

## 2022-04-27 PROCEDURE — 80048 BASIC METABOLIC PNL TOTAL CA: CPT | Performed by: NURSE PRACTITIONER

## 2022-04-27 PROCEDURE — 63710000001 PREDNISONE PER 1 MG: Performed by: NURSE PRACTITIONER

## 2022-04-27 PROCEDURE — 97110 THERAPEUTIC EXERCISES: CPT

## 2022-04-27 PROCEDURE — 85027 COMPLETE CBC AUTOMATED: CPT | Performed by: NURSE PRACTITIONER

## 2022-04-27 PROCEDURE — 94799 UNLISTED PULMONARY SVC/PX: CPT

## 2022-04-27 PROCEDURE — 99232 SBSQ HOSP IP/OBS MODERATE 35: CPT | Performed by: INTERNAL MEDICINE

## 2022-04-27 PROCEDURE — 25010000002 CEFTRIAXONE PER 250 MG: Performed by: NURSE PRACTITIONER

## 2022-04-27 PROCEDURE — 97535 SELF CARE MNGMENT TRAINING: CPT

## 2022-04-27 RX ORDER — LIDOCAINE 50 MG/G
3 PATCH TOPICAL
Status: DISCONTINUED | OUTPATIENT
Start: 2022-04-27 | End: 2022-04-29 | Stop reason: HOSPADM

## 2022-04-27 RX ADMIN — GUAIFENESIN 1200 MG: 600 TABLET, EXTENDED RELEASE ORAL at 20:35

## 2022-04-27 RX ADMIN — LIDOCAINE 3 PATCH: 50 PATCH CUTANEOUS at 11:38

## 2022-04-27 RX ADMIN — DEXTROMETHORPHAN POLISTIREX 60 MG: 30 SUSPENSION ORAL at 20:35

## 2022-04-27 RX ADMIN — PANTOPRAZOLE SODIUM 40 MG: 40 TABLET, DELAYED RELEASE ORAL at 05:57

## 2022-04-27 RX ADMIN — IPRATROPIUM BROMIDE AND ALBUTEROL SULFATE 3 ML: .5; 3 SOLUTION RESPIRATORY (INHALATION) at 23:10

## 2022-04-27 RX ADMIN — BENZONATATE 200 MG: 100 CAPSULE ORAL at 20:35

## 2022-04-27 RX ADMIN — CEFTRIAXONE SODIUM 1 G: 10 INJECTION, POWDER, FOR SOLUTION INTRAVENOUS at 17:01

## 2022-04-27 RX ADMIN — ACETAMINOPHEN 650 MG: 325 TABLET ORAL at 08:38

## 2022-04-27 RX ADMIN — IPRATROPIUM BROMIDE AND ALBUTEROL SULFATE 3 ML: .5; 3 SOLUTION RESPIRATORY (INHALATION) at 14:14

## 2022-04-27 RX ADMIN — PREDNISONE 40 MG: 20 TABLET ORAL at 08:38

## 2022-04-27 RX ADMIN — Medication 10 ML: at 20:34

## 2022-04-27 RX ADMIN — BUDESONIDE AND FORMOTEROL FUMARATE DIHYDRATE 2 PUFF: 160; 4.5 AEROSOL RESPIRATORY (INHALATION) at 23:11

## 2022-04-27 RX ADMIN — BENZONATATE 200 MG: 100 CAPSULE ORAL at 08:38

## 2022-04-27 RX ADMIN — METOPROLOL SUCCINATE 25 MG: 25 TABLET, EXTENDED RELEASE ORAL at 08:39

## 2022-04-27 RX ADMIN — LIDOCAINE 2 PATCH: 50 PATCH CUTANEOUS at 08:38

## 2022-04-27 RX ADMIN — GUAIFENESIN 1200 MG: 600 TABLET, EXTENDED RELEASE ORAL at 08:39

## 2022-04-27 RX ADMIN — ENOXAPARIN SODIUM 30 MG: 30 INJECTION SUBCUTANEOUS at 17:00

## 2022-04-27 RX ADMIN — BENZONATATE 200 MG: 100 CAPSULE ORAL at 17:00

## 2022-04-27 RX ADMIN — DEXTROMETHORPHAN POLISTIREX 60 MG: 30 SUSPENSION ORAL at 08:38

## 2022-04-28 PROCEDURE — 94799 UNLISTED PULMONARY SVC/PX: CPT

## 2022-04-28 PROCEDURE — 97116 GAIT TRAINING THERAPY: CPT

## 2022-04-28 PROCEDURE — 99232 SBSQ HOSP IP/OBS MODERATE 35: CPT | Performed by: INTERNAL MEDICINE

## 2022-04-28 PROCEDURE — 97110 THERAPEUTIC EXERCISES: CPT

## 2022-04-28 PROCEDURE — 94761 N-INVAS EAR/PLS OXIMETRY MLT: CPT

## 2022-04-28 PROCEDURE — 94640 AIRWAY INHALATION TREATMENT: CPT

## 2022-04-28 PROCEDURE — 97535 SELF CARE MNGMENT TRAINING: CPT

## 2022-04-28 PROCEDURE — 25010000002 ENOXAPARIN PER 10 MG: Performed by: INTERNAL MEDICINE

## 2022-04-28 PROCEDURE — 63710000001 PREDNISONE PER 1 MG: Performed by: NURSE PRACTITIONER

## 2022-04-28 RX ORDER — IPRATROPIUM BROMIDE AND ALBUTEROL SULFATE 2.5; .5 MG/3ML; MG/3ML
3 SOLUTION RESPIRATORY (INHALATION)
Status: DISCONTINUED | OUTPATIENT
Start: 2022-04-28 | End: 2022-04-29 | Stop reason: HOSPADM

## 2022-04-28 RX ORDER — PREDNISONE 20 MG/1
20 TABLET ORAL DAILY
Status: DISCONTINUED | OUTPATIENT
Start: 2022-04-29 | End: 2022-04-29 | Stop reason: HOSPADM

## 2022-04-28 RX ORDER — CEFDINIR 300 MG/1
300 CAPSULE ORAL EVERY 12 HOURS SCHEDULED
Status: DISCONTINUED | OUTPATIENT
Start: 2022-04-28 | End: 2022-04-29 | Stop reason: HOSPADM

## 2022-04-28 RX ADMIN — BENZONATATE 200 MG: 100 CAPSULE ORAL at 09:27

## 2022-04-28 RX ADMIN — DEXTROMETHORPHAN POLISTIREX 60 MG: 30 SUSPENSION ORAL at 09:27

## 2022-04-28 RX ADMIN — BUDESONIDE AND FORMOTEROL FUMARATE DIHYDRATE 2 PUFF: 160; 4.5 AEROSOL RESPIRATORY (INHALATION) at 19:59

## 2022-04-28 RX ADMIN — LIDOCAINE 3 PATCH: 50 PATCH CUTANEOUS at 09:28

## 2022-04-28 RX ADMIN — BENZONATATE 200 MG: 100 CAPSULE ORAL at 18:59

## 2022-04-28 RX ADMIN — PREDNISONE 40 MG: 20 TABLET ORAL at 09:27

## 2022-04-28 RX ADMIN — ENOXAPARIN SODIUM 30 MG: 30 INJECTION SUBCUTANEOUS at 18:59

## 2022-04-28 RX ADMIN — CEFDINIR 300 MG: 300 CAPSULE ORAL at 21:16

## 2022-04-28 RX ADMIN — PANTOPRAZOLE SODIUM 40 MG: 40 TABLET, DELAYED RELEASE ORAL at 06:28

## 2022-04-28 RX ADMIN — ACETAMINOPHEN 650 MG: 325 TABLET ORAL at 06:28

## 2022-04-28 RX ADMIN — IPRATROPIUM BROMIDE AND ALBUTEROL SULFATE 3 ML: .5; 3 SOLUTION RESPIRATORY (INHALATION) at 19:59

## 2022-04-28 RX ADMIN — GUAIFENESIN 1200 MG: 600 TABLET, EXTENDED RELEASE ORAL at 09:27

## 2022-04-28 RX ADMIN — GUAIFENESIN 1200 MG: 600 TABLET, EXTENDED RELEASE ORAL at 21:16

## 2022-04-28 RX ADMIN — DEXTROMETHORPHAN POLISTIREX 60 MG: 30 SUSPENSION ORAL at 21:16

## 2022-04-28 RX ADMIN — IPRATROPIUM BROMIDE AND ALBUTEROL SULFATE 3 ML: .5; 3 SOLUTION RESPIRATORY (INHALATION) at 07:35

## 2022-04-28 RX ADMIN — BUDESONIDE AND FORMOTEROL FUMARATE DIHYDRATE 2 PUFF: 160; 4.5 AEROSOL RESPIRATORY (INHALATION) at 07:40

## 2022-04-28 RX ADMIN — BENZONATATE 200 MG: 100 CAPSULE ORAL at 21:16

## 2022-04-29 ENCOUNTER — APPOINTMENT (OUTPATIENT)
Dept: GENERAL RADIOLOGY | Facility: HOSPITAL | Age: 87
End: 2022-04-29

## 2022-04-29 VITALS
SYSTOLIC BLOOD PRESSURE: 140 MMHG | HEART RATE: 78 BPM | HEIGHT: 65 IN | WEIGHT: 91 LBS | DIASTOLIC BLOOD PRESSURE: 64 MMHG | TEMPERATURE: 98.1 F | RESPIRATION RATE: 18 BRPM | OXYGEN SATURATION: 97 % | BODY MASS INDEX: 15.16 KG/M2

## 2022-04-29 LAB
BACTERIA SPEC AEROBE CULT: NORMAL
BACTERIA SPEC AEROBE CULT: NORMAL

## 2022-04-29 PROCEDURE — 99231 SBSQ HOSP IP/OBS SF/LOW 25: CPT | Performed by: INTERNAL MEDICINE

## 2022-04-29 PROCEDURE — 63710000001 PREDNISONE PER 1 MG: Performed by: INTERNAL MEDICINE

## 2022-04-29 PROCEDURE — 71045 X-RAY EXAM CHEST 1 VIEW: CPT

## 2022-04-29 PROCEDURE — 97110 THERAPEUTIC EXERCISES: CPT

## 2022-04-29 PROCEDURE — 97116 GAIT TRAINING THERAPY: CPT

## 2022-04-29 RX ORDER — BUDESONIDE AND FORMOTEROL FUMARATE DIHYDRATE 160; 4.5 UG/1; UG/1
2 AEROSOL RESPIRATORY (INHALATION)
Qty: 1 EACH | Refills: 0
Start: 2022-04-29 | End: 2022-06-23

## 2022-04-29 RX ORDER — BENZONATATE 200 MG/1
200 CAPSULE ORAL 3 TIMES DAILY PRN
Qty: 30 CAPSULE | Refills: 0 | Status: ON HOLD | OUTPATIENT
Start: 2022-04-29 | End: 2022-05-24

## 2022-04-29 RX ORDER — CEFDINIR 300 MG/1
300 CAPSULE ORAL EVERY 12 HOURS SCHEDULED
Qty: 4 CAPSULE | Refills: 0 | Status: SHIPPED | OUTPATIENT
Start: 2022-04-29 | End: 2022-05-01

## 2022-04-29 RX ORDER — PREDNISONE 10 MG/1
TABLET ORAL
Qty: 9 TABLET | Refills: 0 | Status: SHIPPED | OUTPATIENT
Start: 2022-04-30 | End: 2022-05-06

## 2022-04-29 RX ADMIN — PANTOPRAZOLE SODIUM 40 MG: 40 TABLET, DELAYED RELEASE ORAL at 06:23

## 2022-04-29 RX ADMIN — GUAIFENESIN 1200 MG: 600 TABLET, EXTENDED RELEASE ORAL at 08:17

## 2022-04-29 RX ADMIN — DEXTROMETHORPHAN POLISTIREX 60 MG: 30 SUSPENSION ORAL at 08:17

## 2022-04-29 RX ADMIN — BENZONATATE 200 MG: 100 CAPSULE ORAL at 08:18

## 2022-04-29 RX ADMIN — LIDOCAINE 3 PATCH: 50 PATCH CUTANEOUS at 08:20

## 2022-04-29 RX ADMIN — PREDNISONE 20 MG: 20 TABLET ORAL at 08:18

## 2022-04-29 RX ADMIN — ACETAMINOPHEN 650 MG: 325 TABLET ORAL at 06:25

## 2022-04-29 RX ADMIN — CEFDINIR 300 MG: 300 CAPSULE ORAL at 08:18

## 2022-04-30 ENCOUNTER — READMISSION MANAGEMENT (OUTPATIENT)
Dept: CALL CENTER | Facility: HOSPITAL | Age: 87
End: 2022-04-30

## 2022-04-30 NOTE — OUTREACH NOTE
Prep Survey    Flowsheet Row Responses   [facility] facility patient discharged from? [facility]   Is LACE score < 7 ? No   Emergency Room discharge w/ pulse ox? No   Eligibility Readm Mgmt   Discharge diagnosis Pneumonia of both lungs due to infectious organism   Does the patient have one of the following disease processes/diagnoses(primary or secondary)? COPD/Pneumonia   Does the patient have Home health ordered? Yes   What is the Home health agency?  [facility]    Is there a DME ordered? No   Prep survey completed? Yes          MELLISA REIS - Registered Nurse

## 2022-05-03 ENCOUNTER — READMISSION MANAGEMENT (OUTPATIENT)
Dept: CALL CENTER | Facility: HOSPITAL | Age: 87
End: 2022-05-03

## 2022-05-03 NOTE — OUTREACH NOTE
COPD/PN Week 1 Survey    Flowsheet Row Responses   [facility] patient discharged from? [place]   Does the patient have one of the following disease processes/diagnoses(primary or secondary)? COPD/Pneumonia   Was the primary reason for admission: Pneumonia   Week 1 attempt successful? Yes   Call start time 1233   Call end time 1238   Discharge diagnosis Pneumonia of both lungs due to infectious organism   Is patient permission given to speak with other caregiver? Yes   List who call center can speak with son    Person spoke with today (if not patient) and relationship son   Meds reviewed with patient/caregiver? Yes   Is the patient having any side effects they believe may be caused by any medication additions or changes? No   Does the patient have all medications ordered at discharge? Yes   Is the patient taking all medications as directed (includes completed medication regime)? Yes   Does the patient have a primary care provider?  Yes   Does the patient have an appointment with their PCP or specialist within 7 days of discharge? Yes   Comments regarding PCP Javier Ng MD f/u 5-5   Has the patient kept scheduled appointments due by today? N/A   What is the Home health agency?  [facility]    Has home health visited the patient within 72 hours of discharge? Yes   DME comments Wearing home O2 @ 2L    Pulse Ox monitoring Intermittent   Pulse Ox device source Patient   O2 Sat comments 96% with O2   O2 Sat: education provided Sat levels, Monitoring frequency, When to seek care   Psychosocial issues? No   Comments Still having weakness, difficult to walk.    Did the patient receive a copy of their discharge instructions? Yes   Nursing interventions Reviewed instructions with patient   What is the patient's perception of their health status since discharge? Improving   If the patient is a current smoker, are they able to teach back resources for cessation? Not a smoker   Is the patient/caregiver able  to teach back the hierarchy of who to call/visit for symptoms/problems? PCP, Specialist, Home health nurse, Urgent Care, ED, 911 No   Is the patient/caregiver able to teach back signs and symptoms of worsening condition: Fever/chills, Shortness of breath, Chest pain   Is the patient/caregiver able to teach back importance of completing antibiotic course of treatment? Yes   Week 1 call completed? Yes          LING VASQUEZ - Registered Nurse

## 2022-05-11 ENCOUNTER — READMISSION MANAGEMENT (OUTPATIENT)
Dept: CALL CENTER | Facility: HOSPITAL | Age: 87
End: 2022-05-11

## 2022-05-11 NOTE — OUTREACH NOTE
COPD/PN Week 2 Survey    Flowsheet Row Responses   [facility] patient discharged from? [place]   Does the patient have one of the following disease processes/diagnoses(primary or secondary)? COPD/Pneumonia   Was the primary reason for admission: Pneumonia   Week 2 attempt successful? Yes   Call start time 1126   Call end time 1129   Discharge diagnosis Pneumonia of both lungs due to infectious organism   Is patient permission given to speak with other caregiver? Yes   List who call center can speak with son    Person spoke with today (if not patient) and relationship son   Meds reviewed with patient/caregiver? Yes   Is the patient taking all medications as directed (includes completed medication regime)? Yes   Has the patient kept scheduled appointments due by today? Yes   What is the Home health agency?  [facility]    Has home health visited the patient within 72 hours of discharge? Yes   Home health comments working with PT and nursing.   DME comments Wearing home O2 @ 2L    Pulse Ox monitoring Intermittent   Pulse Ox device source Patient   O2 Sat comments 95% with O2   Psychosocial issues? No   Comments Per son, she is improving. Breathing improved. MD told them yesterday that she still had a little PNA in lungs but did not extend abx. Now can walk with walker inside the home.    What is the patient's perception of their health status since discharge? Improving   If the patient is a current smoker, are they able to teach back resources for cessation? Not a smoker   Is the patient/caregiver able to teach back the hierarchy of who to call/visit for symptoms/problems? PCP, Specialist, Home health nurse, Urgent Care, ED, 911 Yes   Is the patient/caregiver able to teach back signs and symptoms of worsening condition: Fever/chills, Shortness of breath, Chest pain   Is the patient/caregiver able to teach back importance of completing antibiotic course of treatment? Yes   Week 2 call completed? Yes           LING VASQUEZ - Registered Nurse

## 2022-05-16 ENCOUNTER — APPOINTMENT (OUTPATIENT)
Dept: GENERAL RADIOLOGY | Facility: HOSPITAL | Age: 87
End: 2022-05-16

## 2022-05-16 ENCOUNTER — HOSPITAL ENCOUNTER (EMERGENCY)
Facility: HOSPITAL | Age: 87
Discharge: HOME | End: 2022-05-16
Admitting: EMERGENCY MEDICINE

## 2022-05-16 ENCOUNTER — APPOINTMENT (OUTPATIENT)
Dept: CT IMAGING | Facility: HOSPITAL | Age: 87
End: 2022-05-16

## 2022-05-16 VITALS
RESPIRATION RATE: 17 BRPM | BODY MASS INDEX: 14.59 KG/M2 | SYSTOLIC BLOOD PRESSURE: 134 MMHG | OXYGEN SATURATION: 96 % | WEIGHT: 87.6 LBS | DIASTOLIC BLOOD PRESSURE: 70 MMHG | HEART RATE: 97 BPM | HEIGHT: 65 IN | TEMPERATURE: 98.3 F

## 2022-05-16 DIAGNOSIS — J47.1 BRONCHIECTASIS WITH ACUTE EXACERBATION: Primary | ICD-10-CM

## 2022-05-16 LAB
ALBUMIN SERPL-MCNC: 3.4 G/DL (ref 3.5–5.2)
ALBUMIN/GLOB SERPL: 0.8 G/DL
ALP SERPL-CCNC: 49 U/L (ref 39–117)
ALT SERPL W P-5'-P-CCNC: 10 U/L (ref 1–33)
ANION GAP SERPL CALCULATED.3IONS-SCNC: 9 MMOL/L (ref 5–15)
ARTERIAL PATENCY WRIST A: ABNORMAL
AST SERPL-CCNC: 17 U/L (ref 1–32)
ATMOSPHERIC PRESS: 749 MMHG
BASE EXCESS BLDA CALC-SCNC: 4.4 MMOL/L (ref 0–2)
BASOPHILS # BLD AUTO: 0.03 10*3/MM3 (ref 0–0.2)
BASOPHILS NFR BLD AUTO: 0.3 % (ref 0–1.5)
BDY SITE: ABNORMAL
BILIRUB SERPL-MCNC: 0.4 MG/DL (ref 0–1.2)
BILIRUB UR QL STRIP: NEGATIVE
BODY TEMPERATURE: 37 C
BUN SERPL-MCNC: 24 MG/DL (ref 8–23)
BUN/CREAT SERPL: 32 (ref 7–25)
CALCIUM SPEC-SCNC: 9.6 MG/DL (ref 8.2–9.6)
CHLORIDE SERPL-SCNC: 98 MMOL/L (ref 98–107)
CLARITY UR: CLEAR
CO2 SERPL-SCNC: 30 MMOL/L (ref 22–29)
COLOR UR: YELLOW
CREAT SERPL-MCNC: 0.75 MG/DL (ref 0.57–1)
D DIMER PPP FEU-MCNC: 0.98 MCGFEU/ML (ref 0–0.5)
D-LACTATE SERPL-SCNC: 1.1 MMOL/L (ref 0.5–2)
DEPRECATED RDW RBC AUTO: 51.2 FL (ref 37–54)
EGFRCR SERPLBLD CKD-EPI 2021: 73 ML/MIN/1.73
EOSINOPHIL # BLD AUTO: 0.05 10*3/MM3 (ref 0–0.4)
EOSINOPHIL NFR BLD AUTO: 0.5 % (ref 0.3–6.2)
ERYTHROCYTE [DISTWIDTH] IN BLOOD BY AUTOMATED COUNT: 14.2 % (ref 12.3–15.4)
FLUAV RNA RESP QL NAA+PROBE: NOT DETECTED
FLUBV RNA RESP QL NAA+PROBE: NOT DETECTED
GAS FLOW AIRWAY: 2 LPM
GLOBULIN UR ELPH-MCNC: 4.3 GM/DL
GLUCOSE SERPL-MCNC: 144 MG/DL (ref 65–99)
GLUCOSE UR STRIP-MCNC: NEGATIVE MG/DL
HCO3 BLDA-SCNC: 29.4 MMOL/L (ref 20–26)
HCT VFR BLD AUTO: 32.2 % (ref 34–46.6)
HGB BLD-MCNC: 10.3 G/DL (ref 12–15.9)
HGB UR QL STRIP.AUTO: NEGATIVE
HOLD SPECIMEN: NORMAL
IMM GRANULOCYTES # BLD AUTO: 0.02 10*3/MM3 (ref 0–0.05)
IMM GRANULOCYTES NFR BLD AUTO: 0.2 % (ref 0–0.5)
KETONES UR QL STRIP: ABNORMAL
LEUKOCYTE ESTERASE UR QL STRIP.AUTO: NEGATIVE
LYMPHOCYTES # BLD AUTO: 1.17 10*3/MM3 (ref 0.7–3.1)
LYMPHOCYTES NFR BLD AUTO: 11.7 % (ref 19.6–45.3)
Lab: ABNORMAL
MCH RBC QN AUTO: 32.8 PG (ref 26.6–33)
MCHC RBC AUTO-ENTMCNC: 32 G/DL (ref 31.5–35.7)
MCV RBC AUTO: 102.5 FL (ref 79–97)
MODALITY: ABNORMAL
MONOCYTES # BLD AUTO: 0.7 10*3/MM3 (ref 0.1–0.9)
MONOCYTES NFR BLD AUTO: 7 % (ref 5–12)
NEUTROPHILS NFR BLD AUTO: 8.06 10*3/MM3 (ref 1.7–7)
NEUTROPHILS NFR BLD AUTO: 80.3 % (ref 42.7–76)
NITRITE UR QL STRIP: NEGATIVE
NRBC BLD AUTO-RTO: 0 /100 WBC (ref 0–0.2)
NT-PROBNP SERPL-MCNC: 609.7 PG/ML (ref 0–1800)
PCO2 BLDA: 45.2 MM HG (ref 35–45)
PCO2 TEMP ADJ BLD: 45.2 MM HG (ref 35–45)
PH BLDA: 7.42 PH UNITS (ref 7.35–7.45)
PH UR STRIP.AUTO: <=5 [PH] (ref 5–8)
PH, TEMP CORRECTED: 7.42 PH UNITS (ref 7.35–7.45)
PLATELET # BLD AUTO: 230 10*3/MM3 (ref 140–450)
PMV BLD AUTO: 10.7 FL (ref 6–12)
PO2 BLDA: 84.1 MM HG (ref 83–108)
PO2 TEMP ADJ BLD: 84.1 MM HG (ref 83–108)
POTASSIUM SERPL-SCNC: 4.2 MMOL/L (ref 3.5–5.2)
PROCALCITONIN SERPL-MCNC: 0.16 NG/ML (ref 0–0.25)
PROT SERPL-MCNC: 7.7 G/DL (ref 6–8.5)
PROT UR QL STRIP: ABNORMAL
RBC # BLD AUTO: 3.14 10*6/MM3 (ref 3.77–5.28)
SAO2 % BLDCOA: 97.7 % (ref 94–99)
SARS-COV-2 RNA RESP QL NAA+PROBE: NOT DETECTED
SODIUM SERPL-SCNC: 137 MMOL/L (ref 136–145)
SP GR UR STRIP: 1.02 (ref 1–1.03)
TROPONIN T SERPL-MCNC: <0.01 NG/ML (ref 0–0.03)
UROBILINOGEN UR QL STRIP: ABNORMAL
VENTILATOR MODE: ABNORMAL
WBC NRBC COR # BLD: 10.03 10*3/MM3 (ref 3.4–10.8)
WHOLE BLOOD HOLD COAG: NORMAL
WHOLE BLOOD HOLD SPECIMEN: NORMAL

## 2022-05-16 PROCEDURE — 99284 EMERGENCY DEPT VISIT MOD MDM: CPT

## 2022-05-16 PROCEDURE — 71275 CT ANGIOGRAPHY CHEST: CPT

## 2022-05-16 PROCEDURE — 93005 ELECTROCARDIOGRAM TRACING: CPT

## 2022-05-16 PROCEDURE — 80053 COMPREHEN METABOLIC PANEL: CPT

## 2022-05-16 PROCEDURE — 84484 ASSAY OF TROPONIN QUANT: CPT

## 2022-05-16 PROCEDURE — 87636 SARSCOV2 & INF A&B AMP PRB: CPT | Performed by: NURSE PRACTITIONER

## 2022-05-16 PROCEDURE — 71045 X-RAY EXAM CHEST 1 VIEW: CPT

## 2022-05-16 PROCEDURE — 83605 ASSAY OF LACTIC ACID: CPT | Performed by: NURSE PRACTITIONER

## 2022-05-16 PROCEDURE — 84145 PROCALCITONIN (PCT): CPT | Performed by: NURSE PRACTITIONER

## 2022-05-16 PROCEDURE — 85379 FIBRIN DEGRADATION QUANT: CPT | Performed by: NURSE PRACTITIONER

## 2022-05-16 PROCEDURE — 85025 COMPLETE CBC W/AUTO DIFF WBC: CPT

## 2022-05-16 PROCEDURE — 94799 UNLISTED PULMONARY SVC/PX: CPT

## 2022-05-16 PROCEDURE — 87040 BLOOD CULTURE FOR BACTERIA: CPT | Performed by: NURSE PRACTITIONER

## 2022-05-16 PROCEDURE — 36600 WITHDRAWAL OF ARTERIAL BLOOD: CPT

## 2022-05-16 PROCEDURE — 94761 N-INVAS EAR/PLS OXIMETRY MLT: CPT

## 2022-05-16 PROCEDURE — 83880 ASSAY OF NATRIURETIC PEPTIDE: CPT

## 2022-05-16 PROCEDURE — 82803 BLOOD GASES ANY COMBINATION: CPT

## 2022-05-16 PROCEDURE — P9612 CATHETERIZE FOR URINE SPEC: HCPCS

## 2022-05-16 PROCEDURE — 0 IOPAMIDOL PER 1 ML: Performed by: NURSE PRACTITIONER

## 2022-05-16 PROCEDURE — 94640 AIRWAY INHALATION TREATMENT: CPT

## 2022-05-16 PROCEDURE — 81003 URINALYSIS AUTO W/O SCOPE: CPT | Performed by: NURSE PRACTITIONER

## 2022-05-16 PROCEDURE — 93010 ELECTROCARDIOGRAM REPORT: CPT | Performed by: EMERGENCY MEDICINE

## 2022-05-16 RX ORDER — IPRATROPIUM BROMIDE AND ALBUTEROL SULFATE 2.5; .5 MG/3ML; MG/3ML
3 SOLUTION RESPIRATORY (INHALATION) ONCE
Status: COMPLETED | OUTPATIENT
Start: 2022-05-16 | End: 2022-05-16

## 2022-05-16 RX ORDER — SODIUM CHLORIDE 0.9 % (FLUSH) 0.9 %
10 SYRINGE (ML) INJECTION AS NEEDED
Status: DISCONTINUED | OUTPATIENT
Start: 2022-05-16 | End: 2022-05-16 | Stop reason: HOSPADM

## 2022-05-16 RX ORDER — AMOXICILLIN AND CLAVULANATE POTASSIUM 500; 125 MG/1; MG/1
1 TABLET, FILM COATED ORAL 2 TIMES DAILY
Qty: 14 TABLET | Refills: 0 | Status: ON HOLD | OUTPATIENT
Start: 2022-05-16 | End: 2022-05-24

## 2022-05-16 RX ORDER — PREDNISONE 20 MG/1
20 TABLET ORAL 2 TIMES DAILY
Qty: 10 TABLET | Refills: 0 | Status: ON HOLD | OUTPATIENT
Start: 2022-05-16 | End: 2022-05-24

## 2022-05-16 RX ADMIN — SODIUM CHLORIDE, POTASSIUM CHLORIDE, SODIUM LACTATE AND CALCIUM CHLORIDE 500 ML: 600; 310; 30; 20 INJECTION, SOLUTION INTRAVENOUS at 13:48

## 2022-05-16 RX ADMIN — IPRATROPIUM BROMIDE AND ALBUTEROL SULFATE 3 ML: .5; 3 SOLUTION RESPIRATORY (INHALATION) at 14:09

## 2022-05-16 RX ADMIN — IOPAMIDOL 100 ML: 755 INJECTION, SOLUTION INTRAVENOUS at 15:55

## 2022-05-16 NOTE — ED PROVIDER NOTES
Subjective   Patient is a very pleasant 89-year-old female that presents ER today with complaint of shortness of breath and cough.  The patient states that this has been ongoing since she initially was admitted in April 2022.  The patient was admitted April 24 through April 29, 2002 with bilateral pneumonia and parainfluenza virus.  Patient was started on O2 at 2 L via nasal cannula continuously which she is continuing to wear.  She states that she is just had more of a cough over the past week.  She is also had some more shortness of breath.  The patient does have a history of COPD.  She presents here today for further evaluation.      History provided by:  Patient   used: No        Review of Systems   Respiratory: Positive for cough and shortness of breath.    Cardiovascular: Negative for chest pain, palpitations and leg swelling.   Gastrointestinal: Negative for abdominal distention and abdominal pain.   Genitourinary: Negative for dysuria and flank pain.   Musculoskeletal: Negative for back pain and joint swelling.   Neurological: Negative for dizziness and seizures.   Psychiatric/Behavioral: Negative for confusion.   All other systems reviewed and are negative.      Past Medical History:   Diagnosis Date   • COPD (chronic obstructive pulmonary disease) (HCC)    • GERD (gastroesophageal reflux disease)    • Hypertension    • Pneumonia        No Known Allergies    Past Surgical History:   Procedure Laterality Date   • CATARACT EXTRACTION, BILATERAL     • THYROID SURGERY         Family History   Problem Relation Age of Onset   • Alzheimer's disease Mother    • Heart disease Father    • Colon polyps Child    • Colon cancer Neg Hx        Social History     Socioeconomic History   • Marital status:    Tobacco Use   • Smoking status: Never Smoker   • Smokeless tobacco: Never Used   Substance and Sexual Activity   • Alcohol use: No   • Drug use: No   • Sexual activity: Defer            Objective   Physical Exam  Vitals and nursing note reviewed.   Constitutional:       Appearance: She is well-developed.   HENT:      Head: Normocephalic and atraumatic.      Mouth/Throat:      Pharynx: Oropharynx is clear.   Eyes:      Conjunctiva/sclera: Conjunctivae normal.      Pupils: Pupils are equal, round, and reactive to light.   Cardiovascular:      Rate and Rhythm: Normal rate and regular rhythm.   Pulmonary:      Effort: Pulmonary effort is normal.      Breath sounds: Normal breath sounds.      Comments: O2 at 2L via NC  Abdominal:      General: Bowel sounds are normal.   Skin:     General: Skin is warm and dry.      Capillary Refill: Capillary refill takes less than 2 seconds.   Neurological:      General: No focal deficit present.      Mental Status: She is alert.   Psychiatric:         Mood and Affect: Mood normal.         Procedures           ED Course  ED Course as of 05/16/22 1745   Mon May 16, 2022   1649 CT Angiogram Chest [LF]   1721 Ambulatory O2 sat 95-99% on 2L O2, .  [LF]   1736 I spoke with Jim Tenorio, nurse practitioner with pulmonology.  I discussed the patient's results. She recommends that the pt be started on prednisone and augmentin. She states that the pt may call the office in the AM and they will get the pt in to be seen. I discussed this with the pt and her son and they voiced understanding this.  The patient is oxygenating well.  She is able to eat and drink without difficulty. The patient will be discharged home at this time in stable condition advised return to the ER for any new or worsening symptoms. [LF]   1744 Pt case discussed with Dr. Oleary who agrees with plan of care.  [LF]      ED Course User Index  [LF] Christina Mesa, APRN                                         CT Angiogram Chest   Final Result   1. No CT evidence of pulmonary embolus. Atheromatous disease of the   thoracic aorta. Cardiomegaly. The pulmonary arteries are prominent in   size.    2. Small mediastinal lymph nodes appear relatively stable and are likely   reactive.   3. Bilateral bronchiectasis with areas of peribronchial consolidation   some of which are slightly worse in some of which are slightly better.   There is also mucous plugging. Aspergillus infection would be in the   differential for these findings. Correlate clinically. Pulmonary   consultation should be considered if not performed previously.   4. Gallstones in the gallbladder. Left renal cysts. Gastric wall   thickening likely due to underdistention.     This report was finalized on 05/16/2022 16:35 by Dr. Ramsey Grover MD.      XR Chest 1 View   Final Result   1. Multifocal bilateral patchy and reticular nodular infiltrate,   superimposed on lung emphysema and interstitial fibrosis. Pulmonary   infiltrates appear very similar to the recent 4/29/2022 chest radiograph   in this appears to be a chronic process, having been present on multiple   prior exams dating back to 2019. Considerations would include atypical   pneumonia such as fungal pneumonia or pulmonary mycobacterial infection.       This report was finalized on 05/16/2022 13:24 by Dr Pk Paz, .        Labs Reviewed   COMPREHENSIVE METABOLIC PANEL - Abnormal; Notable for the following components:       Result Value    Glucose 144 (*)     BUN 24 (*)     CO2 30.0 (*)     Albumin 3.40 (*)     BUN/Creatinine Ratio 32.0 (*)     All other components within normal limits    Narrative:     GFR Normal >60  Chronic Kidney Disease <60  Kidney Failure <15     CBC WITH AUTO DIFFERENTIAL - Abnormal; Notable for the following components:    RBC 3.14 (*)     Hemoglobin 10.3 (*)     Hematocrit 32.2 (*)     .5 (*)     Neutrophil % 80.3 (*)     Lymphocyte % 11.7 (*)     Neutrophils, Absolute 8.06 (*)     All other components within normal limits   URINALYSIS W/ CULTURE IF INDICATED - Abnormal; Notable for the following components:    Ketones, UA 15 mg/dL (1+) (*)      Protein, UA Trace (*)     All other components within normal limits    Narrative:     In absence of clinical symptoms, the presence of pyuria, bacteria, and/or nitrites on the urinalysis result does not correlate with infection.  Urine microscopic not indicated.   D-DIMER, QUANTITATIVE - Abnormal; Notable for the following components:    D-Dimer, Quantitative 0.98 (*)     All other components within normal limits    Narrative:     Reference Range is 0-0.50 MCGFEU/mL. However, results <0.50 MCGFEU/mL tends to rule out DVT or PE. Results >0.50 MCGFEU/mL are not useful in predicting absence or presence of DVT or PE.     BLOOD GAS, ARTERIAL - Abnormal; Notable for the following components:    pCO2, Arterial 45.2 (*)     HCO3, Arterial 29.4 (*)     Base Excess, Arterial 4.4 (*)     pCO2, Temperature Corrected 45.2 (*)     All other components within normal limits   COVID-19 AND FLU A/B, NP SWAB IN TRANSPORT MEDIA 8-12 HR TAT - Normal    Narrative:     Fact sheet for providers: https://www.fda.gov/media/950719/download    Fact sheet for patients: https://www.fda.gov/media/143332/download    Test performed by PCR.   BNP (IN-HOUSE) - Normal    Narrative:     Among patients with dyspnea, NT-proBNP is highly sensitive for the detection of acute congestive heart failure. In addition NT-proBNP of <300 pg/ml effectively rules out acute congestive heart failure with 99% negative predictive value.    Results may be falsely decreased if patient taking Biotin.     TROPONIN (IN-HOUSE) - Normal    Narrative:     Troponin T Reference Range:  <= 0.03 ng/mL-   Negative for AMI  >0.03 ng/mL-     Abnormal for myocardial necrosis.  Clinicians would have to utilize clinical acumen, EKG, Troponin and serial changes to determine if it is an Acute Myocardial Infarction or myocardial injury due to an underlying chronic condition.       Results may be falsely decreased if patient taking Biotin.     LACTIC ACID, PLASMA - Normal   PROCALCITONIN -  "Normal    Narrative:     As a Marker for Sepsis (Non-Neonates):    1. <0.5 ng/mL represents a low risk of severe sepsis and/or septic shock.  2. >2 ng/mL represents a high risk of severe sepsis and/or septic shock.    As a Marker for Lower Respiratory Tract Infections that require antibiotic therapy:    PCT on Admission    Antibiotic Therapy       6-12 Hrs later    >0.5                Strongly Recommended  >0.25 - <0.5        Recommended   0.1 - 0.25          Discouraged              Remeasure/reassess PCT  <0.1                Strongly Discouraged     Remeasure/reassess PCT    As 28 day mortality risk marker: \"Change in Procalcitonin Result\" (>80% or <=80%) if Day 0 (or Day 1) and Day 4 values are available. Refer to http://www.[facility]-pct-calculator.com    Change in PCT <=80%  A decrease of PCT levels below or equal to 80% defines a positive change in PCT test result representing a higher risk for 28-day all-cause mortality of patients diagnosed with severe sepsis for septic shock.    Change in PCT >80%  A decrease of PCT levels of more than 80% defines a negative change in PCT result representing a lower risk for 28-day all-cause mortality of patients diagnosed with severe sepsis or septic shock.      BLOOD CULTURE WITH GIFTY   BLOOD CULTURE WITH GIFTY   RAINBOW DRAW    Narrative:     The following orders were created for panel order Draper Draw.  Procedure                               Abnormality         Status                     ---------                               -----------         ------                     Green Top (Gel)[[id number]]                                  Final result               Lavender Top[[id number]]                                     Final result               Red Top[[id number]]                                                                     Draper Blood Culture Morgan...[[id number]]                      Final result               Thomas Top[[id number]]                                         " Final result               Light Blue Top[[id number]]                                   Final result                 Please view results for these tests on the individual orders.   BLOOD GAS, ARTERIAL   URINALYSIS W/ MICROSCOPIC IF INDICATED (NO CULTURE)   CBC AND DIFFERENTIAL    Narrative:     The following orders were created for panel order CBC & Differential.  Procedure                               Abnormality         Status                     ---------                               -----------         ------                     CBC Auto Differential[[id number]]        Abnormal            Final result                 Please view results for these tests on the individual orders.   GREEN TOP   LAVENDER TOP   RAINBOW BLOOD CULTURE BOTTLES - 1 SET   GRAY TOP   LIGHT BLUE TOP             [facility]    Final diagnoses:   Bronchiectasis with acute exacerbation (HCC)       ED Disposition  ED Disposition     ED Disposition   Discharge    Condition   Stable    Comment   --             Javier Ng MD  [address]  [facility] [id number] [phone]    Call in 1 day      Nataliia Quiñones MD  [address] [facility] [id number] [phone]    In 1 day  Please call in AM for an appointment         Medication List      New Prescriptions    amoxicillin-clavulanate 500-125 MG per tablet  Commonly known as: Augmentin  Take 1 tablet by mouth 2 (Two) Times a Day for 7 days.     predniSONE 20 MG tablet  Commonly known as: DELTASONE  Take 1 tablet by mouth 2 (Two) Times a Day for 5 days.           Where to Get Your Medications      These medications were sent to [facility] - [facility] - [address] [place] [address] - [phone]  - [phone] [address] [address]    Phone: [phone]   · amoxicillin-clavulanate 500-125 MG per tablet  · predniSONE 20 MG tablet          Christina Mesa, APRN  05/16/22 1748

## 2022-05-17 LAB
QT INTERVAL: 236 MS
QTC INTERVAL: 417 MS

## 2022-05-21 LAB — BACTERIA SPEC AEROBE CULT: NORMAL

## 2022-05-22 ENCOUNTER — HOSPITAL ENCOUNTER (INPATIENT)
Facility: HOSPITAL | Age: 87
LOS: 3 days | Discharge: HOME HEALTH | End: 2022-05-26
Attending: INTERNAL MEDICINE | Admitting: INTERNAL MEDICINE

## 2022-05-22 ENCOUNTER — APPOINTMENT (OUTPATIENT)
Dept: GENERAL RADIOLOGY | Facility: HOSPITAL | Age: 87
End: 2022-05-22

## 2022-05-22 DIAGNOSIS — Z78.9 DECREASED ACTIVITIES OF DAILY LIVING (ADL): ICD-10-CM

## 2022-05-22 DIAGNOSIS — J96.21 ACUTE ON CHRONIC RESPIRATORY FAILURE WITH HYPOXIA: ICD-10-CM

## 2022-05-22 DIAGNOSIS — Z74.09 IMPAIRED MOBILITY: ICD-10-CM

## 2022-05-22 DIAGNOSIS — J18.9 PNEUMONIA OF BOTH LUNGS DUE TO INFECTIOUS ORGANISM, UNSPECIFIED PART OF LUNG: ICD-10-CM

## 2022-05-22 DIAGNOSIS — R53.1 GENERALIZED WEAKNESS: ICD-10-CM

## 2022-05-22 DIAGNOSIS — J18.9 PNEUMONIA OF RIGHT LUNG DUE TO INFECTIOUS ORGANISM, UNSPECIFIED PART OF LUNG: Primary | ICD-10-CM

## 2022-05-22 DIAGNOSIS — E43 SEVERE MALNUTRITION: ICD-10-CM

## 2022-05-22 LAB
ALBUMIN SERPL-MCNC: 2.6 G/DL (ref 3.5–5.2)
ALBUMIN/GLOB SERPL: 0.7 G/DL
ALP SERPL-CCNC: 44 U/L (ref 39–117)
ALT SERPL W P-5'-P-CCNC: 14 U/L (ref 1–33)
ANION GAP SERPL CALCULATED.3IONS-SCNC: 7 MMOL/L (ref 5–15)
ANISOCYTOSIS BLD QL: ABNORMAL
ARTERIAL PATENCY WRIST A: ABNORMAL
AST SERPL-CCNC: 18 U/L (ref 1–32)
ATMOSPHERIC PRESS: 757 MMHG
BASE EXCESS BLDA CALC-SCNC: 8.3 MMOL/L (ref 0–2)
BDY SITE: ABNORMAL
BILIRUB SERPL-MCNC: 0.2 MG/DL (ref 0–1.2)
BODY TEMPERATURE: 37 C
BUN SERPL-MCNC: 23 MG/DL (ref 8–23)
BUN/CREAT SERPL: 31.1 (ref 7–25)
CALCIUM SPEC-SCNC: 8.6 MG/DL (ref 8.2–9.6)
CHLORIDE SERPL-SCNC: 100 MMOL/L (ref 98–107)
CO2 SERPL-SCNC: 30 MMOL/L (ref 22–29)
CREAT SERPL-MCNC: 0.74 MG/DL (ref 0.57–1)
D-LACTATE SERPL-SCNC: 1.1 MMOL/L (ref 0.5–2)
DEPRECATED RDW RBC AUTO: 50.2 FL (ref 37–54)
EGFRCR SERPLBLD CKD-EPI 2021: 74.1 ML/MIN/1.73
ERYTHROCYTE [DISTWIDTH] IN BLOOD BY AUTOMATED COUNT: 14.2 % (ref 12.3–15.4)
GAS FLOW AIRWAY: 1.5 LPM
GIANT PLATELETS: ABNORMAL
GLOBULIN UR ELPH-MCNC: 3.5 GM/DL
GLUCOSE SERPL-MCNC: 197 MG/DL (ref 65–99)
HBA1C MFR BLD: 6.7 % (ref 4.8–5.6)
HCO3 BLDA-SCNC: 32.8 MMOL/L (ref 20–26)
HCT VFR BLD AUTO: 28.1 % (ref 34–46.6)
HGB BLD-MCNC: 9.6 G/DL (ref 12–15.9)
L PNEUMO1 AG UR QL IA: NEGATIVE
LYMPHOCYTES # BLD MANUAL: 0.39 10*3/MM3 (ref 0.7–3.1)
LYMPHOCYTES NFR BLD MANUAL: 7.1 % (ref 5–12)
Lab: ABNORMAL
MACROCYTES BLD QL SMEAR: ABNORMAL
MAGNESIUM SERPL-MCNC: 1.9 MG/DL (ref 1.7–2.3)
MCH RBC QN AUTO: 34.8 PG (ref 26.6–33)
MCHC RBC AUTO-ENTMCNC: 34.2 G/DL (ref 31.5–35.7)
MCV RBC AUTO: 101.8 FL (ref 79–97)
MICROCYTES BLD QL: ABNORMAL
MODALITY: ABNORMAL
MONOCYTES # BLD: 0.93 10*3/MM3 (ref 0.1–0.9)
NEUTROPHILS # BLD AUTO: 11.8 10*3/MM3 (ref 1.7–7)
NEUTROPHILS NFR BLD MANUAL: 83.8 % (ref 42.7–76)
NEUTS BAND NFR BLD MANUAL: 6.1 % (ref 0–5)
NRBC BLD AUTO-RTO: 0 /100 WBC (ref 0–0.2)
PCO2 BLDA: 44.5 MM HG (ref 35–45)
PCO2 TEMP ADJ BLD: 44.5 MM HG (ref 35–45)
PH BLDA: 7.48 PH UNITS (ref 7.35–7.45)
PH, TEMP CORRECTED: 7.48 PH UNITS (ref 7.35–7.45)
PLATELET # BLD AUTO: 299 10*3/MM3 (ref 140–450)
PMV BLD AUTO: 11 FL (ref 6–12)
PO2 BLDA: 61.7 MM HG (ref 83–108)
PO2 TEMP ADJ BLD: 61.7 MM HG (ref 83–108)
POLYCHROMASIA BLD QL SMEAR: ABNORMAL
POTASSIUM SERPL-SCNC: 4 MMOL/L (ref 3.5–5.2)
PROCALCITONIN SERPL-MCNC: 0.55 NG/ML (ref 0–0.25)
PROT SERPL-MCNC: 6.1 G/DL (ref 6–8.5)
RBC # BLD AUTO: 2.76 10*6/MM3 (ref 3.77–5.28)
S PNEUM AG SPEC QL LA: NEGATIVE
SAO2 % BLDCOA: 93.2 % (ref 94–99)
SARS-COV-2 RNA PNL SPEC NAA+PROBE: NOT DETECTED
SODIUM SERPL-SCNC: 137 MMOL/L (ref 136–145)
T4 FREE SERPL-MCNC: 1.4 NG/DL (ref 0.93–1.7)
TOXIC GRANULATION: ABNORMAL
TSH SERPL DL<=0.05 MIU/L-ACNC: 1.17 UIU/ML (ref 0.27–4.2)
VARIANT LYMPHS NFR BLD MANUAL: 3 % (ref 19.6–45.3)
VENTILATOR MODE: ABNORMAL
WBC NRBC COR # BLD: 13.13 10*3/MM3 (ref 3.4–10.8)

## 2022-05-22 PROCEDURE — 93010 ELECTROCARDIOGRAM REPORT: CPT | Performed by: INTERNAL MEDICINE

## 2022-05-22 PROCEDURE — 93005 ELECTROCARDIOGRAM TRACING: CPT | Performed by: INTERNAL MEDICINE

## 2022-05-22 PROCEDURE — 25010000002 VANCOMYCIN 10 G RECONSTITUTED SOLUTION: Performed by: INTERNAL MEDICINE

## 2022-05-22 PROCEDURE — 87449 NOS EACH ORGANISM AG IA: CPT | Performed by: INTERNAL MEDICINE

## 2022-05-22 PROCEDURE — 94640 AIRWAY INHALATION TREATMENT: CPT

## 2022-05-22 PROCEDURE — 25010000002 CEFTRIAXONE PER 250 MG: Performed by: PHYSICIAN ASSISTANT

## 2022-05-22 PROCEDURE — 83036 HEMOGLOBIN GLYCOSYLATED A1C: CPT | Performed by: INTERNAL MEDICINE

## 2022-05-22 PROCEDURE — 36600 WITHDRAWAL OF ARTERIAL BLOOD: CPT

## 2022-05-22 PROCEDURE — 25010000002 PIPERACILLIN SOD-TAZOBACTAM PER 1 G: Performed by: INTERNAL MEDICINE

## 2022-05-22 PROCEDURE — 25010000002 AZITHROMYCIN PER 500 MG: Performed by: PHYSICIAN ASSISTANT

## 2022-05-22 PROCEDURE — 94799 UNLISTED PULMONARY SVC/PX: CPT

## 2022-05-22 PROCEDURE — 71045 X-RAY EXAM CHEST 1 VIEW: CPT

## 2022-05-22 PROCEDURE — 36415 COLL VENOUS BLD VENIPUNCTURE: CPT

## 2022-05-22 PROCEDURE — 85025 COMPLETE CBC W/AUTO DIFF WBC: CPT | Performed by: PHYSICIAN ASSISTANT

## 2022-05-22 PROCEDURE — 85007 BL SMEAR W/DIFF WBC COUNT: CPT | Performed by: PHYSICIAN ASSISTANT

## 2022-05-22 PROCEDURE — 84439 ASSAY OF FREE THYROXINE: CPT | Performed by: INTERNAL MEDICINE

## 2022-05-22 PROCEDURE — G0378 HOSPITAL OBSERVATION PER HR: HCPCS

## 2022-05-22 PROCEDURE — 0202U NFCT DS 22 TRGT SARS-COV-2: CPT | Performed by: INTERNAL MEDICINE

## 2022-05-22 PROCEDURE — 94664 DEMO&/EVAL PT USE INHALER: CPT

## 2022-05-22 PROCEDURE — 87641 MR-STAPH DNA AMP PROBE: CPT | Performed by: INTERNAL MEDICINE

## 2022-05-22 PROCEDURE — 84145 PROCALCITONIN (PCT): CPT | Performed by: PHYSICIAN ASSISTANT

## 2022-05-22 PROCEDURE — 87635 SARS-COV-2 COVID-19 AMP PRB: CPT | Performed by: PHYSICIAN ASSISTANT

## 2022-05-22 PROCEDURE — 83605 ASSAY OF LACTIC ACID: CPT | Performed by: PHYSICIAN ASSISTANT

## 2022-05-22 PROCEDURE — 82803 BLOOD GASES ANY COMBINATION: CPT

## 2022-05-22 PROCEDURE — 99284 EMERGENCY DEPT VISIT MOD MDM: CPT

## 2022-05-22 PROCEDURE — 93005 ELECTROCARDIOGRAM TRACING: CPT | Performed by: EMERGENCY MEDICINE

## 2022-05-22 PROCEDURE — 83735 ASSAY OF MAGNESIUM: CPT | Performed by: PHYSICIAN ASSISTANT

## 2022-05-22 PROCEDURE — 80053 COMPREHEN METABOLIC PANEL: CPT | Performed by: PHYSICIAN ASSISTANT

## 2022-05-22 PROCEDURE — 84443 ASSAY THYROID STIM HORMONE: CPT | Performed by: INTERNAL MEDICINE

## 2022-05-22 PROCEDURE — 87040 BLOOD CULTURE FOR BACTERIA: CPT | Performed by: PHYSICIAN ASSISTANT

## 2022-05-22 RX ORDER — ACETAMINOPHEN 500 MG
500 TABLET ORAL EVERY 4 HOURS PRN
Status: DISCONTINUED | OUTPATIENT
Start: 2022-05-22 | End: 2022-05-26 | Stop reason: HOSPADM

## 2022-05-22 RX ORDER — GUAIFENESIN 600 MG/1
1200 TABLET, EXTENDED RELEASE ORAL EVERY 12 HOURS SCHEDULED
Status: DISCONTINUED | OUTPATIENT
Start: 2022-05-22 | End: 2022-05-26 | Stop reason: HOSPADM

## 2022-05-22 RX ORDER — CHOLECALCIFEROL (VITAMIN D3) 125 MCG
500 CAPSULE ORAL DAILY
Status: DISCONTINUED | OUTPATIENT
Start: 2022-05-23 | End: 2022-05-26 | Stop reason: HOSPADM

## 2022-05-22 RX ORDER — DEXTROSE MONOHYDRATE 25 G/50ML
25 INJECTION, SOLUTION INTRAVENOUS
Status: DISCONTINUED | OUTPATIENT
Start: 2022-05-22 | End: 2022-05-23 | Stop reason: SDUPTHER

## 2022-05-22 RX ORDER — ONDANSETRON 2 MG/ML
4 INJECTION INTRAMUSCULAR; INTRAVENOUS EVERY 6 HOURS PRN
Status: DISCONTINUED | OUTPATIENT
Start: 2022-05-22 | End: 2022-05-26 | Stop reason: HOSPADM

## 2022-05-22 RX ORDER — L.ACID,PARA/B.BIFIDUM/S.THERM 8B CELL
1 CAPSULE ORAL DAILY
Status: DISCONTINUED | OUTPATIENT
Start: 2022-05-23 | End: 2022-05-26 | Stop reason: HOSPADM

## 2022-05-22 RX ORDER — TRAZODONE HYDROCHLORIDE 50 MG/1
50 TABLET ORAL NIGHTLY
Status: DISCONTINUED | OUTPATIENT
Start: 2022-05-22 | End: 2022-05-26 | Stop reason: HOSPADM

## 2022-05-22 RX ORDER — ENOXAPARIN SODIUM 100 MG/ML
30 INJECTION SUBCUTANEOUS
Status: DISCONTINUED | OUTPATIENT
Start: 2022-05-23 | End: 2022-05-26 | Stop reason: HOSPADM

## 2022-05-22 RX ORDER — IPRATROPIUM BROMIDE AND ALBUTEROL SULFATE 2.5; .5 MG/3ML; MG/3ML
3 SOLUTION RESPIRATORY (INHALATION) EVERY 6 HOURS PRN
Status: DISCONTINUED | OUTPATIENT
Start: 2022-05-22 | End: 2022-05-26 | Stop reason: HOSPADM

## 2022-05-22 RX ORDER — CODEINE PHOSPHATE AND GUAIFENESIN 10; 100 MG/5ML; MG/5ML
5 SOLUTION ORAL EVERY 6 HOURS PRN
Status: DISCONTINUED | OUTPATIENT
Start: 2022-05-22 | End: 2022-05-26 | Stop reason: HOSPADM

## 2022-05-22 RX ORDER — ALBUTEROL SULFATE 2.5 MG/3ML
2.5 SOLUTION RESPIRATORY (INHALATION) ONCE
Status: COMPLETED | OUTPATIENT
Start: 2022-05-22 | End: 2022-05-22

## 2022-05-22 RX ORDER — BENZONATATE 100 MG/1
200 CAPSULE ORAL 3 TIMES DAILY PRN
Status: DISCONTINUED | OUTPATIENT
Start: 2022-05-22 | End: 2022-05-26 | Stop reason: HOSPADM

## 2022-05-22 RX ORDER — PANTOPRAZOLE SODIUM 40 MG/1
40 TABLET, DELAYED RELEASE ORAL
Status: DISCONTINUED | OUTPATIENT
Start: 2022-05-23 | End: 2022-05-26 | Stop reason: HOSPADM

## 2022-05-22 RX ORDER — ONDANSETRON 4 MG/1
4 TABLET, ORALLY DISINTEGRATING ORAL EVERY 6 HOURS PRN
Status: DISCONTINUED | OUTPATIENT
Start: 2022-05-22 | End: 2022-05-26 | Stop reason: HOSPADM

## 2022-05-22 RX ORDER — SODIUM CHLORIDE 0.9 % (FLUSH) 0.9 %
10 SYRINGE (ML) INJECTION AS NEEDED
Status: DISCONTINUED | OUTPATIENT
Start: 2022-05-22 | End: 2022-05-26 | Stop reason: HOSPADM

## 2022-05-22 RX ORDER — SODIUM CHLORIDE 0.9 % (FLUSH) 0.9 %
10 SYRINGE (ML) INJECTION EVERY 12 HOURS SCHEDULED
Status: DISCONTINUED | OUTPATIENT
Start: 2022-05-22 | End: 2022-05-26 | Stop reason: HOSPADM

## 2022-05-22 RX ORDER — NICOTINE POLACRILEX 4 MG
15 LOZENGE BUCCAL
Status: DISCONTINUED | OUTPATIENT
Start: 2022-05-22 | End: 2022-05-23 | Stop reason: SDUPTHER

## 2022-05-22 RX ORDER — ENOXAPARIN SODIUM 100 MG/ML
40 INJECTION SUBCUTANEOUS EVERY 24 HOURS
Status: DISCONTINUED | OUTPATIENT
Start: 2022-05-22 | End: 2022-05-22 | Stop reason: DRUGHIGH

## 2022-05-22 RX ORDER — METHYLPREDNISOLONE SODIUM SUCCINATE 125 MG/2ML
60 INJECTION, POWDER, LYOPHILIZED, FOR SOLUTION INTRAMUSCULAR; INTRAVENOUS EVERY 6 HOURS SCHEDULED
Status: DISCONTINUED | OUTPATIENT
Start: 2022-05-23 | End: 2022-05-23

## 2022-05-22 RX ORDER — MULTIPLE VITAMINS W/ MINERALS TAB 9MG-400MCG
2 TAB ORAL 2 TIMES DAILY
Status: DISCONTINUED | OUTPATIENT
Start: 2022-05-22 | End: 2022-05-26 | Stop reason: HOSPADM

## 2022-05-22 RX ORDER — IPRATROPIUM BROMIDE AND ALBUTEROL SULFATE 2.5; .5 MG/3ML; MG/3ML
3 SOLUTION RESPIRATORY (INHALATION)
Status: DISCONTINUED | OUTPATIENT
Start: 2022-05-22 | End: 2022-05-26 | Stop reason: HOSPADM

## 2022-05-22 RX ORDER — ONDANSETRON 4 MG/1
4 TABLET, FILM COATED ORAL EVERY 6 HOURS PRN
Status: DISCONTINUED | OUTPATIENT
Start: 2022-05-22 | End: 2022-05-26 | Stop reason: HOSPADM

## 2022-05-22 RX ORDER — BUDESONIDE AND FORMOTEROL FUMARATE DIHYDRATE 160; 4.5 UG/1; UG/1
2 AEROSOL RESPIRATORY (INHALATION)
Status: DISCONTINUED | OUTPATIENT
Start: 2022-05-22 | End: 2022-05-26 | Stop reason: HOSPADM

## 2022-05-22 RX ORDER — INSULIN LISPRO 100 [IU]/ML
2-7 INJECTION, SOLUTION INTRAVENOUS; SUBCUTANEOUS
Status: DISCONTINUED | OUTPATIENT
Start: 2022-05-23 | End: 2022-05-23

## 2022-05-22 RX ORDER — SODIUM CHLORIDE 9 MG/ML
75 INJECTION, SOLUTION INTRAVENOUS CONTINUOUS
Status: DISCONTINUED | OUTPATIENT
Start: 2022-05-22 | End: 2022-05-23

## 2022-05-22 RX ADMIN — AZITHROMYCIN MONOHYDRATE 500 MG: 500 INJECTION, POWDER, LYOPHILIZED, FOR SOLUTION INTRAVENOUS at 16:53

## 2022-05-22 RX ADMIN — ALBUTEROL SULFATE 2.5 MG: 2.5 SOLUTION RESPIRATORY (INHALATION) at 16:51

## 2022-05-22 RX ADMIN — SODIUM CHLORIDE 1 G: 9 INJECTION, SOLUTION INTRAVENOUS at 18:01

## 2022-05-22 RX ADMIN — TRAZODONE HYDROCHLORIDE 50 MG: 50 TABLET ORAL at 23:10

## 2022-05-22 RX ADMIN — VANCOMYCIN HYDROCHLORIDE 750 MG: 10 INJECTION, POWDER, LYOPHILIZED, FOR SOLUTION INTRAVENOUS at 23:12

## 2022-05-22 RX ADMIN — BUDESONIDE AND FORMOTEROL FUMARATE DIHYDRATE 2 PUFF: 160; 4.5 AEROSOL RESPIRATORY (INHALATION) at 23:42

## 2022-05-22 RX ADMIN — GUAIFENESIN 1200 MG: 600 TABLET, EXTENDED RELEASE ORAL at 23:10

## 2022-05-22 RX ADMIN — Medication 2 TABLET: at 23:10

## 2022-05-22 RX ADMIN — SODIUM CHLORIDE 500 ML: 9 INJECTION, SOLUTION INTRAVENOUS at 16:52

## 2022-05-22 RX ADMIN — ACETAMINOPHEN 500 MG: 500 TABLET ORAL at 23:10

## 2022-05-22 RX ADMIN — METHYLPREDNISOLONE SODIUM SUCCINATE 60 MG: 125 INJECTION, POWDER, FOR SOLUTION INTRAMUSCULAR; INTRAVENOUS at 23:11

## 2022-05-22 RX ADMIN — Medication 10 ML: at 23:12

## 2022-05-22 RX ADMIN — IPRATROPIUM BROMIDE AND ALBUTEROL SULFATE 3 ML: 2.5; .5 SOLUTION RESPIRATORY (INHALATION) at 23:42

## 2022-05-22 RX ADMIN — SODIUM CHLORIDE 75 ML/HR: 9 INJECTION, SOLUTION INTRAVENOUS at 23:11

## 2022-05-22 RX ADMIN — TAZOBACTAM SODIUM AND PIPERACILLIN SODIUM 3.38 G: 375; 3 INJECTION, SOLUTION INTRAVENOUS at 23:11

## 2022-05-23 ENCOUNTER — READMISSION MANAGEMENT (OUTPATIENT)
Dept: CALL CENTER | Facility: HOSPITAL | Age: 87
End: 2022-05-23

## 2022-05-23 LAB
ALBUMIN SERPL-MCNC: 2.4 G/DL (ref 3.5–5.2)
ALBUMIN/GLOB SERPL: 0.7 G/DL
ALP SERPL-CCNC: 47 U/L (ref 39–117)
ALT SERPL W P-5'-P-CCNC: 15 U/L (ref 1–33)
ANION GAP SERPL CALCULATED.3IONS-SCNC: 10 MMOL/L (ref 5–15)
ANISOCYTOSIS BLD QL: ABNORMAL
AST SERPL-CCNC: 15 U/L (ref 1–32)
B PARAPERT DNA SPEC QL NAA+PROBE: NOT DETECTED
B PERT DNA SPEC QL NAA+PROBE: NOT DETECTED
BILIRUB SERPL-MCNC: 0.2 MG/DL (ref 0–1.2)
BUN SERPL-MCNC: 22 MG/DL (ref 8–23)
BUN/CREAT SERPL: 33.3 (ref 7–25)
C PNEUM DNA NPH QL NAA+NON-PROBE: NOT DETECTED
CALCIUM SPEC-SCNC: 8.5 MG/DL (ref 8.2–9.6)
CHLORIDE SERPL-SCNC: 102 MMOL/L (ref 98–107)
CO2 SERPL-SCNC: 26 MMOL/L (ref 22–29)
CREAT SERPL-MCNC: 0.66 MG/DL (ref 0.57–1)
DEPRECATED RDW RBC AUTO: 53.1 FL (ref 37–54)
EGFRCR SERPLBLD CKD-EPI 2021: 80.4 ML/MIN/1.73
ERYTHROCYTE [DISTWIDTH] IN BLOOD BY AUTOMATED COUNT: 14.1 % (ref 12.3–15.4)
FLUAV SUBTYP SPEC NAA+PROBE: NOT DETECTED
FLUBV RNA ISLT QL NAA+PROBE: NOT DETECTED
FOLATE SERPL-MCNC: >20 NG/ML (ref 4.78–24.2)
GLOBULIN UR ELPH-MCNC: 3.4 GM/DL
GLUCOSE BLDC GLUCOMTR-MCNC: 246 MG/DL (ref 70–130)
GLUCOSE BLDC GLUCOMTR-MCNC: 376 MG/DL (ref 70–130)
GLUCOSE BLDC GLUCOMTR-MCNC: 378 MG/DL (ref 70–130)
GLUCOSE BLDC GLUCOMTR-MCNC: 379 MG/DL (ref 70–130)
GLUCOSE BLDC GLUCOMTR-MCNC: 408 MG/DL (ref 70–130)
GLUCOSE BLDC GLUCOMTR-MCNC: 454 MG/DL (ref 70–130)
GLUCOSE BLDC GLUCOMTR-MCNC: 472 MG/DL (ref 70–130)
GLUCOSE SERPL-MCNC: 212 MG/DL (ref 65–99)
HADV DNA SPEC NAA+PROBE: NOT DETECTED
HCOV 229E RNA SPEC QL NAA+PROBE: NOT DETECTED
HCOV HKU1 RNA SPEC QL NAA+PROBE: NOT DETECTED
HCOV NL63 RNA SPEC QL NAA+PROBE: NOT DETECTED
HCOV OC43 RNA SPEC QL NAA+PROBE: NOT DETECTED
HCT VFR BLD AUTO: 27.3 % (ref 34–46.6)
HGB BLD-MCNC: 8.6 G/DL (ref 12–15.9)
HMPV RNA NPH QL NAA+NON-PROBE: NOT DETECTED
HPIV1 RNA ISLT QL NAA+PROBE: NOT DETECTED
HPIV2 RNA SPEC QL NAA+PROBE: NOT DETECTED
HPIV3 RNA NPH QL NAA+PROBE: DETECTED
HPIV4 P GENE NPH QL NAA+PROBE: NOT DETECTED
IRON 24H UR-MRATE: 10 MCG/DL (ref 37–145)
IRON SATN MFR SERPL: 7 % (ref 20–50)
LYMPHOCYTES # BLD MANUAL: 0.1 10*3/MM3 (ref 0.7–3.1)
M PNEUMO IGG SER IA-ACNC: NOT DETECTED
MACROCYTES BLD QL SMEAR: ABNORMAL
MCH RBC QN AUTO: 32.7 PG (ref 26.6–33)
MCHC RBC AUTO-ENTMCNC: 31.5 G/DL (ref 31.5–35.7)
MCV RBC AUTO: 103.8 FL (ref 79–97)
MRSA DNA SPEC QL NAA+PROBE: NORMAL
NEUTROPHILS # BLD AUTO: 9.69 10*3/MM3 (ref 1.7–7)
NEUTROPHILS NFR BLD MANUAL: 92 % (ref 42.7–76)
NEUTS BAND NFR BLD MANUAL: 7 % (ref 0–5)
PLAT MORPH BLD: NORMAL
PLATELET # BLD AUTO: 274 10*3/MM3 (ref 140–450)
PMV BLD AUTO: 10.9 FL (ref 6–12)
POLYCHROMASIA BLD QL SMEAR: ABNORMAL
POTASSIUM SERPL-SCNC: 4 MMOL/L (ref 3.5–5.2)
PROT SERPL-MCNC: 5.8 G/DL (ref 6–8.5)
QT INTERVAL: 316 MS
QTC INTERVAL: 419 MS
RBC # BLD AUTO: 2.63 10*6/MM3 (ref 3.77–5.28)
RHINOVIRUS RNA SPEC NAA+PROBE: NOT DETECTED
RSV RNA NPH QL NAA+NON-PROBE: NOT DETECTED
SARS-COV-2 RNA NPH QL NAA+NON-PROBE: NOT DETECTED
SODIUM SERPL-SCNC: 138 MMOL/L (ref 136–145)
TIBC SERPL-MCNC: 149 MCG/DL (ref 298–536)
TOXIC GRANULATION: ABNORMAL
TRANSFERRIN SERPL-MCNC: 100 MG/DL (ref 200–360)
VARIANT LYMPHS NFR BLD MANUAL: 1 % (ref 19.6–45.3)
VIT B12 BLD-MCNC: >2000 PG/ML (ref 211–946)
WBC NRBC COR # BLD: 9.79 10*3/MM3 (ref 3.4–10.8)

## 2022-05-23 PROCEDURE — 63710000001 INSULIN LISPRO (HUMAN) PER 5 UNITS: Performed by: INTERNAL MEDICINE

## 2022-05-23 PROCEDURE — 25010000002 PIPERACILLIN SOD-TAZOBACTAM PER 1 G: Performed by: INTERNAL MEDICINE

## 2022-05-23 PROCEDURE — 25010000002 METHYLPREDNISOLONE PER 125 MG: Performed by: INTERNAL MEDICINE

## 2022-05-23 PROCEDURE — 82962 GLUCOSE BLOOD TEST: CPT

## 2022-05-23 PROCEDURE — 25010000002 METHYLPREDNISOLONE PER 40 MG: Performed by: INTERNAL MEDICINE

## 2022-05-23 PROCEDURE — 82607 VITAMIN B-12: CPT | Performed by: INTERNAL MEDICINE

## 2022-05-23 PROCEDURE — 85025 COMPLETE CBC W/AUTO DIFF WBC: CPT | Performed by: INTERNAL MEDICINE

## 2022-05-23 PROCEDURE — 25010000002 ENOXAPARIN PER 10 MG: Performed by: INTERNAL MEDICINE

## 2022-05-23 PROCEDURE — 82746 ASSAY OF FOLIC ACID SERUM: CPT | Performed by: INTERNAL MEDICINE

## 2022-05-23 PROCEDURE — 85007 BL SMEAR W/DIFF WBC COUNT: CPT | Performed by: INTERNAL MEDICINE

## 2022-05-23 PROCEDURE — 94799 UNLISTED PULMONARY SVC/PX: CPT

## 2022-05-23 PROCEDURE — 84466 ASSAY OF TRANSFERRIN: CPT | Performed by: INTERNAL MEDICINE

## 2022-05-23 PROCEDURE — 80053 COMPREHEN METABOLIC PANEL: CPT | Performed by: INTERNAL MEDICINE

## 2022-05-23 PROCEDURE — 87205 SMEAR GRAM STAIN: CPT | Performed by: INTERNAL MEDICINE

## 2022-05-23 PROCEDURE — 87186 SC STD MICRODIL/AGAR DIL: CPT | Performed by: INTERNAL MEDICINE

## 2022-05-23 PROCEDURE — 83540 ASSAY OF IRON: CPT | Performed by: INTERNAL MEDICINE

## 2022-05-23 PROCEDURE — 87070 CULTURE OTHR SPECIMN AEROBIC: CPT | Performed by: INTERNAL MEDICINE

## 2022-05-23 PROCEDURE — 87077 CULTURE AEROBIC IDENTIFY: CPT | Performed by: INTERNAL MEDICINE

## 2022-05-23 RX ORDER — DEXTROSE MONOHYDRATE 25 G/50ML
10-50 INJECTION, SOLUTION INTRAVENOUS
Status: DISCONTINUED | OUTPATIENT
Start: 2022-05-23 | End: 2022-05-26 | Stop reason: HOSPADM

## 2022-05-23 RX ORDER — INSULIN LISPRO 100 [IU]/ML
14 INJECTION, SOLUTION INTRAVENOUS; SUBCUTANEOUS ONCE
Status: COMPLETED | OUTPATIENT
Start: 2022-05-23 | End: 2022-05-23

## 2022-05-23 RX ORDER — SODIUM CHLORIDE 0.9 % (FLUSH) 0.9 %
10 SYRINGE (ML) INJECTION EVERY 12 HOURS SCHEDULED
Status: DISCONTINUED | OUTPATIENT
Start: 2022-05-24 | End: 2022-05-26 | Stop reason: HOSPADM

## 2022-05-23 RX ORDER — INSULIN LISPRO 100 [IU]/ML
2-7 INJECTION, SOLUTION INTRAVENOUS; SUBCUTANEOUS
Status: DISCONTINUED | OUTPATIENT
Start: 2022-05-24 | End: 2022-05-26 | Stop reason: HOSPADM

## 2022-05-23 RX ORDER — METHYLPREDNISOLONE SODIUM SUCCINATE 40 MG/ML
40 INJECTION, POWDER, LYOPHILIZED, FOR SOLUTION INTRAMUSCULAR; INTRAVENOUS EVERY 6 HOURS SCHEDULED
Status: DISCONTINUED | OUTPATIENT
Start: 2022-05-23 | End: 2022-05-24

## 2022-05-23 RX ORDER — NICOTINE POLACRILEX 4 MG
15 LOZENGE BUCCAL
Status: DISCONTINUED | OUTPATIENT
Start: 2022-05-23 | End: 2022-05-26 | Stop reason: HOSPADM

## 2022-05-23 RX ORDER — SODIUM CHLORIDE 0.9 % (FLUSH) 0.9 %
10 SYRINGE (ML) INJECTION AS NEEDED
Status: DISCONTINUED | OUTPATIENT
Start: 2022-05-23 | End: 2022-05-26 | Stop reason: HOSPADM

## 2022-05-23 RX ADMIN — ENOXAPARIN SODIUM 30 MG: 30 INJECTION SUBCUTANEOUS at 08:42

## 2022-05-23 RX ADMIN — IPRATROPIUM BROMIDE AND ALBUTEROL SULFATE 3 ML: 2.5; .5 SOLUTION RESPIRATORY (INHALATION) at 06:26

## 2022-05-23 RX ADMIN — Medication 1 CAPSULE: at 08:39

## 2022-05-23 RX ADMIN — TAZOBACTAM SODIUM AND PIPERACILLIN SODIUM 3.38 G: 375; 3 INJECTION, SOLUTION INTRAVENOUS at 14:31

## 2022-05-23 RX ADMIN — PANTOPRAZOLE SODIUM 40 MG: 40 TABLET, DELAYED RELEASE ORAL at 05:54

## 2022-05-23 RX ADMIN — INSULIN LISPRO 6 UNITS: 100 INJECTION, SOLUTION INTRAVENOUS; SUBCUTANEOUS at 17:26

## 2022-05-23 RX ADMIN — METHYLPREDNISOLONE SODIUM SUCCINATE 40 MG: 40 INJECTION, POWDER, FOR SOLUTION INTRAMUSCULAR; INTRAVENOUS at 12:51

## 2022-05-23 RX ADMIN — TRAZODONE HYDROCHLORIDE 50 MG: 50 TABLET ORAL at 20:47

## 2022-05-23 RX ADMIN — TAZOBACTAM SODIUM AND PIPERACILLIN SODIUM 3.38 G: 375; 3 INJECTION, SOLUTION INTRAVENOUS at 20:48

## 2022-05-23 RX ADMIN — METHYLPREDNISOLONE SODIUM SUCCINATE 40 MG: 40 INJECTION, POWDER, FOR SOLUTION INTRAMUSCULAR; INTRAVENOUS at 17:26

## 2022-05-23 RX ADMIN — IPRATROPIUM BROMIDE AND ALBUTEROL SULFATE 3 ML: 2.5; .5 SOLUTION RESPIRATORY (INHALATION) at 14:55

## 2022-05-23 RX ADMIN — METHYLPREDNISOLONE SODIUM SUCCINATE 60 MG: 125 INJECTION, POWDER, FOR SOLUTION INTRAMUSCULAR; INTRAVENOUS at 05:54

## 2022-05-23 RX ADMIN — IPRATROPIUM BROMIDE AND ALBUTEROL SULFATE 3 ML: 2.5; .5 SOLUTION RESPIRATORY (INHALATION) at 09:57

## 2022-05-23 RX ADMIN — GUAIFENESIN 1200 MG: 600 TABLET, EXTENDED RELEASE ORAL at 20:48

## 2022-05-23 RX ADMIN — Medication 1 TABLET: at 08:39

## 2022-05-23 RX ADMIN — IPRATROPIUM BROMIDE AND ALBUTEROL SULFATE 3 ML: 2.5; .5 SOLUTION RESPIRATORY (INHALATION) at 03:31

## 2022-05-23 RX ADMIN — GUAIFENESIN 1200 MG: 600 TABLET, EXTENDED RELEASE ORAL at 08:39

## 2022-05-23 RX ADMIN — Medication 2 TABLET: at 08:39

## 2022-05-23 RX ADMIN — TAZOBACTAM SODIUM AND PIPERACILLIN SODIUM 3.38 G: 375; 3 INJECTION, SOLUTION INTRAVENOUS at 05:54

## 2022-05-23 RX ADMIN — BUDESONIDE AND FORMOTEROL FUMARATE DIHYDRATE 2 PUFF: 160; 4.5 AEROSOL RESPIRATORY (INHALATION) at 19:37

## 2022-05-23 RX ADMIN — Medication 2 TABLET: at 20:48

## 2022-05-23 RX ADMIN — BUDESONIDE AND FORMOTEROL FUMARATE DIHYDRATE 2 PUFF: 160; 4.5 AEROSOL RESPIRATORY (INHALATION) at 06:26

## 2022-05-23 RX ADMIN — INSULIN LISPRO 3 UNITS: 100 INJECTION, SOLUTION INTRAVENOUS; SUBCUTANEOUS at 08:42

## 2022-05-23 RX ADMIN — INSULIN LISPRO 6 UNITS: 100 INJECTION, SOLUTION INTRAVENOUS; SUBCUTANEOUS at 12:51

## 2022-05-23 RX ADMIN — Medication 500 MCG: at 08:39

## 2022-05-23 RX ADMIN — INSULIN LISPRO 14 UNITS: 100 INJECTION, SOLUTION INTRAVENOUS; SUBCUTANEOUS at 22:08

## 2022-05-23 RX ADMIN — IPRATROPIUM BROMIDE AND ALBUTEROL SULFATE 3 ML: 2.5; .5 SOLUTION RESPIRATORY (INHALATION) at 19:30

## 2022-05-23 RX ADMIN — ACETAMINOPHEN 500 MG: 500 TABLET ORAL at 08:47

## 2022-05-23 NOTE — OUTREACH NOTE
COPD/PN Week 3 Survey    Flowsheet Row Responses   [facility] discharged from? [place]   Does the patient have one of the following disease processes/diagnoses(primary or secondary)? COPD/Pneumonia   Was the primary reason for admission: Pneumonia   Week 3 attempt successful? No   Revoke Readmitted          MELLISA REIS - Registered Nurse

## 2022-05-24 LAB
BASOPHILS # BLD AUTO: 0.07 10*3/MM3 (ref 0–0.2)
BASOPHILS NFR BLD AUTO: 0.3 % (ref 0–1.5)
DEPRECATED RDW RBC AUTO: 51.7 FL (ref 37–54)
EOSINOPHIL # BLD AUTO: 0.02 10*3/MM3 (ref 0–0.4)
EOSINOPHIL NFR BLD AUTO: 0.1 % (ref 0.3–6.2)
ERYTHROCYTE [DISTWIDTH] IN BLOOD BY AUTOMATED COUNT: 14.2 % (ref 12.3–15.4)
FERRITIN SERPL-MCNC: 440.7 NG/ML (ref 13–150)
GLUCOSE BLDC GLUCOMTR-MCNC: 279 MG/DL (ref 70–130)
GLUCOSE BLDC GLUCOMTR-MCNC: 284 MG/DL (ref 70–130)
GLUCOSE BLDC GLUCOMTR-MCNC: 342 MG/DL (ref 70–130)
GLUCOSE BLDC GLUCOMTR-MCNC: 358 MG/DL (ref 70–130)
HCT VFR BLD AUTO: 25.6 % (ref 34–46.6)
HGB BLD-MCNC: 8.1 G/DL (ref 12–15.9)
LYMPHOCYTES # BLD AUTO: 0.32 10*3/MM3 (ref 0.7–3.1)
LYMPHOCYTES NFR BLD AUTO: 1.4 % (ref 19.6–45.3)
MCH RBC QN AUTO: 32.4 PG (ref 26.6–33)
MCHC RBC AUTO-ENTMCNC: 31.6 G/DL (ref 31.5–35.7)
MCV RBC AUTO: 102.4 FL (ref 79–97)
MONOCYTES # BLD AUTO: 0.4 10*3/MM3 (ref 0.1–0.9)
MONOCYTES NFR BLD AUTO: 1.7 % (ref 5–12)
NEUTROPHILS NFR BLD AUTO: 22.29 10*3/MM3 (ref 1.7–7)
NEUTROPHILS NFR BLD AUTO: 95.2 % (ref 42.7–76)
PLATELET # BLD AUTO: 297 10*3/MM3 (ref 140–450)
PMV BLD AUTO: 11 FL (ref 6–12)
PROCALCITONIN SERPL-MCNC: 0.56 NG/ML (ref 0–0.25)
RBC # BLD AUTO: 2.5 10*6/MM3 (ref 3.77–5.28)
WBC NRBC COR # BLD: 23.41 10*3/MM3 (ref 3.4–10.8)

## 2022-05-24 PROCEDURE — 63710000001 INSULIN LISPRO (HUMAN) PER 5 UNITS: Performed by: NURSE PRACTITIONER

## 2022-05-24 PROCEDURE — 25010000002 METHYLPREDNISOLONE PER 40 MG: Performed by: INTERNAL MEDICINE

## 2022-05-24 PROCEDURE — 25010000002 ENOXAPARIN PER 10 MG: Performed by: INTERNAL MEDICINE

## 2022-05-24 PROCEDURE — 97166 OT EVAL MOD COMPLEX 45 MIN: CPT | Performed by: OCCUPATIONAL THERAPIST

## 2022-05-24 PROCEDURE — 99232 SBSQ HOSP IP/OBS MODERATE 35: CPT | Performed by: INTERNAL MEDICINE

## 2022-05-24 PROCEDURE — 94799 UNLISTED PULMONARY SVC/PX: CPT

## 2022-05-24 PROCEDURE — 82962 GLUCOSE BLOOD TEST: CPT

## 2022-05-24 PROCEDURE — 82728 ASSAY OF FERRITIN: CPT | Performed by: INTERNAL MEDICINE

## 2022-05-24 PROCEDURE — 25010000002 PIPERACILLIN SOD-TAZOBACTAM PER 1 G: Performed by: INTERNAL MEDICINE

## 2022-05-24 PROCEDURE — 84145 PROCALCITONIN (PCT): CPT | Performed by: INTERNAL MEDICINE

## 2022-05-24 PROCEDURE — 25010000002 VANCOMYCIN 10 G RECONSTITUTED SOLUTION: Performed by: INTERNAL MEDICINE

## 2022-05-24 PROCEDURE — 25010000002 METHYLPREDNISOLONE PER 40 MG: Performed by: NURSE PRACTITIONER

## 2022-05-24 PROCEDURE — 85025 COMPLETE CBC W/AUTO DIFF WBC: CPT | Performed by: INTERNAL MEDICINE

## 2022-05-24 PROCEDURE — 94761 N-INVAS EAR/PLS OXIMETRY MLT: CPT

## 2022-05-24 PROCEDURE — 94664 DEMO&/EVAL PT USE INHALER: CPT

## 2022-05-24 RX ORDER — LANSOPRAZOLE 30 MG/1
30 CAPSULE, DELAYED RELEASE ORAL DAILY
COMMUNITY

## 2022-05-24 RX ORDER — METOPROLOL SUCCINATE 25 MG/1
25 TABLET, EXTENDED RELEASE ORAL
Status: DISCONTINUED | OUTPATIENT
Start: 2022-05-24 | End: 2022-05-26 | Stop reason: HOSPADM

## 2022-05-24 RX ORDER — METHYLPREDNISOLONE SODIUM SUCCINATE 40 MG/ML
40 INJECTION, POWDER, LYOPHILIZED, FOR SOLUTION INTRAMUSCULAR; INTRAVENOUS EVERY 12 HOURS
Status: DISCONTINUED | OUTPATIENT
Start: 2022-05-24 | End: 2022-05-25

## 2022-05-24 RX ORDER — METOPROLOL SUCCINATE 25 MG/1
25 TABLET, EXTENDED RELEASE ORAL DAILY
COMMUNITY

## 2022-05-24 RX ORDER — DOXYCYCLINE HYCLATE 100 MG/1
100 CAPSULE ORAL 2 TIMES DAILY
COMMUNITY
Start: 2022-05-16 | End: 2022-05-26 | Stop reason: HOSPADM

## 2022-05-24 RX ADMIN — VANCOMYCIN HYDROCHLORIDE 750 MG: 10 INJECTION, POWDER, LYOPHILIZED, FOR SOLUTION INTRAVENOUS at 00:39

## 2022-05-24 RX ADMIN — GUAIFENESIN 1200 MG: 600 TABLET, EXTENDED RELEASE ORAL at 08:25

## 2022-05-24 RX ADMIN — METHYLPREDNISOLONE SODIUM SUCCINATE 40 MG: 40 INJECTION, POWDER, FOR SOLUTION INTRAMUSCULAR; INTRAVENOUS at 17:36

## 2022-05-24 RX ADMIN — INSULIN LISPRO 4 UNITS: 100 INJECTION, SOLUTION INTRAVENOUS; SUBCUTANEOUS at 21:40

## 2022-05-24 RX ADMIN — Medication 2 TABLET: at 08:25

## 2022-05-24 RX ADMIN — TAZOBACTAM SODIUM AND PIPERACILLIN SODIUM 3.38 G: 375; 3 INJECTION, SOLUTION INTRAVENOUS at 05:42

## 2022-05-24 RX ADMIN — IPRATROPIUM BROMIDE AND ALBUTEROL SULFATE 3 ML: 2.5; .5 SOLUTION RESPIRATORY (INHALATION) at 03:00

## 2022-05-24 RX ADMIN — IPRATROPIUM BROMIDE AND ALBUTEROL SULFATE 3 ML: 2.5; .5 SOLUTION RESPIRATORY (INHALATION) at 10:23

## 2022-05-24 RX ADMIN — TAZOBACTAM SODIUM AND PIPERACILLIN SODIUM 3.38 G: 375; 3 INJECTION, SOLUTION INTRAVENOUS at 21:29

## 2022-05-24 RX ADMIN — INSULIN LISPRO 5 UNITS: 100 INJECTION, SOLUTION INTRAVENOUS; SUBCUTANEOUS at 17:36

## 2022-05-24 RX ADMIN — Medication 500 MCG: at 08:25

## 2022-05-24 RX ADMIN — METHYLPREDNISOLONE SODIUM SUCCINATE 40 MG: 40 INJECTION, POWDER, FOR SOLUTION INTRAMUSCULAR; INTRAVENOUS at 05:42

## 2022-05-24 RX ADMIN — INSULIN LISPRO 4 UNITS: 100 INJECTION, SOLUTION INTRAVENOUS; SUBCUTANEOUS at 08:39

## 2022-05-24 RX ADMIN — GUAIFENESIN 1200 MG: 600 TABLET, EXTENDED RELEASE ORAL at 21:26

## 2022-05-24 RX ADMIN — IPRATROPIUM BROMIDE AND ALBUTEROL SULFATE 3 ML: 2.5; .5 SOLUTION RESPIRATORY (INHALATION) at 06:43

## 2022-05-24 RX ADMIN — IPRATROPIUM BROMIDE AND ALBUTEROL SULFATE 3 ML: 2.5; .5 SOLUTION RESPIRATORY (INHALATION) at 00:03

## 2022-05-24 RX ADMIN — PANTOPRAZOLE SODIUM 40 MG: 40 TABLET, DELAYED RELEASE ORAL at 05:42

## 2022-05-24 RX ADMIN — INSULIN LISPRO 6 UNITS: 100 INJECTION, SOLUTION INTRAVENOUS; SUBCUTANEOUS at 12:20

## 2022-05-24 RX ADMIN — Medication 10 ML: at 21:29

## 2022-05-24 RX ADMIN — TAZOBACTAM SODIUM AND PIPERACILLIN SODIUM 3.38 G: 375; 3 INJECTION, SOLUTION INTRAVENOUS at 13:27

## 2022-05-24 RX ADMIN — METHYLPREDNISOLONE SODIUM SUCCINATE 40 MG: 40 INJECTION, POWDER, FOR SOLUTION INTRAMUSCULAR; INTRAVENOUS at 00:40

## 2022-05-24 RX ADMIN — IPRATROPIUM BROMIDE AND ALBUTEROL SULFATE 3 ML: 2.5; .5 SOLUTION RESPIRATORY (INHALATION) at 19:00

## 2022-05-24 RX ADMIN — IPRATROPIUM BROMIDE AND ALBUTEROL SULFATE 3 ML: 2.5; .5 SOLUTION RESPIRATORY (INHALATION) at 23:13

## 2022-05-24 RX ADMIN — BUDESONIDE AND FORMOTEROL FUMARATE DIHYDRATE 2 PUFF: 160; 4.5 AEROSOL RESPIRATORY (INHALATION) at 06:43

## 2022-05-24 RX ADMIN — BUDESONIDE AND FORMOTEROL FUMARATE DIHYDRATE 2 PUFF: 160; 4.5 AEROSOL RESPIRATORY (INHALATION) at 19:00

## 2022-05-24 RX ADMIN — Medication 1 CAPSULE: at 08:25

## 2022-05-24 RX ADMIN — ENOXAPARIN SODIUM 30 MG: 30 INJECTION SUBCUTANEOUS at 08:39

## 2022-05-24 RX ADMIN — Medication 2 TABLET: at 21:26

## 2022-05-24 RX ADMIN — IPRATROPIUM BROMIDE AND ALBUTEROL SULFATE 3 ML: 2.5; .5 SOLUTION RESPIRATORY (INHALATION) at 14:36

## 2022-05-24 RX ADMIN — Medication 1 TABLET: at 08:25

## 2022-05-24 RX ADMIN — METOPROLOL SUCCINATE 25 MG: 25 TABLET, EXTENDED RELEASE ORAL at 15:01

## 2022-05-24 RX ADMIN — TRAZODONE HYDROCHLORIDE 50 MG: 50 TABLET ORAL at 21:26

## 2022-05-25 LAB
BACTERIA SPEC RESP CULT: ABNORMAL
BACTERIA SPEC RESP CULT: ABNORMAL
BASOPHILS # BLD AUTO: 0.05 10*3/MM3 (ref 0–0.2)
BASOPHILS NFR BLD AUTO: 0.2 % (ref 0–1.5)
DEPRECATED RDW RBC AUTO: 52.6 FL (ref 37–54)
EOSINOPHIL # BLD AUTO: 0 10*3/MM3 (ref 0–0.4)
EOSINOPHIL NFR BLD AUTO: 0 % (ref 0.3–6.2)
ERYTHROCYTE [DISTWIDTH] IN BLOOD BY AUTOMATED COUNT: 14.4 % (ref 12.3–15.4)
GLUCOSE BLDC GLUCOMTR-MCNC: 109 MG/DL (ref 70–130)
GLUCOSE BLDC GLUCOMTR-MCNC: 151 MG/DL (ref 70–130)
GLUCOSE BLDC GLUCOMTR-MCNC: 201 MG/DL (ref 70–130)
GLUCOSE BLDC GLUCOMTR-MCNC: 237 MG/DL (ref 70–130)
GRAM STN SPEC: ABNORMAL
HCT VFR BLD AUTO: 24.6 % (ref 34–46.6)
HGB BLD-MCNC: 7.9 G/DL (ref 12–15.9)
IMM GRANULOCYTES # BLD AUTO: 0.28 10*3/MM3 (ref 0–0.05)
IMM GRANULOCYTES NFR BLD AUTO: 1.3 % (ref 0–0.5)
LYMPHOCYTES # BLD AUTO: 0.7 10*3/MM3 (ref 0.7–3.1)
LYMPHOCYTES NFR BLD AUTO: 3.2 % (ref 19.6–45.3)
MCH RBC QN AUTO: 32.6 PG (ref 26.6–33)
MCHC RBC AUTO-ENTMCNC: 32.1 G/DL (ref 31.5–35.7)
MCV RBC AUTO: 101.7 FL (ref 79–97)
MONOCYTES # BLD AUTO: 0.8 10*3/MM3 (ref 0.1–0.9)
MONOCYTES NFR BLD AUTO: 3.7 % (ref 5–12)
NEUTROPHILS NFR BLD AUTO: 19.74 10*3/MM3 (ref 1.7–7)
NEUTROPHILS NFR BLD AUTO: 91.6 % (ref 42.7–76)
NRBC BLD AUTO-RTO: 0 /100 WBC (ref 0–0.2)
PLATELET # BLD AUTO: 307 10*3/MM3 (ref 140–450)
PMV BLD AUTO: 11.2 FL (ref 6–12)
RBC # BLD AUTO: 2.42 10*6/MM3 (ref 3.77–5.28)
WBC NRBC COR # BLD: 21.57 10*3/MM3 (ref 3.4–10.8)

## 2022-05-25 PROCEDURE — 97161 PT EVAL LOW COMPLEX 20 MIN: CPT | Performed by: PHYSICAL THERAPIST

## 2022-05-25 PROCEDURE — 25010000002 METHYLPREDNISOLONE PER 40 MG: Performed by: NURSE PRACTITIONER

## 2022-05-25 PROCEDURE — 25010000002 ENOXAPARIN PER 10 MG: Performed by: INTERNAL MEDICINE

## 2022-05-25 PROCEDURE — 94799 UNLISTED PULMONARY SVC/PX: CPT

## 2022-05-25 PROCEDURE — 25010000002 PIPERACILLIN SOD-TAZOBACTAM PER 1 G: Performed by: INTERNAL MEDICINE

## 2022-05-25 PROCEDURE — 94761 N-INVAS EAR/PLS OXIMETRY MLT: CPT

## 2022-05-25 PROCEDURE — 25010000002 METHYLPREDNISOLONE PER 40 MG: Performed by: INTERNAL MEDICINE

## 2022-05-25 PROCEDURE — 63710000001 INSULIN LISPRO (HUMAN) PER 5 UNITS: Performed by: NURSE PRACTITIONER

## 2022-05-25 PROCEDURE — 99232 SBSQ HOSP IP/OBS MODERATE 35: CPT | Performed by: INTERNAL MEDICINE

## 2022-05-25 PROCEDURE — 82962 GLUCOSE BLOOD TEST: CPT

## 2022-05-25 PROCEDURE — 85025 COMPLETE CBC W/AUTO DIFF WBC: CPT | Performed by: INTERNAL MEDICINE

## 2022-05-25 RX ORDER — METHYLPREDNISOLONE SODIUM SUCCINATE 40 MG/ML
20 INJECTION, POWDER, LYOPHILIZED, FOR SOLUTION INTRAMUSCULAR; INTRAVENOUS EVERY 12 HOURS
Status: DISCONTINUED | OUTPATIENT
Start: 2022-05-25 | End: 2022-05-26 | Stop reason: HOSPADM

## 2022-05-25 RX ADMIN — GUAIFENESIN 1200 MG: 600 TABLET, EXTENDED RELEASE ORAL at 20:36

## 2022-05-25 RX ADMIN — IPRATROPIUM BROMIDE AND ALBUTEROL SULFATE 3 ML: 2.5; .5 SOLUTION RESPIRATORY (INHALATION) at 14:21

## 2022-05-25 RX ADMIN — Medication 10 ML: at 20:36

## 2022-05-25 RX ADMIN — Medication 1 TABLET: at 09:02

## 2022-05-25 RX ADMIN — IPRATROPIUM BROMIDE AND ALBUTEROL SULFATE 3 ML: 2.5; .5 SOLUTION RESPIRATORY (INHALATION) at 03:00

## 2022-05-25 RX ADMIN — BUDESONIDE AND FORMOTEROL FUMARATE DIHYDRATE 2 PUFF: 160; 4.5 AEROSOL RESPIRATORY (INHALATION) at 06:41

## 2022-05-25 RX ADMIN — Medication 2 TABLET: at 09:02

## 2022-05-25 RX ADMIN — TAZOBACTAM SODIUM AND PIPERACILLIN SODIUM 3.38 G: 375; 3 INJECTION, SOLUTION INTRAVENOUS at 13:25

## 2022-05-25 RX ADMIN — METHYLPREDNISOLONE SODIUM SUCCINATE 20 MG: 40 INJECTION, POWDER, FOR SOLUTION INTRAMUSCULAR; INTRAVENOUS at 18:32

## 2022-05-25 RX ADMIN — METOPROLOL SUCCINATE 25 MG: 25 TABLET, EXTENDED RELEASE ORAL at 09:01

## 2022-05-25 RX ADMIN — IPRATROPIUM BROMIDE AND ALBUTEROL SULFATE 3 ML: 2.5; .5 SOLUTION RESPIRATORY (INHALATION) at 18:36

## 2022-05-25 RX ADMIN — Medication 10 ML: at 09:01

## 2022-05-25 RX ADMIN — IPRATROPIUM BROMIDE AND ALBUTEROL SULFATE 3 ML: 2.5; .5 SOLUTION RESPIRATORY (INHALATION) at 23:09

## 2022-05-25 RX ADMIN — BUDESONIDE AND FORMOTEROL FUMARATE DIHYDRATE 2 PUFF: 160; 4.5 AEROSOL RESPIRATORY (INHALATION) at 18:36

## 2022-05-25 RX ADMIN — IPRATROPIUM BROMIDE AND ALBUTEROL SULFATE 3 ML: 2.5; .5 SOLUTION RESPIRATORY (INHALATION) at 10:18

## 2022-05-25 RX ADMIN — INSULIN LISPRO 2 UNITS: 100 INJECTION, SOLUTION INTRAVENOUS; SUBCUTANEOUS at 17:30

## 2022-05-25 RX ADMIN — Medication 1 CAPSULE: at 09:02

## 2022-05-25 RX ADMIN — TAZOBACTAM SODIUM AND PIPERACILLIN SODIUM 3.38 G: 375; 3 INJECTION, SOLUTION INTRAVENOUS at 21:52

## 2022-05-25 RX ADMIN — IPRATROPIUM BROMIDE AND ALBUTEROL SULFATE 3 ML: 2.5; .5 SOLUTION RESPIRATORY (INHALATION) at 06:41

## 2022-05-25 RX ADMIN — Medication 500 MCG: at 09:02

## 2022-05-25 RX ADMIN — ENOXAPARIN SODIUM 30 MG: 30 INJECTION SUBCUTANEOUS at 09:02

## 2022-05-25 RX ADMIN — INSULIN LISPRO 3 UNITS: 100 INJECTION, SOLUTION INTRAVENOUS; SUBCUTANEOUS at 13:31

## 2022-05-25 RX ADMIN — GUAIFENESIN 1200 MG: 600 TABLET, EXTENDED RELEASE ORAL at 09:02

## 2022-05-25 RX ADMIN — METHYLPREDNISOLONE SODIUM SUCCINATE 40 MG: 40 INJECTION, POWDER, FOR SOLUTION INTRAMUSCULAR; INTRAVENOUS at 06:20

## 2022-05-25 RX ADMIN — INSULIN LISPRO 3 UNITS: 100 INJECTION, SOLUTION INTRAVENOUS; SUBCUTANEOUS at 09:02

## 2022-05-25 RX ADMIN — TAZOBACTAM SODIUM AND PIPERACILLIN SODIUM 3.38 G: 375; 3 INJECTION, SOLUTION INTRAVENOUS at 06:19

## 2022-05-25 RX ADMIN — TRAZODONE HYDROCHLORIDE 50 MG: 50 TABLET ORAL at 20:36

## 2022-05-25 RX ADMIN — PANTOPRAZOLE SODIUM 40 MG: 40 TABLET, DELAYED RELEASE ORAL at 06:19

## 2022-05-25 RX ADMIN — Medication 2 TABLET: at 20:36

## 2022-05-26 ENCOUNTER — READMISSION MANAGEMENT (OUTPATIENT)
Dept: CALL CENTER | Facility: HOSPITAL | Age: 87
End: 2022-05-26

## 2022-05-26 VITALS
WEIGHT: 87 LBS | OXYGEN SATURATION: 99 % | SYSTOLIC BLOOD PRESSURE: 123 MMHG | RESPIRATION RATE: 18 BRPM | BODY MASS INDEX: 14.49 KG/M2 | HEART RATE: 86 BPM | DIASTOLIC BLOOD PRESSURE: 63 MMHG | TEMPERATURE: 98.2 F | HEIGHT: 65 IN

## 2022-05-26 LAB
BASOPHILS # BLD AUTO: 0.03 10*3/MM3 (ref 0–0.2)
BASOPHILS NFR BLD AUTO: 0.2 % (ref 0–1.5)
DEPRECATED RDW RBC AUTO: 52.2 FL (ref 37–54)
EOSINOPHIL # BLD AUTO: 0.01 10*3/MM3 (ref 0–0.4)
EOSINOPHIL NFR BLD AUTO: 0.1 % (ref 0.3–6.2)
ERYTHROCYTE [DISTWIDTH] IN BLOOD BY AUTOMATED COUNT: 14.4 % (ref 12.3–15.4)
GLUCOSE BLDC GLUCOMTR-MCNC: 170 MG/DL (ref 70–130)
HCT VFR BLD AUTO: 26.3 % (ref 34–46.6)
HGB BLD-MCNC: 8.4 G/DL (ref 12–15.9)
IMM GRANULOCYTES # BLD AUTO: 0.17 10*3/MM3 (ref 0–0.05)
IMM GRANULOCYTES NFR BLD AUTO: 1.1 % (ref 0–0.5)
LYMPHOCYTES # BLD AUTO: 1.03 10*3/MM3 (ref 0.7–3.1)
LYMPHOCYTES NFR BLD AUTO: 6.7 % (ref 19.6–45.3)
MCH RBC QN AUTO: 32.6 PG (ref 26.6–33)
MCHC RBC AUTO-ENTMCNC: 31.9 G/DL (ref 31.5–35.7)
MCV RBC AUTO: 101.9 FL (ref 79–97)
MONOCYTES # BLD AUTO: 0.82 10*3/MM3 (ref 0.1–0.9)
MONOCYTES NFR BLD AUTO: 5.4 % (ref 5–12)
NEUTROPHILS NFR BLD AUTO: 13.2 10*3/MM3 (ref 1.7–7)
NEUTROPHILS NFR BLD AUTO: 86.5 % (ref 42.7–76)
NRBC BLD AUTO-RTO: 0 /100 WBC (ref 0–0.2)
PLATELET # BLD AUTO: 299 10*3/MM3 (ref 140–450)
PMV BLD AUTO: 11.1 FL (ref 6–12)
RBC # BLD AUTO: 2.58 10*6/MM3 (ref 3.77–5.28)
WBC NRBC COR # BLD: 15.26 10*3/MM3 (ref 3.4–10.8)

## 2022-05-26 PROCEDURE — 82962 GLUCOSE BLOOD TEST: CPT

## 2022-05-26 PROCEDURE — 63710000001 INSULIN LISPRO (HUMAN) PER 5 UNITS: Performed by: NURSE PRACTITIONER

## 2022-05-26 PROCEDURE — 25010000002 ENOXAPARIN PER 10 MG: Performed by: INTERNAL MEDICINE

## 2022-05-26 PROCEDURE — 25010000002 PIPERACILLIN SOD-TAZOBACTAM PER 1 G: Performed by: INTERNAL MEDICINE

## 2022-05-26 PROCEDURE — 94799 UNLISTED PULMONARY SVC/PX: CPT

## 2022-05-26 PROCEDURE — 85025 COMPLETE CBC W/AUTO DIFF WBC: CPT | Performed by: INTERNAL MEDICINE

## 2022-05-26 PROCEDURE — 25010000002 METHYLPREDNISOLONE PER 40 MG: Performed by: INTERNAL MEDICINE

## 2022-05-26 PROCEDURE — 94664 DEMO&/EVAL PT USE INHALER: CPT

## 2022-05-26 PROCEDURE — 94761 N-INVAS EAR/PLS OXIMETRY MLT: CPT

## 2022-05-26 RX ORDER — PREDNISONE 10 MG/1
TABLET ORAL
Qty: 9 TABLET | Refills: 0 | Status: SHIPPED | OUTPATIENT
Start: 2022-05-26

## 2022-05-26 RX ORDER — CEFDINIR 300 MG/1
300 CAPSULE ORAL 2 TIMES DAILY
Qty: 10 CAPSULE | Refills: 0 | Status: SHIPPED | OUTPATIENT
Start: 2022-05-26 | End: 2022-05-31

## 2022-05-26 RX ORDER — AMOXICILLIN AND CLAVULANATE POTASSIUM 875; 125 MG/1; MG/1
1 TABLET, FILM COATED ORAL 2 TIMES DAILY
Qty: 8 TABLET | Refills: 0 | Status: SHIPPED | OUTPATIENT
Start: 2022-05-26 | End: 2022-05-26 | Stop reason: HOSPADM

## 2022-05-26 RX ADMIN — PANTOPRAZOLE SODIUM 40 MG: 40 TABLET, DELAYED RELEASE ORAL at 05:23

## 2022-05-26 RX ADMIN — IPRATROPIUM BROMIDE AND ALBUTEROL SULFATE 3 ML: 2.5; .5 SOLUTION RESPIRATORY (INHALATION) at 06:03

## 2022-05-26 RX ADMIN — TAZOBACTAM SODIUM AND PIPERACILLIN SODIUM 3.38 G: 375; 3 INJECTION, SOLUTION INTRAVENOUS at 05:23

## 2022-05-26 RX ADMIN — BUDESONIDE AND FORMOTEROL FUMARATE DIHYDRATE 2 PUFF: 160; 4.5 AEROSOL RESPIRATORY (INHALATION) at 06:03

## 2022-05-26 RX ADMIN — Medication 2 TABLET: at 09:00

## 2022-05-26 RX ADMIN — INSULIN LISPRO 2 UNITS: 100 INJECTION, SOLUTION INTRAVENOUS; SUBCUTANEOUS at 09:02

## 2022-05-26 RX ADMIN — Medication 1 TABLET: at 09:00

## 2022-05-26 RX ADMIN — Medication 500 MCG: at 09:00

## 2022-05-26 RX ADMIN — METOPROLOL SUCCINATE 25 MG: 25 TABLET, EXTENDED RELEASE ORAL at 09:00

## 2022-05-26 RX ADMIN — METHYLPREDNISOLONE SODIUM SUCCINATE 20 MG: 40 INJECTION, POWDER, FOR SOLUTION INTRAMUSCULAR; INTRAVENOUS at 05:23

## 2022-05-26 RX ADMIN — ENOXAPARIN SODIUM 30 MG: 30 INJECTION SUBCUTANEOUS at 09:01

## 2022-05-26 RX ADMIN — IPRATROPIUM BROMIDE AND ALBUTEROL SULFATE 3 ML: 2.5; .5 SOLUTION RESPIRATORY (INHALATION) at 10:15

## 2022-05-26 RX ADMIN — Medication 1 CAPSULE: at 09:00

## 2022-05-26 RX ADMIN — GUAIFENESIN 1200 MG: 600 TABLET, EXTENDED RELEASE ORAL at 09:00

## 2022-05-26 NOTE — OUTREACH NOTE
Prep Survey    Flowsheet Row Responses   [facility] [place]   Is LACE score < 7 ? No   Emergency Room discharge w/ pulse ox? No   Eligibility Readm Mgmt   Discharge diagnosis Pneumonia of right lung due to infectious organism,  Acute on chronic respiratory failure with hypoxia,  DKA, COPD   Does the patient have one of the following disease processes/diagnoses(primary or secondary)? COPD/Pneumonia   Does the patient have Home health ordered? Yes   What is the Home health agency?  [facility]    Is there a DME ordered? No   Comments regarding appointments See AVS   Prep survey completed? Yes          MOISES MORALES - Registered Nurse

## 2022-05-27 LAB
BACTERIA SPEC AEROBE CULT: NORMAL
BACTERIA SPEC AEROBE CULT: NORMAL

## 2022-06-01 ENCOUNTER — READMISSION MANAGEMENT (OUTPATIENT)
Dept: CALL CENTER | Facility: HOSPITAL | Age: 87
End: 2022-06-01

## 2022-06-01 NOTE — OUTREACH NOTE
COPD/PN Week 1 Survey    Flowsheet Row Responses   [facility] [place] [facility] [place]   Does the patient have one of the following disease processes/diagnoses(primary or secondary)? COPD/Pneumonia   Was the primary reason for admission: Pneumonia   Week 1 attempt successful? Yes   Call start time 1411   Call end time 1415   Discharge diagnosis Pneumonia of right lung due to infectious organism,  Acute on chronic respiratory failure with hypoxia,  DKA, COPD   Person spoke with today (if not patient) and relationship Nicholas-kathe   Meds reviewed with patient/caregiver? Yes   Is the patient having any side effects they believe may be caused by any medication additions or changes? No   Does the patient have all medications ordered at discharge? Yes   Is the patient taking all medications as directed (includes completed medication regime)? Yes   Does the patient have a primary care provider?  Yes   Does the patient have an appointment with their PCP or specialist within 7 days of discharge? Yes   Comments regarding PCP 6/2/22 @10:30am   Has the patient kept scheduled appointments due by today? N/A   What is the Home health agency?  [facility]    Has home health visited the patient within 72 hours of discharge? Yes   DME comments home oxygen prior to this admission, wears 2-3LO2   Pulse Ox monitoring Intermittent   Pulse Ox device source Patient   O2 Sat comments 95% on 2LO2   O2 Sat: education provided Monitoring frequency, Sat levels   Psychosocial issues? No   Did the patient receive a copy of their discharge instructions? Yes   Nursing interventions Reviewed instructions with patient   What is the patient's perception of their health status since discharge? Same   Nursing Interventions Nurse provided patient education   If the patient is a current smoker, are they able to teach back resources for cessation? Not a smoker   Is the patient/caregiver able to teach back the hierarchy of who to  call/visit for symptoms/problems? PCP, Specialist, Home health nurse, Urgent Care, ED, 911 Yes   Is the patient able to teach back COPD zones? Yes   Nursing interventions Education provided on various zones   Patient reports what zone on this call? Green Zone   Green Zone Reports doing well, Breathing without shortness of breath, Usual activity and exercise level   Green Zone interventions: Take daily medications, Use oxygen as prescribed   Is the patient/caregiver able to teach back signs and symptoms of worsening condition: Fever/chills, Shortness of breath, Chest pain   Is the patient/caregiver able to teach back importance of completing antibiotic course of treatment? Yes   Week 1 call completed? Yes          TY ALAS - Registered Nurse

## 2022-06-08 ENCOUNTER — READMISSION MANAGEMENT (OUTPATIENT)
Dept: CALL CENTER | Facility: HOSPITAL | Age: 87
End: 2022-06-08

## 2022-06-08 NOTE — OUTREACH NOTE
COPD/PN Week 2 Survey    Flowsheet Row Responses   [facility] discharged from? [place]   Does the patient have one of the following disease processes/diagnoses(primary or secondary)? COPD/Pneumonia   Was the primary reason for admission: Pneumonia   Week 2 attempt successful? No   Unsuccessful attempts Attempt 1          KAN VERAS - Registered Nurse

## 2022-06-13 ENCOUNTER — READMISSION MANAGEMENT (OUTPATIENT)
Dept: CALL CENTER | Facility: HOSPITAL | Age: 87
End: 2022-06-13

## 2022-06-13 NOTE — OUTREACH NOTE
COPD/PN Week 2 Survey    Flowsheet Row Responses   [facility] patient discharged from? [place]   Does the patient have one of the following disease processes/diagnoses(primary or secondary)? COPD/Pneumonia   Was the primary reason for admission: Pneumonia   Week 2 attempt successful? Yes   Call start time 1454   Call end time 1456   Discharge diagnosis Pneumonia of right lung due to infectious organism,  Acute on chronic respiratory failure with hypoxia,  DKA, COPD   Person spoke with today (if not patient) and relationship Nicholas-son   Meds reviewed with patient/caregiver? Yes   Is the patient taking all medications as directed (includes completed medication regime)? Yes   Has the patient kept scheduled appointments due by today? Yes   What is the Home health agency?  [facility]    Home health comments working with PT and nursing.   DME comments home oxygen prior to this admission, wears 2-3LO2   Pulse Ox monitoring Intermittent   Pulse Ox device source Patient   O2 Sat comments 97% on 2LO2   O2 Sat: education provided Sat levels, Monitoring frequency   Psychosocial issues? No   Comments Pt is improving so much per son.    What is the patient's perception of their health status since discharge? Improving   Week 2 call completed? Yes          LING VASQUEZ - Registered Nurse

## 2022-06-21 ENCOUNTER — READMISSION MANAGEMENT (OUTPATIENT)
Dept: CALL CENTER | Facility: HOSPITAL | Age: 87
End: 2022-06-21

## 2022-06-21 NOTE — OUTREACH NOTE
COPD/PN Week 3 Survey    Flowsheet Row Responses   [facility] patient discharged from? [place]   Does the patient have one of the following disease processes/diagnoses(primary or secondary)? COPD/Pneumonia   Was the primary reason for admission: Pneumonia   Week 3 attempt successful? Yes   Call start time 1523   Call end time 1536   Discharge diagnosis Pneumonia of right lung due to infectious organism,  Acute on chronic respiratory failure with hypoxia,  DKA, COPD   Person spoke with today (if not patient) and relationship Patient and son   Is the patient taking all medications as directed (includes completed medication regime)? Yes   Medication comments Completed antibiotics and steroids.   Does the patient have a primary care provider?  Yes   Has the patient kept scheduled appointments due by today? Yes   What is the Home health agency?  [facility]    Has home health visited the patient within 72 hours of discharge? Yes   Pulse Ox monitoring Intermittent   Pulse Ox device source Patient   O2 Sat comments 96%-98% 2L oxygen with activity and dr suggest to change to 3L   O2 Sat: education provided Sat levels, When to seek care   Psychosocial issues? No   What is the patient's perception of their health status since discharge? Improving   If the patient is a current smoker, are they able to teach back resources for cessation? Not a smoker   Is the patient/caregiver able to teach back the hierarchy of who to call/visit for symptoms/problems? PCP, Specialist, Home health nurse, Urgent Care, ED, 911 Yes   Additional teach back comments Eating is better and son takes care of her.  Using 2L of oxygen.  States she has built strength in her arms and able to get from chair to wheelchair.     Patient reports what zone on this call? Yellow Zone   Yellow Zone Increased shortness of air, Unable to complete daily activities   Yellow interventions Use oxygen as ordered, Use quick relief inhaler as ordered, Get  plenty of rest, Continue to use daily medications   Is the patient/caregiver able to teach back signs and symptoms of worsening condition: Fever/chills, Shortness of breath, Chest pain   Is the patient/caregiver able to teach back importance of completing antibiotic course of treatment? Yes   Week 3 call completed? Yes   Wrap up additional comments Denies needs or questions at this time.          AVIVA GARCIA - Licensed Nurse

## 2022-06-23 ENCOUNTER — OFFICE VISIT (OUTPATIENT)
Dept: PULMONOLOGY | Facility: CLINIC | Age: 87
End: 2022-06-23

## 2022-06-23 VITALS
DIASTOLIC BLOOD PRESSURE: 82 MMHG | BODY MASS INDEX: 14.49 KG/M2 | HEART RATE: 120 BPM | WEIGHT: 87 LBS | HEIGHT: 65 IN | SYSTOLIC BLOOD PRESSURE: 126 MMHG | OXYGEN SATURATION: 93 %

## 2022-06-23 DIAGNOSIS — J47.9 BRONCHIECTASIS WITHOUT COMPLICATION: Primary | Chronic | ICD-10-CM

## 2022-06-23 DIAGNOSIS — J96.11 CHRONIC RESPIRATORY FAILURE WITH HYPOXIA: Chronic | ICD-10-CM

## 2022-06-23 DIAGNOSIS — Z87.01 HISTORY OF RECENT PNEUMONIA: ICD-10-CM

## 2022-06-23 PROBLEM — J96.21 ACUTE ON CHRONIC RESPIRATORY FAILURE WITH HYPOXIA: Status: RESOLVED | Noted: 2022-05-22 | Resolved: 2022-06-23

## 2022-06-23 PROBLEM — J44.9 COPD (CHRONIC OBSTRUCTIVE PULMONARY DISEASE) (HCC): Status: RESOLVED | Noted: 2019-09-03 | Resolved: 2022-06-23

## 2022-06-23 PROCEDURE — 99214 OFFICE O/P EST MOD 30 MIN: CPT | Performed by: NURSE PRACTITIONER

## 2022-06-23 RX ORDER — AZITHROMYCIN 250 MG/1
TABLET, FILM COATED ORAL
Qty: 45 TABLET | Refills: 3 | Status: SHIPPED | OUTPATIENT
Start: 2022-06-23

## 2022-06-23 NOTE — PROGRESS NOTES
"Chief Complaint  Pneumonia    Subjective    History of Present Illness      Milton Mae presents to [facility]ULMONARY & CRITICAL [facility]ARE MEDICINE for:    History of Present Illness   She was seen on hospital consult in April and May with reoccurring pneumonia, bronchiectasis exacerbation, acute on chronic respiratory failure and during one of those admissions, she was positive for parainfluenza 3.  She has been experiencing a daily productive cough for greater than 6 months.  She has had multiple CT scans of the chest confirming bronchiectasis.  The most recent scan was performed 5/16/2022.  Airway clearance therapies that have been tried and failed or considered and ruled out include Aerobika and Mucinex. The reason she failed these therapies is because she is frail and has inadequate cough.     She comes in today accompanied by her son Rafa who is her power of  and care giver.  She resides at her home with him.  She has been doing breathing treatments 3 times daily and Mucinex twice daily.  She uses a rescue inhaler as needed.  She is on oxygen all hours of the day.  She is interested in starting maintenance antibiotic therapy because she wants to do everything she can to avoid a readmission at the hospital.  She was seen by her PCP yesterday.  She has home health services.  She is agreeable to start a azithromycin 3 days a week and continue doing breathing treatments and Mucinex.  I have encouraged her to increase her fluid intake.  She is interested in having an at home chest percussion vest and we will initiate an order for that.  She is in need of Prevnar 20 but we are currently out of the vaccine.    Objective   Vital Signs:   /82   Pulse 120   Ht 165.1 cm (65\")   Wt 39.5 kg (87 lb)   SpO2 93% Comment: 2L  BMI 14.48 kg/m²     Physical Exam  Vitals reviewed.   Constitutional:       General: She is not in acute distress.     Appearance: She is well-groomed and underweight. "      Comments: Frail-appearing   HENT:      Head: Normocephalic and atraumatic.      Comments: Wearing a mask  Cardiovascular:      Rate and Rhythm: Normal rate and regular rhythm.   Pulmonary:      Breath sounds: Decreased breath sounds (At bases, otherwise clear) present.      Comments: Poor/weak cough effort  Musculoskeletal:      Cervical back: Normal range of motion.      Right lower leg: No edema.      Left lower leg: No edema.      Comments: In a wheelchair   Skin:     General: Skin is warm and dry.   Neurological:      Mental Status: She is alert and oriented to person, place, and time.   Psychiatric:         Mood and Affect: Mood normal.         Behavior: Behavior normal.        Result Review :   The following data was reviewed by: LUIS MIGUEL Reese on 06/23/2022:  CT Angiogram Chest (05/16/2022 15:54)    No results found for this or any previous visit.               Assessment and Plan      Diagnoses and all orders for this visit:    1. Bronchiectasis without complication (HCC) (Primary)  Comments:  With recent exacerbations.  Continue duo nebs and Mucinex.  Start Zithromax 250 mg 1 tab each Monday, Wednesday and Friday.  Will initiate orders for smart vest  Orders:  -     azithromycin (ZITHROMAX) 250 MG tablet; Take 1 pill each Monday, Wednesday and Friday  Dispense: 45 tablet; Refill: 3    2. History of recent pneumonia  Comments:  Get follow-up chest x-ray prior to next appointment.  Orders:  -     XR Chest 2 View; Future    3. Chronic respiratory failure with hypoxia (HCC)  Comments:  Stable.  Patient utilizes 2 L of O2 all hours of the day.        LUIS MIGUEL Reese  6/23/2022  12:17 CDT    Follow Up   Return in about 8 weeks (around 8/18/2022).    Patient was given instructions and counseling regarding her condition or for health maintenance advice. Please see specific information pulled into the AVS if appropriate.

## 2022-06-29 ENCOUNTER — TELEPHONE (OUTPATIENT)
Dept: PULMONOLOGY | Facility: CLINIC | Age: 87
End: 2022-06-29

## 2022-06-29 NOTE — TELEPHONE ENCOUNTER
Please call the patient's son (using the home number, not his cell number) and inquire about the smart vest.  When she was here last week she said she was interested in obtaining 1 and even signed the paperwork.  I was contacted by the smart vest rep who says that when they contacted her for delivery she refused it.  Thank you.   Surgery booklet, Chlorhexidine Gluconate (CHG) medicated soap and instructions (For Your Well Being) given and reviewed. Patient verbalizes understanding. Surgery consent form signed.

## 2022-08-03 ENCOUNTER — HOSPITAL ENCOUNTER (OUTPATIENT)
Dept: GENERAL RADIOLOGY | Facility: HOSPITAL | Age: 87
Discharge: HOME | End: 2022-08-03
Admitting: NURSE PRACTITIONER

## 2022-08-03 DIAGNOSIS — Z87.01 HISTORY OF RECENT PNEUMONIA: ICD-10-CM

## 2022-08-03 PROCEDURE — 71046 X-RAY EXAM CHEST 2 VIEWS: CPT

## 2022-08-14 NOTE — PROGRESS NOTES
"Chief Complaint  bronchiectasis without complication    Subjective    History of Present Illness      Milton Mae presents to [facility] PULMONARY & CRITICAL CARE MEDICINE for:    History of Present Illness   Follow-up for chronic respiratory failure, bronchiectasis with frequent pneumonia.  She had a follow-up chest x-ray 8/3/2022 that showed that the previous right lower lobe infiltrate has resolved.  I reviewed her chest x-ray images with she and her son Rafa.  She received a dose of Prevnar 20 today.  She is doing DuoNeb treatments 3 times a day, taking Mucinex daily and taking Zithromax 3 days a week.  She is on chronic oxygen therapy.  She reports that she \"spit stuff up every day\".  She is continuing to utilize her flutter valve and incentive spirometer at home.  She has no new or worsening pulmonary complaints today.  Objective   Vital Signs:   /70   Pulse 102   Ht 165.1 cm (65\")   Wt 39.5 kg (87 lb)   SpO2 97% Comment: 2L  BMI 14.48 kg/m²     Physical Exam  Vitals reviewed.   Constitutional:       General: She is not in acute distress.     Appearance: She is well-groomed and underweight.      Comments: Frail-appearing   HENT:      Head: Normocephalic and atraumatic.      Comments: Wearing a mask  Cardiovascular:      Rate and Rhythm: Normal rate and regular rhythm.   Pulmonary:      Breath sounds: Decreased breath sounds (At bases, otherwise clear) present.   Musculoskeletal:      Cervical back: Normal range of motion.      Right lower leg: No edema.      Left lower leg: No edema.      Comments: In a wheelchair   Skin:     General: Skin is warm and dry.      Findings: Bruising (To left hand) present.   Neurological:      Mental Status: She is alert and oriented to person, place, and time.   Psychiatric:         Mood and Affect: Mood normal.         Behavior: Behavior normal.        Result Review :   The following data was reviewed by: LUIS MIGUEL Reese on " 08/17/2022:  XR Chest 2 View (08/03/2022 12:18)    No results found for this or any previous visit.               Assessment and Plan      Diagnoses and all orders for this visit:    1. Bronchiectasis without complication (HCC) (Primary)  Comments:  Stable on current regimen of DuoNeb's, Mucinex and 3-day a week Zithromax.  Continue current treatment.  Continue utilizing incentive and flutter valve.  Orders:  -     ipratropium-albuterol (DUO-NEB) 0.5-2.5 mg/3 ml nebulizer; Take 3 mL by nebulization Every 6 (Six) Hours As Needed for Wheezing.  Dispense: 360 mL; Refill: 3    2. Chronic respiratory failure with hypoxia (HCC)  Comments:  Stable.  Continue supplemental oxygen as is.    3. Need for Streptococcus pneumoniae and influenza vaccination  Comments:  She received Prevnar 20 vaccine today.  Orders:  -     Pneumococcal Conjugate Vaccine 20-Valent All        Nithin Forde, LUIS MIGUEL  8/17/2022  13:35 CDT    Follow Up {Instructions Charge Capture  Follow-up Communications :23}  Return in about 6 months (around 2/17/2023).    Patient was given instructions and counseling regarding her condition or for health maintenance advice. Please see specific information pulled into the AVS if appropriate.

## 2022-08-17 ENCOUNTER — OFFICE VISIT (OUTPATIENT)
Dept: PULMONOLOGY | Facility: CLINIC | Age: 87
End: 2022-08-17

## 2022-08-17 VITALS
OXYGEN SATURATION: 97 % | WEIGHT: 87 LBS | BODY MASS INDEX: 14.49 KG/M2 | HEIGHT: 65 IN | SYSTOLIC BLOOD PRESSURE: 126 MMHG | HEART RATE: 102 BPM | DIASTOLIC BLOOD PRESSURE: 70 MMHG

## 2022-08-17 DIAGNOSIS — Z23 NEED FOR STREPTOCOCCUS PNEUMONIAE AND INFLUENZA VACCINATION: ICD-10-CM

## 2022-08-17 DIAGNOSIS — J96.11 CHRONIC RESPIRATORY FAILURE WITH HYPOXIA: Chronic | ICD-10-CM

## 2022-08-17 DIAGNOSIS — J47.9 BRONCHIECTASIS WITHOUT COMPLICATION: Primary | Chronic | ICD-10-CM

## 2022-08-17 PROCEDURE — 90677 PCV20 VACCINE IM: CPT | Performed by: NURSE PRACTITIONER

## 2022-08-17 PROCEDURE — 99214 OFFICE O/P EST MOD 30 MIN: CPT | Performed by: NURSE PRACTITIONER

## 2022-08-17 PROCEDURE — G0009 ADMIN PNEUMOCOCCAL VACCINE: HCPCS | Performed by: NURSE PRACTITIONER

## 2022-08-17 RX ORDER — IPRATROPIUM BROMIDE AND ALBUTEROL SULFATE 2.5; .5 MG/3ML; MG/3ML
3 SOLUTION RESPIRATORY (INHALATION) EVERY 6 HOURS PRN
Qty: 360 ML | Refills: 3 | Status: SHIPPED | OUTPATIENT
Start: 2022-08-17 | End: 2023-01-03

## 2023-01-03 DIAGNOSIS — J47.9 BRONCHIECTASIS WITHOUT COMPLICATION: Chronic | ICD-10-CM

## 2023-01-03 RX ORDER — IPRATROPIUM BROMIDE AND ALBUTEROL SULFATE 2.5; .5 MG/3ML; MG/3ML
3 SOLUTION RESPIRATORY (INHALATION) EVERY 6 HOURS PRN
Qty: 360 ML | Refills: 5 | Status: SHIPPED | OUTPATIENT
Start: 2023-01-03

## 2023-01-03 NOTE — TELEPHONE ENCOUNTER
Pharmacy sent a request for refills on DuoNebs. Request routed to Nithin BOLANOS.   Rx Refill Note  Requested Prescriptions     Pending Prescriptions Disp Refills   • ipratropium-albuterol (DUO-NEB) 0.5-2.5 mg/3 ml nebulizer [Pharmacy Med Name: IPRAT-ALBUT 0.5-3(2.5) MG/3 0.5-2.5 (3) Solution] 360 mL 3     Sig: TAKE 3 ML BY NEBULIZATION EVERY 6 (SIX) HOURS AS NEEDED FOR WHEEZING.      Last office visit with prescribing clinician: 8/17/2022   Last telemedicine visit with prescribing clinician: 2/15/2023   Next office visit with prescribing clinician: 2/15/2023                         Would you like a call back once the refill request has been completed: [] Yes [] No    If the office needs to give you a call back, can they leave a voicemail: [] Yes [] No    Kayden Ramey, [facility]  01/03/23, 10:03 CST

## 2023-01-24 DIAGNOSIS — J47.9 BRONCHIECTASIS WITHOUT COMPLICATION: Primary | ICD-10-CM

## 2023-01-24 NOTE — TELEPHONE ENCOUNTER
Per patient's pharmacy, Mark is on back order and they will not be able to get it in until early February. Pharmacy is asking if it would be okay to send in the plain albuterol nebulizer solution until it is back in stock?  Please advise.  Rx Refill Note  Requested Prescriptions     Pending Prescriptions Disp Refills   • albuterol (PROVENTIL) (2.5 MG/3ML) 0.083% nebulizer solution 360 mL 5     Sig: Take 2.5 mg by nebulization Every 4 (Four) Hours As Needed for Wheezing.      Last office visit with prescribing clinician: 8/17/2022   Last telemedicine visit with prescribing clinician: 2/15/2023   Next office visit with prescribing clinician: 2/15/2023                         Would you like a call back once the refill request has been completed: [] Yes [] No    If the office needs to give you a call back, can they leave a voicemail: [] Yes [] No    Kayden Ramey, [facility]  01/24/23, 14:25 CST

## 2023-01-25 RX ORDER — ALBUTEROL SULFATE 2.5 MG/3ML
2.5 SOLUTION RESPIRATORY (INHALATION) EVERY 4 HOURS PRN
Qty: 360 ML | Refills: 5 | Status: SHIPPED | OUTPATIENT
Start: 2023-01-25

## 2023-07-24 ENCOUNTER — HOSPITAL ENCOUNTER (EMERGENCY)
Facility: HOSPITAL | Age: 88
Discharge: HOME | DRG: 190 | End: 2023-07-24
Attending: FAMILY MEDICINE | Admitting: FAMILY MEDICINE
Payer: MEDICARE

## 2023-07-24 ENCOUNTER — APPOINTMENT (OUTPATIENT)
Dept: GENERAL RADIOLOGY | Facility: HOSPITAL | Age: 88
DRG: 190 | End: 2023-07-24
Payer: MEDICARE

## 2023-07-24 VITALS
RESPIRATION RATE: 19 BRPM | WEIGHT: 73.3 LBS | HEART RATE: 98 BPM | OXYGEN SATURATION: 98 % | TEMPERATURE: 99.7 F | DIASTOLIC BLOOD PRESSURE: 61 MMHG | BODY MASS INDEX: 12.2 KG/M2 | SYSTOLIC BLOOD PRESSURE: 134 MMHG

## 2023-07-24 DIAGNOSIS — R06.02 SHORTNESS OF BREATH: Primary | ICD-10-CM

## 2023-07-24 DIAGNOSIS — J44.9 CHRONIC OBSTRUCTIVE PULMONARY DISEASE, UNSPECIFIED COPD TYPE: ICD-10-CM

## 2023-07-24 DIAGNOSIS — J18.9 PNEUMONIA DUE TO INFECTIOUS ORGANISM, UNSPECIFIED LATERALITY, UNSPECIFIED PART OF LUNG: ICD-10-CM

## 2023-07-24 DIAGNOSIS — R07.89 LEFT-SIDED CHEST WALL PAIN: ICD-10-CM

## 2023-07-24 LAB
ANION GAP SERPL CALCULATED.3IONS-SCNC: 9 MMOL/L (ref 5–15)
APTT PPP: 36.3 SECONDS (ref 24.1–35)
BASOPHILS # BLD AUTO: 0.06 10*3/MM3 (ref 0–0.2)
BASOPHILS NFR BLD AUTO: 0.4 % (ref 0–1.5)
BUN SERPL-MCNC: 23 MG/DL (ref 8–23)
BUN/CREAT SERPL: 35.4 (ref 7–25)
CALCIUM SPEC-SCNC: 9.1 MG/DL (ref 8.2–9.6)
CHLORIDE SERPL-SCNC: 100 MMOL/L (ref 98–107)
CO2 SERPL-SCNC: 29 MMOL/L (ref 22–29)
CREAT SERPL-MCNC: 0.65 MG/DL (ref 0.57–1)
D DIMER PPP FEU-MCNC: 0.89 MCGFEU/ML (ref 0–0.97)
DEPRECATED RDW RBC AUTO: 50.2 FL (ref 37–54)
EGFRCR SERPLBLD CKD-EPI 2021: 80.2 ML/MIN/1.73
EOSINOPHIL # BLD AUTO: 0.15 10*3/MM3 (ref 0–0.4)
EOSINOPHIL NFR BLD AUTO: 0.9 % (ref 0.3–6.2)
ERYTHROCYTE [DISTWIDTH] IN BLOOD BY AUTOMATED COUNT: 13.6 % (ref 12.3–15.4)
GLUCOSE SERPL-MCNC: 129 MG/DL (ref 65–99)
HCT VFR BLD AUTO: 28.6 % (ref 34–46.6)
HGB BLD-MCNC: 8.7 G/DL (ref 12–15.9)
HOLD SPECIMEN: NORMAL
HOLD SPECIMEN: NORMAL
IMM GRANULOCYTES # BLD AUTO: 0.07 10*3/MM3 (ref 0–0.05)
IMM GRANULOCYTES NFR BLD AUTO: 0.4 % (ref 0–0.5)
INR PPP: 1.17 (ref 0.91–1.09)
LYMPHOCYTES # BLD AUTO: 1.53 10*3/MM3 (ref 0.7–3.1)
LYMPHOCYTES NFR BLD AUTO: 9.3 % (ref 19.6–45.3)
MAGNESIUM SERPL-MCNC: 2.1 MG/DL (ref 1.7–2.3)
MCH RBC QN AUTO: 30.7 PG (ref 26.6–33)
MCHC RBC AUTO-ENTMCNC: 30.4 G/DL (ref 31.5–35.7)
MCV RBC AUTO: 101.1 FL (ref 79–97)
MONOCYTES # BLD AUTO: 0.91 10*3/MM3 (ref 0.1–0.9)
MONOCYTES NFR BLD AUTO: 5.5 % (ref 5–12)
NEUTROPHILS NFR BLD AUTO: 13.79 10*3/MM3 (ref 1.7–7)
NEUTROPHILS NFR BLD AUTO: 83.5 % (ref 42.7–76)
NRBC BLD AUTO-RTO: 0 /100 WBC (ref 0–0.2)
NT-PROBNP SERPL-MCNC: 333.1 PG/ML (ref 0–1800)
PLATELET # BLD AUTO: 329 10*3/MM3 (ref 140–450)
PMV BLD AUTO: 10.2 FL (ref 6–12)
POTASSIUM SERPL-SCNC: 4.1 MMOL/L (ref 3.5–5.2)
PROTHROMBIN TIME: 15 SECONDS (ref 11.8–14.8)
RBC # BLD AUTO: 2.83 10*6/MM3 (ref 3.77–5.28)
SODIUM SERPL-SCNC: 138 MMOL/L (ref 136–145)
TROPONIN T SERPL HS-MCNC: 20 NG/L
WBC NRBC COR # BLD: 16.51 10*3/MM3 (ref 3.4–10.8)
WHOLE BLOOD HOLD COAG: NORMAL
WHOLE BLOOD HOLD SPECIMEN: NORMAL

## 2023-07-24 PROCEDURE — 80048 BASIC METABOLIC PNL TOTAL CA: CPT | Performed by: FAMILY MEDICINE

## 2023-07-24 PROCEDURE — 93010 ELECTROCARDIOGRAM REPORT: CPT | Performed by: INTERNAL MEDICINE

## 2023-07-24 PROCEDURE — 85610 PROTHROMBIN TIME: CPT | Performed by: FAMILY MEDICINE

## 2023-07-24 PROCEDURE — 94799 UNLISTED PULMONARY SVC/PX: CPT

## 2023-07-24 PROCEDURE — 94664 DEMO&/EVAL PT USE INHALER: CPT

## 2023-07-24 PROCEDURE — 83880 ASSAY OF NATRIURETIC PEPTIDE: CPT | Performed by: FAMILY MEDICINE

## 2023-07-24 PROCEDURE — 25010000002 METHYLPREDNISOLONE PER 125 MG: Performed by: FAMILY MEDICINE

## 2023-07-24 PROCEDURE — 99284 EMERGENCY DEPT VISIT MOD MDM: CPT

## 2023-07-24 PROCEDURE — 84484 ASSAY OF TROPONIN QUANT: CPT | Performed by: FAMILY MEDICINE

## 2023-07-24 PROCEDURE — 85379 FIBRIN DEGRADATION QUANT: CPT | Performed by: FAMILY MEDICINE

## 2023-07-24 PROCEDURE — 96374 THER/PROPH/DIAG INJ IV PUSH: CPT

## 2023-07-24 PROCEDURE — 85025 COMPLETE CBC W/AUTO DIFF WBC: CPT | Performed by: FAMILY MEDICINE

## 2023-07-24 PROCEDURE — 85730 THROMBOPLASTIN TIME PARTIAL: CPT | Performed by: FAMILY MEDICINE

## 2023-07-24 PROCEDURE — 83735 ASSAY OF MAGNESIUM: CPT | Performed by: FAMILY MEDICINE

## 2023-07-24 PROCEDURE — 94761 N-INVAS EAR/PLS OXIMETRY MLT: CPT

## 2023-07-24 PROCEDURE — 71045 X-RAY EXAM CHEST 1 VIEW: CPT

## 2023-07-24 PROCEDURE — 93005 ELECTROCARDIOGRAM TRACING: CPT | Performed by: FAMILY MEDICINE

## 2023-07-24 PROCEDURE — 94640 AIRWAY INHALATION TREATMENT: CPT

## 2023-07-24 RX ORDER — METHYLPREDNISOLONE SODIUM SUCCINATE 125 MG/2ML
80 INJECTION, POWDER, LYOPHILIZED, FOR SOLUTION INTRAMUSCULAR; INTRAVENOUS ONCE
Status: COMPLETED | OUTPATIENT
Start: 2023-07-24 | End: 2023-07-24

## 2023-07-24 RX ORDER — AZITHROMYCIN 250 MG/1
TABLET, FILM COATED ORAL
Qty: 6 TABLET | Refills: 0 | Status: ON HOLD | OUTPATIENT
Start: 2023-07-24 | End: 2023-07-28

## 2023-07-24 RX ORDER — IPRATROPIUM BROMIDE AND ALBUTEROL SULFATE 2.5; .5 MG/3ML; MG/3ML
3 SOLUTION RESPIRATORY (INHALATION) ONCE
Status: COMPLETED | OUTPATIENT
Start: 2023-07-24 | End: 2023-07-24

## 2023-07-24 RX ORDER — SODIUM CHLORIDE 0.9 % (FLUSH) 0.9 %
10 SYRINGE (ML) INJECTION AS NEEDED
Status: DISCONTINUED | OUTPATIENT
Start: 2023-07-24 | End: 2023-07-25 | Stop reason: HOSPADM

## 2023-07-24 RX ORDER — PREDNISONE 50 MG/1
50 TABLET ORAL DAILY
Qty: 5 TABLET | Refills: 0 | Status: ON HOLD | OUTPATIENT
Start: 2023-07-24 | End: 2023-07-28

## 2023-07-24 RX ADMIN — METHYLPREDNISOLONE SODIUM SUCCINATE 80 MG: 125 INJECTION, POWDER, LYOPHILIZED, FOR SOLUTION INTRAMUSCULAR; INTRAVENOUS at 20:39

## 2023-07-24 RX ADMIN — IPRATROPIUM BROMIDE AND ALBUTEROL SULFATE 3 ML: .5; 3 SOLUTION RESPIRATORY (INHALATION) at 20:53

## 2023-07-25 NOTE — ED PROVIDER NOTES
Subjective   History of Present Illness  89-year-old female with a history of COPD which is oxygen dependent at home.  Patient states that she has been having shortness of breath worsening over the last couple weeks.  Patient states that she has occasional charcoal colored sputum and occasional clear sputum.  Patient has no fevers.  Patient states that she is also having some pain on her left chest wall.  Patient states that she feels like there is a knot on her chest wall.  Patient denies any injury or trauma.  Patient denies any other symptoms at this time.  Patient denies any radiation of the pain.    Review of Systems   Respiratory:  Positive for cough and shortness of breath.    Cardiovascular:  Positive for chest pain.   All other systems reviewed and are negative.    Past Medical History:   Diagnosis Date    COPD (chronic obstructive pulmonary disease)     GERD (gastroesophageal reflux disease)     Hypertension     Pneumonia        No Known Allergies    Past Surgical History:   Procedure Laterality Date    CATARACT EXTRACTION, BILATERAL      THYROID SURGERY         Family History   Problem Relation Age of Onset    Alzheimer's disease Mother     Heart disease Father     Colon polyps Child     Colon cancer Neg Hx        Social History     Socioeconomic History    Marital status:    Tobacco Use    Smoking status: Never    Smokeless tobacco: Never   Substance and Sexual Activity    Alcohol use: No    Drug use: No    Sexual activity: Defer           Objective   Physical Exam  Constitutional:       Appearance: She is well-developed.   HENT:      Head: Normocephalic and atraumatic.   Cardiovascular:      Rate and Rhythm: Normal rate and regular rhythm.      Heart sounds: Normal heart sounds.   Pulmonary:      Effort: Pulmonary effort is normal.      Breath sounds: Normal breath sounds.   Chest:      Chest wall: Tenderness present.   Abdominal:      General: Bowel sounds are normal.      Palpations: Abdomen is  soft.      Tenderness: There is no abdominal tenderness.   Musculoskeletal:      Cervical back: Normal range of motion and neck supple.      Right lower leg: No tenderness. No edema.      Left lower leg: No tenderness. No edema.   Skin:     General: Skin is warm and dry.   Neurological:      General: No focal deficit present.      Mental Status: She is alert and oriented to person, place, and time.   Psychiatric:         Mood and Affect: Mood normal.         Behavior: Behavior normal.       Procedures           ED Course  ED Course as of 07/24/23 2200 Mon Jul 24, 2023 2029 EKG rate 106  Sinus tachycardia  No STEMI [RP]      ED Course User Index  [RP] Jose Maria Krause MD                                         Lab Results (last 24 hours)       Procedure Component Value Units Date/Time    CBC & Differential [[id number]]  (Abnormal) Collected: 07/24/23 2033    Specimen: Blood Updated: 07/24/23 2120    Narrative:      The following orders were created for panel order CBC & Differential.  Procedure                               Abnormality         Status                     ---------                               -----------         ------                     CBC Auto Differential[[id number]]        Abnormal            Final result                 Please view results for these tests on the individual orders.    CBC Auto Differential [[id number]]  (Abnormal) Collected: 07/24/23 2033    Specimen: Blood Updated: 07/24/23 2120     WBC 16.51 10*3/mm3      RBC 2.83 10*6/mm3      Hemoglobin 8.7 g/dL      Hematocrit 28.6 %      .1 fL      MCH 30.7 pg      MCHC 30.4 g/dL      RDW 13.6 %      RDW-SD 50.2 fl      MPV 10.2 fL      Platelets 329 10*3/mm3      Neutrophil % 83.5 %      Lymphocyte % 9.3 %      Monocyte % 5.5 %      Eosinophil % 0.9 %      Basophil % 0.4 %      Immature Grans % 0.4 %      Neutrophils, Absolute 13.79 10*3/mm3      Lymphocytes, Absolute 1.53 10*3/mm3      Monocytes, Absolute 0.91 10*3/mm3       Eosinophils, Absolute 0.15 10*3/mm3      Basophils, Absolute 0.06 10*3/mm3      Immature Grans, Absolute 0.07 10*3/mm3      nRBC 0.0 /100 WBC     Basic Metabolic Panel [[id number]]  (Abnormal) Collected: 07/24/23 2101    Specimen: Blood Updated: 07/24/23 2136     Glucose 129 mg/dL      BUN 23 mg/dL      Creatinine 0.65 mg/dL      Sodium 138 mmol/L      Potassium 4.1 mmol/L      Chloride 100 mmol/L      CO2 29.0 mmol/L      Calcium 9.1 mg/dL      BUN/Creatinine Ratio 35.4     Anion Gap 9.0 mmol/L      eGFR 80.2 mL/min/1.73     Narrative:      GFR Normal >60  Chronic Kidney Disease <60  Kidney Failure <15    The GFR formula is only valid for adults with stable renal function between ages 18 and 70.    Protime-INR [[id number]]  (Abnormal) Collected: 07/24/23 2101    Specimen: Blood Updated: 07/24/23 2129     Protime 15.0 Seconds      INR 1.17    aPTT [[id number]]  (Abnormal) Collected: 07/24/23 2101    Specimen: Blood Updated: 07/24/23 2129     PTT 36.3 seconds     BNP [[id number]]  (Normal) Collected: 07/24/23 2101    Specimen: Blood Updated: 07/24/23 2134     proBNP 333.1 pg/mL     Narrative:      Among patients with dyspnea, NT-proBNP is highly sensitive for the detection of acute congestive heart failure. In addition NT-proBNP of <300 pg/ml effectively rules out acute congestive heart failure with 99% negative predictive value.    Results may be falsely decreased if patient taking Biotin.      D-dimer, Quantitative [[id number]]  (Normal) Collected: 07/24/23 2101    Specimen: Blood Updated: 07/24/23 2129     D-Dimer, Quantitative 0.89 MCGFEU/mL     Narrative:      According to the assay 's published package insert, a normal (<0.50 MCGFEU/mL) D-dimer result in conjunction with a non-high clinical probability assessment, excludes deep vein thrombosis (DVT) and pulmonary embolism (PE) with high sensitivity.    D-dimer values increase with age and this can make VTE exclusion of an older population difficult. To  "address this, the American College of Phys[employer], based on best available evidence and recent guidelines, recommends that clinicians use age-adjusted D-dimer thresholds in patients greater than 50 years of age with: a) a low probability of PE who do not meet all Pulmonary Embolism Rule Out Criteria, or b) in those with intermediate probability of PE.   The formula for an age-adjusted D-dimer cut-off is \"age/100\".  For example, a 60 year old patient would have an age-adjusted cut-off of 0.60 MCGFEU/mL and an 80 year old 0.80 MCGFEU/mL.    High Sensitivity Troponin T [[id number]]  (Abnormal) Collected: 07/24/23 2101    Specimen: Blood Updated: 07/24/23 2133     HS Troponin T 20 ng/L     Narrative:      High Sensitive Troponin T Reference Range:  <10.0 ng/L- Negative Female for AMI  <15.0 ng/L- Negative Male for AMI  >=10 - Abnormal Female indicating possible myocardial injury.  >=15 - Abnormal Male indicating possible myocardial injury.   Clinicians would have to utilize clinical acumen, EKG, Troponin, and serial changes to determine if it is an Acute Myocardial Infarction or myocardial injury due to an underlying chronic condition.         Magnesium [[id number]]  (Normal) Collected: 07/24/23 2101    Specimen: Blood Updated: 07/24/23 2136     Magnesium 2.1 mg/dL           XR Chest 1 View   Final Result   . Scarring within both upper lobes with volume loss and   retraction of the pulmonary amaya having the appearance of progressive   massive fibrosis. This may be related to inhalational lung disease.   Atypical pneumonia including such entities as JEANINE can also present in a   similar manner.   This report was finalized on 07/24/2023 20:57 by Dr. Jonn Chicas MD.          Medications   sodium chloride 0.9 % flush 10 mL (has no administration in time range)   sodium chloride 0.9 % flush 10 mL (has no administration in time range)   ipratropium-albuterol (DUO-NEB) nebulizer solution 3 mL (3 mL Nebulization Given 7/24/23 " 2053)   methylPREDNISolone sodium succinate (SOLU-Medrol) injection 80 mg (80 mg Intravenous Given 7/24/23 2039)       Medical Decision Making  89-year-old female came with chest pain shortness of breath.  Patient was in no severe respiratory distress.  Patient was not requiring any more oxygen than she normally uses.  Patient was found to have a possible pneumonia on her x-ray.  I offer the patient admission for IV antibiotics and observation.  Patient is declining at this time.  Patient states that she prefers to be discharged home and given p.o. antibiotics.  I offer the patient 1 round of IV antibiotics here in the emergency room.  Patient is declining the IV antibiotics at this time.  Patient will follow-up with her primary care provider.  Patient has been advised to return emergency with new or worsening symptoms.  All questions were answered the patient and family present bedside prior to discharge.  Patient verbalized understand was agreeable plan as discussed.    Problems Addressed:  Chronic obstructive pulmonary disease, unspecified COPD type: complicated acute illness or injury  Left-sided chest wall pain: complicated acute illness or injury  Pneumonia due to infectious organism, unspecified laterality, unspecified part of lung: complicated acute illness or injury  Shortness of breath: complicated acute illness or injury    Amount and/or Complexity of Data Reviewed  Labs: ordered. Decision-making details documented in ED Course.  Radiology: ordered. Decision-making details documented in ED Course.  ECG/medicine tests: ordered. Decision-making details documented in ED Course.    Risk  Prescription drug management.        Final diagnoses:   Shortness of breath   Left-sided chest wall pain   Pneumonia due to infectious organism, unspecified laterality, unspecified part of lung   Chronic obstructive pulmonary disease, unspecified COPD type       ED Disposition  ED Disposition       ED Disposition   Discharge     Condition   Stable    Comment   --               Javier Ng MD  [address]  [place] [address]  [phone]    Schedule an appointment as soon as possible for a visit       [facility] EMERGENCY DEPARTMENT  [address] [facility] [address] [phone]    As needed, If symptoms worsen         Medication List        Changed      * azithromycin 250 MG tablet  Commonly known as: ZITHROMAX  Take 1 pill each Monday, Wednesday and Friday  What changed: Another medication with the same name was added. Make sure you understand how and when to take each.     * azithromycin 250 MG tablet  Commonly known as: Zithromax Z-Wilmer  Take 2 tablets by mouth on day 1, then 1 tablet daily on days 2-5  What changed: You were already taking a medication with the same name, and this prescription was added. Make sure you understand how and when to take each.     * predniSONE 10 MG tablet  Commonly known as: DELTASONE  20 mg daily x 3 days then 10 mg daily x 3 days then stop  What changed: Another medication with the same name was added. Make sure you understand how and when to take each.     * predniSONE 50 MG tablet  Commonly known as: DELTASONE  Take 1 tablet by mouth Daily for 5 days.  What changed: You were already taking a medication with the same name, and this prescription was added. Make sure you understand how and when to take each.           * This list has 4 medication(s) that are the same as other medications prescribed for you. Read the directions carefully, and ask your doctor or other care provider to review them with you.                   Where to Get Your Medications        These medications were sent to [facility] - [place], [place] - [address] [place] [address] - [phone] [facility] [phone] [address] [address]      Phone: [phone]   azithromycin 250 MG tablet  predniSONE 50 MG tablet            Jose Maria Krause MD  07/24/23 2200

## 2023-07-25 NOTE — ED NOTES
Contacted [facility] to request assistance to help family move patient into the home. Spoke with Jana she advised she will help and for the family to call her about 15-20 minutes before they arrive home.

## 2023-07-26 ENCOUNTER — TELEPHONE (OUTPATIENT)
Dept: PULMONOLOGY | Facility: CLINIC | Age: 88
End: 2023-07-26
Payer: MEDICARE

## 2023-07-26 LAB
QT INTERVAL: 320 MS
QTC INTERVAL: 425 MS

## 2023-07-26 NOTE — TELEPHONE ENCOUNTER
She was in the ER on Monday night and the gave her a z-meg and she has taken it but it is given her diarrhea.   She is also taking azithromycin 3 x week.   Does she need to take the z-meg since it is giving her diarrhea.

## 2023-07-26 NOTE — TELEPHONE ENCOUNTER
Just to clarify, she is still taking the 3-day a week Zithromax and on top of that she took an additional 6 doses of Zithromax in a Z-pack? If that's the case, tell her to stop taking the 3 day a week Z-max for now. She needs to see Dr. Ng for a f/u appointment in the next few days to make sure she's getting better. She has not had an appointment with us in almost a year.  If the diarrhea does not resolve in the next 24 hours, she needs to be seen back in the ER.  Thank you.

## 2023-07-27 ENCOUNTER — APPOINTMENT (OUTPATIENT)
Dept: CT IMAGING | Facility: HOSPITAL | Age: 88
DRG: 190 | End: 2023-07-27
Payer: MEDICARE

## 2023-07-27 ENCOUNTER — HOSPITAL ENCOUNTER (INPATIENT)
Facility: HOSPITAL | Age: 88
LOS: 6 days | Discharge: NURSING, REHAB OR LONG-TERM CARE | DRG: 190 | End: 2023-08-02
Attending: FAMILY MEDICINE | Admitting: FAMILY MEDICINE
Payer: MEDICARE

## 2023-07-27 ENCOUNTER — APPOINTMENT (OUTPATIENT)
Dept: GENERAL RADIOLOGY | Facility: HOSPITAL | Age: 88
DRG: 190 | End: 2023-07-27
Payer: MEDICARE

## 2023-07-27 DIAGNOSIS — Z78.9 FAILURE OF OUTPATIENT TREATMENT: ICD-10-CM

## 2023-07-27 DIAGNOSIS — J18.9 PNEUMONIA OF BOTH LOWER LOBES DUE TO INFECTIOUS ORGANISM: Primary | ICD-10-CM

## 2023-07-27 DIAGNOSIS — Z78.9 DECREASED INDEPENDENCE WITH ACTIVITIES OF DAILY LIVING: ICD-10-CM

## 2023-07-27 DIAGNOSIS — K80.20 GALLSTONES: ICD-10-CM

## 2023-07-27 DIAGNOSIS — R09.02 HYPOXIA: ICD-10-CM

## 2023-07-27 DIAGNOSIS — K31.89 GASTRIC WALL THICKENING: ICD-10-CM

## 2023-07-27 DIAGNOSIS — N28.1 RENAL CYST, LEFT: ICD-10-CM

## 2023-07-27 DIAGNOSIS — K57.90 DIVERTICULOSIS: ICD-10-CM

## 2023-07-27 DIAGNOSIS — Z74.09 IMPAIRED MOBILITY: ICD-10-CM

## 2023-07-27 PROBLEM — R11.10 VOMITING: Status: ACTIVE | Noted: 2023-07-27

## 2023-07-27 PROBLEM — R10.11 RIGHT UPPER QUADRANT ABDOMINAL PAIN: Status: ACTIVE | Noted: 2023-07-27

## 2023-07-27 PROBLEM — R19.7 DIARRHEA: Status: ACTIVE | Noted: 2023-07-27

## 2023-07-27 LAB
ALBUMIN SERPL-MCNC: 3.3 G/DL (ref 3.5–5.2)
ALBUMIN/GLOB SERPL: 0.9 G/DL
ALP SERPL-CCNC: 64 U/L (ref 39–117)
ALT SERPL W P-5'-P-CCNC: 21 U/L (ref 1–33)
ANION GAP SERPL CALCULATED.3IONS-SCNC: 10 MMOL/L (ref 5–15)
AST SERPL-CCNC: 45 U/L (ref 1–32)
BACTERIA UR QL AUTO: ABNORMAL /HPF
BASOPHILS # BLD AUTO: 0.03 10*3/MM3 (ref 0–0.2)
BASOPHILS NFR BLD AUTO: 0.2 % (ref 0–1.5)
BILIRUB SERPL-MCNC: 0.3 MG/DL (ref 0–1.2)
BILIRUB UR QL STRIP: NEGATIVE
BUN SERPL-MCNC: 24 MG/DL (ref 8–23)
BUN/CREAT SERPL: 36.9 (ref 7–25)
CALCIUM SPEC-SCNC: 9.3 MG/DL (ref 8.2–9.6)
CHLORIDE SERPL-SCNC: 100 MMOL/L (ref 98–107)
CLARITY UR: CLEAR
CO2 SERPL-SCNC: 29 MMOL/L (ref 22–29)
COLOR UR: YELLOW
CREAT SERPL-MCNC: 0.65 MG/DL (ref 0.57–1)
D-LACTATE SERPL-SCNC: 1.3 MMOL/L (ref 0.5–2)
DEPRECATED RDW RBC AUTO: 50.8 FL (ref 37–54)
EGFRCR SERPLBLD CKD-EPI 2021: 80.2 ML/MIN/1.73
EOSINOPHIL # BLD AUTO: 0 10*3/MM3 (ref 0–0.4)
EOSINOPHIL NFR BLD AUTO: 0 % (ref 0.3–6.2)
ERYTHROCYTE [DISTWIDTH] IN BLOOD BY AUTOMATED COUNT: 14 % (ref 12.3–15.4)
GLOBULIN UR ELPH-MCNC: 3.8 GM/DL
GLUCOSE SERPL-MCNC: 141 MG/DL (ref 65–99)
GLUCOSE UR STRIP-MCNC: NEGATIVE MG/DL
HCT VFR BLD AUTO: 28.1 % (ref 34–46.6)
HGB BLD-MCNC: 8.8 G/DL (ref 12–15.9)
HGB UR QL STRIP.AUTO: NEGATIVE
HYALINE CASTS UR QL AUTO: ABNORMAL /LPF
IMM GRANULOCYTES # BLD AUTO: 0.07 10*3/MM3 (ref 0–0.05)
IMM GRANULOCYTES NFR BLD AUTO: 0.4 % (ref 0–0.5)
KETONES UR QL STRIP: NEGATIVE
LEUKOCYTE ESTERASE UR QL STRIP.AUTO: NEGATIVE
LIPASE SERPL-CCNC: 33 U/L (ref 13–60)
LYMPHOCYTES # BLD AUTO: 0.47 10*3/MM3 (ref 0.7–3.1)
LYMPHOCYTES NFR BLD AUTO: 2.9 % (ref 19.6–45.3)
MCH RBC QN AUTO: 31.5 PG (ref 26.6–33)
MCHC RBC AUTO-ENTMCNC: 31.3 G/DL (ref 31.5–35.7)
MCV RBC AUTO: 100.7 FL (ref 79–97)
MONOCYTES # BLD AUTO: 0.63 10*3/MM3 (ref 0.1–0.9)
MONOCYTES NFR BLD AUTO: 3.8 % (ref 5–12)
NEUTROPHILS NFR BLD AUTO: 15.23 10*3/MM3 (ref 1.7–7)
NEUTROPHILS NFR BLD AUTO: 92.7 % (ref 42.7–76)
NITRITE UR QL STRIP: NEGATIVE
NRBC BLD AUTO-RTO: 0 /100 WBC (ref 0–0.2)
PH UR STRIP.AUTO: 5.5 [PH] (ref 5–8)
PLATELET # BLD AUTO: 330 10*3/MM3 (ref 140–450)
PMV BLD AUTO: 10.3 FL (ref 6–12)
POTASSIUM SERPL-SCNC: 4.5 MMOL/L (ref 3.5–5.2)
PROCALCITONIN SERPL-MCNC: 0.17 NG/ML (ref 0–0.25)
PROT SERPL-MCNC: 7.1 G/DL (ref 6–8.5)
PROT UR QL STRIP: ABNORMAL
RBC # BLD AUTO: 2.79 10*6/MM3 (ref 3.77–5.28)
RBC # UR STRIP: ABNORMAL /HPF
REF LAB TEST METHOD: ABNORMAL
SARS-COV-2 RNA RESP QL NAA+PROBE: NOT DETECTED
SODIUM SERPL-SCNC: 139 MMOL/L (ref 136–145)
SP GR UR STRIP: 1.02 (ref 1–1.03)
SQUAMOUS #/AREA URNS HPF: ABNORMAL /HPF
UROBILINOGEN UR QL STRIP: ABNORMAL
WBC # UR STRIP: ABNORMAL /HPF
WBC NRBC COR # BLD: 16.43 10*3/MM3 (ref 3.4–10.8)

## 2023-07-27 PROCEDURE — 83605 ASSAY OF LACTIC ACID: CPT

## 2023-07-27 PROCEDURE — 80053 COMPREHEN METABOLIC PANEL: CPT

## 2023-07-27 PROCEDURE — 84145 PROCALCITONIN (PCT): CPT

## 2023-07-27 PROCEDURE — 83690 ASSAY OF LIPASE: CPT

## 2023-07-27 PROCEDURE — 81001 URINALYSIS AUTO W/SCOPE: CPT

## 2023-07-27 PROCEDURE — 74177 CT ABD & PELVIS W/CONTRAST: CPT

## 2023-07-27 PROCEDURE — 87040 BLOOD CULTURE FOR BACTERIA: CPT

## 2023-07-27 PROCEDURE — 87635 SARS-COV-2 COVID-19 AMP PRB: CPT

## 2023-07-27 PROCEDURE — 25010000002 CEFTRIAXONE PER 250 MG

## 2023-07-27 PROCEDURE — 25010000002 METHYLPREDNISOLONE PER 125 MG

## 2023-07-27 PROCEDURE — 71045 X-RAY EXAM CHEST 1 VIEW: CPT

## 2023-07-27 PROCEDURE — 71250 CT THORAX DX C-: CPT

## 2023-07-27 PROCEDURE — 25510000001 IOPAMIDOL 61 % SOLUTION

## 2023-07-27 PROCEDURE — 85025 COMPLETE CBC W/AUTO DIFF WBC: CPT

## 2023-07-27 PROCEDURE — 25010000002 AZITHROMYCIN PER 500 MG

## 2023-07-27 PROCEDURE — 99285 EMERGENCY DEPT VISIT HI MDM: CPT

## 2023-07-27 RX ORDER — ONDANSETRON 4 MG/1
4 TABLET, FILM COATED ORAL EVERY 6 HOURS PRN
Status: DISCONTINUED | OUTPATIENT
Start: 2023-07-27 | End: 2023-08-02 | Stop reason: HOSPADM

## 2023-07-27 RX ORDER — ONDANSETRON 2 MG/ML
4 INJECTION INTRAMUSCULAR; INTRAVENOUS ONCE
Status: DISCONTINUED | OUTPATIENT
Start: 2023-07-27 | End: 2023-07-28 | Stop reason: HOSPADM

## 2023-07-27 RX ORDER — SODIUM CHLORIDE 0.9 % (FLUSH) 0.9 %
10 SYRINGE (ML) INJECTION AS NEEDED
Status: DISCONTINUED | OUTPATIENT
Start: 2023-07-27 | End: 2023-08-02 | Stop reason: HOSPADM

## 2023-07-27 RX ORDER — IPRATROPIUM BROMIDE AND ALBUTEROL SULFATE 2.5; .5 MG/3ML; MG/3ML
3 SOLUTION RESPIRATORY (INHALATION) ONCE
Status: DISCONTINUED | OUTPATIENT
Start: 2023-07-27 | End: 2023-07-28 | Stop reason: HOSPADM

## 2023-07-27 RX ORDER — SODIUM CHLORIDE 0.9 % (FLUSH) 0.9 %
10 SYRINGE (ML) INJECTION EVERY 12 HOURS SCHEDULED
Status: DISCONTINUED | OUTPATIENT
Start: 2023-07-27 | End: 2023-08-02 | Stop reason: HOSPADM

## 2023-07-27 RX ORDER — SODIUM CHLORIDE 9 MG/ML
50 INJECTION, SOLUTION INTRAVENOUS CONTINUOUS
Status: DISCONTINUED | OUTPATIENT
Start: 2023-07-27 | End: 2023-07-31

## 2023-07-27 RX ORDER — SODIUM CHLORIDE 9 MG/ML
40 INJECTION, SOLUTION INTRAVENOUS AS NEEDED
Status: DISCONTINUED | OUTPATIENT
Start: 2023-07-27 | End: 2023-08-02 | Stop reason: HOSPADM

## 2023-07-27 RX ORDER — BISACODYL 5 MG/1
5 TABLET, DELAYED RELEASE ORAL DAILY PRN
Status: DISCONTINUED | OUTPATIENT
Start: 2023-07-27 | End: 2023-08-02 | Stop reason: HOSPADM

## 2023-07-27 RX ORDER — METHYLPREDNISOLONE SODIUM SUCCINATE 125 MG/2ML
125 INJECTION, POWDER, LYOPHILIZED, FOR SOLUTION INTRAMUSCULAR; INTRAVENOUS ONCE
Status: COMPLETED | OUTPATIENT
Start: 2023-07-27 | End: 2023-07-27

## 2023-07-27 RX ORDER — SODIUM CHLORIDE 0.9 % (FLUSH) 0.9 %
10 SYRINGE (ML) INJECTION AS NEEDED
Status: DISCONTINUED | OUTPATIENT
Start: 2023-07-27 | End: 2023-07-28 | Stop reason: SDUPTHER

## 2023-07-27 RX ORDER — MORPHINE SULFATE 2 MG/ML
1 INJECTION, SOLUTION INTRAMUSCULAR; INTRAVENOUS EVERY 4 HOURS PRN
Status: DISCONTINUED | OUTPATIENT
Start: 2023-07-27 | End: 2023-08-02 | Stop reason: HOSPADM

## 2023-07-27 RX ORDER — POLYETHYLENE GLYCOL 3350 17 G/17G
17 POWDER, FOR SOLUTION ORAL DAILY PRN
Status: DISCONTINUED | OUTPATIENT
Start: 2023-07-27 | End: 2023-08-02 | Stop reason: HOSPADM

## 2023-07-27 RX ORDER — BISACODYL 10 MG
10 SUPPOSITORY, RECTAL RECTAL DAILY PRN
Status: DISCONTINUED | OUTPATIENT
Start: 2023-07-27 | End: 2023-08-02 | Stop reason: HOSPADM

## 2023-07-27 RX ORDER — AMOXICILLIN 250 MG
2 CAPSULE ORAL 2 TIMES DAILY
Status: DISCONTINUED | OUTPATIENT
Start: 2023-07-27 | End: 2023-08-02 | Stop reason: HOSPADM

## 2023-07-27 RX ORDER — NALOXONE HCL 0.4 MG/ML
0.4 VIAL (ML) INJECTION
Status: DISCONTINUED | OUTPATIENT
Start: 2023-07-27 | End: 2023-08-02 | Stop reason: HOSPADM

## 2023-07-27 RX ORDER — ACETAMINOPHEN 325 MG/1
650 TABLET ORAL EVERY 4 HOURS PRN
Status: DISCONTINUED | OUTPATIENT
Start: 2023-07-27 | End: 2023-08-02 | Stop reason: HOSPADM

## 2023-07-27 RX ADMIN — SODIUM CHLORIDE 1000 ML: 9 INJECTION, SOLUTION INTRAVENOUS at 15:11

## 2023-07-27 RX ADMIN — CEFTRIAXONE 1000 MG: 1 INJECTION, POWDER, FOR SOLUTION INTRAMUSCULAR; INTRAVENOUS at 16:46

## 2023-07-27 RX ADMIN — METHYLPREDNISOLONE SODIUM SUCCINATE 125 MG: 125 INJECTION, POWDER, LYOPHILIZED, FOR SOLUTION INTRAMUSCULAR; INTRAVENOUS at 16:46

## 2023-07-27 RX ADMIN — SODIUM CHLORIDE 50 ML/HR: 9 INJECTION, SOLUTION INTRAVENOUS at 18:41

## 2023-07-27 RX ADMIN — AZITHROMYCIN DIHYDRATE 500 MG: 500 INJECTION, POWDER, LYOPHILIZED, FOR SOLUTION INTRAVENOUS at 16:47

## 2023-07-27 RX ADMIN — IOPAMIDOL 60 ML: 612 INJECTION, SOLUTION INTRAVENOUS at 14:56

## 2023-07-27 NOTE — H&P
[facility] Medicine Services  HISTORY AND PHYSICAL    Date of Admission: 7/27/2023  Primary Care Physician: Javier Ng MD    Subjective   Primary Historian: patient and son    Chief Complaint:   Abdominal pain.  Nausea and vomiting.   Diarrhea   Worse with eating.      History of Present Illness  Patient presents the hospital with complaints of nausea and vomiting, abdominal pain, she has had a sick stomach for 3 weeks at least.  She has had some diarrhea.  She has been dizzy in the head but that's going on for some time she says.  She has a known history of COPD.  She follows with the pulmonologist.  She has previously been admitted for lung issues and has had bronchiectasis.       During her ER evaluation CT of the abdomen was done and she was found to have consolidations in her lower lobes.  ER is requesting patient be admitted for pneumonia.    Review of Systems   Constitutional:  Negative for fever.   HENT:  Positive for hearing loss.    Eyes: Negative.    Respiratory:  Positive for shortness of breath.    Cardiovascular:  Negative for chest pain and palpitations.   Gastrointestinal:  Positive for abdominal pain, diarrhea, nausea and vomiting.   Endocrine: Negative.    Genitourinary: Negative.    Musculoskeletal:  Positive for arthralgias.   Skin: Negative.    Allergic/Immunologic: Negative.    Neurological:  Positive for weakness and light-headedness.   Hematological: Negative.    Psychiatric/Behavioral: Negative.          Past Medical History:   Past Medical History:   Diagnosis Date    COPD (chronic obstructive pulmonary disease)     GERD (gastroesophageal reflux disease)     Hypertension     Pneumonia      Past Surgical History:  Past Surgical History:   Procedure Laterality Date    CATARACT EXTRACTION, BILATERAL      THYROID SURGERY       Social History:  reports that she has never smoked. She has never used smokeless tobacco. She reports that she does not drink  alcohol and does not use drugs.    Family History: family history includes Alzheimer's disease in her mother; Colon polyps in her child; Heart disease in her father.       Allergies:  No Known Allergies    Medications:  Prior to Admission medications    Medication Sig Start Date End Date Taking? Authorizing Provider   acetaminophen (TYLENOL) 500 MG tablet Take 500 mg by mouth Every 4 (Four) Hours As Needed for Mild Pain .    Cristel Black MD   albuterol (PROVENTIL) (2.5 MG/3ML) 0.083% nebulizer solution Take 2.5 mg by nebulization Every 4 (Four) Hours As Needed for Wheezing. 1/25/23   Nithin Forde APRN   albuterol sulfate  (90 Base) MCG/ACT inhaler Inhale 2 puffs Every 4 (Four) Hours As Needed for Wheezing.    Cristel Black MD   azithromycin (Zithromax Z-Wilmer) 250 MG tablet Take 2 tablets by mouth on day 1, then 1 tablet daily on days 2-5 7/24/23   Jose Maria Krause MD   azithromycin (ZITHROMAX) 250 MG tablet Take 1 pill each Monday, Wednesday and Friday 6/23/22   Nithin Forde APRN   Calcium-Magnesium-Vitamin D (CALCIUM 1200+D3 PO) Take 1 tablet by mouth Daily.    Cristel Black MD   cyanocobalamin (VITAMIN B-12) 500 MCG tablet Take 1 tablet by mouth Daily.    Cristel Black MD   guaiFENesin (MUCINEX) 600 MG 12 hr tablet Take 2 tablets by mouth Every 12 (Twelve) Hours. 9/5/19   Alexsander Chew DO   ipratropium-albuterol (DUO-NEB) 0.5-2.5 mg/3 ml nebulizer TAKE 3 ML BY NEBULIZATION EVERY 6 (SIX) HOURS AS NEEDED FOR WHEEZING. 1/3/23   Nithin Forde APRN   lansoprazole (PREVACID) 30 MG capsule Take 30 mg by mouth Daily.    Cristel Black MD   metoprolol succinate XL (TOPROL-XL) 25 MG 24 hr tablet Take 25 mg by mouth Daily.    Cristel Black MD   Multiple Vitamins-Minerals (WOMENS ONE DAILY) tablet Take 1 tablet by mouth Daily.    Cristel Black MD   ondansetron ODT (ZOFRAN-ODT) 4 MG disintegrating tablet Take 1 tablet by  "mouth Every 6 (Six) Hours As Needed for Nausea or Vomiting. 7/22/18   Hans Wise MD   predniSONE (DELTASONE) 10 MG tablet 20 mg daily x 3 days then 10 mg daily x 3 days then stop 5/26/22   Marco Mclaughlin MD   predniSONE (DELTASONE) 50 MG tablet Take 1 tablet by mouth Daily for 5 days. 7/24/23 7/29/23  Jose Maria Krause MD   Probiotic Product (PROBIOTIC-10) capsule Take 1 capsule by mouth Daily.    Provider, MD Cristel   traZODone (DESYREL) 50 MG tablet Take 50 mg by mouth Every Night.    Provider, MD Cristel     I have utilized all available immediate resources to obtain, update, or review the patient's current medications (including all prescriptions, over-the-counter products, herbals, cannabis/cannabidiol products, and vitamin/mineral/dietary (nutritional) supplements).    Objective     Vital Signs: /71   Pulse 105   Temp 98.8 øF (37.1 øC) (Oral)   Resp 20   Ht 152.4 cm (60\")   Wt 34 kg (75 lb)   SpO2 90%   BMI 14.65 kg/mý   Physical Exam  Vitals and nursing note reviewed.   Constitutional:       Appearance: She is well-developed.      Comments: thin   HENT:      Head: Normocephalic and atraumatic.      Right Ear: External ear normal.      Left Ear: External ear normal.      Nose: Nose normal.      Mouth/Throat:      Mouth: Mucous membranes are moist.   Eyes:      Extraocular Movements: Extraocular movements intact.      Conjunctiva/sclera: Conjunctivae normal.      Pupils: Pupils are equal, round, and reactive to light.   Neck:      Thyroid: No thyromegaly.      Vascular: No JVD.      Trachea: No tracheal deviation.   Cardiovascular:      Rate and Rhythm: Normal rate and regular rhythm.      Pulses: Normal pulses.      Heart sounds: Normal heart sounds. No murmur heard.    No friction rub. No gallop.   Pulmonary:      Effort: Pulmonary effort is normal.      Comments: Velcro crackles  Abdominal:      Comments: Bowel sounds present.  Marked tenderness palpation of the right " "upper quadrant.  No rebound.  She does have some mild generalized tenderness palpation in the remainder of the abdomen.   Musculoskeletal:         General: Normal range of motion.      Cervical back: Normal range of motion and neck supple.   Lymphadenopathy:      Cervical: No cervical adenopathy.   Skin:     General: Skin is warm and dry.      Capillary Refill: Capillary refill takes less than 2 seconds.   Neurological:      Mental Status: She is alert and oriented to person, place, and time.      Cranial Nerves: No cranial nerve deficit.      Coordination: Coordination normal.   Psychiatric:         Mood and Affect: Mood normal.         Behavior: Behavior normal.          Results Reviewed:  Lab Results (last 24 hours)       Procedure Component Value Units Date/Time    Blood Culture - Blood, Arm, Right [[id number]] Collected: 07/27/23 1550    Specimen: Blood from Arm, Right Updated: 07/27/23 1620    Blood Culture - Blood, Arm, Left [[id number]] Collected: 07/27/23 1530    Specimen: Blood from Arm, Left Updated: 07/27/23 1620    Procalcitonin [[id number]]  (Normal) Collected: 07/27/23 1427    Specimen: Blood Updated: 07/27/23 1607     Procalcitonin 0.17 ng/mL     Narrative:      As a Marker for Sepsis (Non-Neonates):    1. <0.5 ng/mL represents a low risk of severe sepsis and/or septic shock.  2. >2 ng/mL represents a high risk of severe sepsis and/or septic shock.    As a Marker for Lower Respiratory Tract Infections that require antibiotic therapy:    PCT on Admission    Antibiotic Therapy       6-12 Hrs later    >0.5                Strongly Recommended  >0.25 - <0.5        Recommended   0.1 - 0.25          Discouraged              Remeasure/reassess PCT  <0.1                Strongly Discouraged     Remeasure/reassess PCT    As 28 day mortality risk marker: \"Change in Procalcitonin Result\" (>80% or <=80%) if Day 0 (or Day 1) and Day 4 values are available. Refer to http://www.[employer]-pct-calculator.com    Change in PCT " <=80%  A decrease of PCT levels below or equal to 80% defines a positive change in PCT test result representing a higher risk for 28-day all-cause mortality of patients diagnosed with severe sepsis for septic shock.    Change in PCT >80%  A decrease of PCT levels of more than 80% defines a negative change in PCT result representing a lower risk for 28-day all-cause mortality of patients diagnosed with severe sepsis or septic shock.       COVID PRE-OP / PRE-PROCEDURE SCREENING ORDER (NO ISOLATION) - Swab, Nasal Cavity [[id number]]  (Normal) Collected: 07/27/23 1430    Specimen: Swab from Nasal Cavity Updated: 07/27/23 1506    Narrative:      The following orders were created for panel order COVID PRE-OP / PRE-PROCEDURE SCREENING ORDER (NO ISOLATION) - Swab, Nasal Cavity.  Procedure                               Abnormality         Status                     ---------                               -----------         ------                     COVID-19,CEPHEID/GARY,CO...[[id number]]  Normal              Final result                 Please view results for these tests on the individual orders.    COVID-19,CEPHEID/GARY,COR/SILVESTRE/PAD/SHARON/MAD IN-HOUSE(OR EMERGENT/ADD-ON),NP SWAB IN TRANSPORT MEDIA 3-4 HR TAT, RT-PCR - Swab, Nasopharynx [[id number]]  (Normal) Collected: 07/27/23 1430    Specimen: Swab from Nasopharynx Updated: 07/27/23 1506     COVID19 Not Detected    Narrative:      Fact sheet for providers: https://www.fda.gov/media/410034/download     Fact sheet for patients: https://www.fda.gov/media/959385/download  Fact sheet for providers: https://www.fda.gov/media/103092/download    Fact sheet for patients: https://www.fda.gov/media/121584/download    Test performed by PCR.    Comprehensive Metabolic Panel [[id number]]  (Abnormal) Collected: 07/27/23 1427    Specimen: Blood Updated: 07/27/23 1458     Glucose 141 mg/dL      BUN 24 mg/dL      Creatinine 0.65 mg/dL      Sodium 139 mmol/L      Potassium 4.5 mmol/L       Chloride 100 mmol/L      CO2 29.0 mmol/L      Calcium 9.3 mg/dL      Total Protein 7.1 g/dL      Albumin 3.3 g/dL      ALT (SGPT) 21 U/L      AST (SGOT) 45 U/L      Alkaline Phosphatase 64 U/L      Total Bilirubin 0.3 mg/dL      Globulin 3.8 gm/dL      A/G Ratio 0.9 g/dL      BUN/Creatinine Ratio 36.9     Anion Gap 10.0 mmol/L      eGFR 80.2 mL/min/1.73     Narrative:      GFR Normal >60  Chronic Kidney Disease <60  Kidney Failure <15    The GFR formula is only valid for adults with stable renal function between ages 18 and 70.    Lactic Acid, Plasma [[id number]]  (Normal) Collected: 07/27/23 1427    Specimen: Blood Updated: 07/27/23 1456     Lactate 1.3 mmol/L     Lipase [[id number]]  (Normal) Collected: 07/27/23 1427    Specimen: Blood Updated: 07/27/23 1453     Lipase 33 U/L     Urinalysis With Culture If Indicated - Urine, Clean Catch [[id number]]  (Abnormal) Collected: 07/27/23 1427    Specimen: Urine, Clean Catch Updated: 07/27/23 1441     Color, UA Yellow     Appearance, UA Clear     pH, UA 5.5     Specific Gravity, UA 1.019     Glucose, UA Negative     Ketones, UA Negative     Bilirubin, UA Negative     Blood, UA Negative     Protein, UA 30 mg/dL (1+)     Leuk Esterase, UA Negative     Nitrite, UA Negative     Urobilinogen, UA 0.2 E.U./dL    Narrative:      In absence of clinical symptoms, the presence of pyuria, bacteria, and/or nitrites on the urinalysis result does not correlate with infection.    Urinalysis, Microscopic Only - Urine, Clean Catch [[id number]]  (Abnormal) Collected: 07/27/23 1427    Specimen: Urine, Clean Catch Updated: 07/27/23 1441     RBC, UA None Seen /HPF      WBC, UA 0-2 /HPF      Comment: Urine culture not indicated.        Bacteria, UA None Seen /HPF      Squamous Epithelial Cells, UA None Seen /HPF      Hyaline Casts, UA 0-2 /LPF      Methodology Automated Microscopy    CBC & Differential [[id number]]  (Abnormal) Collected: 07/27/23 1427    Specimen: Blood Updated: 07/27/23 1438     Narrative:      The following orders were created for panel order CBC & Differential.  Procedure                               Abnormality         Status                     ---------                               -----------         ------                     CBC Auto Differential[[id number]]        Abnormal            Final result                 Please view results for these tests on the individual orders.    CBC Auto Differential [[id number]]  (Abnormal) Collected: 07/27/23 1427    Specimen: Blood Updated: 07/27/23 1438     WBC 16.43 10*3/mm3      RBC 2.79 10*6/mm3      Hemoglobin 8.8 g/dL      Hematocrit 28.1 %      .7 fL      MCH 31.5 pg      MCHC 31.3 g/dL      RDW 14.0 %      RDW-SD 50.8 fl      MPV 10.3 fL      Platelets 330 10*3/mm3      Neutrophil % 92.7 %      Lymphocyte % 2.9 %      Monocyte % 3.8 %      Eosinophil % 0.0 %      Basophil % 0.2 %      Immature Grans % 0.4 %      Neutrophils, Absolute 15.23 10*3/mm3      Lymphocytes, Absolute 0.47 10*3/mm3      Monocytes, Absolute 0.63 10*3/mm3      Eosinophils, Absolute 0.00 10*3/mm3      Basophils, Absolute 0.03 10*3/mm3      Immature Grans, Absolute 0.07 10*3/mm3      nRBC 0.0 /100 WBC           Imaging Results (Last 24 Hours)       Procedure Component Value Units Date/Time    XR Chest 1 View [[id number]] Collected: 07/27/23 1556     Updated: 07/27/23 1601    Narrative:      EXAMINATION: Chest 1 view 07/27/2023     HISTORY: Evaluate for pneumonia.     FINDINGS: Today's exam is compared to previous study of 3 days earlier.  Again demonstrated is parenchymal scarring within both upper lobes with  retraction of the pulmonary amaya. There are emphysematous changes of the  lungs. Overall stable appearance in the chest from previous examination  of 3 days earlier. No effusion or free air.       Impression:      . Stable appearance of the chest from previous exam of 3 days  earlier.  This report was finalized on 07/27/2023 15:58 by Dr. Jonn Chicas MD.     CT Abdomen Pelvis With Contrast [[id number]] Collected: 07/27/23 1459     Updated: 07/27/23 1511    Narrative:      EXAMINATION:  CT ABDOMEN PELVIS W CONTRAST-  7/27/2023 2:53 PM CDT     HISTORY: Generalized abdominal pain. Nausea, vomiting and diarrhea.     TECHNIQUE: Spiral CT was performed of the abdomen and pelvis with IV  contrast. Multiplanar images were reconstructed.     DLP: 116 mGy-cm. Automated dosage reduction technique was utilized to  reduce patient dose.     COMPARISON: 09/03/2019.      LUNG BASES: There are patchy parenchymal infiltrates in both lung bases  worrisome for pneumonia.     LIVER AND SPLEEN: There are gallstones in the gallbladder. There is  breathing motion artifact. There is intrahepatic and extrahepatic  biliary ductal dilatation. I do not see a definite stone in the  extrahepatic biliary tree. The spleen is unremarkable.     PANCREAS: No pancreatic mass or inflammatory change.     KIDNEYS AND ADRENALS: The adrenal glands are unremarkable. There is  breathing motion artifact. The right kidney demonstrates no focal  abnormality. There are 2 renal cysts on the left, the largest arising  from the upper pole and measures 3.6 cm. The renal collecting systems  are nondilated. The urinary bladder demonstrates no focal abnormality.     BOWEL: The patient has very little intraperitoneal fat. There is very  little subcutaneous fat. No oral contrast was given which makes  evaluation of bowel difficult. There is gastric wall thickening. Small  bowel loops appear to be nondilated. There is underdistention of most of  the colon. There is diverticulosis of the colon. I do not see the  appendix with confidence.     OTHER: There is atheromatous disease of the aortoiliac vessels. No free  air or free fluid is seen. No lymphadenopathy is seen. There are  degenerative changes of the spine. No definite acute bony abnormality.          Impression:      1. The study is degraded by breathing motion  artifact. The patient also  has very little intraperitoneal and subcutaneous fat making evaluation  of intra-abdominal bowel difficult. The patient appears cachectic.  2. Patchy infiltrates in both lung bases worrisome for pneumonia.  3. Gallstones in the gallbladder. There is intrahepatic and extrahepatic  biliary ductal dilatation. I do not see a definite stone in the biliary  tree.  4. Left renal cysts.  5. Gastric wall thickening versus artifact of underdistention.  Differential includes gastritis, Menetrier's disease and other  etiologies. There is diverticulosis of the colon. There is  underdistention of the colon. No definite small bowel distention is  seen.  6. Atheromatous disease of the aortoiliac vessels. Degenerative changes  of the spine.        The full report of this exam was immediately signed and available to the  emergency room. The patient is currently in the emergency room.  This report was finalized on 07/27/2023 15:08 by Dr. Ramsey Grover MD.          I have personally reviewed and interpreted the radiology studies and ECG obtained at time of admission.     Assessment / Plan   Assessment:   Active Hospital Problems    Diagnosis     **Right upper quadrant abdominal pain     Community acquired bilateral lower lobe pneumonia     Vomiting     Diarrhea        Treatment Plan  The patient will be admitted to my service here at [facility]. \    Gentle hydration  Pain control  CT chest without contrast  Continue Rocephin/azithromycin  Consult general surgeon re: recommendations for right upper quadrant pain/cholelithiasis.  CMP CBC in a.m.      Medical Decision Making  Number and Complexity of problems:  Right upper quadrant abdominal pain high complexity   cholelithiasis high complexity   bilateral lower lobe consolidations possibly pneumonia high complexity  Vomiting high complexity  Diarrhea high complexity    Differential Diagnosis: Gastroenteritis    Conditions and Status         Condition is unchanged.     [facility] Data  External documents reviewed: Care everywhere reviewed  Cardiac tracing (EKG, telemetry) interpretation: Reviewed  Radiology interpretation: Reviewed  Labs reviewed: Reviewed  Any tests that were considered but not ordered: None     Decision rules/scores evaluated (example [id number], Wells, etc): None    Discussed with: Patient and son     Care Planning  Shared decision making: Patient  Code status and discussions: DNR/DNI    Disposition  Social Determinants of Health that impact treatment or disposition: None  Estimated length of stay is 2 to 4 days.     I confirmed that the patient's advanced care plan is present, code status is documented, and a surrogate decision maker is listed in the patient's medical record.     The patient's surrogate decision maker is kathe Haddad    The patient was seen and examined by me on 7/27/2023 at 1650.    Electronically signed by Shabnam Sanchez, 07/27/23, 17:36 CDT.

## 2023-07-27 NOTE — ED PROVIDER NOTES
Subjective   History of Present Illness  Patient is a 89-year-old female who presents to the ED today via EMS complaining of generalized abdominal pain for the last several weeks accompanied by nausea, vomiting, and diarrhea over the last few days.  She states 2 days ago she had 11 episodes of diarrhea, she reports she is highly debilitated by this at this time.  She denies any known sick contacts.  She currently lives at home with her son who helps care for her.  She states she has been able to eat, however her appetite has been decreased and she has been experiencing fatigue due to this.  On exam the abdomen is flat, nondistended, soft, bowel sounds are normal, there is no focal tenderness noted, however there is generalized tenderness noted with palpation.  She denies any dysuria, flank pain, fever, body aches, or chills. PMH is significant for COPD, GERD, hypertension, and pneumonia.  She has previously had cataract and thyroid surgery.  Differential diagnoses: Enteritis, colitis, diverticulitis, electrolyte imbalance, COVID, UTI, and other.    History provided by:  Patient   used: No      Review of Systems   Constitutional:  Positive for fatigue. Negative for chills, diaphoresis and fever.   HENT: Negative.     Eyes: Negative.    Respiratory: Negative.     Cardiovascular: Negative.    Gastrointestinal:  Positive for abdominal pain, diarrhea, nausea and vomiting. Negative for abdominal distention and constipation.   Genitourinary:  Negative for dysuria, flank pain, frequency, pelvic pain, urgency, vaginal bleeding, vaginal discharge and vaginal pain.   Musculoskeletal: Negative.    Skin: Negative.    Neurological: Negative.    Psychiatric/Behavioral: Negative.       Past Medical History:   Diagnosis Date    COPD (chronic obstructive pulmonary disease)     GERD (gastroesophageal reflux disease)     Hypertension     Pneumonia        No Known Allergies    Past Surgical History:   Procedure  Laterality Date    CATARACT EXTRACTION, BILATERAL      THYROID SURGERY         Family History   Problem Relation Age of Onset    Alzheimer's disease Mother     Heart disease Father     Colon polyps Child     Colon cancer Neg Hx        Social History     Socioeconomic History    Marital status:    Tobacco Use    Smoking status: Never    Smokeless tobacco: Never   Vaping Use    Vaping Use: Never used   Substance and Sexual Activity    Alcohol use: No    Drug use: No    Sexual activity: Defer       Objective   Physical Exam  Vitals and nursing note reviewed.   Constitutional:       General: She is not in acute distress.     Appearance: Normal appearance. She is not ill-appearing, toxic-appearing or diaphoretic.   HENT:      Head: Normocephalic and atraumatic.      Right Ear: External ear normal.      Left Ear: External ear normal.      Nose: Nose normal.   Eyes:      Extraocular Movements: Extraocular movements intact.      Conjunctiva/sclera: Conjunctivae normal.      Pupils: Pupils are equal, round, and reactive to light.   Cardiovascular:      Rate and Rhythm: Normal rate and regular rhythm.      Pulses: Normal pulses.      Heart sounds: Normal heart sounds.   Pulmonary:      Effort: Pulmonary effort is normal.      Breath sounds: Normal breath sounds.   Abdominal:      General: Bowel sounds are normal. There is no distension.      Palpations: Abdomen is soft.      Tenderness: There is generalized abdominal tenderness. There is no right CVA tenderness, left CVA tenderness, guarding or rebound.   Musculoskeletal:      Cervical back: Normal range of motion.   Skin:     General: Skin is warm and dry.      Capillary Refill: Capillary refill takes less than 2 seconds.   Neurological:      Mental Status: She is alert and oriented to person, place, and time. Mental status is at baseline.      GCS: GCS eye subscore is 4. GCS verbal subscore is 5. GCS motor subscore is 6.   Psychiatric:         Mood and Affect: Mood  normal.         Behavior: Behavior normal.         Thought Content: Thought content normal.         Judgment: Judgment normal.     Labs Reviewed   COMPREHENSIVE METABOLIC PANEL - Abnormal; Notable for the following components:       Result Value    Glucose 141 (*)     BUN 24 (*)     Albumin 3.3 (*)     AST (SGOT) 45 (*)     BUN/Creatinine Ratio 36.9 (*)     All other components within normal limits    Narrative:     GFR Normal >60  Chronic Kidney Disease <60  Kidney Failure <15    The GFR formula is only valid for adults with stable renal function between ages 18 and 70.   URINALYSIS W/ CULTURE IF INDICATED - Abnormal; Notable for the following components:    Protein, UA 30 mg/dL (1+) (*)     All other components within normal limits    Narrative:     In absence of clinical symptoms, the presence of pyuria, bacteria, and/or nitrites on the urinalysis result does not correlate with infection.   CBC WITH AUTO DIFFERENTIAL - Abnormal; Notable for the following components:    WBC 16.43 (*)     RBC 2.79 (*)     Hemoglobin 8.8 (*)     Hematocrit 28.1 (*)     .7 (*)     MCHC 31.3 (*)     Neutrophil % 92.7 (*)     Lymphocyte % 2.9 (*)     Monocyte % 3.8 (*)     Eosinophil % 0.0 (*)     Neutrophils, Absolute 15.23 (*)     Lymphocytes, Absolute 0.47 (*)     Immature Grans, Absolute 0.07 (*)     All other components within normal limits   URINALYSIS, MICROSCOPIC ONLY - Abnormal; Notable for the following components:    WBC, UA 0-2 (*)     All other components within normal limits   CBC WITH AUTO DIFFERENTIAL - Abnormal; Notable for the following components:    WBC 11.92 (*)     RBC 2.66 (*)     Hemoglobin 8.4 (*)     Hematocrit 27.2 (*)     .3 (*)     MCHC 30.9 (*)     Neutrophil % 89.2 (*)     Lymphocyte % 6.3 (*)     Monocyte % 3.8 (*)     Eosinophil % 0.0 (*)     Immature Grans % 0.6 (*)     Neutrophils, Absolute 10.64 (*)     Immature Grans, Absolute 0.07 (*)     All other components within normal limits  "  COMPREHENSIVE METABOLIC PANEL - Abnormal; Notable for the following components:    Glucose 144 (*)     Creatinine 0.56 (*)     Total Protein 5.6 (*)     Albumin 3.0 (*)     BUN/Creatinine Ratio 41.1 (*)     All other components within normal limits    Narrative:     GFR Normal >60  Chronic Kidney Disease <60  Kidney Failure <15    The GFR formula is only valid for adults with stable renal function between ages 18 and 70.   COVID-19,CEPHEID/GARY,COR/SILVESTRE/PAD/SHARON/MAD IN-HOUSE,NP SWAB IN TRANSPORT MEDIA 3-4 HR TAT, RT-PCR - Normal    Narrative:     Fact sheet for providers: https://www.fda.gov/media/478401/download     Fact sheet for patients: https://www.fda.gov/media/188446/download  Fact sheet for providers: https://www.fda.gov/media/080200/download    Fact sheet for patients: https://www.the [employer].gov/media/731069/download    Test performed by PCR.   LIPASE - Normal   LACTIC ACID, PLASMA - Normal   PROCALCITONIN - Normal    Narrative:     As a Marker for Sepsis (Non-Neonates):    1. <0.5 ng/mL represents a low risk of severe sepsis and/or septic shock.  2. >2 ng/mL represents a high risk of severe sepsis and/or septic shock.    As a Marker for Lower Respiratory Tract Infections that require antibiotic therapy:    PCT on Admission    Antibiotic Therapy       6-12 Hrs later    >0.5                Strongly Recommended  >0.25 - <0.5        Recommended   0.1 - 0.25          Discouraged              Remeasure/reassess PCT  <0.1                Strongly Discouraged     Remeasure/reassess PCT    As 28 day mortality risk marker: \"Change in Procalcitonin Result\" (>80% or <=80%) if Day 0 (or Day 1) and Day 4 values are available. Refer to http://www.[employer]-pct-calculator.com    Change in PCT <=80%  A decrease of PCT levels below or equal to 80% defines a positive change in PCT test result representing a higher risk for 28-day all-cause mortality of patients diagnosed with severe sepsis for septic shock.    Change in PCT >80%  A " decrease of PCT levels of more than 80% defines a negative change in PCT result representing a lower risk for 28-day all-cause mortality of patients diagnosed with severe sepsis or septic shock.      COVID PRE-OP / PRE-PROCEDURE SCREENING ORDER (NO ISOLATION)    Narrative:     The following orders were created for panel order COVID PRE-OP / PRE-PROCEDURE SCREENING ORDER (NO ISOLATION) - Swab, Nasal Cavity.  Procedure                               Abnormality         Status                     ---------                               -----------         ------                     COVID-19,CEPHEID/GARY,CO...[[id number]]  Normal              Final result                 Please view results for these tests on the individual orders.   BLOOD CULTURE   BLOOD CULTURE   CBC AND DIFFERENTIAL    Narrative:     The following orders were created for panel order CBC & Differential.  Procedure                               Abnormality         Status                     ---------                               -----------         ------                     CBC Auto Differential[[id number]]        Abnormal            Final result                 Please view results for these tests on the individual orders.     CT Chest Without Contrast Diagnostic   Final Result   1. Extensive bronchiectasis within both lungs with associated   peribronchial consolidation. In most segments of the lungs I feel this   is stable from the previous exam of May of last year. However, there is   increased consolidation in the superior segment of both lower lobes as   well as consolidation and mucoid impaction within the left lower lobe   when compared to last year's CT exam..           This report was finalized on 07/27/2023 20:23 by Dr. Jonn Chicas MD.      XR Chest 1 View   Final Result   . Stable appearance of the chest from previous exam of 3 days   earlier.   This report was finalized on 07/27/2023 15:58 by Dr. Jonn Chicas MD.      CT Abdomen  Pelvis With Contrast   Final Result   1. The study is degraded by breathing motion artifact. The patient also   has very little intraperitoneal and subcutaneous fat making evaluation   of intra-abdominal bowel difficult. The patient appears cachectic.   2. Patchy infiltrates in both lung bases worrisome for pneumonia.   3. Gallstones in the gallbladder. There is intrahepatic and extrahepatic   biliary ductal dilatation. I do not see a definite stone in the biliary   tree.   4. Left renal cysts.   5. Gastric wall thickening versus artifact of underdistention.   Differential includes gastritis, Menetrier's disease and other   etiologies. There is diverticulosis of the colon. There is   underdistention of the colon. No definite small bowel distention is   seen.   6. Atheromatous disease of the aortoiliac vessels. Degenerative changes   of the spine.           The full report of this exam was immediately signed and available to the   emergency room. The patient is currently in the emergency room.   This report was finalized on 07/27/2023 15:08 by Dr. Ramsey Grover MD.        Medications   sodium chloride 0.9 % flush 10 mL (has no administration in time range)   ondansetron (ZOFRAN) injection 4 mg (4 mg Intravenous Not Given 7/27/23 1512)   ipratropium-albuterol (DUO-NEB) nebulizer solution 3 mL ( Nebulization Canceled Entry 7/27/23 1705)   sodium chloride 0.9 % flush 10 mL (10 mL Intravenous Not Given 7/28/23 0815)   sodium chloride 0.9 % flush 10 mL (has no administration in time range)   sodium chloride 0.9 % infusion 40 mL (has no administration in time range)   sennosides-docusate (PERICOLACE) 8.6-50 MG per tablet 2 tablet (2 tablets Oral Not Given 7/28/23 0815)     And   polyethylene glycol (MIRALAX) packet 17 g (has no administration in time range)     And   bisacodyl (DULCOLAX) EC tablet 5 mg (has no administration in time range)     And   bisacodyl (DULCOLAX) suppository 10 mg (has no administration in time  range)   sodium chloride 0.9 % infusion (50 mL/hr Intravenous New Bag 7/27/23 1841)   acetaminophen (TYLENOL) tablet 650 mg (has no administration in time range)   Morphine sulfate (PF) injection 1 mg (has no administration in time range)     And   naloxone (NARCAN) injection 0.4 mg (has no administration in time range)   ondansetron (ZOFRAN) tablet 4 mg (has no administration in time range)   cefTRIAXone (ROCEPHIN) 1,000 mg in sodium chloride 0.9 % 100 mL IVPB (has no administration in time range)   sodium chloride 0.9 % bolus 1,000 mL (0 mL Intravenous Stopped 7/27/23 1650)   iopamidol (ISOVUE-300) 61 % injection 100 mL (60 mL Intravenous Given 7/27/23 1456)   methylPREDNISolone sodium succinate (SOLU-Medrol) injection 125 mg (125 mg Intravenous Given 7/27/23 1646)   azithromycin (ZITHROMAX) 500 mg in sodium chloride 0.9 % 250 mL IVPB-VTB (500 mg Intravenous New Bag 7/27/23 1647)   cefTRIAXone (ROCEPHIN) 1,000 mg in sodium chloride 0.9 % 100 mL IVPB (1,000 mg Intravenous New Bag 7/27/23 1646)     CURB-65 Score for Pneumonia Severity - MDCalc  2 points -> Moderate risk group: 6.8% 30-day mortality. Consider inpatient treatment or outpatient with close followup.    Procedures           ED Course  ED Course as of 07/28/23 1526   Thu Jul 27, 2023   1540 O2 sat is 87% on her normal 3L.  [HE]   1634 I discussed the patient's case with hospitalist on-call, Dr. Wise who is agreeable to admission for failed outpatient treatment of bilateral lower lobe pneumonia.  Patient is now requiring more oxygen than usual and is hypoxic at 89%.  She had previously been prescribed azithromycin 3 days ago but has since been vomiting.  Patient agreeable with plan of care. [HE]      ED Course User Index  [HE] Lou Piedra APRN                                           Medical Decision Making  Patient is a 89-year-old female who presents to the ED today via EMS complaining of generalized abdominal pain for the last several weeks  accompanied by nausea, vomiting, and diarrhea over the last few days.  She states 2 days ago she had 11 episodes of diarrhea, she reports she is highly debilitated by this at this time.  She denies any known sick contacts.  She currently lives at home with her son who helps care for her.  She states she has been able to eat, however her appetite has been decreased and she has been experiencing fatigue due to this.  On exam the abdomen is flat, nondistended, soft, bowel sounds are normal, there is no focal tenderness noted, however there is generalized tenderness noted with palpation.  She denies any dysuria, flank pain, fever, body aches, or chills. PMH is significant for COPD, GERD, hypertension, and pneumonia.  She has previously had cataract and thyroid surgery.  Differential diagnoses: Enteritis, colitis, diverticulitis, electrolyte imbalance, COVID, UTI, and other.    Pt was initially given IV fluids for symptom management, zofran ordered but not given by nursing staff.     Blood cultures pending.   Lactic, lipase, and procalcitonin normal.   Covid test negative.   CMP reveals AST barely elevated at 45, BUN of 24 and BUN/creat ratio of 37; pt dehydrated.   UA positive for protein, otherwise unremarkable.   CBC reveals WBC elevated at 16 with H&H at baseline, normal platelets.     CXR reveals stable appearance of the chest from previous exam of 3 days  earlier.    CT abdomen pelvis reveals the study is degraded by breathing motion artifact. The patient also  has very little intraperitoneal and subcutaneous fat making evaluation  of intra-abdominal bowel difficult. The patient appears cachectic.  2. Patchy infiltrates in both lung bases worrisome for pneumonia.  3. Gallstones in the gallbladder. There is intrahepatic and extrahepatic  biliary ductal dilatation. I do not see a definite stone in the biliary  tree.  4. Left renal cysts.  5. Gastric wall thickening versus artifact of underdistention.  Differential  includes gastritis, Menetrier's disease and other  etiologies. There is diverticulosis of the colon. There is  underdistention of the colon. No definite small bowel distention is  seen.  6. Atheromatous disease of the aortoiliac vessels. Degenerative changes  of the spine.    Pt was given IV solumedrol, a duoneb treatment, IV rocephin, and IV azithromycin.     Results discussed at bedside.   Pt typically wears 3L O2 at home, her O2 sat here in the ED on her normal 3L is 87%.   On chart review it appears the pt was here a few days ago and diagnosed with pneumonia, was sent home on Azithromycin.   I recommend admission given that she has failed outpatient treatment, is vomiting, is requiring more O2 than usual, is hypoxic, and has a CURB-65 score of 2.     I discussed the patient's case with hospitalist on-call, Dr. Wise who is agreeable to admission for failed outpatient treatment of bilateral lower lobe pneumonia.  Patient is now requiring more oxygen than usual and is hypoxic at 89%.  She had previously been prescribed azithromycin 3 days ago but has since been vomiting.  Patient agreeable with plan of care.      Problems Addressed:  Diverticulosis: complicated acute illness or injury  Failure of outpatient treatment: complicated acute illness or injury  Gallstones: complicated acute illness or injury  Gastric wall thickening: complicated acute illness or injury  Hypoxia: complicated acute illness or injury  Pneumonia of both lower lobes due to infectious organism: complicated acute illness or injury  Renal cyst, left: complicated acute illness or injury    Amount and/or Complexity of Data Reviewed  Labs: ordered.  Radiology: ordered.    Risk  Prescription drug management.  Decision regarding hospitalization.        Final diagnoses:   Pneumonia of both lower lobes due to infectious organism   Hypoxia   Failure of outpatient treatment   Gallstones   Renal cyst, left   Gastric wall thickening   Diverticulosis        ED Disposition  ED Disposition       ED Disposition   Decision to Admit    Condition   --    Comment   Level of Care: Med/Surg [1]   Diagnosis: Community acquired bilateral lower lobe pneumonia [[id number]]   Admitting Physician: ROBERTA MURO [[id number]]   Attending Physician: ROBERTA MURO [[id number]]   Certification: I Certify That Inpatient Hospital Services Are Medically Necessary For Greater Than 2 Midnights                 No follow-up provider specified.       Medication List      No changes were made to your prescriptions during this visit.            , Lou MCDONNELL, APRN  07/28/23 1526

## 2023-07-28 LAB
ALBUMIN SERPL-MCNC: 3 G/DL (ref 3.5–5.2)
ALBUMIN/GLOB SERPL: 1.2 G/DL
ALP SERPL-CCNC: 50 U/L (ref 39–117)
ALT SERPL W P-5'-P-CCNC: 14 U/L (ref 1–33)
ANION GAP SERPL CALCULATED.3IONS-SCNC: 9 MMOL/L (ref 5–15)
AST SERPL-CCNC: 19 U/L (ref 1–32)
BASOPHILS # BLD AUTO: 0.01 10*3/MM3 (ref 0–0.2)
BASOPHILS NFR BLD AUTO: 0.1 % (ref 0–1.5)
BILIRUB SERPL-MCNC: 0.2 MG/DL (ref 0–1.2)
BUN SERPL-MCNC: 23 MG/DL (ref 8–23)
BUN/CREAT SERPL: 41.1 (ref 7–25)
CALCIUM SPEC-SCNC: 8.4 MG/DL (ref 8.2–9.6)
CHLORIDE SERPL-SCNC: 107 MMOL/L (ref 98–107)
CO2 SERPL-SCNC: 27 MMOL/L (ref 22–29)
CREAT SERPL-MCNC: 0.56 MG/DL (ref 0.57–1)
DEPRECATED RDW RBC AUTO: 53.1 FL (ref 37–54)
EGFRCR SERPLBLD CKD-EPI 2021: 83.1 ML/MIN/1.73
EOSINOPHIL # BLD AUTO: 0 10*3/MM3 (ref 0–0.4)
EOSINOPHIL NFR BLD AUTO: 0 % (ref 0.3–6.2)
ERYTHROCYTE [DISTWIDTH] IN BLOOD BY AUTOMATED COUNT: 14.1 % (ref 12.3–15.4)
GLOBULIN UR ELPH-MCNC: 2.6 GM/DL
GLUCOSE SERPL-MCNC: 144 MG/DL (ref 65–99)
HCT VFR BLD AUTO: 27.2 % (ref 34–46.6)
HGB BLD-MCNC: 8.4 G/DL (ref 12–15.9)
IMM GRANULOCYTES # BLD AUTO: 0.07 10*3/MM3 (ref 0–0.05)
IMM GRANULOCYTES NFR BLD AUTO: 0.6 % (ref 0–0.5)
LYMPHOCYTES # BLD AUTO: 0.75 10*3/MM3 (ref 0.7–3.1)
LYMPHOCYTES NFR BLD AUTO: 6.3 % (ref 19.6–45.3)
MCH RBC QN AUTO: 31.6 PG (ref 26.6–33)
MCHC RBC AUTO-ENTMCNC: 30.9 G/DL (ref 31.5–35.7)
MCV RBC AUTO: 102.3 FL (ref 79–97)
MONOCYTES # BLD AUTO: 0.45 10*3/MM3 (ref 0.1–0.9)
MONOCYTES NFR BLD AUTO: 3.8 % (ref 5–12)
NEUTROPHILS NFR BLD AUTO: 10.64 10*3/MM3 (ref 1.7–7)
NEUTROPHILS NFR BLD AUTO: 89.2 % (ref 42.7–76)
NRBC BLD AUTO-RTO: 0 /100 WBC (ref 0–0.2)
PLATELET # BLD AUTO: 331 10*3/MM3 (ref 140–450)
PMV BLD AUTO: 10.4 FL (ref 6–12)
POTASSIUM SERPL-SCNC: 4.4 MMOL/L (ref 3.5–5.2)
PROT SERPL-MCNC: 5.6 G/DL (ref 6–8.5)
RBC # BLD AUTO: 2.66 10*6/MM3 (ref 3.77–5.28)
SODIUM SERPL-SCNC: 143 MMOL/L (ref 136–145)
WBC NRBC COR # BLD: 11.92 10*3/MM3 (ref 3.4–10.8)

## 2023-07-28 PROCEDURE — 94760 N-INVAS EAR/PLS OXIMETRY 1: CPT

## 2023-07-28 PROCEDURE — 85025 COMPLETE CBC W/AUTO DIFF WBC: CPT | Performed by: FAMILY MEDICINE

## 2023-07-28 PROCEDURE — 36415 COLL VENOUS BLD VENIPUNCTURE: CPT | Performed by: FAMILY MEDICINE

## 2023-07-28 PROCEDURE — 25010000002 CEFTRIAXONE PER 250 MG: Performed by: FAMILY MEDICINE

## 2023-07-28 PROCEDURE — 80053 COMPREHEN METABOLIC PANEL: CPT | Performed by: FAMILY MEDICINE

## 2023-07-28 PROCEDURE — 94799 UNLISTED PULMONARY SVC/PX: CPT

## 2023-07-28 PROCEDURE — 94640 AIRWAY INHALATION TREATMENT: CPT

## 2023-07-28 PROCEDURE — 94761 N-INVAS EAR/PLS OXIMETRY MLT: CPT

## 2023-07-28 PROCEDURE — 25010000002 MORPHINE PER 10 MG: Performed by: FAMILY MEDICINE

## 2023-07-28 PROCEDURE — 99222 1ST HOSP IP/OBS MODERATE 55: CPT | Performed by: INTERNAL MEDICINE

## 2023-07-28 RX ORDER — PREDNISONE 10 MG/1
50 TABLET ORAL DAILY
COMMUNITY
End: 2023-08-02 | Stop reason: HOSPADM

## 2023-07-28 RX ORDER — IPRATROPIUM BROMIDE AND ALBUTEROL SULFATE 2.5; .5 MG/3ML; MG/3ML
3 SOLUTION RESPIRATORY (INHALATION)
Status: DISCONTINUED | OUTPATIENT
Start: 2023-07-28 | End: 2023-08-02 | Stop reason: HOSPADM

## 2023-07-28 RX ORDER — IPRATROPIUM BROMIDE AND ALBUTEROL SULFATE 2.5; .5 MG/3ML; MG/3ML
3 SOLUTION RESPIRATORY (INHALATION)
COMMUNITY
End: 2023-08-02 | Stop reason: HOSPADM

## 2023-07-28 RX ORDER — FERROUS SULFATE 325(65) MG
325 TABLET ORAL DAILY
COMMUNITY

## 2023-07-28 RX ORDER — BUDESONIDE AND FORMOTEROL FUMARATE DIHYDRATE 160; 4.5 UG/1; UG/1
2 AEROSOL RESPIRATORY (INHALATION) 2 TIMES DAILY
COMMUNITY

## 2023-07-28 RX ADMIN — IPRATROPIUM BROMIDE AND ALBUTEROL SULFATE 3 ML: .5; 3 SOLUTION RESPIRATORY (INHALATION) at 19:00

## 2023-07-28 RX ADMIN — SODIUM CHLORIDE 50 ML/HR: 9 INJECTION, SOLUTION INTRAVENOUS at 16:36

## 2023-07-28 RX ADMIN — CEFTRIAXONE 1000 MG: 1 INJECTION, POWDER, FOR SOLUTION INTRAMUSCULAR; INTRAVENOUS at 16:36

## 2023-07-28 RX ADMIN — MORPHINE SULFATE 1 MG: 2 INJECTION, SOLUTION INTRAMUSCULAR; INTRAVENOUS at 22:41

## 2023-07-28 NOTE — CONSULTS
Adult Nutrition  Assessment/PES    Patient Name:  Milton Mae  YOB: 1926  MRN: [id number]  Admit Date:  7/27/2023    Assessment Date:  7/28/2023       Reason for Assessment       Row Name 07/28/23 1622          Reason for Assessment    Reason For Assessment identified at risk by screening criteria     Diagnosis gastrointestinal disease;infection/sepsis     Identified At Risk by Screening Criteria difficulty chewing/swallowing;BMI;other (see comments)  Nurse Admission Screen                    Nutrition/Diet History       Row Name 07/28/23 1623          Nutrition/Diet History    Typical Intake (Food/Fluid/EN/PN) Pt with friends in room. Pt very [place] and difficult to obtain hx but pt was complimentary of chicken broth. Pt was hoping to have solid food.     Factors Affecting Nutritional Intake appetite;other (see comments);altered gastrointestinal function  advanced age                    Labs/Tests/Procedures/Meds       Row Name 07/28/23 1621          Labs/Procedures/Meds    Lab Results Reviewed reviewed        Diagnostic Tests/Procedures    Diagnostic Test/Procedure Reviewed reviewed        Medications    Pertinent Medications Reviewed reviewed     Pertinent Medications Comments See MAR                    Physical Findings       Row Name 07/28/23 1623          Physical Findings    Overall Physical Appearance Advanced age, BM 7/27, John Score 14, skin intact, N/V/D and abdominal pain x 3 week prior to admit                    Estimated/Assessed Needs - Anthropometrics       Row Name 07/28/23 0880          Anthropometrics    Weight for Calculation 34.8 kg (76 lb 11.5 oz)        Estimated/Assessed Needs    Additional Documentation Fluid Requirements (Group);KCAL/KG (Group);Protein Requirements (Group)        KCAL/KG    KCAL/KG 35 Kcal/Kg (kcal)     35 Kcal/Kg (kcal) 1218        Protein Requirements    Weight Used For Protein Calculations 34.8 kg (76 lb 11.5 oz)     Est Protein Requirement Amount  (gms/kg) 1.5 gm protein     Estimated Protein Requirements (gms/day) 52.2        Fluid Requirements    Fluid Requirements (mL/day) 1218     RDA Method (mL) 1218                    Nutrition Prescription Ordered       Row Name 07/28/23 1624          Nutrition Prescription PO    Current PO Diet Clear Liquid                    Evaluation of Received Nutrient/Fluid Intake       Row Name 07/28/23 1625          Nutrient/Fluid Evaluation    Number of Days Evaluated Other (comment)  admission < 24 hours        Fluid Intake Evaluation    Other Fluid (mL) 1016  admit to present total        PO Evaluation    % PO Intake CLD                       Problem/Interventions:   Problem 1       Row Name 07/28/23 1625          Nutrition Diagnoses Problem 1    Problem 1 Altered GI Function     Etiology (related to) Factors Affecting Nutrition     Reported GI Symptoms N & V;Diarrhea     Signs/Symptoms (evidenced by) Report of Mnimal PO Intake;Clear Liquid Diet                          Intervention Goal       Row Name 07/28/23 1625          Intervention Goal    General Meet nutritional needs for age/condition;Reduce/improve symptoms;Disease management/therapy     PO Tolerate PO;Advance diet;Meet estimated needs     Weight Maintain weight                    Nutrition Intervention       Row Name 07/28/23 1626          Nutrition Intervention    RD/Tech Action Care plan reviewd;Follow Tx progress;Await begin PO                    Nutrition Prescription       Row Name 07/28/23 1626          Nutrition Prescription PO    PO Prescription Other (comment)  advance as tolerated                    Education/Evaluation       Row Name 07/28/23 1626          Education    Education No discharge needs identified at this time        Monitor/Evaluation    Monitor Per protocol                     Electronically signed by:  Kamini Katz RDN, HAFSA  07/28/23 16:26 CDT

## 2023-07-28 NOTE — PLAN OF CARE
Goal Outcome Evaluation:    Problem: Adult Inpatient Plan of Care  Goal: Plan of Care Review  Outcome: Ongoing, Progressing  Flowsheets (Taken 7/28/2023 8525)  Progress: no change  Plan of Care Reviewed With: patient  Outcome Evaluation: No c/o pain. VSS. IV fluids infusing. SB/S 57-95. On 2L NC at this time. Will update MD as needed.

## 2023-07-28 NOTE — CONSULTS
[facility]  Inpatient Consult Note  Today's date:  07/28/23    Milton Mae  1/25/1926       Referring Provider: No ref. provider found  Primary Physician: Javier Ng MD   Primary Gastroenterologist: Dr. Foster    Date of Admission: 7/27/2023  Date of Service:  07/28/23    Reason for Consultation/Chief Complaint: Nausea/vomiting/chest pain    History of present illness:    Patient is a 89-year-old female with a history of COPD with baseline SOB, HTN who reports  In her [facility] when 2 weeks ago she developed left-sided lower subcostal pain extending to her left flank associated with dyspnea and chest pain.  1 to 2 days later she had a similar pain beginning under her right lower subcostal region extending down to her right flank.  She states the pain was excruciating with cough and worsened with inspiration.  She reports the pain improving with lotion and heating pads.  She was recently evaluated in the ED for similar symptoms on 7/24/2023 and it was thought she may have had pneumonia but she refused admission at that time was sent home with a Z-Wilmer.  Shortly thereafter she developed nausea/abdominal pain/diarrhea which has resolved.  She denies the symptoms at this time.   She she has been eating normally endorsed by Rafa her son.  She had some mild constipation which had been chronic.  She is a patient of Dr. Fostre's who was last seen in our office on 8/15/2019 with generalized abdominal pain and constipation.  She was given MiraLAX and at that point did not want to pursue a colonoscopy at that time.  He felt that she was not an appropriate upper endoscopy candidate as well.  CT abdomen pelvis was pursued on 9/3/2019 with contrast revealed gallstones and diverticulosis but no significant findings.  Her labs on admission revealed Hgb 8.4 compared with 8.1 on 5/24/2022, WBC of 11.92 decreased from admission.  CMP WNL.  CT chest with bronchiectasis and consolidation consistent with  pneumonia.  CT abdomen/pelvis noting gallstones with intra and extrahepatic duct dilation and some possible gastric wall thickening versus underdistention.  There was atheromatous disease of her aortoiliac vessels and DJD.    Past Medical History:   Diagnosis Date    COPD (chronic obstructive pulmonary disease)     GERD (gastroesophageal reflux disease)     Hypertension     Pneumonia        Past Surgical History:   Procedure Laterality Date    CATARACT EXTRACTION, BILATERAL      THYROID SURGERY          No Known Allergies    Medications Prior to Admission   Medication Sig Dispense Refill Last Dose    Calcium-Magnesium-Vitamin D (CALCIUM 1200+D3 PO) Take 1 tablet by mouth Daily.   7/27/2023    cyanocobalamin (VITAMIN B-12) 500 MCG tablet Take 1 tablet by mouth Daily.   7/27/2023    guaiFENesin (MUCINEX) 600 MG 12 hr tablet Take 2 tablets by mouth Every 12 (Twelve) Hours. 20 tablet 0 7/27/2023    metoprolol succinate XL (TOPROL-XL) 25 MG 24 hr tablet Take 1 tablet by mouth Daily.   7/26/2023    Multiple Vitamins-Minerals (WOMENS ONE DAILY) tablet Take 1 tablet by mouth Daily.   7/27/2023    predniSONE (DELTASONE) 10 MG tablet 20 mg daily x 3 days then 10 mg daily x 3 days then stop 9 tablet 0 7/26/2023    predniSONE (DELTASONE) 50 MG tablet Take 1 tablet by mouth Daily for 5 days. 5 tablet 0 7/26/2023    Probiotic Product (PROBIOTIC-10) capsule Take 1 capsule by mouth Daily.   7/27/2023    traZODone (DESYREL) 50 MG tablet Take 1 tablet by mouth Every Night.   7/26/2023    acetaminophen (TYLENOL) 500 MG tablet Take 1 tablet by mouth Every 4 (Four) Hours As Needed for Mild Pain.   Unknown    albuterol (PROVENTIL) (2.5 MG/3ML) 0.083% nebulizer solution Take 2.5 mg by nebulization Every 4 (Four) Hours As Needed for Wheezing. 360 mL 5 Unknown    albuterol sulfate  (90 Base) MCG/ACT inhaler Inhale 2 puffs Every 4 (Four) Hours As Needed for Wheezing.   Unknown    azithromycin (Zithromax Z-Wilmer) 250 MG tablet Take 2  tablets by mouth on day 1, then 1 tablet daily on days 2-5 6 tablet 0 Unknown    azithromycin (ZITHROMAX) 250 MG tablet Take 1 pill each Monday, Wednesday and Friday 45 tablet 3 Unknown    ipratropium-albuterol (DUO-NEB) 0.5-2.5 mg/3 ml nebulizer TAKE 3 ML BY NEBULIZATION EVERY 6 (SIX) HOURS AS NEEDED FOR WHEEZING. 360 mL 5 Unknown    lansoprazole (PREVACID) 30 MG capsule Take 1 capsule by mouth Daily.   Unknown    ondansetron ODT (ZOFRAN-ODT) 4 MG disintegrating tablet Take 1 tablet by mouth Every 6 (Six) Hours As Needed for Nausea or Vomiting. 28 tablet 0 Unknown       Hospital Medications (active)         Dose Frequency Start End    acetaminophen (TYLENOL) tablet 650 mg 650 mg Every 4 Hours PRN 7/27/2023     Admin Instructions: Based on patient request - if ordered for moderate or severe pain, provider allows for administration of a medication prescribed for a lower pain scale.  Do not exceed 4 grams of acetaminophen in a 24 hr period. Max dose of 2gm for AST/ALT greater than 120 units/L    If given for fever, use fever parameter: fever greater than 100.4 øF.    If given for pain, use the following pain scale:   Mild Pain = Pain Score of 1-3, CPOT 1-2  Moderate Pain = Pain Score of 4-6, CPOT 3-4  Severe Pain = Pain Score of 7-10, CPOT 5-8    Route: Oral    bisacodyl (DULCOLAX) EC tablet 5 mg 5 mg Daily PRN 7/27/2023     Admin Instructions: Use if no bowel movement after 12 hours.  Swallow whole. Do not crush, split, or chew tablet.    Route: Oral    Linked Group 1: See Hyperspace for full Linked Orders Report.        bisacodyl (DULCOLAX) suppository 10 mg 10 mg Daily PRN 7/27/2023     Admin Instructions: Use if no bowel movement after 12 hours.  Hold for diarrhea    Route: Rectal    Linked Group 1: See Hyperspace for full Linked Orders Report.        cefTRIAXone (ROCEPHIN) 1,000 mg in sodium chloride 0.9 % 100 mL IVPB 1,000 mg Every 24 Hours 7/28/2023 8/2/2023    Admin Instructions: LR should be paused and  "flushing of the line with NS is recommended prior to and after completion of ceftriaxone infusion due to incompatibility. Do not co-adminster with calcium-containing solutions.  Caution: Look alike/sound alike drug alert    Route: Intravenous    ipratropium-albuterol (DUO-NEB) nebulizer solution 3 mL 3 mL Once 7/27/2023     Admin Instructions: Include Respiratory Treatment Education    Route: Nebulization    Cosign for Ordering: Accepted by Sam Jackson MD on 7/27/2023  5:37 PM    Morphine sulfate (PF) injection 1 mg 1 mg Every 4 Hours PRN 7/27/2023 8/3/2023    Admin Instructions: Based on patient request - if ordered for moderate or severe pain, provider allows for administration of a medication prescribed for a lower pain scale.      Caution: Look alike/sound alike drug alert    If given for pain, use the following pain scale:  Mild Pain = Pain Score of 1-3, CPOT 1-2  Moderate Pain = Pain Score of 4-6, CPOT 3-4  Severe Pain = Pain Score of 7-10, CPOT 5-8    Route: Intravenous    Linked Group 2: See Hyperspace for full Linked Orders Report.        naloxone (NARCAN) injection 0.4 mg 0.4 mg Every 5 Minutes PRN 7/27/2023     Admin Instructions: If respiratory rate is less than 8 breaths/minute or patient is difficult to arouse stop any narcotics and contact physician.   Administer slow IV push. Repeat as ordered until patient's respiratory rate is greater than 12 breaths/minute.    Route: Intravenous    Linked Group 2: See Hyperspace for full Linked Orders Report.        ondansetron (ZOFRAN) injection 4 mg 4 mg Once 7/27/2023     Admin Instructions: \"If multiple N/V medications ordered, use in the following order: Ondansetron, Prochlorperazine, Promethazine. Use PO unless patient refuses or patient unable to swallow.\"      Route: Intravenous    Cosign for Ordering: Accepted by Sam Jackson MD on 7/27/2023  5:37 PM    ondansetron (ZOFRAN) tablet 4 mg 4 mg Every 6 Hours PRN 7/27/2023     Admin Instructions: If BOTH " ondansetron (ZOFRAN) and promethazine (PHENERGAN) are ordered use ondansetron first and THEN promethazine IF ondansetron is ineffective.    Route: Oral    polyethylene glycol (MIRALAX) packet 17 g 17 g Daily PRN 7/27/2023     Admin Instructions: Use if no bowel movement after 12 hours. Mix in 6-8 ounces of water.  Use 4-8 ounces of water, tea, or juice for each 17 gram dose.    Route: Oral    Linked Group 1: See Hyperspace for full Linked Orders Report.        sennosides-docusate (PERICOLACE) 8.6-50 MG per tablet 2 tablet 2 tablet 2 Times Daily 7/27/2023     Admin Instructions: HOLD MEDICATION IF PATIENT HAS HAD BOWEL MOVEMENT. Start bowel management regimen if patient has not had a bowel movement after 12 hours.    Route: Oral    Linked Group 1: See Hyperspace for full Linked Orders Report.        sodium chloride 0.9 % flush 10 mL 10 mL As Needed 7/27/2023     Route: Intravenous    Cosign for Ordering: Accepted by Sam Jackson MD on 7/27/2023  5:37 PM    Linked Group 3: See Hyperspace for full Linked Orders Report.        sodium chloride 0.9 % flush 10 mL 10 mL Every 12 Hours Scheduled 7/27/2023     Route: Intravenous    sodium chloride 0.9 % flush 10 mL 10 mL As Needed 7/27/2023     Route: Intravenous    sodium chloride 0.9 % infusion 40 mL 40 mL As Needed 7/27/2023     Admin Instructions: Following administration of an IV intermittent medication, flush line with 40mL NS at 100mL/hr.    Route: Intravenous    sodium chloride 0.9 % infusion 50 mL/hr Continuous 7/27/2023     Route: Intravenous            Social History     Tobacco Use    Smoking status: Never    Smokeless tobacco: Never   Substance Use Topics    Alcohol use: No        Past Family History:  Family History   Problem Relation Age of Onset    Alzheimer's disease Mother     Heart disease Father     Colon polyps Child     Colon cancer Neg Hx        Review of Systems:  12 point inspection performed with pertinent positives in HPI      Physical  Exam:  Temp:  [97.5 øF (36.4 øC)-98.8 øF (37.1 øC)] 97.5 øF (36.4 øC)  Heart Rate:  [] 90  Resp:  [18-20] 18  BP: (113-135)/(49-79) 124/53  Body mass index is 14.98 kg/mý.    Intake/Output Summary (Last 24 hours) at 7/28/2023 1154  Last data filed at 7/28/2023 0556  Gross per 24 hour   Intake 1015.83 ml   Output 250 ml   Net 765.83 ml     No intake/output data recorded.    General appearance:   HEENT: Nonicteric sclerae. EOMI.    Lungs: Coarse breath sounds with rales/rhonchi R>L  Heart: Regular rate and rhythm.  Abdomen: Soft, nondistended, nontender to palpation, with normoactive bowel sounds.  No York sign.  Extremities: No cyanosis, edema or pulse deficits.  Skin: No rash or jaundice.    Results Review:  Lab Results (last 24 hours)       Procedure Component Value Units Date/Time    Comprehensive Metabolic Panel [[id number]]  (Abnormal) Collected: 07/28/23 0407    Specimen: Blood Updated: 07/28/23 0447     Glucose 144 mg/dL      BUN 23 mg/dL      Creatinine 0.56 mg/dL      Sodium 143 mmol/L      Potassium 4.4 mmol/L      Chloride 107 mmol/L      CO2 27.0 mmol/L      Calcium 8.4 mg/dL      Total Protein 5.6 g/dL      Albumin 3.0 g/dL      ALT (SGPT) 14 U/L      AST (SGOT) 19 U/L      Alkaline Phosphatase 50 U/L      Total Bilirubin 0.2 mg/dL      Globulin 2.6 gm/dL      A/G Ratio 1.2 g/dL      BUN/Creatinine Ratio 41.1     Anion Gap 9.0 mmol/L      eGFR 83.1 mL/min/1.73     Narrative:      GFR Normal >60  Chronic Kidney Disease <60  Kidney Failure <15    The GFR formula is only valid for adults with stable renal function between ages 18 and 70.    CBC Auto Differential [[id number]]  (Abnormal) Collected: 07/28/23 0407    Specimen: Blood Updated: 07/28/23 0424     WBC 11.92 10*3/mm3      RBC 2.66 10*6/mm3      Hemoglobin 8.4 g/dL      Hematocrit 27.2 %      .3 fL      MCH 31.6 pg      MCHC 30.9 g/dL      RDW 14.1 %      RDW-SD 53.1 fl      MPV 10.4 fL      Platelets 331 10*3/mm3      Neutrophil % 89.2  "%      Lymphocyte % 6.3 %      Monocyte % 3.8 %      Eosinophil % 0.0 %      Basophil % 0.1 %      Immature Grans % 0.6 %      Neutrophils, Absolute 10.64 10*3/mm3      Lymphocytes, Absolute 0.75 10*3/mm3      Monocytes, Absolute 0.45 10*3/mm3      Eosinophils, Absolute 0.00 10*3/mm3      Basophils, Absolute 0.01 10*3/mm3      Immature Grans, Absolute 0.07 10*3/mm3      nRBC 0.0 /100 WBC     Blood Culture - Blood, Arm, Right [[id number]] Collected: 07/27/23 1550    Specimen: Blood from Arm, Right Updated: 07/27/23 1620    Blood Culture - Blood, Arm, Left [[id number]] Collected: 07/27/23 1530    Specimen: Blood from Arm, Left Updated: 07/27/23 1620    Procalcitonin [[id number]]  (Normal) Collected: 07/27/23 1427    Specimen: Blood Updated: 07/27/23 1607     Procalcitonin 0.17 ng/mL     Narrative:      As a Marker for Sepsis (Non-Neonates):    1. <0.5 ng/mL represents a low risk of severe sepsis and/or septic shock.  2. >2 ng/mL represents a high risk of severe sepsis and/or septic shock.    As a Marker for Lower Respiratory Tract Infections that require antibiotic therapy:    PCT on Admission    Antibiotic Therapy       6-12 Hrs later    >0.5                Strongly Recommended  >0.25 - <0.5        Recommended   0.1 - 0.25          Discouraged              Remeasure/reassess PCT  <0.1                Strongly Discouraged     Remeasure/reassess PCT    As 28 day mortality risk marker: \"Change in Procalcitonin Result\" (>80% or <=80%) if Day 0 (or Day 1) and Day 4 values are available. Refer to http://www.[facility]-pct-calculator.com    Change in PCT <=80%  A decrease of PCT levels below or equal to 80% defines a positive change in PCT test result representing a higher risk for 28-day all-cause mortality of patients diagnosed with severe sepsis for septic shock.    Change in PCT >80%  A decrease of PCT levels of more than 80% defines a negative change in PCT result representing a lower risk for 28-day all-cause mortality of " patients diagnosed with severe sepsis or septic shock.       COVID PRE-OP / PRE-PROCEDURE SCREENING ORDER (NO ISOLATION) - Swab, Nasal Cavity [[id number]]  (Normal) Collected: 07/27/23 1430    Specimen: Swab from Nasal Cavity Updated: 07/27/23 1506    Narrative:      The following orders were created for panel order COVID PRE-OP / PRE-PROCEDURE SCREENING ORDER (NO ISOLATION) - Swab, Nasal Cavity.  Procedure                               Abnormality         Status                     ---------                               -----------         ------                     COVID-19,CEPHEID/GARY,CO...[[id number]]  Normal              Final result                 Please view results for these tests on the individual orders.    COVID-19,CEPHEID/GARY,COR/SILVESTRE/PAD/SHARON/MAD IN-HOUSE(OR EMERGENT/ADD-ON),NP SWAB IN TRANSPORT MEDIA 3-4 HR TAT, RT-PCR - Swab, Nasopharynx [[id number]]  (Normal) Collected: 07/27/23 1430    Specimen: Swab from Nasopharynx Updated: 07/27/23 1506     COVID19 Not Detected    Narrative:      Fact sheet for providers: https://www.fda.gov/media/357460/download     Fact sheet for patients: https://www.fda.gov/media/500611/download  Fact sheet for providers: https://www.fda.gov/media/007144/download    Fact sheet for patients: https://www.fda.gov/media/468134/download    Test performed by PCR.    Comprehensive Metabolic Panel [[id number]]  (Abnormal) Collected: 07/27/23 1427    Specimen: Blood Updated: 07/27/23 1458     Glucose 141 mg/dL      BUN 24 mg/dL      Creatinine 0.65 mg/dL      Sodium 139 mmol/L      Potassium 4.5 mmol/L      Chloride 100 mmol/L      CO2 29.0 mmol/L      Calcium 9.3 mg/dL      Total Protein 7.1 g/dL      Albumin 3.3 g/dL      ALT (SGPT) 21 U/L      AST (SGOT) 45 U/L      Alkaline Phosphatase 64 U/L      Total Bilirubin 0.3 mg/dL      Globulin 3.8 gm/dL      A/G Ratio 0.9 g/dL      BUN/Creatinine Ratio 36.9     Anion Gap 10.0 mmol/L      eGFR 80.2 mL/min/1.73     Narrative:      GFR Normal  >60  Chronic Kidney Disease <60  Kidney Failure <15    The GFR formula is only valid for adults with stable renal function between ages 18 and 70.    Lactic Acid, Plasma [[id number]]  (Normal) Collected: 07/27/23 1427    Specimen: Blood Updated: 07/27/23 1456     Lactate 1.3 mmol/L     Lipase [[id number]]  (Normal) Collected: 07/27/23 1427    Specimen: Blood Updated: 07/27/23 1453     Lipase 33 U/L     Urinalysis With Culture If Indicated - Urine, Clean Catch [[id number]]  (Abnormal) Collected: 07/27/23 1427    Specimen: Urine, Clean Catch Updated: 07/27/23 1441     Color, UA Yellow     Appearance, UA Clear     pH, UA 5.5     Specific Gravity, UA 1.019     Glucose, UA Negative     Ketones, UA Negative     Bilirubin, UA Negative     Blood, UA Negative     Protein, UA 30 mg/dL (1+)     Leuk Esterase, UA Negative     Nitrite, UA Negative     Urobilinogen, UA 0.2 E.U./dL    Narrative:      In absence of clinical symptoms, the presence of pyuria, bacteria, and/or nitrites on the urinalysis result does not correlate with infection.    Urinalysis, Microscopic Only - Urine, Clean Catch [[id number]]  (Abnormal) Collected: 07/27/23 1427    Specimen: Urine, Clean Catch Updated: 07/27/23 1441     RBC, UA None Seen /HPF      WBC, UA 0-2 /HPF      Comment: Urine culture not indicated.        Bacteria, UA None Seen /HPF      Squamous Epithelial Cells, UA None Seen /HPF      Hyaline Casts, UA 0-2 /LPF      Methodology Automated Microscopy    CBC & Differential [[id number]]  (Abnormal) Collected: 07/27/23 1427    Specimen: Blood Updated: 07/27/23 1438    Narrative:      The following orders were created for panel order CBC & Differential.  Procedure                               Abnormality         Status                     ---------                               -----------         ------                     CBC Auto Differential[[id number]]        Abnormal            Final result                 Please view results for these tests on  the individual orders.    CBC Auto Differential [[id number]]  (Abnormal) Collected: 07/27/23 1427    Specimen: Blood Updated: 07/27/23 1438     WBC 16.43 10*3/mm3      RBC 2.79 10*6/mm3      Hemoglobin 8.8 g/dL      Hematocrit 28.1 %      .7 fL      MCH 31.5 pg      MCHC 31.3 g/dL      RDW 14.0 %      RDW-SD 50.8 fl      MPV 10.3 fL      Platelets 330 10*3/mm3      Neutrophil % 92.7 %      Lymphocyte % 2.9 %      Monocyte % 3.8 %      Eosinophil % 0.0 %      Basophil % 0.2 %      Immature Grans % 0.4 %      Neutrophils, Absolute 15.23 10*3/mm3      Lymphocytes, Absolute 0.47 10*3/mm3      Monocytes, Absolute 0.63 10*3/mm3      Eosinophils, Absolute 0.00 10*3/mm3      Basophils, Absolute 0.03 10*3/mm3      Immature Grans, Absolute 0.07 10*3/mm3      nRBC 0.0 /100 WBC             Radiology Review:  Imaging Results (Last 72 Hours)       Procedure Component Value Units Date/Time    CT Chest Without Contrast Diagnostic [[id number]] Collected: 07/27/23 2013     Updated: 07/27/23 2026    Narrative:      CT CHEST WO CONTRAST DIAGNOSTIC- 7/27/2023 7:04 PM CDT     HISTORY: hypoxia; J18.9-Pneumonia, unspecified organism;  R09.02-Hypoxemia; Z78.9-Other specified health status; K80.20-Calculus  of gallbladder without cholecystitis without obstruction; N28.1-Cyst of  kidney, acquired; K31.89-Other diseases of stomach and duodenum;  K57.90-Diverticulosis of intestine, part unspecified, without  perforation or abscess without bleeding     COMPARISON: 05/16/2022     DOSE LENGTH PRODUCT: 71 mGy cm. Automated exposure control was also  utilized to decrease patient radiation dose.     TECHNIQUE: Serial helical tomographic images of the chest were acquired  without contrast. Multiplanar reformatted images were provided for  review.     FINDINGS:     Visualized neck base: The imaged portion of the base of the neck appears  unremarkable.      Lungs: Again demonstrated are rather extensive changes of bronchiectasis  within both lungs.  This is demonstrated throughout both lungs but most  severe in the upper lobes. For the most part I feel this bronchiectasis  and peribronchial consolidation is stable. There is slight worsening of  disease within the superior segment of both lower lobes when compared to  the previous examination. There is some increased mucoid impaction  within the left lower lobe when compared to the previous examination..  No pleural effusion is present. There is mild diffuse bronchial wall  thickening present. No discrete trachea or proximal main stem bronchial  lesions are identified..      Heart: The heart is normal in size. There is no pericardial effusion.  Mild coronary atheromatous calcification.     Vasculature: There is calcified atheromatous plaque in the aorta without  aneurysmal dilation. The pulmonary arteries are enlarged, suggestive of  pulmonary arterial hypertension.     Mediastinum and lymph nodes: No suspicious hilar or mediastinal  adenopathy..     Skeletal and soft tissues: Chest wall soft tissues are unremarkable. No  acute bony abnormality. Mild thoracic spine degenerative change.      Upper abdomen: The imaged portion of the upper abdomen demonstrates no  acute process.           Impression:      1. Extensive bronchiectasis within both lungs with associated  peribronchial consolidation. In most segments of the lungs I feel this  is stable from the previous exam of May of last year. However, there is  increased consolidation in the superior segment of both lower lobes as  well as consolidation and mucoid impaction within the left lower lobe  when compared to last year's CT exam..        This report was finalized on 07/27/2023 20:23 by Dr. Jonn Chicas MD.    XR Chest 1 View [[id number]] Collected: 07/27/23 1556     Updated: 07/27/23 1601    Narrative:      EXAMINATION: Chest 1 view 07/27/2023     HISTORY: Evaluate for pneumonia.     FINDINGS: Today's exam is compared to previous study of 3 days  earlier.  Again demonstrated is parenchymal scarring within both upper lobes with  retraction of the pulmonary amaya. There are emphysematous changes of the  lungs. Overall stable appearance in the chest from previous examination  of 3 days earlier. No effusion or free air.       Impression:      . Stable appearance of the chest from previous exam of 3 days  earlier.  This report was finalized on 07/27/2023 15:58 by Dr. Jonn Chicas MD.    CT Abdomen Pelvis With Contrast [[id number]] Collected: 07/27/23 1459     Updated: 07/27/23 1511    Narrative:      EXAMINATION:  CT ABDOMEN PELVIS W CONTRAST-  7/27/2023 2:53 PM CDT     HISTORY: Generalized abdominal pain. Nausea, vomiting and diarrhea.     TECHNIQUE: Spiral CT was performed of the abdomen and pelvis with IV  contrast. Multiplanar images were reconstructed.     DLP: 116 mGy-cm. Automated dosage reduction technique was utilized to  reduce patient dose.     COMPARISON: 09/03/2019.      LUNG BASES: There are patchy parenchymal infiltrates in both lung bases  worrisome for pneumonia.     LIVER AND SPLEEN: There are gallstones in the gallbladder. There is  breathing motion artifact. There is intrahepatic and extrahepatic  biliary ductal dilatation. I do not see a definite stone in the  extrahepatic biliary tree. The spleen is unremarkable.     PANCREAS: No pancreatic mass or inflammatory change.     KIDNEYS AND ADRENALS: The adrenal glands are unremarkable. There is  breathing motion artifact. The right kidney demonstrates no focal  abnormality. There are 2 renal cysts on the left, the largest arising  from the upper pole and measures 3.6 cm. The renal collecting systems  are nondilated. The urinary bladder demonstrates no focal abnormality.     BOWEL: The patient has very little intraperitoneal fat. There is very  little subcutaneous fat. No oral contrast was given which makes  evaluation of bowel difficult. There is gastric wall thickening. Small  bowel loops  appear to be nondilated. There is underdistention of most of  the colon. There is diverticulosis of the colon. I do not see the  appendix with confidence.     OTHER: There is atheromatous disease of the aortoiliac vessels. No free  air or free fluid is seen. No lymphadenopathy is seen. There are  degenerative changes of the spine. No definite acute bony abnormality.          Impression:      1. The study is degraded by breathing motion artifact. The patient also  has very little intraperitoneal and subcutaneous fat making evaluation  of intra-abdominal bowel difficult. The patient appears cachectic.  2. Patchy infiltrates in both lung bases worrisome for pneumonia.  3. Gallstones in the gallbladder. There is intrahepatic and extrahepatic  biliary ductal dilatation. I do not see a definite stone in the biliary  tree.  4. Left renal cysts.  5. Gastric wall thickening versus artifact of underdistention.  Differential includes gastritis, Menetrier's disease and other  etiologies. There is diverticulosis of the colon. There is  underdistention of the colon. No definite small bowel distention is  seen.  6. Atheromatous disease of the aortoiliac vessels. Degenerative changes  of the spine.        The full report of this exam was immediately signed and available to the  emergency room. The patient is currently in the emergency room.  This report was finalized on 07/27/2023 15:08 by Dr. Ramsey Grover MD.            Impression/Plan:    Right upper quadrant abdominal pain    Community acquired bilateral lower lobe pneumonia    Vomiting    Diarrhea      Bilateral pneumonia/associated with chest and flank pain.  Upper GI symptoms most likely associated with Zithromax now resolved.  Doubt cholecystitis. Dilated bile ducts most likely incidental finding.  LFTs are very low.    -Continue supportive care/antibiotics  -Incentive spirometry  -We will follow and make recommendations as appropriate.    Patient and son, Rafa, at  bedside agree with assessment and plan and are very appreciative of the evaluation.  All questions were answered to their satisfaction.    Michael Neri MD  07/28/23   11:54 CDT    DISCLAIMER:    This physician works through a [employer] as an inpatient consultant gastroenterologist only and has no outpatient clinic for patient follow up.  Any results not available at time of inpatient discharge and/or GI clinic follow up should be managed by the hospitalist team, PCP, or outpatient gastroenterologist.

## 2023-07-28 NOTE — CONSULTS
[facility] [place] [facility] - [facility]    23, 10:24 CDT  Patient Care Team:  Javier Ng MD as PCP - General (Family Medicine)  Jermaine Foster DO as Consulting Physician (Gastroenterology)  Nithin Forde APRN as Nurse Practitioner (Pulmonary Disease)  Name: Milton Mae  : 1926  MRN: [id number]  Contact Serial Number [id number]    Chief complaint: Bronchiectasis  HPI:  We have been consulted by Shabnam Sanchez to see this 89 y.o. female patient.  Patient is known to our practice that she follows with LUIS MIGUEL Jones for severe bronchiectasis, chronic respiratory failure on home oxygen at 2 L.  He was inpatient in May 2022 with parainfluenza, emphysema, bronchiectasis.  She presented to [facility] ER on  with complaints of cough, shortness of breath, chest pain.  She was felt to have possible pneumonia on her chest x-ray.  She was offered admission for IV antibiotics and observation for which she declined.  She was given a prescription for Zithromax Z-Wilmer.  She typically has been on azithromycin 3 days a week for her bronchiectasis.  She contacted our office on Wednesday noting that she had developed diarrhea with the Z-Wilmer and was apparently taking this in conjunction with her 3-day a week azithromycin.  She was advised to discontinue the 3-day a week azithromycin, follow-up with her PCP, and return to the ER if her diarrhea did not resolve.  She presented back to the hospital yesterday with complaints of nausea, vomiting, abdominal pain and sick to her stomach for 3 weeks.  She has had dizziness in the head that has been going on for some time.  Her work-up in the ER of the CT of the abdomen showed her to have consolidation in the lower lobes of the lungs and was admitted for pneumonia.  She has a general surgeon consult pending for recommendations for the right upper quadrant pain/cholelithiasis.  She does report that she has  a flutter valve at home and uses that twice a day for which she finds benefit.  Today she reports that she is feeling much better than she did yesterday.  CT of the chest continues to show extensive bronchiectasis within both lungs.  Overall is stable compared to the May of last year CT however she has some increased consolidation in the superior segment of both lower lobes as well as consolidation and mucoid impaction within the left lower lobe when compared to last year CT.  Again she feels like she is improved today and does well with the flutter valve at home.  We will add flutter valve to her regimen today.  Further recommendations per Dr. Gaitan.  Past Medical History:   has a past medical history of COPD (chronic obstructive pulmonary disease), GERD (gastroesophageal reflux disease), Hypertension, and Pneumonia.   has a past surgical history that includes Thyroid surgery and Cataract extraction, bilateral.  No Known Allergies  Medications:  cefTRIAXone, 1,000 mg, Intravenous, Q24H  ipratropium-albuterol, 3 mL, Nebulization, Once  ondansetron, 4 mg, Intravenous, Once  senna-docusate sodium, 2 tablet, Oral, BID  sodium chloride, 10 mL, Intravenous, Q12H      sodium chloride, 50 mL/hr, Last Rate: 50 mL/hr (07/27/23 1841)      Family History:  Family History   Problem Relation Age of Onset    Alzheimer's disease Mother     Heart disease Father     Colon polyps Child     Colon cancer Neg Hx      Social History:   reports that she has never smoked. She has never used smokeless tobacco. She reports that she does not drink alcohol and does not use drugs.    Physical Exam:   Temp:  [97.5 øF (36.4 øC)-98.8 øF (37.1 øC)] 97.7 øF (36.5 øC)  Heart Rate:  [] 72  Resp:  [18-20] 18  BP: (113-135)/(49-79) 113/51  Intake/Output Summary (Last 24 hours) at 7/28/2023 1024  Last data filed at 7/28/2023 0556  Gross per 24 hour   Intake 1015.83 ml   Output 250 ml   Net 765.83 ml         07/27/23  1400 07/27/23  2300   Weight:  34 kg (75 lb) 34.8 kg (76 lb 11.5 oz)     SpO2 Percentage    07/27/23 2300 07/28/23 0307 07/28/23 0735   SpO2: 100% 99% 100%     Body mass index is 14.98 kg/mý.   Physical Exam  Constitutional:       Appearance: Normal appearance. She is underweight.   HENT:      Head: Normocephalic and atraumatic.      Right Ear: Decreased hearing noted.      Left Ear: Decreased hearing noted.      Nose: Nose normal.      Mouth/Throat:      Mouth: Mucous membranes are moist.   Eyes:      General:         Right eye: No discharge.         Left eye: No discharge.      Conjunctiva/sclera: Conjunctivae normal.   Cardiovascular:      Rate and Rhythm: Normal rate and regular rhythm.      Heart sounds: No murmur heard.    No friction rub. No gallop.   Pulmonary:      Effort: Pulmonary effort is normal.      Breath sounds: Normal breath sounds.      Comments: Crackles   Abdominal:      General: Abdomen is flat.      Palpations: Abdomen is soft.   Musculoskeletal:         General: Normal range of motion.      Cervical back: Normal range of motion.      Right lower leg: No edema.      Left lower leg: No edema.   Skin:     General: Skin is warm and dry.   Neurological:      General: No focal deficit present.      Mental Status: She is alert and oriented to person, place, and time.   Psychiatric:         Mood and Affect: Mood normal.         Behavior: Behavior normal.         Thought Content: Thought content normal.         Judgment: Judgment normal.       Electronically signed by LUIS MIGUEL Llanes, 7/28/2023, 10:24 CDT      Physician Substantive Portion: Medical Decision Making  Result Review  Results from last 7 days   Lab Units 07/28/23  0407 07/27/23  1427 07/24/23 2033   WBC 10*3/mm3 11.92* 16.43* 16.51*   HEMOGLOBIN g/dL 8.4* 8.8* 8.7*   PLATELETS 10*3/mm3 331 330 329     Results from last 7 days   Lab Units 07/28/23  0407 07/27/23  1427 07/24/23  2101   SODIUM mmol/L 143 139 138   POTASSIUM mmol/L 4.4 4.5 4.1   CO2 mmol/L 27.0  29.0 29.0   BUN mg/dL 23 24* 23   CREATININE mg/dL 0.56* 0.65 0.65   MAGNESIUM mg/dL  --   --  2.1   GLUCOSE mg/dL 144* 141* 129*   LACTATE mmol/L  --  1.3  --          Lab Results   Component Value Date    PROBNP 333.1 07/24/2023     Microbiology Results (last 10 days)       Procedure Component Value - Date/Time    Blood Culture - Blood, Arm, Right [[id number]]  (Normal) Collected: 07/27/23 1550    Lab Status: Preliminary result Specimen: Blood from Arm, Right Updated: 07/28/23 1631     Blood Culture No growth at 24 hours    Blood Culture - Blood, Arm, Left [[id number]]  (Normal) Collected: 07/27/23 1530    Lab Status: Preliminary result Specimen: Blood from Arm, Left Updated: 07/28/23 1631     Blood Culture No growth at 24 hours    COVID PRE-OP / PRE-PROCEDURE SCREENING ORDER (NO ISOLATION) - Swab, Nasal Cavity [[id number]]  (Normal) Collected: 07/27/23 1430    Lab Status: Final result Specimen: Swab from Nasal Cavity Updated: 07/27/23 1506    Narrative:      The following orders were created for panel order COVID PRE-OP / PRE-PROCEDURE SCREENING ORDER (NO ISOLATION) - Swab, Nasal Cavity.  Procedure                               Abnormality         Status                     ---------                               -----------         ------                     COVID-19,CEPHEID/GARY,CO...[[id number]]  Normal              Final result                 Please view results for these tests on the individual orders.    COVID-19,CEPHEID/GARY,COR/SILVESTRE/PAD/SHARON/MAD IN-HOUSE(OR EMERGENT/ADD-ON),NP SWAB IN TRANSPORT MEDIA 3-4 HR TAT, RT-PCR - Swab, Nasopharynx [[id number]]  (Normal) Collected: 07/27/23 1430    Lab Status: Final result Specimen: Swab from Nasopharynx Updated: 07/27/23 1506     COVID19 Not Detected    Narrative:      Fact sheet for providers: https://www.fda.gov/media/419236/download     Fact sheet for patients: https://www.fda.gov/media/607636/download  Fact sheet for providers:  https://www.fda.gov/media/867190/download    Fact sheet for patients: https://www.fda.gov/media/026739/download    Test performed by PCR.             Recent radiology:   Imaging Results (Last 72 Hours)       Procedure Component Value Units Date/Time    CT Chest Without Contrast Diagnostic [[id number]] Collected: 07/27/23 2013     Updated: 07/27/23 2026    Narrative:      CT CHEST WO CONTRAST DIAGNOSTIC- 7/27/2023 7:04 PM CDT     HISTORY: hypoxia; J18.9-Pneumonia, unspecified organism;  R09.02-Hypoxemia; Z78.9-Other specified health status; K80.20-Calculus  of gallbladder without cholecystitis without obstruction; N28.1-Cyst of  kidney, acquired; K31.89-Other diseases of stomach and duodenum;  K57.90-Diverticulosis of intestine, part unspecified, without  perforation or abscess without bleeding     COMPARISON: 05/16/2022     DOSE LENGTH PRODUCT: 71 mGy cm. Automated exposure control was also  utilized to decrease patient radiation dose.     TECHNIQUE: Serial helical tomographic images of the chest were acquired  without contrast. Multiplanar reformatted images were provided for  review.     FINDINGS:     Visualized neck base: The imaged portion of the base of the neck appears  unremarkable.      Lungs: Again demonstrated are rather extensive changes of bronchiectasis  within both lungs. This is demonstrated throughout both lungs but most  severe in the upper lobes. For the most part I feel this bronchiectasis  and peribronchial consolidation is stable. There is slight worsening of  disease within the superior segment of both lower lobes when compared to  the previous examination. There is some increased mucoid impaction  within the left lower lobe when compared to the previous examination..  No pleural effusion is present. There is mild diffuse bronchial wall  thickening present. No discrete trachea or proximal main stem bronchial  lesions are identified..      Heart: The heart is normal in size. There is no  pericardial effusion.  Mild coronary atheromatous calcification.     Vasculature: There is calcified atheromatous plaque in the aorta without  aneurysmal dilation. The pulmonary arteries are enlarged, suggestive of  pulmonary arterial hypertension.     Mediastinum and lymph nodes: No suspicious hilar or mediastinal  adenopathy..     Skeletal and soft tissues: Chest wall soft tissues are unremarkable. No  acute bony abnormality. Mild thoracic spine degenerative change.      Upper abdomen: The imaged portion of the upper abdomen demonstrates no  acute process.           Impression:      1. Extensive bronchiectasis within both lungs with associated  peribronchial consolidation. In most segments of the lungs I feel this  is stable from the previous exam of May of last year. However, there is  increased consolidation in the superior segment of both lower lobes as  well as consolidation and mucoid impaction within the left lower lobe  when compared to last year's CT exam..        This report was finalized on 07/27/2023 20:23 by Dr. Jonn Chicas MD.    XR Chest 1 View [[id number]] Collected: 07/27/23 1556     Updated: 07/27/23 1601    Narrative:      EXAMINATION: Chest 1 view 07/27/2023     HISTORY: Evaluate for pneumonia.     FINDINGS: Today's exam is compared to previous study of 3 days earlier.  Again demonstrated is parenchymal scarring within both upper lobes with  retraction of the pulmonary amaya. There are emphysematous changes of the  lungs. Overall stable appearance in the chest from previous examination  of 3 days earlier. No effusion or free air.       Impression:      . Stable appearance of the chest from previous exam of 3 days  earlier.  This report was finalized on 07/27/2023 15:58 by Dr. Jonn Chicas MD.    CT Abdomen Pelvis With Contrast [[id number]] Collected: 07/27/23 1459     Updated: 07/27/23 1511    Narrative:      EXAMINATION:  CT ABDOMEN PELVIS W CONTRAST-  7/27/2023 2:53 PM CDT     HISTORY:  Generalized abdominal pain. Nausea, vomiting and diarrhea.     TECHNIQUE: Spiral CT was performed of the abdomen and pelvis with IV  contrast. Multiplanar images were reconstructed.     DLP: 116 mGy-cm. Automated dosage reduction technique was utilized to  reduce patient dose.     COMPARISON: 09/03/2019.      LUNG BASES: There are patchy parenchymal infiltrates in both lung bases  worrisome for pneumonia.     LIVER AND SPLEEN: There are gallstones in the gallbladder. There is  breathing motion artifact. There is intrahepatic and extrahepatic  biliary ductal dilatation. I do not see a definite stone in the  extrahepatic biliary tree. The spleen is unremarkable.     PANCREAS: No pancreatic mass or inflammatory change.     KIDNEYS AND ADRENALS: The adrenal glands are unremarkable. There is  breathing motion artifact. The right kidney demonstrates no focal  abnormality. There are 2 renal cysts on the left, the largest arising  from the upper pole and measures 3.6 cm. The renal collecting systems  are nondilated. The urinary bladder demonstrates no focal abnormality.     BOWEL: The patient has very little intraperitoneal fat. There is very  little subcutaneous fat. No oral contrast was given which makes  evaluation of bowel difficult. There is gastric wall thickening. Small  bowel loops appear to be nondilated. There is underdistention of most of  the colon. There is diverticulosis of the colon. I do not see the  appendix with confidence.     OTHER: There is atheromatous disease of the aortoiliac vessels. No free  air or free fluid is seen. No lymphadenopathy is seen. There are  degenerative changes of the spine. No definite acute bony abnormality.          Impression:      1. The study is degraded by breathing motion artifact. The patient also  has very little intraperitoneal and subcutaneous fat making evaluation  of intra-abdominal bowel difficult. The patient appears cachectic.  2. Patchy infiltrates in both lung  bases worrisome for pneumonia.  3. Gallstones in the gallbladder. There is intrahepatic and extrahepatic  biliary ductal dilatation. I do not see a definite stone in the biliary  tree.  4. Left renal cysts.  5. Gastric wall thickening versus artifact of underdistention.  Differential includes gastritis, Menetrier's disease and other  etiologies. There is diverticulosis of the colon. There is  underdistention of the colon. No definite small bowel distention is  seen.  6. Atheromatous disease of the aortoiliac vessels. Degenerative changes  of the spine.        The full report of this exam was immediately signed and available to the  emergency room. The patient is currently in the emergency room.  This report was finalized on 07/27/2023 15:08 by Dr. Ramsey Grover MD.            Personal review of imaging : CT scan shows severe bronchiectatic and fibrotic changes  Other test results:  PFT not available  Independent review of ekg: LAD, q wave v1v2  Problem List as identified by Epic (may contain historical, inactive problems)    Right upper quadrant abdominal pain    Bronchiectasis    Vomiting    Diarrhea    Pulmonary Assessment:  Bronchiectasis, with some additional cxr findings suggesting pneumonia or exacerbation of bronchiectasis with bronchitis, mucus plugging and atelectases  Cachexia  Gallstones with duct dilatation  Leukocytosis, moderate anemia    Recommend/plan:   Flutter valve  Respiratory culture  Aerosol bronchodilators  Consider broadening spectrum of cephalosporin if respiratory status deteriorates; however I am not convinced she is far from baseline if at all.  Surgery evaluation if biliary disease is felt to warrant intervention  Pt will be at elevated risk for pulmonary complications from anesthesia and surgery  Follow up cbc at discretion of primary service    This visit was performed by both a physician and an [facility] RN.  I personally evaluated and examined the patient.  I performed all  aspects of the medical decision making as documented.  Electronically signed by Flash Gaitan MD, 7/28/2023, 17:41 CDT

## 2023-07-28 NOTE — PROGRESS NOTES
[facility] Medicine Services  INPATIENT PROGRESS NOTE    Patient Name: Milton Mae  Date of Admission: 7/27/2023  Today's Date: 07/28/23  Length of Stay: 1  Primary Care Physician: Javier Ng MD    Subjective   Chief Complaint: Patient does feel better.  HPI   No current nausea or vomiting.  Still has some right upper quadrant pain.  Son at bedside.  No shortness of breath at this time.        Review of Systems   All pertinent negatives and positives are as above. All other systems have been reviewed and are negative unless otherwise stated.     Objective    Temp:  [97.5 øF (36.4 øC)-98.8 øF (37.1 øC)] 97.5 øF (36.4 øC)  Heart Rate:  [] 90  Resp:  [18-20] 18  BP: (113-135)/(49-79) 124/53  Physical Exam  Vitals and nursing note reviewed.   Constitutional:       Appearance: Normal appearance. She is well-developed.   HENT:      Head: Normocephalic and atraumatic.      Right Ear: External ear normal.      Left Ear: External ear normal.      Ears:      Comments: Hard of hearing     Nose: Nose normal.      Mouth/Throat:      Mouth: Mucous membranes are moist.   Eyes:      Extraocular Movements: Extraocular movements intact.      Conjunctiva/sclera: Conjunctivae normal.      Pupils: Pupils are equal, round, and reactive to light.   Neck:      Thyroid: No thyromegaly.      Vascular: No JVD.      Trachea: No tracheal deviation.   Cardiovascular:      Rate and Rhythm: Normal rate and regular rhythm.      Pulses: Normal pulses.      Heart sounds: Normal heart sounds. No murmur heard.    No friction rub. No gallop.   Pulmonary:      Effort: Pulmonary effort is normal.      Breath sounds: Normal breath sounds.   Abdominal:      General: Bowel sounds are normal. There is no distension.      Palpations: Abdomen is soft.      Tenderness: There is abdominal tenderness.      Comments: Right upper quadrant tenderness palpation    Musculoskeletal:         General: Normal range of  motion.      Cervical back: Normal range of motion and neck supple.   Lymphadenopathy:      Cervical: No cervical adenopathy.   Skin:     General: Skin is warm and dry.      Capillary Refill: Capillary refill takes less than 2 seconds.   Neurological:      Mental Status: She is alert and oriented to person, place, and time.      Cranial Nerves: No cranial nerve deficit.      Coordination: Coordination normal.   Psychiatric:         Mood and Affect: Mood normal.         Behavior: Behavior normal.           Results Review:  I have reviewed the labs, radiology results, and diagnostic studies.    Laboratory Data:   Results from last 7 days   Lab Units 07/28/23  0407 07/27/23  1427 07/24/23  2033   WBC 10*3/mm3 11.92* 16.43* 16.51*   HEMOGLOBIN g/dL 8.4* 8.8* 8.7*   HEMATOCRIT % 27.2* 28.1* 28.6*   PLATELETS 10*3/mm3 331 330 329        Results from last 7 days   Lab Units 07/28/23  0407 07/27/23  1427 07/24/23  2101   SODIUM mmol/L 143 139 138   POTASSIUM mmol/L 4.4 4.5 4.1   CHLORIDE mmol/L 107 100 100   CO2 mmol/L 27.0 29.0 29.0   BUN mg/dL 23 24* 23   CREATININE mg/dL 0.56* 0.65 0.65   CALCIUM mg/dL 8.4 9.3 9.1   BILIRUBIN mg/dL 0.2 0.3  --    ALK PHOS U/L 50 64  --    ALT (SGPT) U/L 14 21  --    AST (SGOT) U/L 19 45*  --    GLUCOSE mg/dL 144* 141* 129*       Culture Data:   No results found for: BLOODCX, URINECX, WOUNDCX, MRSACX, RESPCX, STOOLCX    Radiology Data:   Imaging Results (Last 24 Hours)       Procedure Component Value Units Date/Time    CT Chest Without Contrast Diagnostic [[id number]] Collected: 07/27/23 2013     Updated: 07/27/23 2026    Narrative:      CT CHEST WO CONTRAST DIAGNOSTIC- 7/27/2023 7:04 PM CDT     HISTORY: hypoxia; J18.9-Pneumonia, unspecified organism;  R09.02-Hypoxemia; Z78.9-Other specified health status; K80.20-Calculus  of gallbladder without cholecystitis without obstruction; N28.1-Cyst of  kidney, acquired; K31.89-Other diseases of stomach and duodenum;  K57.90-Diverticulosis of  intestine, part unspecified, without  perforation or abscess without bleeding     COMPARISON: 05/16/2022     DOSE LENGTH PRODUCT: 71 mGy cm. Automated exposure control was also  utilized to decrease patient radiation dose.     TECHNIQUE: Serial helical tomographic images of the chest were acquired  without contrast. Multiplanar reformatted images were provided for  review.     FINDINGS:     Visualized neck base: The imaged portion of the base of the neck appears  unremarkable.      Lungs: Again demonstrated are rather extensive changes of bronchiectasis  within both lungs. This is demonstrated throughout both lungs but most  severe in the upper lobes. For the most part I feel this bronchiectasis  and peribronchial consolidation is stable. There is slight worsening of  disease within the superior segment of both lower lobes when compared to  the previous examination. There is some increased mucoid impaction  within the left lower lobe when compared to the previous examination..  No pleural effusion is present. There is mild diffuse bronchial wall  thickening present. No discrete trachea or proximal main stem bronchial  lesions are identified..      Heart: The heart is normal in size. There is no pericardial effusion.  Mild coronary atheromatous calcification.     Vasculature: There is calcified atheromatous plaque in the aorta without  aneurysmal dilation. The pulmonary arteries are enlarged, suggestive of  pulmonary arterial hypertension.     Mediastinum and lymph nodes: No suspicious hilar or mediastinal  adenopathy..     Skeletal and soft tissues: Chest wall soft tissues are unremarkable. No  acute bony abnormality. Mild thoracic spine degenerative change.      Upper abdomen: The imaged portion of the upper abdomen demonstrates no  acute process.           Impression:      1. Extensive bronchiectasis within both lungs with associated  peribronchial consolidation. In most segments of the lungs I feel this  is  stable from the previous exam of May of last year. However, there is  increased consolidation in the superior segment of both lower lobes as  well as consolidation and mucoid impaction within the left lower lobe  when compared to last year's CT exam..        This report was finalized on 07/27/2023 20:23 by Dr. Jonn Chicas MD.    XR Chest 1 View [[id number]] Collected: 07/27/23 1556     Updated: 07/27/23 1601    Narrative:      EXAMINATION: Chest 1 view 07/27/2023     HISTORY: Evaluate for pneumonia.     FINDINGS: Today's exam is compared to previous study of 3 days earlier.  Again demonstrated is parenchymal scarring within both upper lobes with  retraction of the pulmonary amaya. There are emphysematous changes of the  lungs. Overall stable appearance in the chest from previous examination  of 3 days earlier. No effusion or free air.       Impression:      . Stable appearance of the chest from previous exam of 3 days  earlier.  This report was finalized on 07/27/2023 15:58 by Dr. Jonn Chicas MD.    CT Abdomen Pelvis With Contrast [[id number]] Collected: 07/27/23 1459     Updated: 07/27/23 1511    Narrative:      EXAMINATION:  CT ABDOMEN PELVIS W CONTRAST-  7/27/2023 2:53 PM CDT     HISTORY: Generalized abdominal pain. Nausea, vomiting and diarrhea.     TECHNIQUE: Spiral CT was performed of the abdomen and pelvis with IV  contrast. Multiplanar images were reconstructed.     DLP: 116 mGy-cm. Automated dosage reduction technique was utilized to  reduce patient dose.     COMPARISON: 09/03/2019.      LUNG BASES: There are patchy parenchymal infiltrates in both lung bases  worrisome for pneumonia.     LIVER AND SPLEEN: There are gallstones in the gallbladder. There is  breathing motion artifact. There is intrahepatic and extrahepatic  biliary ductal dilatation. I do not see a definite stone in the  extrahepatic biliary tree. The spleen is unremarkable.     PANCREAS: No pancreatic mass or inflammatory change.      KIDNEYS AND ADRENALS: The adrenal glands are unremarkable. There is  breathing motion artifact. The right kidney demonstrates no focal  abnormality. There are 2 renal cysts on the left, the largest arising  from the upper pole and measures 3.6 cm. The renal collecting systems  are nondilated. The urinary bladder demonstrates no focal abnormality.     BOWEL: The patient has very little intraperitoneal fat. There is very  little subcutaneous fat. No oral contrast was given which makes  evaluation of bowel difficult. There is gastric wall thickening. Small  bowel loops appear to be nondilated. There is underdistention of most of  the colon. There is diverticulosis of the colon. I do not see the  appendix with confidence.     OTHER: There is atheromatous disease of the aortoiliac vessels. No free  air or free fluid is seen. No lymphadenopathy is seen. There are  degenerative changes of the spine. No definite acute bony abnormality.          Impression:      1. The study is degraded by breathing motion artifact. The patient also  has very little intraperitoneal and subcutaneous fat making evaluation  of intra-abdominal bowel difficult. The patient appears cachectic.  2. Patchy infiltrates in both lung bases worrisome for pneumonia.  3. Gallstones in the gallbladder. There is intrahepatic and extrahepatic  biliary ductal dilatation. I do not see a definite stone in the biliary  tree.  4. Left renal cysts.  5. Gastric wall thickening versus artifact of underdistention.  Differential includes gastritis, Menetrier's disease and other  etiologies. There is diverticulosis of the colon. There is  underdistention of the colon. No definite small bowel distention is  seen.  6. Atheromatous disease of the aortoiliac vessels. Degenerative changes  of the spine.        The full report of this exam was immediately signed and available to the  emergency room. The patient is currently in the emergency room.  This report was finalized  on 07/27/2023 15:08 by Dr. Ramsey Grover MD.            I have reviewed the patient's current medications.     Assessment/Plan   Assessment  Active Hospital Problems    Diagnosis     **Right upper quadrant abdominal pain     Vomiting     Diarrhea     Bronchiectasis        Treatment Plan  Continue current medications.  Consult pulmonology.  CBC CMP in AM.  Continue nebs continue oxygen continue antibiotics    Medical Decision Making  Number and Complexity of problems:   Right upper quadrant abdominal pain with cholelithiasis moderate complexity   Vomiting Moderate complexity  Diarrhea moderate complexity  Bronchiectasis  Differential DIagnosis:     Conditions and Status        Condition is improving.     [facility] Data  External documents reviewed: No new documents  Cardiac tracing (EKG, telemetry) interpretation: Reviewed  Radiology interpretation: Reviewed  Labs reviewed: Reviewed  Any tests that were considered but not ordered: None     Decision rules/scores evaluated (example [id number], Wells, etc): None     Discussed with: Patient     Care Planning  Shared decision making: Patient  Code status and discussions: DNR/DNI    Disposition  Social Determinants of Health that impact treatment or disposition: None  I expect the patient to be discharged to home in 2-3 days.     Electronically signed by Shabnam Sanchez, 07/28/23, 13:58 CDT.

## 2023-07-28 NOTE — CASE MANAGEMENT/SOCIAL WORK
Continued Stay Note  VERNELL Fairbanks     Patient Name: Milton Mae  MRN: [id number]  Today's Date: 7/28/2023    Admit Date: 7/27/2023    Plan: Home   Discharge Plan       Row Name 07/28/23 1333       Plan    Plan Home    Patient/Family in Agreement with Plan yes    Plan Comments Pt resides home with son, Nicholas.  Pt has home 02, shower chair, bsc and a walker.  She is unsure what DME company her 02 is from.  She does not have home health services at this time.  Pt has PCP, RX coverage and can afford medications.  SW will follow.                   Discharge Codes    No documentation.                       MIKO Leahy

## 2023-07-29 PROBLEM — R64 PULMONARY CACHEXIA DUE TO CHRONIC OBSTRUCTIVE PULMONARY DISEASE: Status: ACTIVE | Noted: 2019-09-03

## 2023-07-29 LAB
ALBUMIN SERPL-MCNC: 2.8 G/DL (ref 3.5–5.2)
ALBUMIN/GLOB SERPL: 1.2 G/DL
ALP SERPL-CCNC: 48 U/L (ref 39–117)
ALT SERPL W P-5'-P-CCNC: 10 U/L (ref 1–33)
ANION GAP SERPL CALCULATED.3IONS-SCNC: 8 MMOL/L (ref 5–15)
AST SERPL-CCNC: 16 U/L (ref 1–32)
BASOPHILS # BLD AUTO: 0.02 10*3/MM3 (ref 0–0.2)
BASOPHILS NFR BLD AUTO: 0.1 % (ref 0–1.5)
BILIRUB SERPL-MCNC: 0.2 MG/DL (ref 0–1.2)
BUN SERPL-MCNC: 19 MG/DL (ref 8–23)
BUN/CREAT SERPL: 43.2 (ref 7–25)
CALCIUM SPEC-SCNC: 8.3 MG/DL (ref 8.2–9.6)
CHLORIDE SERPL-SCNC: 106 MMOL/L (ref 98–107)
CO2 SERPL-SCNC: 27 MMOL/L (ref 22–29)
CREAT SERPL-MCNC: 0.44 MG/DL (ref 0.57–1)
DEPRECATED RDW RBC AUTO: 52.4 FL (ref 37–54)
EGFRCR SERPLBLD CKD-EPI 2021: 88.1 ML/MIN/1.73
EOSINOPHIL # BLD AUTO: 0 10*3/MM3 (ref 0–0.4)
EOSINOPHIL NFR BLD AUTO: 0 % (ref 0.3–6.2)
ERYTHROCYTE [DISTWIDTH] IN BLOOD BY AUTOMATED COUNT: 14.1 % (ref 12.3–15.4)
GLOBULIN UR ELPH-MCNC: 2.4 GM/DL
GLUCOSE SERPL-MCNC: 81 MG/DL (ref 65–99)
HCT VFR BLD AUTO: 26.8 % (ref 34–46.6)
HGB BLD-MCNC: 8.2 G/DL (ref 12–15.9)
IMM GRANULOCYTES # BLD AUTO: 0.09 10*3/MM3 (ref 0–0.05)
IMM GRANULOCYTES NFR BLD AUTO: 0.5 % (ref 0–0.5)
LYMPHOCYTES # BLD AUTO: 1.22 10*3/MM3 (ref 0.7–3.1)
LYMPHOCYTES NFR BLD AUTO: 7.4 % (ref 19.6–45.3)
MCH RBC QN AUTO: 31.4 PG (ref 26.6–33)
MCHC RBC AUTO-ENTMCNC: 30.6 G/DL (ref 31.5–35.7)
MCV RBC AUTO: 102.7 FL (ref 79–97)
MONOCYTES # BLD AUTO: 0.9 10*3/MM3 (ref 0.1–0.9)
MONOCYTES NFR BLD AUTO: 5.5 % (ref 5–12)
NEUTROPHILS NFR BLD AUTO: 14.22 10*3/MM3 (ref 1.7–7)
NEUTROPHILS NFR BLD AUTO: 86.5 % (ref 42.7–76)
NRBC BLD AUTO-RTO: 0 /100 WBC (ref 0–0.2)
PLATELET # BLD AUTO: 330 10*3/MM3 (ref 140–450)
PMV BLD AUTO: 10.4 FL (ref 6–12)
POTASSIUM SERPL-SCNC: 4 MMOL/L (ref 3.5–5.2)
PROT SERPL-MCNC: 5.2 G/DL (ref 6–8.5)
RBC # BLD AUTO: 2.61 10*6/MM3 (ref 3.77–5.28)
SODIUM SERPL-SCNC: 141 MMOL/L (ref 136–145)
WBC NRBC COR # BLD: 16.45 10*3/MM3 (ref 3.4–10.8)

## 2023-07-29 PROCEDURE — 25010000002 MORPHINE PER 10 MG: Performed by: FAMILY MEDICINE

## 2023-07-29 PROCEDURE — 80053 COMPREHEN METABOLIC PANEL: CPT | Performed by: FAMILY MEDICINE

## 2023-07-29 PROCEDURE — 94799 UNLISTED PULMONARY SVC/PX: CPT

## 2023-07-29 PROCEDURE — 94761 N-INVAS EAR/PLS OXIMETRY MLT: CPT

## 2023-07-29 PROCEDURE — 94664 DEMO&/EVAL PT USE INHALER: CPT

## 2023-07-29 PROCEDURE — 25010000002 CEFTRIAXONE PER 250 MG: Performed by: FAMILY MEDICINE

## 2023-07-29 PROCEDURE — 87205 SMEAR GRAM STAIN: CPT | Performed by: INTERNAL MEDICINE

## 2023-07-29 PROCEDURE — 87070 CULTURE OTHR SPECIMN AEROBIC: CPT | Performed by: INTERNAL MEDICINE

## 2023-07-29 PROCEDURE — 85025 COMPLETE CBC W/AUTO DIFF WBC: CPT | Performed by: FAMILY MEDICINE

## 2023-07-29 RX ADMIN — CEFTRIAXONE 1000 MG: 1 INJECTION, POWDER, FOR SOLUTION INTRAMUSCULAR; INTRAVENOUS at 18:20

## 2023-07-29 RX ADMIN — IPRATROPIUM BROMIDE AND ALBUTEROL SULFATE 3 ML: .5; 3 SOLUTION RESPIRATORY (INHALATION) at 10:26

## 2023-07-29 RX ADMIN — ACETAMINOPHEN 650 MG: 325 TABLET, FILM COATED ORAL at 03:35

## 2023-07-29 RX ADMIN — IPRATROPIUM BROMIDE AND ALBUTEROL SULFATE 3 ML: .5; 3 SOLUTION RESPIRATORY (INHALATION) at 19:52

## 2023-07-29 RX ADMIN — Medication 10 ML: at 20:33

## 2023-07-29 RX ADMIN — IPRATROPIUM BROMIDE AND ALBUTEROL SULFATE 3 ML: .5; 3 SOLUTION RESPIRATORY (INHALATION) at 07:15

## 2023-07-29 RX ADMIN — IPRATROPIUM BROMIDE AND ALBUTEROL SULFATE 3 ML: .5; 3 SOLUTION RESPIRATORY (INHALATION) at 15:03

## 2023-07-29 RX ADMIN — Medication 10 ML: at 10:10

## 2023-07-29 RX ADMIN — DOCUSATE SODIUM 50 MG AND SENNOSIDES 8.6 MG 2 TABLET: 8.6; 5 TABLET, FILM COATED ORAL at 10:09

## 2023-07-29 RX ADMIN — MORPHINE SULFATE 1 MG: 2 INJECTION, SOLUTION INTRAMUSCULAR; INTRAVENOUS at 20:33

## 2023-07-29 RX ADMIN — DOCUSATE SODIUM 50 MG AND SENNOSIDES 8.6 MG 2 TABLET: 8.6; 5 TABLET, FILM COATED ORAL at 20:33

## 2023-07-29 NOTE — PROGRESS NOTES
Don Mtz MD    Subjective:  LOS: 2    Chief Complaint: Bronchiectasis exacerbation    Patient states that she did not sleep well last night due to some chest pain.  Finally relieved with some pain medicines.  Currently without any chest pain.  States that she has been coughing up large amounts of phlegm today.  Her son feels that her breathing is starting to improve and her phlegm is starting to loosen up to where she can expectorate effectively.  Currently on 4 times daily nebs and uses the same at home.  Also has a flutter valve which she is using.    Objective   Vital Signs past 24hrs  Temp range: Temp (24hrs), Av øF (36.7 øC), Min:97.2 øF (36.2 øC), Max:99.3 øF (37.4 øC)    BP range: BP: (111-124)/(47-62) 124/62  Pulse range: Heart Rate:  [] 97  Resp rate range: Resp:  [14-18] 14  Device (Oxygen Therapy): nasal cannulaFlow (L/min):  [2-3] 2  Oxygen range:SpO2:  [95 %-98 %] 96 %       Physical Exam  Constitutional:       Appearance: She is cachectic.   Cardiovascular:      Rate and Rhythm: Normal rate and regular rhythm.      Heart sounds: No murmur heard.  Pulmonary:      Breath sounds: Decreased breath sounds and rales present.   Abdominal:      General: Bowel sounds are normal.      Palpations: Abdomen is soft. There is no mass.      Tenderness: There is no abdominal tenderness.   Musculoskeletal:         General: No swelling.   Neurological:      Mental Status: She is alert.     Results Review:    I have reviewed the laboratory and imaging data since the last note by [facility] physician.  My annotations are noted in assessment and plan.      Result Review:  I have personally reviewed the results from last note by [facility] physician to 2023 13:22 CDT and agree with these findings:  [x]  Laboratory list / accordion  [x]  Microbiology  [x]  Radiology  []  EKG/Telemetry   []  Cardiology/Vascular   []  Pathology  [x]  Old records  []  Other:    Medication Review:  I have reviewed the current MAR.  My  annotations are noted in assessment and plan.    cefTRIAXone, 1,000 mg, Intravenous, Q24H  ipratropium-albuterol, 3 mL, Nebulization, 4x Daily - RT  senna-docusate sodium, 2 tablet, Oral, BID  sodium chloride, 10 mL, Intravenous, Q12H        sodium chloride, 50 mL/hr, Last Rate: 50 mL/hr (07/28/23 1636)      Lines, Drains & Airways       Active LDAs       Name Placement date Placement time Site Days    Peripheral IV 07/28/23 1230 Anterior;Left Forearm 07/28/23  1230  Forearm  1    External Urinary Catheter --  --  --  --                  No active isolations  Diet Orders (active) (From admission, onward)       Start     Ordered    07/29/23 1600  Dietary Nutrition Supplements Boost Breeze (Ensure Clear)  3 Times Daily       07/29/23 1308    07/27/23 1704  Diet: Liquid Diets; Clear Liquid; Texture: Regular Texture (IDDSI 7); Fluid Consistency: Thin (IDDSI 0)  Diet Effective Now         07/27/23 1707                    PCCM Problems  Bronchiectasis exacerbation  Severe protein calorie malnutrition  Gallstones with duct dilatation, incidental finding      THESE ARE NEW MEDICAL PROBLEMS TO ME.    Plan of Treatment    She appears to be improving with expectoration of copious amounts of phlegm today.  We will keep current treatment with 4 times daily nebs and flutter valve.  Will consider adding additional mucolytic's but presently not necessary.  Encouraged to use her flutter valve effectively and frequently.  Continue current antibiotics with ceftriaxone.  Needs a respiratory culture and I have requested.    Don Mtz MD  07/29/23  13:22 CDT    Part of this note may be an electronic transcription/translation of spoken language to printed text using the Dragon Dictation System.

## 2023-07-29 NOTE — PROGRESS NOTES
[facility] Medicine Services  INPATIENT PROGRESS NOTE    Patient Name: Milton Mae  Date of Admission: 7/27/2023  Today's Date: 07/29/23  Length of Stay: 2  Primary Care Physician: Javier Ng MD    Subjective   Chief Complaint: Poor sleep last night secondary to coughing  HPI   Continues to cough with good sputum production.  She states she does feel better.  She has no nausea or vomiting at this time.  No abdominal pain.  Son is at bedside.    Patient is a little tired secondary to not getting sleep.    Review of Systems   All pertinent negatives and positives are as above. All other systems have been reviewed and are negative unless otherwise stated.     Objective    Temp:  [97.2 øF (36.2 øC)-99.3 øF (37.4 øC)] 99.3 øF (37.4 øC)  Heart Rate:  [] 97  Resp:  [14-18] 14  BP: (111-124)/(47-62) 124/62  Physical Exam  Vitals and nursing note reviewed.   Constitutional:       Appearance: Normal appearance. She is well-developed.   HENT:      Head: Normocephalic and atraumatic.      Right Ear: External ear normal.      Left Ear: External ear normal.      Nose: Nose normal.      Mouth/Throat:      Mouth: Mucous membranes are moist.   Eyes:      Extraocular Movements: Extraocular movements intact.      Conjunctiva/sclera: Conjunctivae normal.      Pupils: Pupils are equal, round, and reactive to light.   Neck:      Thyroid: No thyromegaly.      Vascular: No JVD.      Trachea: No tracheal deviation.   Cardiovascular:      Rate and Rhythm: Normal rate and regular rhythm.      Pulses: Normal pulses.      Heart sounds: Normal heart sounds. No murmur heard.    No friction rub. No gallop.   Pulmonary:      Effort: Pulmonary effort is normal.      Breath sounds: Wheezing and rhonchi present.   Abdominal:      General: Bowel sounds are normal. There is no distension.      Palpations: Abdomen is soft.      Tenderness: There is no abdominal tenderness.   Musculoskeletal:          General: Normal range of motion.      Cervical back: Normal range of motion and neck supple.   Lymphadenopathy:      Cervical: No cervical adenopathy.   Skin:     General: Skin is warm and dry.      Capillary Refill: Capillary refill takes less than 2 seconds.   Neurological:      Mental Status: She is alert and oriented to person, place, and time.      Cranial Nerves: No cranial nerve deficit.      Coordination: Coordination normal.   Psychiatric:         Mood and Affect: Mood normal.         Behavior: Behavior normal.           Results Review:  I have reviewed the labs, radiology results, and diagnostic studies.    Laboratory Data:   Results from last 7 days   Lab Units 07/29/23  0410 07/28/23  0407 07/27/23  1427   WBC 10*3/mm3 16.45* 11.92* 16.43*   HEMOGLOBIN g/dL 8.2* 8.4* 8.8*   HEMATOCRIT % 26.8* 27.2* 28.1*   PLATELETS 10*3/mm3 330 331 330        Results from last 7 days   Lab Units 07/29/23  0410 07/28/23  0407 07/27/23  1427   SODIUM mmol/L 141 143 139   POTASSIUM mmol/L 4.0 4.4 4.5   CHLORIDE mmol/L 106 107 100   CO2 mmol/L 27.0 27.0 29.0   BUN mg/dL 19 23 24*   CREATININE mg/dL 0.44* 0.56* 0.65   CALCIUM mg/dL 8.3 8.4 9.3   BILIRUBIN mg/dL 0.2 0.2 0.3   ALK PHOS U/L 48 50 64   ALT (SGPT) U/L 10 14 21   AST (SGOT) U/L 16 19 45*   GLUCOSE mg/dL 81 144* 141*       Culture Data:   Blood Culture   Date Value Ref Range Status   07/27/2023 No growth at 24 hours  Preliminary   07/27/2023 No growth at 24 hours  Preliminary       Radiology Data:   Imaging Results (Last 24 Hours)       ** No results found for the last 24 hours. **            I have reviewed the patient's current medications.     Assessment/Plan   Assessment  Active Hospital Problems    Diagnosis     **Right upper quadrant abdominal pain     Vomiting     Diarrhea     Bronchiectasis        Treatment Plan  Continue current medications  Encourage food  Supplements  Chair for meals.   Cbc bmp in AM      Medical Decision Making  Number and Complexity of  problems:   Right upper quadrant abdominal pain with cholelithiasis moderate complexity   Vomiting Moderate complexity  Diarrhea moderate complexity  Bronchiectasis  Differential DIagnosis:      Conditions and Status        Condition is improving.     MDM Data  External documents reviewed: No new documents  Cardiac tracing (EKG, telemetry) interpretation: Reviewed  Radiology interpretation: Reviewed  Labs reviewed: Reviewed  Any tests that were considered but not ordered: None     Decision rules/scores evaluated (example [id number], Wells, etc): None     Discussed with: Patient     Care Planning  Shared decision making: Patient  Code status and discussions: DNR/DNI     Disposition  Social Determinants of Health that impact treatment or disposition: None  I expect the patient to be discharged to home in 2-3 days.   Electronically signed by Shabnam Sanchez, 07/29/23, 13:01 CDT.

## 2023-07-29 NOTE — PROGRESS NOTES
[facility] Gastroenterology  Inpatient Progress Note  Today's date:  07/29/23    Milton Mae  1/25/1926       Reason for Follow Up: Nausea/vomiting/chest pain    Subjective:   Patient reports that she had left-sided lateral chest pain extending along rib cage all night.  Denies right-sided chest pain or any abdominal pain.  She is very hungry.  Denies nausea/vomiting.    No Known Allergies    Current Facility-Administered Medications:     acetaminophen (TYLENOL) tablet 650 mg, 650 mg, Oral, Q4H PRN, Shabnam Sanchez, 650 mg at 07/29/23 0335    sennosides-docusate (PERICOLACE) 8.6-50 MG per tablet 2 tablet, 2 tablet, Oral, BID, 2 tablet at 07/29/23 1009 **AND** polyethylene glycol (MIRALAX) packet 17 g, 17 g, Oral, Daily PRN **AND** bisacodyl (DULCOLAX) EC tablet 5 mg, 5 mg, Oral, Daily PRN **AND** bisacodyl (DULCOLAX) suppository 10 mg, 10 mg, Rectal, Daily PRN, Shabnam Sanchez    cefTRIAXone (ROCEPHIN) 1,000 mg in sodium chloride 0.9 % 100 mL IVPB, 1,000 mg, Intravenous, Q24H, Shabnam Sanchez, Last Rate: 200 mL/hr at 07/28/23 1636, 1,000 mg at 07/28/23 1636    ipratropium-albuterol (DUO-NEB) nebulizer solution 3 mL, 3 mL, Nebulization, 4x Daily - RT, Flash Gaitan MD, 3 mL at 07/29/23 1503    Morphine sulfate (PF) injection 1 mg, 1 mg, Intravenous, Q4H PRN, 1 mg at 07/28/23 2241 **AND** naloxone (NARCAN) injection 0.4 mg, 0.4 mg, Intravenous, Q5 Min PRN, Shabnam Sanchez    ondansetron (ZOFRAN) tablet 4 mg, 4 mg, Oral, Q6H PRN, Shabnam Sanchez    [COMPLETED] Insert Peripheral IV, , , Once **AND** sodium chloride 0.9 % flush 10 mL, 10 mL, Intravenous, PRN, Roberto Piedranah E, APRN    sodium chloride 0.9 % flush 10 mL, 10 mL, Intravenous, Q12H, Shabnam Sanchez, 10 mL at 07/29/23 1010    sodium chloride 0.9 % infusion 40 mL, 40 mL, Intravenous, PRN, Shabnam Sanchez    sodium chloride 0.9 % infusion, 50 mL/hr, Intravenous, Continuous, Shabnam Sanchez, Last Rate: 50 mL/hr at  07/28/23 1636, 50 mL/hr at 07/28/23 1636    Review of Systems:   Review of Systems   As per HPI  Vital Signs:  Temp:  [97.3 øF (36.3 øC)-99.3 øF (37.4 øC)] 99.1 øF (37.3 øC)  Heart Rate:  [] 99  Resp:  [14-18] 14  BP: (111-129)/(47-62) 129/54  Body mass index is 16.87 kg/mý.     Intake/Output Summary (Last 24 hours) at 7/29/2023 1742  Last data filed at 7/29/2023 1333  Gross per 24 hour   Intake 680 ml   Output 550 ml   Net 130 ml     I/O this shift:  In: 680 [P.O.:680]  Out: -   Physical Exam:  Physical Exam  HEENT: Nonicteric sclerae. EOMI.    Lungs: Coarse breath sounds with rales/rhonchi R>L  Heart: Regular rate and rhythm.  Abdomen: Soft, nondistended, nontender to palpation, with normoactive bowel sounds.  No York sign.  Extremities: No cyanosis, edema or pulse deficits.  Skin: No rash or jaundice.   Results Review:   I have reviewed all of the patient's current test results    Results from last 7 days   Lab Units 07/29/23  0410 07/28/23  0407 07/27/23  1427   WBC 10*3/mm3 16.45* 11.92* 16.43*   HEMOGLOBIN g/dL 8.2* 8.4* 8.8*   HEMATOCRIT % 26.8* 27.2* 28.1*   PLATELETS 10*3/mm3 330 331 330       Results from last 7 days   Lab Units 07/29/23  0410 07/28/23  0407 07/27/23  1427   SODIUM mmol/L 141 143 139   POTASSIUM mmol/L 4.0 4.4 4.5   CHLORIDE mmol/L 106 107 100   CO2 mmol/L 27.0 27.0 29.0   BUN mg/dL 19 23 24*   CREATININE mg/dL 0.44* 0.56* 0.65   CALCIUM mg/dL 8.3 8.4 9.3   BILIRUBIN mg/dL 0.2 0.2 0.3   ALK PHOS U/L 48 50 64   ALT (SGPT) U/L 10 14 21   AST (SGOT) U/L 16 19 45*   GLUCOSE mg/dL 81 144* 141*       Results from last 7 days   Lab Units 07/24/23  2101   INR  1.17*       Lab Results   Lab Value Date/Time    LIPASE 33 07/27/2023 1427    LIPASE 26 04/24/2022 1331    LIPASE 84 09/03/2019 1444    LIPASE 74 07/18/2018 1944    LIPASE 116 01/13/2014 0340       Radiology Review:  Imaging Results (Last 24 Hours)       ** No results found for the last 24 hours. 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  No upper GI symptoms. LFTs remain  very low.     -Continue supportive care/antibiotics  -Incentive spirometry  -Regular diet  -If patient should have abdominal pain, consider HIDA scan      We will sign off for now.  Do not hesitate to call with questions or concerns.    Michael Neri MD  07/29/23  17:42 CDT    DISCLAIMER:    This physician works through a [employer] as an inpatient consultant gastroenterologist only and has no outpatient clinic for patient follow up.  Any results not available at time of inpatient discharge and/or GI clinic follow up should be managed by the hospitalist team, PCP, or outpatient gastroenterologist.

## 2023-07-30 LAB
ANION GAP SERPL CALCULATED.3IONS-SCNC: 7 MMOL/L (ref 5–15)
BASOPHILS # BLD AUTO: 0.02 10*3/MM3 (ref 0–0.2)
BASOPHILS NFR BLD AUTO: 0.2 % (ref 0–1.5)
BUN SERPL-MCNC: 15 MG/DL (ref 8–23)
BUN/CREAT SERPL: 29.4 (ref 7–25)
CALCIUM SPEC-SCNC: 8.3 MG/DL (ref 8.2–9.6)
CHLORIDE SERPL-SCNC: 104 MMOL/L (ref 98–107)
CO2 SERPL-SCNC: 29 MMOL/L (ref 22–29)
CREAT SERPL-MCNC: 0.51 MG/DL (ref 0.57–1)
DEPRECATED RDW RBC AUTO: 52.4 FL (ref 37–54)
EGFRCR SERPLBLD CKD-EPI 2021: 85 ML/MIN/1.73
EOSINOPHIL # BLD AUTO: 0.12 10*3/MM3 (ref 0–0.4)
EOSINOPHIL NFR BLD AUTO: 1 % (ref 0.3–6.2)
ERYTHROCYTE [DISTWIDTH] IN BLOOD BY AUTOMATED COUNT: 14 % (ref 12.3–15.4)
GLUCOSE SERPL-MCNC: 94 MG/DL (ref 65–99)
HCT VFR BLD AUTO: 26.3 % (ref 34–46.6)
HGB BLD-MCNC: 7.8 G/DL (ref 12–15.9)
IMM GRANULOCYTES # BLD AUTO: 0.05 10*3/MM3 (ref 0–0.05)
IMM GRANULOCYTES NFR BLD AUTO: 0.4 % (ref 0–0.5)
LYMPHOCYTES # BLD AUTO: 1.28 10*3/MM3 (ref 0.7–3.1)
LYMPHOCYTES NFR BLD AUTO: 10.4 % (ref 19.6–45.3)
MCH RBC QN AUTO: 30.7 PG (ref 26.6–33)
MCHC RBC AUTO-ENTMCNC: 29.7 G/DL (ref 31.5–35.7)
MCV RBC AUTO: 103.5 FL (ref 79–97)
MONOCYTES # BLD AUTO: 0.82 10*3/MM3 (ref 0.1–0.9)
MONOCYTES NFR BLD AUTO: 6.7 % (ref 5–12)
NEUTROPHILS NFR BLD AUTO: 10.03 10*3/MM3 (ref 1.7–7)
NEUTROPHILS NFR BLD AUTO: 81.3 % (ref 42.7–76)
NRBC BLD AUTO-RTO: 0 /100 WBC (ref 0–0.2)
PLATELET # BLD AUTO: 318 10*3/MM3 (ref 140–450)
PMV BLD AUTO: 10.4 FL (ref 6–12)
POTASSIUM SERPL-SCNC: 4.1 MMOL/L (ref 3.5–5.2)
RBC # BLD AUTO: 2.54 10*6/MM3 (ref 3.77–5.28)
SODIUM SERPL-SCNC: 140 MMOL/L (ref 136–145)
WBC NRBC COR # BLD: 12.32 10*3/MM3 (ref 3.4–10.8)

## 2023-07-30 PROCEDURE — 94799 UNLISTED PULMONARY SVC/PX: CPT

## 2023-07-30 PROCEDURE — 97535 SELF CARE MNGMENT TRAINING: CPT

## 2023-07-30 PROCEDURE — 80048 BASIC METABOLIC PNL TOTAL CA: CPT | Performed by: FAMILY MEDICINE

## 2023-07-30 PROCEDURE — 94664 DEMO&/EVAL PT USE INHALER: CPT

## 2023-07-30 PROCEDURE — 97166 OT EVAL MOD COMPLEX 45 MIN: CPT

## 2023-07-30 PROCEDURE — 25010000002 CEFTRIAXONE PER 250 MG: Performed by: FAMILY MEDICINE

## 2023-07-30 PROCEDURE — 25010000002 MORPHINE PER 10 MG: Performed by: FAMILY MEDICINE

## 2023-07-30 PROCEDURE — 63710000001 ONDANSETRON PER 8 MG: Performed by: FAMILY MEDICINE

## 2023-07-30 PROCEDURE — 85025 COMPLETE CBC W/AUTO DIFF WBC: CPT | Performed by: FAMILY MEDICINE

## 2023-07-30 RX ADMIN — ONDANSETRON 4 MG: 4 TABLET, FILM COATED ORAL at 04:44

## 2023-07-30 RX ADMIN — IPRATROPIUM BROMIDE AND ALBUTEROL SULFATE 3 ML: .5; 3 SOLUTION RESPIRATORY (INHALATION) at 10:21

## 2023-07-30 RX ADMIN — DOCUSATE SODIUM 50 MG AND SENNOSIDES 8.6 MG 2 TABLET: 8.6; 5 TABLET, FILM COATED ORAL at 09:01

## 2023-07-30 RX ADMIN — IPRATROPIUM BROMIDE AND ALBUTEROL SULFATE 3 ML: .5; 3 SOLUTION RESPIRATORY (INHALATION) at 14:27

## 2023-07-30 RX ADMIN — CEFTRIAXONE 1000 MG: 1 INJECTION, POWDER, FOR SOLUTION INTRAMUSCULAR; INTRAVENOUS at 18:54

## 2023-07-30 RX ADMIN — Medication 10 ML: at 20:58

## 2023-07-30 RX ADMIN — IPRATROPIUM BROMIDE AND ALBUTEROL SULFATE 3 ML: .5; 3 SOLUTION RESPIRATORY (INHALATION) at 18:32

## 2023-07-30 RX ADMIN — Medication 10 ML: at 09:42

## 2023-07-30 RX ADMIN — MORPHINE SULFATE 1 MG: 2 INJECTION, SOLUTION INTRAMUSCULAR; INTRAVENOUS at 20:57

## 2023-07-30 RX ADMIN — IPRATROPIUM BROMIDE AND ALBUTEROL SULFATE 3 ML: .5; 3 SOLUTION RESPIRATORY (INHALATION) at 06:00

## 2023-07-30 NOTE — PROGRESS NOTES
Don Mtz MD    Subjective:  LOS: 3    Chief Complaint: Bronchiectasis exacerbation    Mostly complaining of constipation today.  Denies any new breathing problems.  Still with cough and phlegm but less productive today.    Objective   Vital Signs past 24hrs  Temp range: Temp (24hrs), Av.7 øF (37.1 øC), Min:98 øF (36.7 øC), Max:99.3 øF (37.4 øC)    BP range: BP: (117-152)/(54-77) 152/77  Pulse range: Heart Rate:  [] 102  Resp rate range: Resp:  [14-18] 16  Device (Oxygen Therapy): nasal cannulaFlow (L/min):  [1.5-2] 2  Oxygen range:SpO2:  [94 %-100 %] 97 %       Physical Exam  Constitutional:       Appearance: She is cachectic.   Cardiovascular:      Rate and Rhythm: Normal rate and regular rhythm.      Heart sounds: No murmur heard.  Pulmonary:      Breath sounds: Decreased breath sounds and rales present.   Abdominal:      General: Bowel sounds are normal.      Palpations: Abdomen is soft. There is no mass.      Tenderness: There is no abdominal tenderness.   Musculoskeletal:         General: No swelling.   Neurological:      Mental Status: She is alert.     Results Review:    I have reviewed the laboratory and imaging data since the last note by [facility] physician.  My annotations are noted in assessment and plan.      Result Review:  I have personally reviewed the results from last note by [facility] physician to 2023 10:42 CDT and agree with these findings:  [x]  Laboratory list / accordion  [x]  Microbiology  [x]  Radiology  []  EKG/Telemetry   []  Cardiology/Vascular   []  Pathology  [x]  Old records  []  Other:    Medication Review:  I have reviewed the current MAR.  My annotations are noted in assessment and plan.    cefTRIAXone, 1,000 mg, Intravenous, Q24H  ipratropium-albuterol, 3 mL, Nebulization, 4x Daily - RT  senna-docusate sodium, 2 tablet, Oral, BID  sodium chloride, 10 mL, Intravenous, Q12H        sodium chloride, 50 mL/hr, Last Rate: 50 mL/hr (23 1636)      Lines, Drains & Airways        Active LDAs       Name Placement date Placement time Site Days    Peripheral IV 07/28/23 1230 Anterior;Left Forearm 07/28/23  1230  Forearm  1    External Urinary Catheter --  --  --  --                  No active isolations  Diet Orders (active) (From admission, onward)       Start     Ordered    07/29/23 1600  Dietary Nutrition Supplements Boost Breeze (Ensure Clear)  3 Times Daily       07/29/23 1308    07/27/23 1704  Diet: Liquid Diets; Clear Liquid; Texture: Regular Texture (IDDSI 7); Fluid Consistency: Thin (IDDSI 0)  Diet Effective Now         07/27/23 1707                    PCCM Problems  Bronchiectasis exacerbation  Severe protein calorie malnutrition  Gallstones with duct dilatation, incidental finding        Plan of Treatment    Amount of expectoration seems to have improved.  We will continue with 4 times daily nebs and flutter valve.  Hold off on mucolytic's.     Currently on ceftriaxone.  Respiratory culture pending.    Don Mtz MD  07/30/23  10:42 CDT    Part of this note may be an electronic transcription/translation of spoken language to printed text using the Dragon Dictation System.

## 2023-07-30 NOTE — PROGRESS NOTES
[facility] Medicine Services  INPATIENT PROGRESS NOTE    Patient Name: Milton Mae  Date of Admission: 7/27/2023  Today's Date: 07/30/23  Length of Stay: 3  Primary Care Physician: Javier Ng MD    Subjective   Chief Complaint: coughing improving  HPI   Feels better.  Less shortness of breath.   No nausea.      Review of Systems   All pertinent negatives and positives are as above. All other systems have been reviewed and are negative unless otherwise stated.     Objective    Temp:  [98 øF (36.7 øC)-99.1 øF (37.3 øC)] 98.8 øF (37.1 øC)  Heart Rate:  [] 114  Resp:  [14-18] 14  BP: (117-152)/(54-77) 133/64  Physical Exam  Vitals and nursing note reviewed.   Constitutional:       Appearance: Normal appearance. She is well-developed.   HENT:      Head: Normocephalic and atraumatic.      Right Ear: External ear normal.      Left Ear: External ear normal.      Nose: Nose normal.      Mouth/Throat:      Mouth: Mucous membranes are moist.   Eyes:      Extraocular Movements: Extraocular movements intact.      Conjunctiva/sclera: Conjunctivae normal.      Pupils: Pupils are equal, round, and reactive to light.   Neck:      Thyroid: No thyromegaly.      Vascular: No JVD.      Trachea: No tracheal deviation.   Cardiovascular:      Rate and Rhythm: Normal rate and regular rhythm.      Pulses: Normal pulses.      Heart sounds: Normal heart sounds. No murmur heard.    No friction rub. No gallop.   Pulmonary:      Effort: Pulmonary effort is normal.      Comments: Improved air movement  Still with some crackles.  Abdominal:      General: Bowel sounds are normal. There is no distension.      Palpations: Abdomen is soft.      Tenderness: There is no abdominal tenderness.   Musculoskeletal:         General: Normal range of motion.      Cervical back: Normal range of motion and neck supple.   Lymphadenopathy:      Cervical: No cervical adenopathy.   Skin:     General: Skin is warm and  dry.      Capillary Refill: Capillary refill takes less than 2 seconds.   Neurological:      Mental Status: She is alert and oriented to person, place, and time.      Cranial Nerves: No cranial nerve deficit.      Coordination: Coordination normal.   Psychiatric:         Mood and Affect: Mood normal.         Behavior: Behavior normal.           Results Review:  I have reviewed the labs, radiology results, and diagnostic studies.    Laboratory Data:   Results from last 7 days   Lab Units 07/30/23  0356 07/29/23 0410 07/28/23 0407   WBC 10*3/mm3 12.32* 16.45* 11.92*   HEMOGLOBIN g/dL 7.8* 8.2* 8.4*   HEMATOCRIT % 26.3* 26.8* 27.2*   PLATELETS 10*3/mm3 318 330 331        Results from last 7 days   Lab Units 07/30/23  0356 07/29/23 0410 07/28/23 0407 07/27/23  1427   SODIUM mmol/L 140 141 143 139   POTASSIUM mmol/L 4.1 4.0 4.4 4.5   CHLORIDE mmol/L 104 106 107 100   CO2 mmol/L 29.0 27.0 27.0 29.0   BUN mg/dL 15 19 23 24*   CREATININE mg/dL 0.51* 0.44* 0.56* 0.65   CALCIUM mg/dL 8.3 8.3 8.4 9.3   BILIRUBIN mg/dL  --  0.2 0.2 0.3   ALK PHOS U/L  --  48 50 64   ALT (SGPT) U/L  --  10 14 21   AST (SGOT) U/L  --  16 19 45*   GLUCOSE mg/dL 94 81 144* 141*       Culture Data:   Blood Culture   Date Value Ref Range Status   07/27/2023 No growth at 2 days  Preliminary   07/27/2023 No growth at 2 days  Preliminary       Radiology Data:   Imaging Results (Last 24 Hours)       ** No results found for the last 24 hours. **            I have reviewed the patient's current medications.     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     MDM Data  External documents reviewed: No new  Cardiac tracing (EKG, telemetry) interpretation: reviewed  Radiology interpretation: reviewed  Labs reviewed: reviewed  Any tests that were considered but not ordered: none     Decision rules/scores evaluated (example [id number], Wells, etc): none     Discussed with: patient     Care Planning  Shared decision making: patient  Code status and discussions: dnr dni     Disposition  Social Determinants of Health that impact treatment or disposition: none  I expect the patient to be discharged to home in 2-3 days.     Electronically signed by Shabnam Sanchez, 07/30/23, 11:40 CDT.

## 2023-07-30 NOTE — PLAN OF CARE
Goal Outcome Evaluation:  Plan of Care Reviewed With: patient        Progress: no change  Outcome Evaluation: OT eval completed. Pt R sidelying upon therapist arrival; A&Ox4; No pain reported at rest. Pt reports requiring Min-Mod A for bathing and dressing, Supervision for transfers and toileting utilizing BS, Dependent for household and community mobility in w/c at [facility]. Today, Pt performed supine<>sit utilizing bedrail with HOB elevated with Min A. Pt donned B socks while seated EOB with Mod A. After becoming seated EOB and donning B socks, Pt became increasingly SOB, HR increased to 141, and Pt reported c/o L flank pain, therefore Pt was returned to supine and no further fxl activity was attempted; Pt reported relief of symptoms as SOB subsided with rest. Nursing notified of pain. Pt additionally demonstrates BUE weakness. Skilled OT intervention indicated in order to address remaining deficits in fxl mobility, fxl activity tolerance, balance, strength, and use of adaptive techniques/equipment during performance of BADLs. Recommend SNF at discharge.      Anticipated Discharge Disposition (OT): skilled nursing facility

## 2023-07-30 NOTE — THERAPY EVALUATION
Patient Name: Milton Mae  : 1926    MRN: [id number]                              Today's Date: 2023       Admit Date: 2023    Visit Dx:     ICD-10-CM ICD-9-CM   1. Pneumonia of both lower lobes due to infectious organism  J18.9 486   2. Hypoxia  R09.02 799.02   3. Failure of outpatient treatment  Z78.9 V49.89   4. Gallstones  K80.20 574.20   5. Renal cyst, left  N28.1 753.10   6. Gastric wall thickening  K31.89 537.89   7. 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    Hypertension    GERD (gastroesophageal reflux disease)    Pneumonia of both upper lobes    Bronchiectasis    Anemia    Oropharyngeal dysphagia    Pneumonia of both lungs due to infectious organism    Infection due to parainfluenza virus 3    Pneumonia    Severe malnutrition    Generalized weakness    Pneumonia of right lung due to infectious organism, unspecified part of lung    Community acquired bilateral lower lobe pneumonia    Right upper quadrant abdominal pain    Vomiting    Diarrhea     Past Medical History:   Diagnosis Date    COPD (chronic obstructive pulmonary disease)     GERD (gastroesophageal reflux disease)     Hypertension     Pneumonia      Past Surgical History:   Procedure Laterality Date    CATARACT EXTRACTION, BILATERAL      THYROID SURGERY        General Information       Row Name 23 1330          OT Time and Intention    Document Type evaluation  Presented to the ED with nausea, vomiting, and abdominal pain. Dx: Right upper quadrant abdominal pain, Community acquired bilateral lower lobe pneumonia. PMH: HTN, COPD.  -LS     Mode of Treatment occupational therapy  -LS       Row Name 23 6063          General Information    Patient Profile Reviewed yes  -LS     Prior Level of Function --  Min-Mod A for bathing and dressing, Supervision for transfers and toileting utilizing BSC, Dependent for household and  community mobility in w/c.  -     Existing Precautions/Restrictions fall;oxygen therapy device and L/min  2L  -     Barriers to Rehab previous functional deficit;hearing deficit  -       Row Name 07/30/23 1330          Occupational Profile    Environmental Supports and Barriers (Occupational Profile) Walk in shower with a shower chair and grab bars, but Pt only sponge bathes. BSC. Sleeps in a regular bed. Other AD/DME: rwx, manual w/c.  -       Row Name 07/30/23 1330          Living Environment    People in Home child(maty), adult  -       Row Name 07/30/23 1330          Home Main Entrance    Number of Stairs, Main Entrance one  -       Row Name 07/30/23 1330          Stairs Within Home, Primary    Number of Stairs, Within Home, Primary none  -       Row Name 07/30/23 1330          Cognition    Orientation Status (Cognition) oriented x 4  -       Row Name 07/30/23 1330          Safety Issues, Functional Mobility    Impairments Affecting Function (Mobility) balance;endurance/activity tolerance;shortness of breath;strength;pain  -               User Key  (r) = Recorded By, (t) = Taken By, (c) = Cosigned By      Initials Name Provider Type    LS Kathryn Quintana, OTR/L Occupational Therapist                     Mobility/ADL's       Row Name 07/30/23 1330          Bed Mobility    Bed Mobility supine-sit;sit-supine  -LS     Supine-Sit [place] (Bed Mobility) minimum assist (75% patient effort);verbal cues  -     Sit-Supine [place] (Bed Mobility) minimum assist (75% patient effort);verbal cues  -     Assistive Device (Bed Mobility) bed rails;head of bed elevated  -       Row Name 07/30/23 1330          Activities of Daily Living    BADL Assessment/Intervention upper body dressing;lower body dressing;toileting  -       Row Name 07/30/23 1330          Upper Body Dressing Assessment/Training    [place] Level (Upper Body Dressing) upper body dressing skills;minimum assist (75% patient  effort)  -     Position (Upper Body Dressing) edge of bed sitting  -       Row Name 07/30/23 1330          Lower Body Dressing Assessment/Training    [place] Level (Lower Body Dressing) lower body dressing skills;maximum assist (25% patient effort)  -       Row Name 07/30/23 1330          Toileting Assessment/Training    [place] Level (Toileting) toileting skills;dependent (less than 25% patient effort)  -               User Key  (r) = Recorded By, (t) = Taken By, (c) = Cosigned By      Initials Name Provider Type     Kathryn Quintana OTR/L Occupational Therapist                   Obj/Interventions       Row Name 07/30/23 1330          Sensory Assessment (Somatosensory)    Sensory Assessment (Somatosensory) UE sensation intact  -LS       Row Name 07/30/23 1330          Vision Assessment/Intervention    Visual Impairment/Limitations WFL;corrective lenses full-time  -[facility] Name 07/30/23 1330          Range of Motion Comprehensive    General Range of Motion bilateral upper extremity ROM WFL  -LS       Row Name 07/30/23 1330          Strength Comprehensive (MMT)    General Manual Muscle Testing (MMT) Assessment upper extremity strength deficits identified  -     Comment, General Manual Muscle Testing (MMT) Assessment BUE strength grossly 4/5  -[facility] Name 07/30/23 1330          Balance    Balance Assessment sitting static balance;sitting dynamic balance  -     Static Sitting Balance standby assist  -     Dynamic Sitting Balance contact guard  -LS     Position, Sitting Balance sitting edge of bed;unsupported  -               User Key  (r) = Recorded By, (t) = Taken By, (c) = Cosigned By      Initials Name Provider Type     Kathryn Quintana OTR/L Occupational Therapist                   Goals/Plan       Row Name 07/30/23 1330          Transfer Goal 1 (OT)    Activity/Assistive Device (Transfer Goal 1, OT) commode, bedside without drop arms  -     [place] Level/Cues Needed  (Transfer Goal 1, OT) contact guard required  -LS     Time Frame (Transfer Goal 1, OT) long term goal (LTG);10 days  -LS     Progress/Outcome (Transfer Goal 1, OT) new goal  -LS       Row Name 07/30/23 1330          Toileting Goal 1 (OT)    Activity/Device (Toileting Goal 1, OT) toileting skills, all;commode, bedside without drop arms  -LS     [place] Level/Cues Needed (Toileting Goal 1, OT) minimum assist (75% or more patient effort)  -LS     Time Frame (Toileting Goal 1, OT) long term goal (LTG);10 days  -LS     Progress/Outcome (Toileting Goal 1, OT) new goal  -       Row Name 07/30/23 7560          Therapy Assessment/Plan (OT)    Planned Therapy Interventions (OT) activity tolerance training;functional balance retraining;occupation/activity based interventions;ROM/therapeutic exercise;strengthening exercise;transfer/mobility retraining;patient/caregiver education/training;adaptive equipment training;BADL retraining  -LS               User Key  (r) = Recorded By, (t) = Taken By, (c) = Cosigned By      Initials Name Provider Type    LS Kathryn Quintana, OTR/L Occupational Therapist                   Clinical Impression       Row Name 07/30/23 7490          Pain Assessment    Pretreatment Pain Rating 0/10 - no pain  -LS     Posttreatment Pain Rating 9/10  -LS     Pain Location - Side/Orientation Left  -LS     Pain Location - flank  -LS     Pain Intervention(s) Nursing Notified;Repositioned;Ambulation/increased activity;Medication (See MAR)  -       Row Name 07/30/23 2470          Plan of Care Review    Plan of Care Reviewed With patient  -LS     Progress no change  -LS     Outcome Evaluation OT eval completed. Pt R sidelying upon therapist arrival; A&Ox4; No pain reported at rest. Pt reports requiring Min-Mod A for bathing and dressing, Supervision for transfers and toileting utilizing [facility], Dependent for household and community mobility in w/c at [facility]. Today, Pt performed supine<>sit utilizing bedrail with  HOB elevated with Min A. Pt donned B socks while seated EOB with Mod A. After becoming seated EOB and donning B socks, Pt became increasingly SOB, HR increased to 141, and Pt reported c/o L flank pain, therefore Pt was returned to supine and no further fxl activity was attempted; Pt reported relief of symptoms as SOB subsided with rest. Nursing notified of pain. Pt additionally demonstrates BUE weakness. Skilled OT intervention indicated in order to address remaining deficits in fxl mobility, fxl activity tolerance, balance, strength, and use of adaptive techniques/equipment during performance of BADLs. Recommend SNF at discharge.  -       Row Name 07/30/23 1330          Therapy Assessment/Plan (OT)    Rehab Potential (OT) good, to achieve stated therapy goals  -     Criteria for Skilled Therapeutic Interventions Met (OT) yes;skilled treatment is necessary  -     Therapy Frequency (OT) 3 times/wk  -       Row Name 07/30/23 1330          Therapy Plan Review/Discharge Plan (OT)    Anticipated Discharge Disposition (OT) skilled nursing facility  -       Row Name 07/30/23 1330          Vital Signs    Pretreatment Heart Rate (beats/min) 111  -LS     Intratreatment Heart Rate (beats/min) 141  -LS     Posttreatment Heart Rate (beats/min) 115  -LS     Pre SpO2 (%) 97  -LS     Intra SpO2 (%) 92  -LS     Post SpO2 (%) 97  -LS       Row Name 07/30/23 1330          Positioning and Restraints    Pre-Treatment Position in bed  -LS     Post Treatment Position bed  -LS     In Bed side lying right;side rails up x2;call light within reach;encouraged to call for assist;pillow between legs;with other staff  -               User Key  (r) = Recorded By, (t) = Taken By, (c) = Cosigned By      Initials Name Provider Type    LS Kathryn Quintana, OTR/L Occupational Therapist                   Outcome Measures       Row Name 07/30/23 0953          How much help from another is currently needed...    Putting on and taking off regular  lower body clothing? 2  -LS     Bathing (including washing, rinsing, and drying) 2  -LS     Toileting (which includes using toilet bed pan or urinal) 2  -LS     Putting on and taking off regular upper body clothing 3  -LS     Taking care of personal grooming (such as brushing teeth) 3  -LS     Eating meals 4  -LS     AM-PAC 6 Clicks Score (OT) 16  -LS       Row Name 07/30/23 0753          How much help from another person do you currently need...    Turning from your back to your side while in flat bed without using bedrails? 3  -LY     Moving from lying on back to sitting on the side of a flat bed without bedrails? 2  -LY     Moving to and from a bed to a chair (including a wheelchair)? 1  -LY     Standing up from a chair using your arms (e.g., wheelchair, bedside chair)? 1  -LY     Climbing 3-5 steps with a railing? 1  -LY     To walk in hospital room? 1  -LY     AM-PAC 6 Clicks Score (PT) 9  -LY     Highest level of mobility 3 --> Sat at edge of bed  -LY       Row Name 07/30/23 1330          Functional Assessment    Outcome Measure Options AM-PAC 6 Clicks Daily Activity (OT)  -               User Key  (r) = Recorded By, (t) = Taken By, (c) = Cosigned By      Initials Name Provider Type    Kathryn Ray OTR/L Occupational Therapist    Sabrina Gamboa, JAY Registered Nurse                    Occupational Therapy Education       Title: PT OT SLP Therapies (In Progress)       Topic: Occupational Therapy (In Progress)       Point: ADL training (Done)       Description:   Instruct learner(s) on proper safety adaptation and remediation techniques during self care or transfers.   Instruct in proper use of assistive devices.                  Learning Progress Summary             Patient Acceptance, E, VU by SABRINA at 7/30/2023 2146                         Point: Home exercise program (Not Started)       Description:   Instruct learner(s) on appropriate technique for monitoring, assisting and/or progressing therapeutic  exercises/activities.                  Learner Progress:  Not documented in this visit.              Point: Precautions (Done)       Description:   Instruct learner(s) on prescribed precautions during self-care and functional transfers.                  Learning Progress Summary             Patient Acceptance, E, VU by  at 7/30/2023 1447                         Point: Body mechanics (Done)       Description:   Instruct learner(s) on proper positioning and spine alignment during self-care, functional mobility activities and/or exercises.                  Learning Progress Summary             Patient Acceptance, E, VU by  at 7/30/2023 1447                                         User Key       Initials Effective Dates Name Provider Type Discipline    SABRINA 06/20/22 -  Kathryn Quintana, OTR/L Occupational Therapist OT                  OT Recommendation and Plan  Planned Therapy Interventions (OT): activity tolerance training, functional balance retraining, occupation/activity based interventions, ROM/therapeutic exercise, strengthening exercise, transfer/mobility retraining, patient/caregiver education/training, adaptive equipment training, BADL retraining  Therapy Frequency (OT): 3 times/wk  Plan of Care Review  Plan of Care Reviewed With: patient  Progress: no change  Outcome Evaluation: OT eval completed. Pt R sidelying upon therapist arrival; A&Ox4; No pain reported at rest. Pt reports requiring Min-Mod A for bathing and dressing, Supervision for transfers and toileting utilizing [facility], Dependent for household and community mobility in w/c at [facility]. Today, Pt performed supine<>sit utilizing bedrail with HOB elevated with Min A. Pt donned B socks while seated EOB with Mod A. After becoming seated EOB and donning B socks, Pt became increasingly SOB, HR increased to 141, and Pt reported c/o L flank pain, therefore Pt was returned to supine and no further fxl activity was attempted; Pt reported relief of symptoms as SOB  subsided with rest. Nursing notified of pain. Pt additionally demonstrates BUE weakness. Skilled OT intervention indicated in order to address remaining deficits in fxl mobility, fxl activity tolerance, balance, strength, and use of adaptive techniques/equipment during performance of BADLs. Recommend SNF at discharge.     Time Calculation:         Time Calculation- OT       Row Name 07/30/23 1330             Time Calculation- OT    OT Start Time 1330  +10 minutes chart review  -      OT Stop Time 1428  -      OT Time Calculation (min) 58 min  -      Total Timed Code Minutes- OT 13 minute(s)  -      OT Non-Billable Time (min) 55 min  -      OT Received On 07/30/23  -      OT Goal Re-Cert Due Date 08/09/23  -                User Key  (r) = Recorded By, (t) = Taken By, (c) = Cosigned By      Initials Name Provider Type    LS Kathryn Quintana OTR/L Occupational Therapist                  Therapy Charges for Today       Code Description Service Date Service Provider Modifiers Qty    83814194891 HC OT EVAL MOD COMPLEXITY 4 7/30/2023 Kathryn Quintana OTR/L GO 1    34849484672 HC OT SELF CARE/MGMT/TRAIN EA 15 MIN 7/30/2023 Kathryn Quintana OTR/L GO 1                 Kathryn Quintana OTR/L  7/30/2023

## 2023-07-30 NOTE — PLAN OF CARE
Goal Outcome Evaluation:    Problem: Adult Inpatient Plan of Care  Goal: Plan of Care Review  Outcome: Ongoing, Progressing  Flowsheets (Taken 7/30/2023 4089)  Progress: no change  Plan of Care Reviewed With: patient  Outcome Evaluation: PRN pain meds given with relief. VSS. S/ST . Will update MD as needed.

## 2023-07-31 PROBLEM — E43 SEVERE MALNUTRITION: Status: ACTIVE | Noted: 2023-07-31

## 2023-07-31 LAB
ANION GAP SERPL CALCULATED.3IONS-SCNC: 8 MMOL/L (ref 5–15)
BASOPHILS # BLD AUTO: 0.02 10*3/MM3 (ref 0–0.2)
BASOPHILS NFR BLD AUTO: 0.2 % (ref 0–1.5)
BUN SERPL-MCNC: 12 MG/DL (ref 8–23)
BUN/CREAT SERPL: 31.6 (ref 7–25)
CALCIUM SPEC-SCNC: 8.1 MG/DL (ref 8.2–9.6)
CHLORIDE SERPL-SCNC: 101 MMOL/L (ref 98–107)
CO2 SERPL-SCNC: 31 MMOL/L (ref 22–29)
CREAT SERPL-MCNC: 0.38 MG/DL (ref 0.57–1)
DEPRECATED RDW RBC AUTO: 52.3 FL (ref 37–54)
EGFRCR SERPLBLD CKD-EPI 2021: 91.3 ML/MIN/1.73
EOSINOPHIL # BLD AUTO: 0.25 10*3/MM3 (ref 0–0.4)
EOSINOPHIL NFR BLD AUTO: 2.7 % (ref 0.3–6.2)
ERYTHROCYTE [DISTWIDTH] IN BLOOD BY AUTOMATED COUNT: 14 % (ref 12.3–15.4)
GLUCOSE SERPL-MCNC: 99 MG/DL (ref 65–99)
HCT VFR BLD AUTO: 27.7 % (ref 34–46.6)
HGB BLD-MCNC: 8.3 G/DL (ref 12–15.9)
IMM GRANULOCYTES # BLD AUTO: 0.05 10*3/MM3 (ref 0–0.05)
IMM GRANULOCYTES NFR BLD AUTO: 0.5 % (ref 0–0.5)
LYMPHOCYTES # BLD AUTO: 1.19 10*3/MM3 (ref 0.7–3.1)
LYMPHOCYTES NFR BLD AUTO: 13 % (ref 19.6–45.3)
MCH RBC QN AUTO: 30.7 PG (ref 26.6–33)
MCHC RBC AUTO-ENTMCNC: 30 G/DL (ref 31.5–35.7)
MCV RBC AUTO: 102.6 FL (ref 79–97)
MONOCYTES # BLD AUTO: 0.75 10*3/MM3 (ref 0.1–0.9)
MONOCYTES NFR BLD AUTO: 8.2 % (ref 5–12)
NEUTROPHILS NFR BLD AUTO: 6.92 10*3/MM3 (ref 1.7–7)
NEUTROPHILS NFR BLD AUTO: 75.4 % (ref 42.7–76)
NRBC BLD AUTO-RTO: 0 /100 WBC (ref 0–0.2)
PLATELET # BLD AUTO: 318 10*3/MM3 (ref 140–450)
PMV BLD AUTO: 10.3 FL (ref 6–12)
POTASSIUM SERPL-SCNC: 4.3 MMOL/L (ref 3.5–5.2)
RBC # BLD AUTO: 2.7 10*6/MM3 (ref 3.77–5.28)
SODIUM SERPL-SCNC: 140 MMOL/L (ref 136–145)
WBC NRBC COR # BLD: 9.18 10*3/MM3 (ref 3.4–10.8)

## 2023-07-31 PROCEDURE — 94761 N-INVAS EAR/PLS OXIMETRY MLT: CPT

## 2023-07-31 PROCEDURE — 94799 UNLISTED PULMONARY SVC/PX: CPT

## 2023-07-31 PROCEDURE — 63710000001 ONDANSETRON PER 8 MG: Performed by: FAMILY MEDICINE

## 2023-07-31 PROCEDURE — 97535 SELF CARE MNGMENT TRAINING: CPT | Performed by: OCCUPATIONAL THERAPIST

## 2023-07-31 PROCEDURE — 94664 DEMO&/EVAL PT USE INHALER: CPT

## 2023-07-31 PROCEDURE — 94760 N-INVAS EAR/PLS OXIMETRY 1: CPT

## 2023-07-31 PROCEDURE — 85025 COMPLETE CBC W/AUTO DIFF WBC: CPT | Performed by: FAMILY MEDICINE

## 2023-07-31 PROCEDURE — 25010000002 MORPHINE PER 10 MG: Performed by: FAMILY MEDICINE

## 2023-07-31 PROCEDURE — 80048 BASIC METABOLIC PNL TOTAL CA: CPT | Performed by: FAMILY MEDICINE

## 2023-07-31 RX ORDER — CEFDINIR 300 MG/1
300 CAPSULE ORAL EVERY 12 HOURS SCHEDULED
Status: DISCONTINUED | OUTPATIENT
Start: 2023-07-31 | End: 2023-08-02 | Stop reason: HOSPADM

## 2023-07-31 RX ADMIN — IPRATROPIUM BROMIDE AND ALBUTEROL SULFATE 3 ML: .5; 3 SOLUTION RESPIRATORY (INHALATION) at 05:52

## 2023-07-31 RX ADMIN — ONDANSETRON 4 MG: 4 TABLET, FILM COATED ORAL at 18:05

## 2023-07-31 RX ADMIN — MORPHINE SULFATE 1 MG: 2 INJECTION, SOLUTION INTRAMUSCULAR; INTRAVENOUS at 21:29

## 2023-07-31 RX ADMIN — IPRATROPIUM BROMIDE AND ALBUTEROL SULFATE 3 ML: .5; 3 SOLUTION RESPIRATORY (INHALATION) at 18:47

## 2023-07-31 RX ADMIN — DOCUSATE SODIUM 50 MG AND SENNOSIDES 8.6 MG 2 TABLET: 8.6; 5 TABLET, FILM COATED ORAL at 21:29

## 2023-07-31 RX ADMIN — IPRATROPIUM BROMIDE AND ALBUTEROL SULFATE 3 ML: .5; 3 SOLUTION RESPIRATORY (INHALATION) at 14:00

## 2023-07-31 RX ADMIN — ACETAMINOPHEN 650 MG: 325 TABLET, FILM COATED ORAL at 03:48

## 2023-07-31 RX ADMIN — IPRATROPIUM BROMIDE AND ALBUTEROL SULFATE 3 ML: .5; 3 SOLUTION RESPIRATORY (INHALATION) at 10:31

## 2023-07-31 RX ADMIN — MORPHINE SULFATE 1 MG: 2 INJECTION, SOLUTION INTRAMUSCULAR; INTRAVENOUS at 10:56

## 2023-07-31 RX ADMIN — Medication 10 ML: at 09:49

## 2023-07-31 RX ADMIN — CEFDINIR 300 MG: 300 CAPSULE ORAL at 21:29

## 2023-07-31 RX ADMIN — DOCUSATE SODIUM 50 MG AND SENNOSIDES 8.6 MG 2 TABLET: 8.6; 5 TABLET, FILM COATED ORAL at 09:51

## 2023-07-31 RX ADMIN — Medication 10 ML: at 21:29

## 2023-07-31 NOTE — PLAN OF CARE
Goal Outcome Evaluation:              Outcome Evaluation: NTN follow up and NFPE/MSA completed. See note. Modified to soft texture, ground meats, gravy all trays to help pt with chewing difficulty. Pt most concerned about her son arriving for visit. Reported excellent PO at lunch. Average PO 75% of meals, 830 mL oral fluid/day. Pt prefers breakfast over other meals. Discontinued Boost Breeze as diet as advanced per GI recommendations. NTN following per protocol.

## 2023-07-31 NOTE — PLAN OF CARE
"Goal Outcome Evaluation:  Plan of Care Reviewed With: (P) patient        Progress: (P) no change  Outcome Evaluation: (P) OT tx complete. A&Ox4. Pt reports no pain before tx and 8/10 during tx in abdomen. Today pt required max encouragement to participate in therapy session. Pt is very hesitant to move due to HR being elevated last therapy session. Pt education provided on pressure sore prevention, the benefit OOB activity, and the importance of self-care. Pt states, \"I want to do therapy but I just can't\". OTS provided reassurance that her HR will be monitored and if HR becomes to high, she can lay back down. Pt agreed to sit EOB to complete grooming activities. Sup<>sit with Remigio. Pt states she cannot complete grooming activities because she is too weak. Sit<>sup Remigio. Pt c/o abdomen pain stating she can't complete grooming activities while sitting up in bed. Nsg notified of pt's pain. Pt was left supported sitting in bed with call light in reach. OT will continue POC to increased strength, activity tolerance, and pain. Recommended d/c SNF.      Anticipated Discharge Disposition (OT): (P) skilled nursing facility  "

## 2023-07-31 NOTE — PROGRESS NOTES
Don Mtz MD    Subjective:  LOS: 4    Chief Complaint: Bronchiectasis exacerbation    She complains of a headache today.  States her cough is less productive.  Denies any new breathing problems.    Objective   Vital Signs past 24hrs  Temp range: Temp (24hrs), Av.4 øF (36.9 øC), Min:97.9 øF (36.6 øC), Max:99.4 øF (37.4 øC)    BP range: BP: (122-143)/(52-83) 122/52  Pulse range: Heart Rate:  [] 102  Resp rate range: Resp:  [14-18] 16  Device (Oxygen Therapy): humidified;nasal cannulaFlow (L/min):  [2] 2  Oxygen range:SpO2:  [90 %-98 %] 97 %       Physical Exam  Constitutional:       Appearance: She is cachectic.   Cardiovascular:      Rate and Rhythm: Normal rate and regular rhythm.      Heart sounds: No murmur heard.  Pulmonary:      Breath sounds: Decreased breath sounds and rales present.   Abdominal:      General: Bowel sounds are normal.      Palpations: Abdomen is soft. There is no mass.      Tenderness: There is no abdominal tenderness.   Musculoskeletal:         General: No swelling.   Neurological:      Mental Status: She is alert.     Results Review:    I have reviewed the laboratory and imaging data since the last note by [facility] physician.  My annotations are noted in assessment and plan.      Result Review:  I have personally reviewed the results from last note by [facility] physician to 2023 09:27 CDT and agree with these findings:  [x]  Laboratory list / accordion  [x]  Microbiology  [x]  Radiology  []  EKG/Telemetry   []  Cardiology/Vascular   []  Pathology  [x]  Old records  []  Other:    Medication Review:  I have reviewed the current MAR.  My annotations are noted in assessment and plan.    cefTRIAXone, 1,000 mg, Intravenous, Q24H  ipratropium-albuterol, 3 mL, Nebulization, 4x Daily - RT  senna-docusate sodium, 2 tablet, Oral, BID  sodium chloride, 10 mL, Intravenous, Q12H        sodium chloride, 50 mL/hr, Last Rate: 50 mL/hr (23 1636)      Lines, Drains & Airways       Active LDAs        Name Placement date Placement time Site Days    Peripheral IV 07/28/23 1230 Anterior;Left Forearm 07/28/23  1230  Forearm  1    External Urinary Catheter --  --  --  --                  No active isolations  Diet Orders (active) (From admission, onward)       Start     Ordered    07/29/23 1600  Dietary Nutrition Supplements Boost Breeze (Ensure Clear)  3 Times Daily       07/29/23 1308    07/27/23 1704  Diet: Liquid Diets; Clear Liquid; Texture: Regular Texture (IDDSI 7); Fluid Consistency: Thin (IDDSI 0)  Diet Effective Now         07/27/23 1707                    PCCM Problems  Bronchiectasis exacerbation  Severe protein calorie malnutrition  Gallstones with duct dilatation, incidental finding  Anemia      Plan of Treatment    She remains on 4 times daily nebs and flutter valve.  Hold off on mucolytic's.  Overall improved.    Currently on ceftriaxone.  Respiratory culture pending.  Okay to switch to oral antibiotics by me.    Appears close to baseline to me and may be suitable for discharge.    Anemia with macrocytosis. Defer to primary team.    Don Mtz MD  07/31/23  09:27 CDT    Part of this note may be an electronic transcription/translation of spoken language to printed text using the Dragon Dictation System.

## 2023-07-31 NOTE — THERAPY TREATMENT NOTE
Patient Name: Milton Mae  : 1926    MRN: [id number]                              Today's Date: 2023       Admit Date: 2023    Visit Dx:     ICD-10-CM ICD-9-CM   1. Pneumonia of both lower lobes due to infectious organism  J18.9 486   2. Hypoxia  R09.02 799.02   3. Failure of outpatient treatment  Z78.9 V49.89   4. Gallstones  K80.20 574.20   5. Renal cyst, left  N28.1 753.10   6. Gastric wall thickening  K31.89 537.89   7. Diverticulosis  K57.90 562.10   8. 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    Hypertension    GERD (gastroesophageal reflux disease)    Pneumonia of both upper lobes    Bronchiectasis    Anemia    Oropharyngeal dysphagia    Pneumonia of both lungs due to infectious organism    Infection due to parainfluenza virus 3    Pneumonia    Severe malnutrition    Generalized weakness    Pneumonia of right lung due to infectious organism, unspecified part of lung    Community acquired bilateral lower lobe pneumonia    Right upper quadrant abdominal pain    Vomiting    Diarrhea     Past Medical History:   Diagnosis Date    COPD (chronic obstructive pulmonary disease)     GERD (gastroesophageal reflux disease)     Hypertension     Pneumonia      Past Surgical History:   Procedure Laterality Date    CATARACT EXTRACTION, BILATERAL      THYROID SURGERY        General Information       Row Name 23 0940          OT Time and Intention    Document Type therapy note (daily note)  -JAZMYNE (r) ABIMBOLA (t) JAZMYNE (c)     Mode of Treatment occupational therapy  -JAZMYNE (r) ABIMBOLA (t) JAZMYNE (c)       Row Name 23 0940          General Information    Patient Profile Reviewed yes  -JAZMYNE (r) ABIMBOLA (t) MCKENZIEJ (c)     Existing Precautions/Restrictions fall;oxygen therapy device and L/min  -JAZMYNE (r) ABIMBOLA (t) JJ (c)     Barriers to Rehab previous functional deficit;hearing  deficit  -JJ (r) KT (t) JJ (c)       Row Name 07/31/23 0940          Cognition    Orientation Status (Cognition) oriented x 4  -JJ (r) KT (t) JJ (c)       Row Name 07/31/23 0940          Safety Issues, Functional Mobility    Impairments Affecting Function (Mobility) balance;endurance/activity tolerance;shortness of breath;strength;pain  -JJ (r) KT (t) JJ (c)               User Key  (r) = Recorded By, (t) = Taken By, (c) = Cosigned By      Initials Name Provider Type    Susy Raygoza, OTR/L, CSRS Occupational Therapist    Betty Bowman, OT Student OT Student                     Mobility/ADL's       Row Name 07/31/23 0940          Bed Mobility    Bed Mobility rolling right;supine-sit-supine  -JJ (r) KT (t) JJ (c)     Rolling Right [place] (Bed Mobility) verbal cues;contact guard;1 person assist  -JJ (r) KT (t) JJ (c)     Supine-Sit-Supine [place] (Bed Mobility) minimum assist (75% patient effort);verbal cues  -JJ (r) KT (t) JJ (c)     Bed Mobility, Safety Issues decreased use of arms for pushing/pulling;decreased use of legs for bridging/pushing  -JJ (r) KT (t) JJ (c)     Assistive Device (Bed Mobility) bed rails;head of bed elevated  -JJ (r) KT (t) JJ (c)       Row Name 07/31/23 0940          Activities of Daily Living    BADL Assessment/Intervention grooming;feeding  -JJ (r) KT (t) JJ (c)       Row Name 07/31/23 0940          Grooming Assessment/Training    [place] Level (Grooming) oral care regimen  -JJ (r) KT (t) JJ (c)     Position (Grooming) edge of bed sitting  -JJ (r) KT (t) JJ (c)     Comment, (Grooming) Pt unable to carry out task due to weakness and hesitation  -JJ (r) KT (t) JJ (c)       Row Name 07/31/23 0940          Self-Feeding Assessment/Training    [place] Level (Feeding) liquids to mouth;scoop food and bring to mouth;supervision;verbal cues  -JJ (r) KT (t) JJ (c)     Position (Self-Feeding) sitting up in bed;supported sitting  -JAZMYNE (r) ABIMBOLA (t) JAZMYNE (c)               User  "Key  (r) = Recorded By, (t) = Taken By, (c) = Cosigned By      Initials Name Provider Type    Susy Raygoza OTR/L, DAVID Occupational Therapist    Betty Bowman, OT Student OT Student                   Obj/Interventions       Row Name 07/31/23 0940          Balance    Balance Assessment sitting static balance;sitting dynamic balance  -JJ (r) KT (t) JJ (c)     Static Sitting Balance standby assist  -JJ (r) KT (t) JJ (c)     Dynamic Sitting Balance standby assist  -JJ (r) KT (t) JJ (c)     Position, Sitting Balance sitting edge of bed;unsupported  -JJ (r) KT (t) JJ (c)     Balance Interventions sitting;static;dynamic;occupation based/functional task  -JJ (r) KT (t) JJ (c)               User Key  (r) = Recorded By, (t) = Taken By, (c) = Cosigned By      Initials Name Provider Type    Susy Raygoza OTR/L, DAVID Occupational Therapist    Betty Bowman, OT Student OT Student                   Goals/Plan    No documentation.                  Clinical Impression       Row Name 07/31/23 0940          Pain Assessment    Pretreatment Pain Rating 0/10 - no pain  -JJ (r) KT (t) JJ (c)     Posttreatment Pain Rating 8/10  -JJ (r) KT (t) JJ (c)     Pain Location - flank  -JJ (r) KT (t) JJ (c)     Pain Intervention(s) Medication (See MAR);Repositioned;Ambulation/increased activity;Nursing Notified  -JJ (r) KT (t) JJ (c)       Row Name 07/31/23 0940          Plan of Care Review    Plan of Care Reviewed With patient  -JJ (r) KT (t) JJ (c)     Progress no change  -JJ (r) KT (t) JJ (c)     Outcome Evaluation OT tx complete. A&Ox4. Pt reports no pain before tx and 8/10 during tx in abdomen. Today pt required max encouragement to participate in therapy session. Pt is very hesitant to move due to HR being elevated last therapy session. Pt education provided on pressure sore prevention, the benefit OOB activity, and the importance of self-care. Pt states, \"I want to do therapy but I just can't\". OTS provided " reassurance that her HR will be monitored and if HR becomes to high, she can lay back down. Pt agreed to sit EOB to complete grooming activities. Sup<>sit with Remigio. Pt states she cannot complete grooming activities because she is too weak. Sit<>sup Remigio. Pt c/o abdomen pain stating she can't complete grooming activities while sitting up in bed. Nsg notified of pt's pain. Pt was left supported sitting in bed with call light in reach. OT will continue POC to increased strength, activity tolerance, and pain. Recommended d/c SNF.  -JJ (r) KT (t) JJ (c)       Row Name 07/31/23 0940          Therapy Plan Review/Discharge Plan (OT)    Anticipated Discharge Disposition (OT) skilled nursing facility  -JJ (r) KT (t) JJ (c)       Row Name 07/31/23 0940          Vital Signs    O2 Delivery Pre Treatment nasal cannula  3.5L  -JJ (r) KT (t) JJ (c)     O2 Delivery Intra Treatment nasal cannula  -JJ (r) KT (t) JJ (c)     O2 Delivery Post Treatment nasal cannula  -JJ (r) KT (t) JJ (c)     Pre Patient Position Supine  -JJ (r) KT (t) JJ (c)     Intra Patient Position Sitting  -JJ (r) KT (t) JJ (c)     Post Patient Position Supine  -JJ (r) KT (t) JJ (c)       Row Name 07/31/23 0940          Positioning and Restraints    Pre-Treatment Position in bed  -JJ (r) KT (t) JJ (c)     Post Treatment Position bed  -JJ (r) KT (t) JJ (c)     In Bed sitting;call light within reach;encouraged to call for assist;side rails up x3  -JJ (r) KT (t) JJ (c)               User Key  (r) = Recorded By, (t) = Taken By, (c) = Cosigned By      Initials Name Provider Type    Susy Raygoza, OTR/L, CSRS Occupational Therapist    Betty Bowman, OT Student OT Student                   Outcome Measures       Row Name 07/31/23 0940          How much help from another is currently needed...    Putting on and taking off regular lower body clothing? 2  -JJ (r) KT (t) JJ (c)     Bathing (including washing, rinsing, and drying) 2  -JJ (r) ABIMBOLA (t) JAZMYNE (c)      Toileting (which includes using toilet bed pan or urinal) 2  -JJ (r) KT (t) JJ (c)     Putting on and taking off regular upper body clothing 3  -JJ (r) KT (t) JJ (c)     Taking care of personal grooming (such as brushing teeth) 3  -JJ (r) KT (t) JJ (c)     Eating meals 4  -JJ (r) KT (t) JJ (c)     AM-PAC 6 Clicks Score (OT) 16  -JJ (r) KT (t)       Row Name 07/31/23 0700          How much help from another person do you currently need...    Turning from your back to your side while in flat bed without using bedrails? 3  -LY     Moving from lying on back to sitting on the side of a flat bed without bedrails? 2  -LY     Moving to and from a bed to a chair (including a wheelchair)? 1  -LY     Standing up from a chair using your arms (e.g., wheelchair, bedside chair)? 1  -LY     Climbing 3-5 steps with a railing? 1  -LY     To walk in hospital room? 1  -LY     AM-PAC 6 Clicks Score (PT) 9  -LY     Highest level of mobility 3 --> Sat at edge of bed  -LY       Row Name 07/31/23 1003 07/31/23 0940       Functional Assessment    Outcome Measure Options -- (P)   -CG AM-PAC 6 Clicks Daily Activity (OT)  -JJ (r) KT (t) JJ (c)              User Key  (r) = Recorded By, (t) = Taken By, (c) = Cosigned By      Initials Name Provider Type    Susy Raygoza, OTR/L, CSRS Occupational Therapist    Steven Chavira, PT Student PT Student    Betty Bowman, OT Student OT Student    Sabrina Gamboa, RN Registered Nurse                    Occupational Therapy Education       Title: PT OT SLP Therapies (In Progress)       Topic: Occupational Therapy (In Progress)       Point: ADL training (Done)       Description:   Instruct learner(s) on proper safety adaptation and remediation techniques during self care or transfers.   Instruct in proper use of assistive devices.                  Learning Progress Summary             Patient Acceptance, E, VU by KT at 7/31/2023 1028    Acceptance, E, VU by SABRINA at 7/30/2023 9358                  "        Point: Home exercise program (Not Started)       Description:   Instruct learner(s) on appropriate technique for monitoring, assisting and/or progressing therapeutic exercises/activities.                  Learner Progress:  Not documented in this visit.              Point: Precautions (Done)       Description:   Instruct learner(s) on prescribed precautions during self-care and functional transfers.                  Learning Progress Summary             Patient Acceptance, E, VU by KT at 7/31/2023 1028    Acceptance, E, VU by LS at 7/30/2023 1447                         Point: Body mechanics (Done)       Description:   Instruct learner(s) on proper positioning and spine alignment during self-care, functional mobility activities and/or exercises.                  Learning Progress Summary             Patient Acceptance, E, VU by LS at 7/30/2023 1447                                         User Key       Initials Effective Dates Name Provider Type Discipline    LS 06/20/22 -  Kathryn Quintana, OTR/L Occupational Therapist OT    KT 04/27/23 -  Betty Wolf OT Student OT Student OT                  OT Recommendation and Plan     Plan of Care Review  Plan of Care Reviewed With: patient  Progress: no change  Outcome Evaluation: OT tx complete. A&Ox4. Pt reports no pain before tx and 8/10 during tx in abdomen. Today pt required max encouragement to participate in therapy session. Pt is very hesitant to move due to HR being elevated last therapy session. Pt education provided on pressure sore prevention, the benefit OOB activity, and the importance of self-care. Pt states, \"I want to do therapy but I just can't\". OTS provided reassurance that her HR will be monitored and if HR becomes to high, she can lay back down. Pt agreed to sit EOB to complete grooming activities. Sup<>sit with Remigio. Pt states she cannot complete grooming activities because she is too weak. Sit<>sup Remigio. Pt c/o abdomen pain stating she can't " complete grooming activities while sitting up in bed. Nsg notified of pt's pain. Pt was left supported sitting in bed with call light in reach. OT will continue POC to increased strength, activity tolerance, and pain. Recommended d/c SNF.     Time Calculation:         Time Calculation- OT       Row Name 07/31/23 0940             Time Calculation- OT    OT Start Time 0935  -JJ (r) KT (t) JJ (c)      OT Stop Time 1015  -JJ (r) KT (t) JJ (c)      OT Time Calculation (min) 40 min  -JJ (r) KT (t)      Total Timed Code Minutes- OT 40 minute(s)  -JJ (r) KT (t) JJ (c)      OT Received On 07/31/23  -JJ (r) KT (t) JJ (c)         Timed Charges    42176 - OT Self Care/Mgmt Minutes 40  -JJ (r) KT (t) JJ (c)         Total Minutes    Timed Charges Total Minutes 40  -JJ (r) KT (t)       Total Minutes 40  -JJ (r) KT (t)                User Key  (r) = Recorded By, (t) = Taken By, (c) = Cosigned By      Initials Name Provider Type    Susy Raygoza, OTR/L, CSRS Occupational Therapist    Betty Bowman OT Student OT Student                           Betty Wolf OT Student  7/31/2023

## 2023-07-31 NOTE — PLAN OF CARE
Problem: Adult Inpatient Plan of Care  Goal: Plan of Care Review  Outcome: Ongoing, Progressing  Goal: Patient-Specific Goal (Individualized)  Outcome: Ongoing, Progressing  Goal: Absence of Hospital-Acquired Illness or Injury  Outcome: Ongoing, Progressing  Intervention: Identify and Manage Fall Risk  Recent Flowsheet Documentation  Taken 7/30/2023 1827 by Sabrina Botello RN  Safety Promotion/Fall Prevention: safety round/check completed  Goal: Optimal Comfort and Wellbeing  Outcome: Ongoing, Progressing  Goal: Readiness for Transition of Care  Outcome: Ongoing, Progressing     Problem: Skin Injury Risk Increased  Goal: Skin Health and Integrity  Outcome: Ongoing, Progressing     Problem: COPD (Chronic Obstructive Pulmonary Disease) Comorbidity  Goal: Maintenance of COPD Symptom Control  Outcome: Ongoing, Progressing     Problem: Hypertension Comorbidity  Goal: Blood Pressure in Desired Range  Outcome: Ongoing, Progressing     Problem: Fall Injury Risk  Goal: Absence of Fall and Fall-Related Injury  Outcome: Ongoing, Progressing  Intervention: Promote Injury-Free Environment  Recent Flowsheet Documentation  Taken 7/30/2023 1827 by Sabrina Botello RN  Safety Promotion/Fall Prevention: safety round/check completed   Goal Outcome Evaluation:

## 2023-07-31 NOTE — PROGRESS NOTES
Malnutrition Severity Assessment    Patient Name:  Milton Mae  YOB: 1926  MRN: [id number]  Admit Date:  7/27/2023    Patient meets criteria for : Severe Malnutrition  Malnutrition Severity Assessment  Malnutrition Type: Chronic Disease - Related Malnutrition  Malnutrition Type (last 8 hours)       Malnutrition Severity Assessment       Row Name 07/31/23 1442       Malnutrition Severity Assessment    Malnutrition Type Chronic Disease - Related Malnutrition      Row Name 07/31/23 1442       Muscle Loss    Loss of Muscle Mass Findings Severe    [place] Region Severe - deep hollowing/scooping, lack of muscle to touch, facial bones well defined    Clavicle Bone Region Severe - protruding prominent bone    Acromion Bone Region Severe - squared shoulders, bones, and acromion process protrusion prominent    Scapular Bone Region Severe - prominent bones, depressions easily visible between ribs, scapula, spine, shoulders    Dorsal Hand Region Severe - prominent depression    Patellar Region Severe - prominent bone, square looking, very little muscle definition    Anterior Thigh Region Severe - line/depression along thigh, obviously thin    Posterior Calf Region Severe - thin with very little definition/firmness      Row Name 07/31/23 1442       Fat Loss    Subcutaneous Fat Loss Findings Severe    Orbital Region  Severe - pronounced hollowness/depression, dark circles, loose saggy skin    Upper Arm Region Severe - mostly skin, very little space between folds, fingers touch    Thoracic & Lumbar Region Severe - ribs visible with prominent depressions, iliac crest very prominent      Row Name 07/31/23 1442       Criteria Met (Must meet criteria for severity in at least 2 of these categories: M Wasting, Fat Loss, Fluid, Secondary Signs, Wt. Status, Intake)    Patient meets criteria for  Severe Malnutrition                    Electronically signed by:  Kamini Katz RDN, LD  07/31/23 14:44 CDT

## 2023-07-31 NOTE — PLAN OF CARE
Goal Outcome Evaluation:    Problem: Adult Inpatient Plan of Care  Goal: Plan of Care Review  Outcome: Ongoing, Progressing  Flowsheets (Taken 7/31/2023 0431)  Progress: no change  Plan of Care Reviewed With: patient  Outcome Evaluation: PRN pain meds given with relief. VSS. S/ST . Safety maintained.

## 2023-07-31 NOTE — CASE MANAGEMENT/SOCIAL WORK
Continued Stay Note  [facility] [place]     Patient Name: Milton Mae  MRN: [id number]  Today's Date: 7/31/2023    Admit Date: 7/27/2023    Plan: Home   Discharge Plan       Row Name 07/31/23 1135       Plan    Plan Home    Plan Comments Chart reviewed. Patient is very weak but she lives with her son, has DME, and wishes to return home at discharge.                 CORINE Resendez

## 2023-08-01 LAB
BACTERIA SPEC AEROBE CULT: NORMAL
BACTERIA SPEC AEROBE CULT: NORMAL
BACTERIA SPEC RESP CULT: NORMAL
GRAM STN SPEC: NORMAL

## 2023-08-01 PROCEDURE — 99233 SBSQ HOSP IP/OBS HIGH 50: CPT | Performed by: INTERNAL MEDICINE

## 2023-08-01 PROCEDURE — 94799 UNLISTED PULMONARY SVC/PX: CPT

## 2023-08-01 PROCEDURE — 25010000002 MORPHINE PER 10 MG: Performed by: FAMILY MEDICINE

## 2023-08-01 PROCEDURE — 97535 SELF CARE MNGMENT TRAINING: CPT

## 2023-08-01 PROCEDURE — 97161 PT EVAL LOW COMPLEX 20 MIN: CPT | Performed by: PHYSICAL THERAPIST

## 2023-08-01 PROCEDURE — 94618 PULMONARY STRESS TESTING: CPT

## 2023-08-01 PROCEDURE — 94664 DEMO&/EVAL PT USE INHALER: CPT

## 2023-08-01 RX ORDER — CEFDINIR 300 MG/1
300 CAPSULE ORAL EVERY 12 HOURS SCHEDULED
Qty: 4 CAPSULE | Refills: 0 | Status: SHIPPED | OUTPATIENT
Start: 2023-08-01 | End: 2023-08-03

## 2023-08-01 RX ORDER — IPRATROPIUM BROMIDE AND ALBUTEROL SULFATE 2.5; .5 MG/3ML; MG/3ML
3 SOLUTION RESPIRATORY (INHALATION)
Qty: 360 ML
Start: 2023-08-01

## 2023-08-01 RX ADMIN — IPRATROPIUM BROMIDE AND ALBUTEROL SULFATE 3 ML: .5; 3 SOLUTION RESPIRATORY (INHALATION) at 19:24

## 2023-08-01 RX ADMIN — CEFDINIR 300 MG: 300 CAPSULE ORAL at 21:27

## 2023-08-01 RX ADMIN — IPRATROPIUM BROMIDE AND ALBUTEROL SULFATE 3 ML: .5; 3 SOLUTION RESPIRATORY (INHALATION) at 09:56

## 2023-08-01 RX ADMIN — IPRATROPIUM BROMIDE AND ALBUTEROL SULFATE 3 ML: .5; 3 SOLUTION RESPIRATORY (INHALATION) at 05:55

## 2023-08-01 RX ADMIN — Medication 10 ML: at 21:27

## 2023-08-01 RX ADMIN — DOCUSATE SODIUM 50 MG AND SENNOSIDES 8.6 MG 2 TABLET: 8.6; 5 TABLET, FILM COATED ORAL at 21:27

## 2023-08-01 RX ADMIN — Medication 10 ML: at 09:26

## 2023-08-01 RX ADMIN — MORPHINE SULFATE 1 MG: 2 INJECTION, SOLUTION INTRAMUSCULAR; INTRAVENOUS at 05:00

## 2023-08-01 RX ADMIN — ACETAMINOPHEN 650 MG: 325 TABLET, FILM COATED ORAL at 00:03

## 2023-08-01 RX ADMIN — CEFDINIR 300 MG: 300 CAPSULE ORAL at 09:26

## 2023-08-01 RX ADMIN — IPRATROPIUM BROMIDE AND ALBUTEROL SULFATE 3 ML: .5; 3 SOLUTION RESPIRATORY (INHALATION) at 13:38

## 2023-08-01 RX ADMIN — DOCUSATE SODIUM 50 MG AND SENNOSIDES 8.6 MG 2 TABLET: 8.6; 5 TABLET, FILM COATED ORAL at 09:26

## 2023-08-01 RX ADMIN — MORPHINE SULFATE 1 MG: 2 INJECTION, SOLUTION INTRAMUSCULAR; INTRAVENOUS at 14:24

## 2023-08-01 RX ADMIN — MORPHINE SULFATE 1 MG: 2 INJECTION, SOLUTION INTRAMUSCULAR; INTRAVENOUS at 10:34

## 2023-08-01 NOTE — PROGRESS NOTES
Exercise Oximetry    Patient Name:Milton Mae   MRN: [id number]   Date: 08/01/23             ROOM AIR BASELINE   SpO2% 90   Heart Rate 115   Blood Pressure      EXERCISE ON ROOM AIR SpO2% EXERCISE ON O2 @ 1 LPM SpO2%   1 MINUTE 90 1 MINUTE 92   2 MINUTES 89 2 MINUTES 93   3 MINUTES 87 3 MINUTES 92   4 MINUTES 87 4 MINUTES 95   5 MINUTES  5 MINUTES 94   6 MINUTES  6 MINUTES 94              Distance Walked  Distance Walked   Dyspnea (Omid Scale)   Dyspnea (Omid Scale)   Fatigue (Omid Scale)   Fatigue (Omid Scale)   SpO2% Post Exercise  87 SpO2% Post Exercise  94   HR Post Exercise  120 HR Post Exercise 115   Time to Recovery   Time to Recovery 3 minutes     Comments: Had patient do bedsides exercise for about 5 minutes. Patient is unable to walk on her own it took an assist of two just to get her to the bathroom.

## 2023-08-01 NOTE — CASE MANAGEMENT/SOCIAL WORK
Continued Stay Note   [place]     Patient Name: Milton Mae  MRN: [id number]  Today's Date: 8/1/2023    Admit Date: 7/27/2023    Plan: [facility]   Discharge Plan       Row Name 08/01/23 1404       Plan    Plan [facility]    Patient/Family in Agreement with Plan yes    Plan Comments [facility] has offered a bed. Informed pt's son, Nicholas [phone], and he accepts. She will not need a covid swab. Pharmacy has been changed in the system. If pt is discharged, d/c summary will need faxed to [phone]. Report number is [phone]. Pt will go by [facility] -[phone] or [facility] -[phone].    Final Discharge Disposition Code 03 - skilled nursing facility (SNF)      Row Name 08/01/23 1206       Plan    Plan Referral to [facility]    Patient/Family in Agreement with Plan yes    Provided Post Acute Provider List? Yes    Post Acute Provider List Nursing Home    Provided Post Acute Provider Quality & Resource List? Yes    Post Acute Provider Quality and Resource List Nursing Home    Delivered To Patient;Support Person    Method of Delivery In person    Plan Comments Spoke with pt and son in the room about snf options. They request a referral to [facility]. Referral called to [facility] [phone].                   Discharge Codes    No documentation.                 Expected Discharge Date and Time       Expected Discharge Date Expected Discharge Time    Aug 1, 2023               [facility]

## 2023-08-01 NOTE — DISCHARGE SUMMARY
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  Her bronchiectasis has improved significantly.  She is on 1 L per nasal cannula based on walking oximetry requirement.    Hospital Course  Patient presents the hospital with complaints of nausea and vomiting, abdominal pain, she has had a sick stomach for 3 weeks at least.  She has had some diarrhea.  She has been dizzy in the head but that's going on for some time she says.  She has a known history of COPD.  She follows with the pulmonologist.  She has previously been admitted for lung issues and has had bronchiectasis.        During her ER evaluation CT of the abdomen was done and she was found to have consolidations in her lower lobes.  ER is requesting patient be admitted for pneumonia.  Treatment Plan  The patient will be admitted to my service here at [facility]. \     Gentle hydration  Pain control  CT chest without contrast  Continue Rocephin/azithromycin  Consult general surgeon re: recommendations for right upper quadrant pain/cholelithiasis.  CMP CBC in a.m.    The day after admission, the patient was feeling better.  Right upper quadrant discomfort was improving significantly.  Both  pulmonology and gastroenterology has seen and evaluated the patient.  Pulmonology recommended flutter valve, pulmonary cultures and aerosol bronchodilators.  Gastroenterology felt that the patient's upper GI symptoms were likely associated with Zithromax.  GI felt that cholecystitis was doubtful and that dilated bile ducts were likely an incidental finding.  LFTs were noted to be very low and inconsistent with obstructive condition.  Treatment with aerosol bronchodilators was successful and the patient began to produce large amount of mucus.  Flutter valve was apparently used frequently and effectively.  She continued IV antibiotics throughout her hospital stay.  On the day before discharge, the patient was de-escalated to oral antibiotics with cefdinir to continue for 3 additional days.  Oxygen requirement was decreased to 1 L per nasal cannula.  Potential discharge was discussed with the patient on 7/31.  She noted that she was far too weak to return home and noted that she could not walk unassisted.  Therapies evaluated the patient and recommended SNF placement.  The patient was agreeable to SNF placement recognizing that she is unable to function at home even with assistance of her son.  She was converted to oral antibiotic therapy and is stable for discharge to skilled nursing facility today for continuation of rehabilitation.    Consults:   Pulmonology:  Problem List as identified by Epic (may contain historical, inactive problems)    Right upper quadrant abdominal pain    Bronchiectasis    Vomiting    Diarrhea     Pulmonary Assessment:  Bronchiectasis, with some additional cxr findings suggesting pneumonia or exacerbation of bronchiectasis with bronchitis, mucus plugging and atelectases  Cachexia  Gallstones with duct dilatation  Leukocytosis, moderate anemia     Recommend/plan:   Flutter valve  Respiratory culture  Aerosol bronchodilators  Consider broadening spectrum of cephalosporin if respiratory status  "deteriorates; however I am not convinced she is far from baseline if at all.  Surgery evaluation if biliary disease is felt to warrant intervention  Pt will be at elevated risk for pulmonary complications from anesthesia and surgery  Follow up cbc at discretion of primary service     This visit was performed by both a physician and an [facility] RN.  I personally evaluated and examined the patient.  I performed all aspects of the medical decision making as documented.  Electronically signed by Flash Gaitan MD    Gastroenterology:  Impression/Plan:    Right upper quadrant abdominal pain    Community acquired bilateral lower lobe pneumonia    Vomiting    Diarrhea        Bilateral pneumonia/associated with chest and flank pain.  Upper GI symptoms most likely associated with Zithromax now resolved.  Doubt cholecystitis. Dilated bile ducts most likely incidental finding.  LFTs are very low.     -Continue supportive care/antibiotics  -Incentive spirometry  -We will follow and make recommendations as appropriate.     Patient and sonRafa, at bedside agree with assessment and plan and are very appreciative of the evaluation.  All questions were answered to their satisfaction.     Michael Neri MD      Result Review    Result Review:  I have personally reviewed the results from the time of this admission to 8/1/2023 15:51 CDT and agree with these findings:  []  Laboratory  []  Microbiology  []  Radiology  []  EKG/Telemetry   []  Cardiology/Vascular   []  Pathology  []  Old records  []  Other:    Condition on Discharge:    Stable and improved    Physical Exam on Discharge:  /63 (BP Location: Right arm, Patient Position: Lying)   Pulse 107   Temp 98.3 øF (36.8 øC) (Oral)   Resp 18   Ht 152.4 cm (60\")   Wt 34.8 kg (76 lb 12.8 oz)   SpO2 94%   BMI 15.00 kg/mý   Physical Exam     Constitutional:       Appearance: Normal appearance. She is well-developed.   HENT:      Head: Normocephalic and " atraumatic.      Right Ear: External ear normal.      Left Ear: External ear normal.      Nose: Nose normal.      Mouth: Mucous membranes are moist.   Eyes:      Conjunctiva/sclera: Conjunctivae normal.   Cardiovascular:      Rate and Rhythm: Normal rate and regular rhythm.      Pulses: Normal pulses.      Heart sounds: Normal heart sounds. No murmur heard.  Pulmonary:      Effort: Pulmonary effort is normal.      Comments: Few bibasilar crackles which are likely chronic in nature.  Abdominal:      General: Bowel sounds are normal. There is no distension.      Palpations: Abdomen is soft.      Tenderness: There is no abdominal tenderness.   Musculoskeletal:         General: Normal range of motion.      Cervical back: Normal range of motion and neck supple.   Skin:     General: Skin is warm and dry.   Neurological:      Mental Status: She is alert and oriented to person, place, and time.      Coordination: Coordination normal.   Psychiatric:         Mood and Affect: Mood normal.         Behavior: Behavior normal.     Discharge Disposition:  Home or Self Care    Discharge Medications:     Discharge Medications        New Medications        Instructions Start Date   cefdinir 300 MG capsule  Commonly known as: OMNICEF   300 mg, Oral, Every 12 Hours Scheduled             Changes to Medications        Instructions Start Date   ipratropium-albuterol 0.5-2.5 mg/3 ml nebulizer  Commonly known as: DUO-NEB  What changed:   when to take this  reasons to take this   3 mL, Nebulization, 4 Times Daily - RT             Continue These Medications        Instructions Start Date   acetaminophen 500 MG tablet  Commonly known as: TYLENOL   500 mg, Oral, Every 4 Hours PRN      budesonide-formoterol 160-4.5 MCG/ACT inhaler  Commonly known as: SYMBICORT   2 puffs, Inhalation, 2 Times Daily      CALCIUM 1200+D3 PO   1 tablet, Oral, Daily      cyanocobalamin 500 MCG tablet  Commonly known as: VITAMIN B-12   500 mcg, Oral, Daily      ferrous  sulfate 325 (65 FE) MG tablet   325 mg, Oral, Daily      guaiFENesin 600 MG 12 hr tablet  Commonly known as: MUCINEX   1,200 mg, Oral, Every 12 Hours Scheduled      lansoprazole 30 MG capsule  Commonly known as: PREVACID   30 mg, Oral, Daily      metoprolol succinate XL 25 MG 24 hr tablet  Commonly known as: TOPROL-XL   25 mg, Oral, Daily      ondansetron ODT 4 MG disintegrating tablet  Commonly known as: ZOFRAN-ODT   4 mg, Oral, Every 6 Hours PRN      Womens One Daily tablet tablet  Generic drug: multivitamin with minerals   1 tablet, Oral, Daily             Stop These Medications      albuterol sulfate  (90 Base) MCG/ACT inhaler  Commonly known as: PROVENTIL HFA;VENTOLIN HFA;PROAIR HFA     azithromycin 250 MG tablet  Commonly known as: ZITHROMAX     predniSONE 10 MG tablet  Commonly known as: DELTASONE     Probiotic-10 capsule     traZODone 50 MG tablet  Commonly known as: DESYREL              Discharge Diet:   Diet Instructions       Diet: Regular/House Diet; Soft to Chew (NDD 3); Ground Meat; Thin (IDDSI 0)      Discharge Diet: Regular/House Diet    Texture: Soft to Chew (NDD 3)    Soft to Chew: Ground Meat    Fluid Consistency: Thin (IDDSI 0)            Discharge Care Plan / Instructions:   Discharge to skilled nursing facility    Activity at Discharge:   Activity Instructions       Activity as Tolerated              Follow-up Appointments:  Follow-up with primary care physician 1 week after discharge from rehab    Electronically signed by Alexsander Chew DO, 08/02/23, 15:51 CDT.    Time: Discharge over 30 min    Part of this note may be an electronic transcription/translation of spoken language to printed text using the Dragon Dictation system.

## 2023-08-01 NOTE — PLAN OF CARE
Problem: Adult Inpatient Plan of Care  Goal: Plan of Care Review  8/1/2023 1343 by Sabrina Botello RN  Outcome: Ongoing, Progressing  8/1/2023 0759 by Sabrina Botello RN  Outcome: Ongoing, Not Progressing  Goal: Patient-Specific Goal (Individualized)  8/1/2023 1343 by Sabrina Botello RN  Outcome: Ongoing, Progressing  8/1/2023 0759 by Sabrina Botello RN  Outcome: Ongoing, Not Progressing  Goal: Absence of Hospital-Acquired Illness or Injury  8/1/2023 1343 by Sabrina Botello RN  Outcome: Ongoing, Progressing  8/1/2023 0759 by Sabrina Botello RN  Outcome: Ongoing, Not Progressing  Intervention: Prevent Skin Injury  Recent Flowsheet Documentation  Taken 8/1/2023 0759 by Sabrina Botello RN  Skin Protection: adhesive use limited  Intervention: Prevent and Manage VTE (Venous Thromboembolism) Risk  Recent Flowsheet Documentation  Taken 8/1/2023 0759 by Sabrina Botello RN  Range of Motion: active ROM (range of motion) encouraged  Goal: Optimal Comfort and Wellbeing  8/1/2023 1343 by Sabrina Botello RN  Outcome: Ongoing, Progressing  8/1/2023 0759 by Sabrina Botello RN  Outcome: Ongoing, Not Progressing  Intervention: Provide Person-Centered Care  Recent Flowsheet Documentation  Taken 8/1/2023 0759 by Sabrina Botello RN  Trust Relationship/Rapport: questions answered  Goal: Readiness for Transition of Care  8/1/2023 1343 by Sabrina Botello RN  Outcome: Ongoing, Progressing  8/1/2023 0759 by Sabrina Botello RN  Outcome: Ongoing, Not Progressing     Problem: Skin Injury Risk Increased  Goal: Skin Health and Integrity  8/1/2023 1343 by Sabrina Botello RN  Outcome: Ongoing, Progressing  8/1/2023 0759 by Sabrina Botello RN  Outcome: Ongoing, Not Progressing  Intervention: Optimize Skin Protection  Recent Flowsheet Documentation  Taken 8/1/2023 0759 by Sabrina Botello RN  Pressure Reduction Techniques: frequent weight shift encouraged  Pressure Reduction Devices: heel offloading device utilized  Skin Protection: adhesive use limited     Problem: COPD (Chronic  Obstructive Pulmonary Disease) Comorbidity  Goal: Maintenance of COPD Symptom Control  8/1/2023 1343 by Sabrina Botello RN  Outcome: Ongoing, Progressing  8/1/2023 0759 by Sabrina Botello RN  Outcome: Ongoing, Not Progressing  Intervention: Maintain COPD-Symptom Control  Recent Flowsheet Documentation  Taken 8/1/2023 0759 by Sabrina Botello RN  Supportive Measures: active listening utilized     Problem: Hypertension Comorbidity  Goal: Blood Pressure in Desired Range  8/1/2023 1343 by Sabrina Botello RN  Outcome: Ongoing, Progressing  8/1/2023 0759 by Sabrina Botello RN  Outcome: Ongoing, Not Progressing  Intervention: Maintain Blood Pressure Management  Recent Flowsheet Documentation  Taken 8/1/2023 0759 by Sabrina Botello RN  Syncope Management: position changed slowly     Problem: Fall Injury Risk  Goal: Absence of Fall and Fall-Related Injury  8/1/2023 1343 by Sabrina Botello RN  Outcome: Ongoing, Progressing  8/1/2023 0759 by Sabrina Botello RN  Outcome: Ongoing, Not Progressing     Problem: Malnutrition  Goal: Improved Nutritional Intake  8/1/2023 1343 by Sabrina Botello RN  Outcome: Ongoing, Progressing  8/1/2023 0759 by Sabrina Botello RN  Outcome: Ongoing, Not Progressing   Goal Outcome Evaluation:

## 2023-08-01 NOTE — PROGRESS NOTES
[facility] Medicine Services  INPATIENT PROGRESS NOTE    Patient Name: Milton Mae  Date of Admission: 7/27/2023  Today's Date: 08/01/23  Length of Stay: 5  Primary Care Physician: Javier Ng MD    Subjective   Chief Complaint: Generalized weakness  HPI     Patient is on 1 L per nasal cannula based on walking oximetry requirement.  She is appropriate for discharge when a bed is available at skilled nursing facility.    Review of Systems   All pertinent negatives and positives are as above. All other systems have been reviewed and are negative unless otherwise stated.     Objective    Temp:  [98 øF (36.7 øC)-98.9 øF (37.2 øC)] 98.3 øF (36.8 øC)  Heart Rate:  [] 107  Resp:  [16-18] 18  BP: (109-152)/(63-80) 114/63  Physical Exam  Constitutional:       Appearance: Normal appearance. She is well-developed.   HENT:      Head: Normocephalic and atraumatic.      Right Ear: External ear normal.      Left Ear: External ear normal.      Nose: Nose normal.      Mouth: Mucous membranes are moist.   Eyes:      Conjunctiva/sclera: Conjunctivae normal.   Cardiovascular:      Rate and Rhythm: Normal rate and regular rhythm.      Pulses: Normal pulses.      Heart sounds: Normal heart sounds. No murmur heard.  Pulmonary:      Effort: Pulmonary effort is normal.      Comments: Few bibasilar crackles which are likely chronic in nature.  Abdominal:      General: Bowel sounds are normal. There is no distension.      Palpations: Abdomen is soft.      Tenderness: There is no abdominal tenderness.   Musculoskeletal:         General: Normal range of motion.      Cervical back: Normal range of motion and neck supple.   Skin:     General: Skin is warm and dry.   Neurological:      Mental Status: She is alert and oriented to person, place, and time.      Coordination: Coordination normal.   Psychiatric:         Mood and Affect: Mood normal.         Behavior: Behavior normal.     Results  Review:  I have reviewed the labs, radiology results, and diagnostic studies.    Laboratory Data:   Results from last 7 days   Lab Units 07/31/23  0409 07/30/23  0356 07/29/23  0410   WBC 10*3/mm3 9.18 12.32* 16.45*   HEMOGLOBIN g/dL 8.3* 7.8* 8.2*   HEMATOCRIT % 27.7* 26.3* 26.8*   PLATELETS 10*3/mm3 318 318 330        Results from last 7 days   Lab Units 07/31/23  0409 07/30/23  0356 07/29/23  0410 07/28/23  0407 07/27/23  1427   SODIUM mmol/L 140 140 141 143 139   POTASSIUM mmol/L 4.3 4.1 4.0 4.4 4.5   CHLORIDE mmol/L 101 104 106 107 100   CO2 mmol/L 31.0* 29.0 27.0 27.0 29.0   BUN mg/dL 12 15 19 23 24*   CREATININE mg/dL 0.38* 0.51* 0.44* 0.56* 0.65   CALCIUM mg/dL 8.1* 8.3 8.3 8.4 9.3   BILIRUBIN mg/dL  --   --  0.2 0.2 0.3   ALK PHOS U/L  --   --  48 50 64   ALT (SGPT) U/L  --   --  10 14 21   AST (SGOT) U/L  --   --  16 19 45*   GLUCOSE mg/dL 99 94 81 144* 141*       Culture Data:   Blood Culture   Date Value Ref Range Status   07/27/2023 No growth at 5 days  Final   07/27/2023 No growth at 5 days  Final     Respiratory Culture   Date Value Ref Range Status   07/29/2023   Final    Light growth (2+) Normal respiratory daryn. No S. aureus or Pseudomonas aeruginosa detected. Final report.       Radiology Data:   Imaging Results (Last 24 Hours)       ** No results found for the last 24 hours. **            I have reviewed the patient's current medications.     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     MDM Data  External documents reviewed: No new  Cardiac tracing (EKG, telemetry) interpretation: reviewed  Radiology interpretation: reviewed  Labs reviewed: reviewed  Any tests that were considered but not ordered: none     Decision rules/scores evaluated (example [id number], Wells, etc): none     Discussed with: patient     Care Planning  Shared decision making: patient  Code status and discussions: dnr dni      Disposition  Social Determinants of Health that impact treatment or disposition: none  I expect the patient to be discharged to home in 1 days.     Electronically signed by Alexsander Chew DO, 08/01/23, 17:11 CDT.

## 2023-08-01 NOTE — PLAN OF CARE
Problem: Adult Inpatient Plan of Care  Goal: Plan of Care Review  Outcome: Ongoing, Not Progressing  Goal: Patient-Specific Goal (Individualized)  Outcome: Ongoing, Not Progressing  Goal: Absence of Hospital-Acquired Illness or Injury  Outcome: Ongoing, Not Progressing  Goal: Optimal Comfort and Wellbeing  Outcome: Ongoing, Not Progressing  Goal: Readiness for Transition of Care  Outcome: Ongoing, Not Progressing     Problem: Skin Injury Risk Increased  Goal: Skin Health and Integrity  Outcome: Ongoing, Not Progressing     Problem: COPD (Chronic Obstructive Pulmonary Disease) Comorbidity  Goal: Maintenance of COPD Symptom Control  Outcome: Ongoing, Not Progressing     Problem: Hypertension Comorbidity  Goal: Blood Pressure in Desired Range  Outcome: Ongoing, Not Progressing     Problem: Fall Injury Risk  Goal: Absence of Fall and Fall-Related Injury  Outcome: Ongoing, Not Progressing     Problem: Malnutrition  Goal: Improved Nutritional Intake  Outcome: Ongoing, Not Progressing   Goal Outcome Evaluation:

## 2023-08-01 NOTE — CASE MANAGEMENT/SOCIAL WORK
Continued Stay Note  [facility] [place]     Patient Name: Milton Mae  MRN: [id number]  Today's Date: 8/1/2023    Admit Date: 7/27/2023    Plan: Referral to [facility]   Discharge Plan       Row Name 08/01/23 1208       Plan    Plan Referral to [facility]    Patient/Family in Agreement with Plan yes    Provided Post Acute Provider List? Yes    Post Acute Provider List Nursing Home    Provided Post Acute Provider Quality & Resource List? Yes    Post Acute Provider Quality and Resource List Nursing Home    Delivered To Patient;Support Person    Method of Delivery In person    Plan Comments Spoke with pt and son in the room about snf options. They request a referral to [facility]. Referral called to [facility] [phone].                   Discharge Codes    No documentation.                 Expected Discharge Date and Time       Expected Discharge Date Expected Discharge Time    Aug 1, 2023               PIPO Painter

## 2023-08-01 NOTE — PLAN OF CARE
Goal Outcome Evaluation:  Plan of Care Reviewed With: patient        Progress: no change  Outcome Evaluation: Pt had ongoing c/o pain to left side flank treated with prn medication, see MAR. Pt A&O x4 and up with assist x2. Pt to go home on 1L NC per respiratory. Sinus/ Sinus Tach  on tele.

## 2023-08-01 NOTE — THERAPY TREATMENT NOTE
Patient Name: Milton Mae  : 1926    MRN: [id number]                              Today's Date: 2023       Admit Date: 2023    Visit Dx:     ICD-10-CM ICD-9-CM   1. Pneumonia of both lower lobes due to infectious organism  J18.9 486   2. Hypoxia  R09.02 799.02   3. Failure of outpatient treatment  Z78.9 V49.89   4. Gallstones  K80.20 574.20   5. Renal cyst, left  N28.1 753.10   6. Gastric wall thickening  K31.89 537.89   7. Diverticulosis  K57.90 562.10   8. 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    Hypertension    GERD (gastroesophageal reflux disease)    Pneumonia of both upper lobes    Bronchiectasis    Anemia    Oropharyngeal dysphagia    Pneumonia of both lungs due to infectious organism    Infection due to parainfluenza virus 3    Pneumonia    Severe malnutrition    Generalized weakness    Pneumonia of right lung due to infectious organism, unspecified part of lung    Community acquired bilateral lower lobe pneumonia    Right upper quadrant abdominal pain    Vomiting    Diarrhea    Severe malnutrition     Past Medical History:   Diagnosis Date    COPD (chronic obstructive pulmonary disease)     GERD (gastroesophageal reflux disease)     Hypertension     Pneumonia      Past Surgical History:   Procedure Laterality Date    CATARACT EXTRACTION, BILATERAL      THYROID SURGERY        General Information       Row Name 23 1009          OT Time and Intention    Document Type therapy note (daily note)  -LS     Mode of Treatment occupational therapy  -LS       Row Name 23 1009          General Information    Patient Profile Reviewed yes  -LS     Existing Precautions/Restrictions fall;oxygen therapy device and L/min  1L  -LS     Barriers to Rehab previous functional deficit;hearing deficit  -LS       Row Name 23 1000           Cognition    Orientation Status (Cognition) oriented x 4  -LS       Row Name 08/01/23 1009          Safety Issues, Functional Mobility    Impairments Affecting Function (Mobility) endurance/activity tolerance;pain;strength;balance;shortness of breath  -LS               User Key  (r) = Recorded By, (t) = Taken By, (c) = Cosigned By      Initials Name Provider Type    Kathryn Ray, OTR/L Occupational Therapist                     Mobility/ADL's       Row Name 08/01/23 1009          Bed Mobility    Supine-Sit [place] (Bed Mobility) maximum assist (25% patient effort);verbal cues  -LS     Sit-Supine [place] (Bed Mobility) moderate assist (50% patient effort);verbal cues  -LS     Assistive Device (Bed Mobility) bed rails;head of bed elevated  -               User Key  (r) = Recorded By, (t) = Taken By, (c) = Cosigned By      Initials Name Provider Type    Kathryn Ray, OTR/L Occupational Therapist                   Obj/Interventions    No documentation.                  Goals/Plan    No documentation.                  Clinical Impression       Row Name 08/01/23 1009          Pain Assessment    Pretreatment Pain Rating 7/10  -LS     Posttreatment Pain Rating 7/10  -LS     Pain Location - Side/Orientation Left  -LS     Pain Location - flank  -LS       Row Name 08/01/23 1009          Plan of Care Review    Plan of Care Reviewed With patient;son  -LS     Progress no change  -LS     Outcome Evaluation OT tx completed. Pt in fowlers with wedge under L side upon therapist arrival; Reported 7/10 L flank pain; Pt's son also present. Pt performed supine>sit utilizing bedrail with HOB elevated requiring Max A and verbal cues on this date. Upon sitting EOB, Pt requested to return to supine due to L flank pain. Pt performed sit>supine with Mod A and verbal cues. HR was 110bpm at rest and increased to 118bpm upon sitting EOB. Pt's SpO2 remained between 94-96% while Pt remained on 1L supplemental O2 throughout tx.  Pt continues to benefit from skilled OT intervention in order to address remaining deficits in fxl mobility, fxl activity tolerance, balance, strength, and use of adaptive techniques/equipment during performance of BADLs. Recommend SNF at discharge.  -       Row Name 08/01/23 1009          Therapy Plan Review/Discharge Plan (OT)    Anticipated Discharge Disposition (OT) skilled nursing facility  -       Row Name 08/01/23 1009          Positioning and Restraints    Pre-Treatment Position in bed  -LS     Post Treatment Position bed  -LS     In Bed side lying right;call light within reach;encouraged to call for assist;side rails up x2;with family/caregiver  -               User Key  (r) = Recorded By, (t) = Taken By, (c) = Cosigned By      Initials Name Provider Type    Kathryn Ray OTR/L Occupational Therapist                   Outcome Measures       Row Name 08/01/23 1009          How much help from another is currently needed...    Putting on and taking off regular lower body clothing? 2  -LS     Bathing (including washing, rinsing, and drying) 2  -LS     Toileting (which includes using toilet bed pan or urinal) 2  -LS     Putting on and taking off regular upper body clothing 3  -LS     Taking care of personal grooming (such as brushing teeth) 3  -LS     Eating meals 4  -LS     AM-PAC 6 Clicks Score (OT) 16  -LS       Row Name 08/01/23 0759          How much help from another person do you currently need...    Turning from your back to your side while in flat bed without using bedrails? 3  -LY     Moving from lying on back to sitting on the side of a flat bed without bedrails? 2  -LY     Moving to and from a bed to a chair (including a wheelchair)? 1  -LY     Standing up from a chair using your arms (e.g., wheelchair, bedside chair)? 1  -LY     Climbing 3-5 steps with a railing? 1  -LY     To walk in hospital room? 1  -LY     AM-PAC 6 Clicks Score (PT) 9  -LY     Highest level of mobility 3 --> Sat at  edge of bed  -LY       Row Name 08/01/23 1009          Functional Assessment    Outcome Measure Options AM-PAC 6 Clicks Daily Activity (OT)  -LS               User Key  (r) = Recorded By, (t) = Taken By, (c) = Cosigned By      Initials Name Provider Type    LS Kathryn Quintana OTR/L Occupational Therapist    Sabrina Gamboa, RN Registered Nurse                    Occupational Therapy Education       Title: PT OT SLP Therapies (In Progress)       Topic: Occupational Therapy (In Progress)       Point: ADL training (Done)       Description:   Instruct learner(s) on proper safety adaptation and remediation techniques during self care or transfers.   Instruct in proper use of assistive devices.                  Learning Progress Summary             Patient Acceptance, E, VU by KT at 7/31/2023 1028    Acceptance, E, VU by LS at 7/30/2023 1447                         Point: Home exercise program (Not Started)       Description:   Instruct learner(s) on appropriate technique for monitoring, assisting and/or progressing therapeutic exercises/activities.                  Learner Progress:  Not documented in this visit.              Point: Precautions (Done)       Description:   Instruct learner(s) on prescribed precautions during self-care and functional transfers.                  Learning Progress Summary             Patient Acceptance, E, VU by KT at 7/31/2023 1028    Acceptance, E, VU by LS at 7/30/2023 1447                         Point: Body mechanics (Done)       Description:   Instruct learner(s) on proper positioning and spine alignment during self-care, functional mobility activities and/or exercises.                  Learning Progress Summary             Patient Acceptance, E, VU by LS at 7/30/2023 1447                                         User Key       Initials Effective Dates Name Provider Type Discipline    SABRINA 06/20/22 -  Kathryn Quintana OTR/L Occupational Therapist OT    ABIMBOLA 04/27/23 -  Betty Wolf OT  Student OT Student OT                  OT Recommendation and Plan  Planned Therapy Interventions (OT): activity tolerance training, functional balance retraining, occupation/activity based interventions, ROM/therapeutic exercise, strengthening exercise, transfer/mobility retraining, patient/caregiver education/training, adaptive equipment training, BADL retraining  Therapy Frequency (OT): 3 times/wk  Plan of Care Review  Plan of Care Reviewed With: patient, son  Progress: no change  Outcome Evaluation: OT tx completed. Pt in fowlers with wedge under L side upon therapist arrival; Reported 7/10 L flank pain; Pt's son also present. Pt performed supine>sit utilizing bedrail with HOB elevated requiring Max A and verbal cues on this date. Upon sitting EOB, Pt requested to return to supine due to L flank pain. Pt performed sit>supine with Mod A and verbal cues. HR was 110bpm at rest and increased to 118bpm upon sitting EOB. Pt's SpO2 remained between 94-96% while Pt remained on 1L supplemental O2 throughout tx. Pt continues to benefit from skilled OT intervention in order to address remaining deficits in fxl mobility, fxl activity tolerance, balance, strength, and use of adaptive techniques/equipment during performance of BADLs. Recommend SNF at discharge.     Time Calculation:         Time Calculation- OT       Row Name 08/01/23 1009             Time Calculation- OT    OT Start Time 1009  -LS      OT Stop Time 1021  -      OT Time Calculation (min) 12 min  -      Total Timed Code Minutes- OT 12 minute(s)  -      OT Received On 08/01/23  -                User Key  (r) = Recorded By, (t) = Taken By, (c) = Cosigned By      Initials Name Provider Type    LS Kathryn Quintana OTR/L Occupational Therapist                  Therapy Charges for Today       Code Description Service Date Service Provider Modifiers Qty    [id number]  OT SELF CARE/MGMT/TRAIN EA 15 MIN 8/1/2023 Kathryn Quintana OTR/L GO 1                 Kathryn  Lilian, OTR/L  8/1/2023

## 2023-08-01 NOTE — THERAPY EVALUATION
Patient Name: Milton Mae  : 1926    MRN: [id number]                              Today's Date: 2023       Admit Date: 2023    Visit Dx:     ICD-10-CM ICD-9-CM   1. Pneumonia of both lower lobes due to infectious organism  J18.9 486   2. Hypoxia  R09.02 799.02   3. Failure of outpatient treatment  Z78.9 V49.89   4. Gallstones  K80.20 574.20   5. Renal cyst, left  N28.1 753.10   6. Gastric wall thickening  K31.89 537.89   7. Diverticulosis  K57.90 562.10   8. 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    Hypertension    GERD (gastroesophageal reflux disease)    Pneumonia of both upper lobes    Bronchiectasis    Anemia    Oropharyngeal dysphagia    Pneumonia of both lungs due to infectious organism    Infection due to parainfluenza virus 3    Pneumonia    Severe malnutrition    Generalized weakness    Pneumonia of right lung due to infectious organism, unspecified part of lung    Community acquired bilateral lower lobe pneumonia    Right upper quadrant abdominal pain    Vomiting    Diarrhea    Severe malnutrition     Past Medical History:   Diagnosis Date    COPD (chronic obstructive pulmonary disease)     GERD (gastroesophageal reflux disease)     Hypertension     Pneumonia      Past Surgical History:   Procedure Laterality Date    CATARACT EXTRACTION, BILATERAL      THYROID SURGERY        General Information       Row Name 23 2073          Physical Therapy Time and Intention    Document Type evaluation  CC nausea and vomiting, abdominal pain; PMH includes but isnt limited to HTN, GERD, COPD  -SB (r) CG (t) SB (c)     Mode of Treatment physical therapy  -SB (r) CG (t) SB (c)       Row Name 23 9043          General Information    Patient Profile Reviewed yes  -SB (r) CG (t) SB (c)     Prior Level of Function min assist:;mod  assist:;bathing;dressing;dependent:;transfer;all household mobility;independent:;bed mobility  -SB (r) CG (t) SB (c)     Existing Precautions/Restrictions fall;oxygen therapy device and L/min  1L of O2 via NC  -SB (r) CG (t) SB (c)     Barriers to Rehab previous functional deficit;hearing deficit  -SB (r) CG (t) SB (c)       Row Name 08/01/23 1334          Living Environment    People in Home child(maty), adult  -SB (r) CG (t) SB (c)       Row Name 08/01/23 1334          Home Main Entrance    Number of Stairs, Main Entrance none  -SB (r) CG (t) SB (c)     Stair Railings, Main Entrance none  -SB (r) CG (t) SB (c)       Row Name 08/01/23 1334          Stairs Within Home, Primary    Number of Stairs, Within Home, Primary none  -SB (r) CG (t) SB (c)     Stair Railings, Within Home, Primary none  -SB (r) CG (t) SB (c)       Row Name 08/01/23 1334          Cognition    Orientation Status (Cognition) oriented x 4  -SB (r) CG (t) SB (c)       Row Name 08/01/23 1334          Safety Issues, Functional Mobility    Safety Issues Affecting Function (Mobility) insight into deficits/self-awareness  -SB (r) CG (t) SB (c)     Impairments Affecting Function (Mobility) endurance/activity tolerance;pain;strength;balance;shortness of breath  -SB (r) CG (t) SB (c)               User Key  (r) = Recorded By, (t) = Taken By, (c) = Cosigned By      Initials Name Provider Type    Deneen Hooper, PT DPT Physical Therapist    Steven Chavira, PT Student PT Student                   Mobility       Row Name 08/01/23 1335          Bed Mobility    Bed Mobility supine-sit;sit-supine  -SB (r) CG (t) SB (c)     Supine-Sit [place] (Bed Mobility) 1 person assist;moderate assist (50% patient effort)  -SB (r) CG (t) SB (c)     Sit-Supine [place] (Bed Mobility) moderate assist (50% patient effort);1 person assist  -SB (r) CG (t) SB (c)     Assistive Device (Bed Mobility) bed rails;head of bed elevated  -SB (r) CG (t) SB (c)       Row Name  08/01/23 1334          Sit-Stand Transfer    Comment, (Sit-Stand Transfer) Not completed due to pt requests to lay back down  -SB (r) CG (t) SB (c)               User Key  (r) = Recorded By, (t) = Taken By, (c) = Cosigned By      Initials Name Provider Type    Deneen Hooper, PT DPT Physical Therapist    Steven Chavira, PT Student PT Student                   Obj/Interventions       Row Name 08/01/23 1335          Range of Motion Comprehensive    General Range of Motion lower extremity range of motion deficits identified  -SB (r) CG (t) SB (c)     Comment, General Range of Motion Decreased B knee extension due to hamstring tightness  -SB (r) CG (t) SB (c)       Row Name 08/01/23 1335          Strength Comprehensive (MMT)    General Manual Muscle Testing (MMT) Assessment lower extremity strength deficits identified  -SB (r) CG (t) SB (c)     Comment, General Manual Muscle Testing (MMT) Assessment B LE grossly 4/5  -SB (r) CG (t) SB (c)       Row Name 08/01/23 1335          Balance    Balance Assessment sitting static balance;sitting dynamic balance  -SB (r) CG (t) SB (c)     Static Sitting Balance contact guard;1-person assist  -SB (r) CG (t) SB (c)     Dynamic Sitting Balance 1-person assist;contact guard  -SB (r) CG (t) SB (c)     Position, Sitting Balance unsupported;sitting edge of bed  -SB (r) CG (t) SB (c)       Row Name 08/01/23 1335          Sensory Assessment (Somatosensory)    Sensory Assessment (Somatosensory) LE sensation intact  -SB (r) CG (t) SB (c)               User Key  (r) = Recorded By, (t) = Taken By, (c) = Cosigned By      Initials Name Provider Type    Deneen Hooper, PT DPT Physical Therapist    Steven Chavira, PT Student PT Student                   Goals/Plan       Row Name 08/01/23 1334          Bed Mobility Goal 1 (PT)    Activity/Assistive Device (Bed Mobility Goal 1, PT) bed mobility activities, all  -SB (r) CG (t) SB (c)     [place] Level/Cues Needed (Bed Mobility Goal  1, PT) minimum assist (75% or more patient effort)  -SB (r) CG (t) SB (c)     Time Frame (Bed Mobility Goal 1, PT) long term goal (LTG)  -SB (r) CG (t) SB (c)     Progress/Outcomes (Bed Mobility Goal 1, PT) new goal  -SB (r) CG (t) SB (c)       Row Name 08/01/23 4911          Transfer Goal 1 (PT)    Activity/Assistive Device (Transfer Goal 1, PT) sit-to-stand/stand-to-sit;bed-to-chair/chair-to-bed  -SB (r) CG (t) SB (c)     [place] Level/Cues Needed (Transfer Goal 1, PT) minimum assist (75% or more patient effort)  -SB (r) CG (t) SB (c)     Time Frame (Transfer Goal 1, PT) long term goal (LTG)  -SB (r) CG (t) SB (c)     Progress/Outcome (Transfer Goal 1, PT) new goal  -SB (r) CG (t) SB (c)       Row Name 08/01/23 4373          Gait Training Goal 1 (PT)    Activity/Assistive Device (Gait Training Goal 1, PT) gait (walking locomotion);improve balance and speed;increase endurance/gait distance;decrease fall risk;assistive device use  -SB (r) CG (t) SB (c)     [place] Level (Gait Training Goal 1, PT) contact guard required  -SB (r) CG (t) SB (c)     Distance (Gait Training Goal 1, PT) 25 feet w/ AD useage and no rest breaks w/ O2 saturation levels above 90%  -SB (r) CG (t) SB (c)     Time Frame (Gait Training Goal 1, PT) long term goal (LTG)  -SB (r) CG (t) SB (c)     Progress/Outcome (Gait Training Goal 1, PT) new goal  -SB (r) CG (t) SB (c)       Row Name 08/01/23 2357          Therapy Assessment/Plan (PT)    Planned Therapy Interventions (PT) balance training;bed mobility training;gait training;home exercise program;patient/family education;transfer training;strengthening  -SB (r) CG (t) SB (c)               User Key  (r) = Recorded By, (t) = Taken By, (c) = Cosigned By      Initials Name Provider Type    Deneen Hooper, PT DPT Physical Therapist    CG Steven Guy, PT Student PT Student                   Clinical Impression       Row Name 08/01/23 1245          Pain    Pretreatment Pain Rating 0/10 -  "no pain  -SB (r) CG (t) SB (c)     Posttreatment Pain Rating 7/10  -SB (r) CG (t) SB (c)     Pain Location - Side/Orientation Left  -SB (r) CG (t) SB (c)     Pain Location - flank  -SB (r) CG (t) SB (c)     Pain Intervention(s) Medication (See MAR);Repositioned;Ambulation/increased activity  -SB (r) CG (t) SB (c)     Additional Documentation Pain Scale: Numbers Pre/Post-Treatment (Group)  -SB (r) CG (t) SB (c)       Row Name 08/01/23 8744          Plan of Care Review    Plan of Care Reviewed With patient;son  -SB (r) CG (t) SB (c)     Progress no change  -SB (r) CG (t) SB (c)     Outcome Evaluation PT eval complete. Pt was A&Ox4 laying supine in bed w/ son in the room. Pt was on 1L of O2 w/ O2 sats at 94%. HR was 111. Pt complained of no pain. Pt compelted supine <> sit w/ mod Ax1. HR jumped to 130 and returned to normal within a few seconds. O2 saturations were 93%. Sensation and strength were assesed EOB. LE sensation was intact. LE strength was grossly 4/5. Sitting balance required CGA. Pt requested to return to supine, denying standing mentioning \"I'm too tired to do anything else\". Pt returned to supine w/ mod Ax1. Pt was rolled to her R and wedges were placed underneath L torso and hip. Pt and son were educated on increasing activity, bedside activites, and POC. Skilled therapy is required to address strength and activity tolerance. D/c recommendation to SNF.  -SB (r) CG (t) SB (c)       Row Name 08/01/23 6829          Therapy Assessment/Plan (PT)    Patient/Family Therapy Goals Statement (PT) None stated  -SB (r) CG (t) SB (c)     Rehab Potential (PT) good, to achieve stated therapy goals  -SB (r) CG (t) SB (c)     Criteria for Skilled Interventions Met (PT) yes;meets criteria;skilled treatment is necessary  -SB (r) CG (t) SB (c)     Therapy Frequency (PT) 2 times/day  -SB (r) CG (t) SB (c)       Row Name 08/01/23 1335          Vital Signs    Pretreatment Heart Rate (beats/min) 111  -SB (r) CG (t) SB (c)     " Intratreatment Heart Rate (beats/min) 130  -SB (r) CG (t) SB (c)     Posttreatment Heart Rate (beats/min) 112  -SB (r) CG (t) SB (c)     Pre SpO2 (%) 94  -SB (r) CG (t) SB (c)     O2 Delivery Pre Treatment supplemental O2  1L O2 via NC  -SB (r) CG (t) SB (c)     Intra SpO2 (%) 93  -SB (r) CG (t) SB (c)     O2 Delivery Intra Treatment supplemental O2  -SB (r) CG (t) SB (c)     Post SpO2 (%) 94  -SB (r) CG (t) SB (c)     O2 Delivery Post Treatment supplemental O2  -SB (r) CG (t) SB (c)     Pre Patient Position Supine  -SB (r) CG (t) SB (c)     Intra Patient Position Sitting  -SB (r) CG (t) SB (c)     Post Patient Position Supine  -SB (r) CG (t) SB (c)       Row Name 08/01/23 1335          Positioning and Restraints    Pre-Treatment Position in bed  -SB (r) CG (t) SB (c)     Post Treatment Position bed  -SB (r) CG (t) SB (c)     In Bed notified nsg;call light within reach;encouraged to call for assist;side lying right;side rails up x2  -SB (r) CG (t) SB (c)               User Key  (r) = Recorded By, (t) = Taken By, (c) = Cosigned By      Initials Name Provider Type    Deneen Hooper, PT DPT Physical Therapist    Steven Chavira, PT Student PT Student                   Outcome Measures       Row Name 08/01/23 1333 08/01/23 0759       How much help from another person do you currently need...    Turning from your back to your side while in flat bed without using bedrails? 3  -SB (r) CG (t) SB (c) 3  -LY    Moving from lying on back to sitting on the side of a flat bed without bedrails? 2  -SB (r) CG (t) SB (c) 2  -LY    Moving to and from a bed to a chair (including a wheelchair)? -- 1  -LY    Standing up from a chair using your arms (e.g., wheelchair, bedside chair)? 2  -SB (r) CG (t) SB (c) 1  -LY    Climbing 3-5 steps with a railing? 2  -SB (r) CG (t) SB (c) 1  -LY    To walk in hospital room? 2  -SB (r) CG (t) SB (c) 1  -LY    AM-PAC 6 Clicks Score (PT) -- 9  -LY    Highest level of mobility -- 3 --> Sat at edge  of bed  -LY      Row Name 08/01/23 1333 08/01/23 1009       Functional Assessment    Outcome Measure Options AM-PAC 6 Clicks Basic Mobility (PT)  -SB (r) CG (t) SB (c) AM-PAC 6 Clicks Daily Activity (OT)  -LS              User Key  (r) = Recorded By, (t) = Taken By, (c) = Cosigned By      Initials Name Provider Type    SB Deneen Pillai, PT DPT Physical Therapist    LS Kathryn Quintana, OTR/L Occupational Therapist    Steven Chavira, PT Student PT Student    Sabrina Gamboa, RN Registered Nurse                                 Physical Therapy Education       Title: PT OT SLP Therapies (In Progress)       Topic: Physical Therapy (Done)       Point: Mobility training (Done)       Learning Progress Summary             Patient Acceptance, E, VU by CG at 8/1/2023 1424    Comment: Pt and son were educated on increasing activity, bedside activites, and POC   Family Acceptance, E, VU by CG at 8/1/2023 1424    Comment: Pt and son were educated on increasing activity, bedside activites, and POC                         Point: Body mechanics (Done)       Learning Progress Summary             Patient Acceptance, E, VU by CG at 8/1/2023 1424    Comment: Pt and son were educated on increasing activity, bedside activites, and POC   Family Acceptance, E, VU by CG at 8/1/2023 1424    Comment: Pt and son were educated on increasing activity, bedside activites, and POC                         Point: Precautions (Done)       Learning Progress Summary             Patient Acceptance, E, VU by CG at 8/1/2023 1424    Comment: Pt and son were educated on increasing activity, bedside activites, and POC   Family Acceptance, E, VU by CG at 8/1/2023 1424    Comment: Pt and son were educated on increasing activity, bedside activites, and POC                                         User Key       Initials Effective Dates Name Provider Type Discipline    CG 04/27/23 -  Steven Guy, PT Student PT Student PT                  PT Recommendation and  "Plan  Planned Therapy Interventions (PT): balance training, bed mobility training, gait training, home exercise program, patient/family education, transfer training, strengthening  Plan of Care Reviewed With: patient, son  Progress: no change  Outcome Evaluation: PT eval complete. Pt was A&Ox4 laying supine in bed w/ son in the room. Pt was on 1L of O2 w/ O2 sats at 94%. HR was 111. Pt complained of no pain. Pt compelted supine <> sit w/ mod Ax1. HR jumped to 130 and returned to normal within a few seconds. O2 saturations were 93%. Sensation and strength were assesed EOB. LE sensation was intact. LE strength was grossly 4/5. Sitting balance required CGA. Pt requested to return to supine, denying standing mentioning \"I'm too tired to do anything else\". Pt returned to supine w/ mod Ax1. Pt was rolled to her R and wedges were placed underneath L torso and hip. Pt and son were educated on increasing activity, bedside activites, and POC. Skilled therapy is required to address strength and activity tolerance. D/c recommendation to SNF.     Time Calculation:         PT Charges       Row Name 08/01/23 1424             Time Calculation    Start Time 1334  -SB (r) CG (t) SB (c)      Stop Time 1416  -SB (r) CG (t) SB (c)      Time Calculation (min) 42 min  -SB (r) CG (t)      PT Received On 08/01/23  -SB (r) CG (t) SB (c)      PT Goal Re-Cert Due Date 08/11/23  -SB (r) CG (t) SB (c)         Untimed Charges    PT Eval/Re-eval Minutes 53  11 min chart review  -SB (r) CG (t) SB (c)         Total Minutes    Untimed Charges Total Minutes 53  -SB (r) CG (t)       Total Minutes 53  -SB (r) CG (t)                User Key  (r) = Recorded By, (t) = Taken By, (c) = Cosigned By      Initials Name Provider Type    Deneen Hooper, PT DPT Physical Therapist    Steven Chavira, PT Student PT Student                      PT G-Codes  Outcome Measure Options: AM-PAC 6 Clicks Basic Mobility (PT)  AM-PAC 6 Clicks Score (PT): 9  AM-PAC 6 " Clicks Score (OT): 16  PT Discharge Summary  Anticipated Discharge Disposition (PT): skilled nursing facility    Steven Guy, PT Student  8/1/2023

## 2023-08-01 NOTE — PROGRESS NOTES
[facility] [place] - [facility]    Patient: Milton Mae  1/25/1926   MR# [id number]   Acct# [id number]  08/01/23   08:10 CDT  Referring Provider: Alexsander Chew DO    Chief Complaint: shortness of breath/cough   Interval history: She is seen awake and alert resting in bed. Son is at bedside. He is somewhat concerned about her still having some pain intermittently in her sides/chest. Oxygen saturation stable on 1l nasal cannula. Walking oximetry confirmed need for 1l with exertion. Cough has improved. WBC normalized. Afebrile. Awaiting snf placement. No other issues reported overnight.   Meds:  cefdinir, 300 mg, Oral, Q12H  ipratropium-albuterol, 3 mL, Nebulization, 4x Daily - RT  senna-docusate sodium, 2 tablet, Oral, BID  sodium chloride, 10 mL, Intravenous, Q12H           Physical Exam:  SpO2 Percentage    08/01/23 0555 08/01/23 0602 08/01/23 0803   SpO2: 99% 91% 93%     Body mass index is 15 kg/mý.   Temp:  [98 øF (36.7 øC)-98.9 øF (37.2 øC)] 98.1 øF (36.7 øC)  Heart Rate:  [] 96  Resp:  [16] 16  BP: (109-152)/(66-80) 109/67  Intake/Output Summary (Last 24 hours) at 8/1/2023 0810  Last data filed at 7/31/2023 2023  Gross per 24 hour   Intake 2184.17 ml   Output 400 ml   Net 1784.17 ml     Physical Exam  Constitutional:       General: She is not in acute distress.     Appearance: She is underweight.      Interventions: Nasal cannula in place.   HENT:      Head: Normocephalic.      Nose: Nose normal.      Mouth/Throat:      Mouth: Mucous membranes are moist.   Eyes:      General: No scleral icterus.  Cardiovascular:      Rate and Rhythm: Normal rate.   Pulmonary:      Effort: No respiratory distress.   Abdominal:      General: There is no distension.   Musculoskeletal:      Right lower leg: No edema.      Left lower leg: No edema.   Skin:     Findings: Bruising present.   Neurological:      Mental Status: She is alert and oriented to person, place, and time.    Psychiatric:         Mood and Affect: Mood normal.         Behavior: Behavior is cooperative.       Electronically signed by LUIS MIGUEL Giraldo, 8/1/2023, 08:10 CDT      Physician substantive portion: medical decision making  Result Review  Laboratory Data:  Results from last 7 days   Lab Units 07/31/23  0409 07/30/23  0356 07/29/23  0410   WBC 10*3/mm3 9.18 12.32* 16.45*   HEMOGLOBIN g/dL 8.3* 7.8* 8.2*   PLATELETS 10*3/mm3 318 318 330     Results from last 7 days   Lab Units 07/31/23  0409 07/30/23  0356 07/29/23  0410 07/28/23  0407 07/27/23  1427   SODIUM mmol/L 140 140 141   < > 139   POTASSIUM mmol/L 4.3 4.1 4.0   < > 4.5   CO2 mmol/L 31.0* 29.0 27.0   < > 29.0   BUN mg/dL 12 15 19   < > 24*   CREATININE mg/dL 0.38* 0.51* 0.44*   < > 0.65   LACTATE mmol/L  --   --   --   --  1.3    < > = values in this interval not displayed.         Microbiology Results (last 10 days)       Procedure Component Value - Date/Time    Respiratory Culture - Sputum, Cough [[id number]] Collected: 07/29/23 1522    Lab Status: Final result Specimen: Sputum from Cough Updated: 08/01/23 1010     Respiratory Culture Light growth (2+) Normal respiratory daryn. No S. aureus or Pseudomonas aeruginosa detected. Final report.     Gram Stain Moderate (3+) WBCs per low power field      No Epithelial cells per low power field      Few (2+) Mixed gram positive daryn      Rare (1+) Yeast with hyphae seen    Blood Culture - Blood, Arm, Right [[id number]]  (Normal) Collected: 07/27/23 1550    Lab Status: Final result Specimen: Blood from Arm, Right Updated: 08/01/23 1631     Blood Culture No growth at 5 days    Blood Culture - Blood, Arm, Left [[id number]]  (Normal) Collected: 07/27/23 1530    Lab Status: Final result Specimen: Blood from Arm, Left Updated: 08/01/23 1631     Blood Culture No growth at 5 days    COVID PRE-OP / PRE-PROCEDURE SCREENING ORDER (NO ISOLATION) - Swab, Nasal Cavity [[id number]]  (Normal) Collected: 07/27/23 1430    Lab  Status: Final result Specimen: Swab from Nasal Cavity Updated: 07/27/23 1506    Narrative:      The following orders were created for panel order COVID PRE-OP / PRE-PROCEDURE SCREENING ORDER (NO ISOLATION) - Swab, Nasal Cavity.  Procedure                               Abnormality         Status                     ---------                               -----------         ------                     COVID-19,CEPHEID/GARY,CO...[[id number]]  Normal              Final result                 Please view results for these tests on the individual orders.    COVID-19,CEPHEID/GARY,COR/SILVESTRE/PAD/SHARON/MAD IN-HOUSE(OR EMERGENT/ADD-ON),NP SWAB IN TRANSPORT MEDIA 3-4 HR TAT, RT-PCR - Swab, Nasopharynx [[id number]]  (Normal) Collected: 07/27/23 1430    Lab Status: Final result Specimen: Swab from Nasopharynx Updated: 07/27/23 1506     COVID19 Not Detected    Narrative:      Fact sheet for providers: https://www.fda.gov/media/341648/download     Fact sheet for patients: https://www.fda.gov/media/601579/download  Fact sheet for providers: https://www.fda.gov/media/634808/download    Fact sheet for patients: https://www.fda.gov/media/541489/download    Test performed by PCR.           Recent films:  No radiology results from the last 24 hrs   Personal review of imaging : CXR shows bilateral upper lobe pulmonary scarring and emphysematous changes and no acute changes.      Pulmonary Assessment:    Acute on chronic hypoxic respiratory failure  Bronchiectasis with acute exacerbation  Pulmonary scarring  Severe cachexia and advanced age  Gallstone with duct dilatation.  Leukocytosis  Moderate anemia    Recommend/plan:   Patient was seen in the follow-up visit in pulm rounds in medical floor today.    Patient is a very pleasant flail elderly  female who is on 1 L of oxygen today  She had a walking oximetry which confirmed the need for 1 L of oxygen.  She told me she is getting discharged probably today not seeing him tomorrow.  No  acute events overnight and she is afebrile.  Bronchodilator treatment routine respiratory care.  She is on oral antibiotics cefdinir  DVT and stress ulcer person pain and anxiety control.  Nutritional support.  Repeat lab work if needed.  Physical activity as tolerated.  Waiting for placement.  CODE STATUS: Limited code.  Overall prognosis: Guarded.  We will follow.    This visit was performed by both a physician and an Advanced Practice RN.  I performed all aspects of the medical decision making as documented.    Electronically signed by     Nataliia Quiñones MD,  Pulmonologist/Intensivist   8/1/2023, 22:06 CDT

## 2023-08-01 NOTE — PLAN OF CARE
Goal Outcome Evaluation:  Plan of Care Reviewed With: patient, son        Progress: no change  Outcome Evaluation: OT tx completed. Pt in fowlers with wedge under L side upon therapist arrival; Reported 7/10 L flank pain; Pt's son also present. Pt performed supine>sit utilizing bedrail with HOB elevated requiring Max A and verbal cues on this date. Upon sitting EOB, Pt requested to return to supine due to L flank pain. Pt performed sit>supine with Mod A and verbal cues. HR was 110bpm at rest and increased to 118bpm upon sitting EOB. Pt's SpO2 remained between 94-96% while Pt remained on 1L supplemental O2 throughout tx. Pt continues to benefit from skilled OT intervention in order to address remaining deficits in fxl mobility, fxl activity tolerance, balance, strength, and use of adaptive techniques/equipment during performance of BADLs. Recommend SNF at discharge.      Anticipated Discharge Disposition (OT): skilled nursing facility

## 2023-08-01 NOTE — PLAN OF CARE
"Goal Outcome Evaluation:  Plan of Care Reviewed With: patient, son        Progress: no change  Outcome Evaluation: PT eval complete. Pt was A&Ox4 laying supine in bed w/ son in the room. Pt was on 1L of O2 w/ O2 sats at 94%. HR was 111. Pt complained of no pain. Pt compelted supine <> sit w/ mod Ax1. HR jumped to 130 and returned to normal within a few seconds. O2 saturations were 93%. Sensation and strength were assesed EOB. LE sensation was intact. LE strength was grossly 4/5. Sitting balance required CGA. Pt requested to return to supine, denying standing mentioning \"I'm too tired to do anything else\". Pt returned to supine w/ mod Ax1. Pt was rolled to her R and wedges were placed underneath L torso and hip. Pt and son were educated on increasing activity, bedside activites, and POC. Skilled therapy is required to address strength and activity tolerance. D/c recommendation to SNF.      Anticipated Discharge Disposition (PT): skilled nursing facility  "

## 2023-08-01 NOTE — PROGRESS NOTES
[facility] Medicine Services  INPATIENT PROGRESS NOTE    Patient Name: Milton Mae  Date of Admission: 7/27/2023  Today's Date: 07/31/23  Length of Stay: 4  Primary Care Physician: Javier Ng MD    Subjective   Chief Complaint: Generalized weakness  HPI     The patient and I discussed potential discharge today.  She states that she is far too weak to return home, noting that she cannot even walk.  Therapies have evaluated the patient and recommend SNF placement.  Today, the patient is agreeable to SNF placement and recognizes that she is unable to function at home even with the assistance of her son.  BMP obtained this a.m. is unremarkable.  CBC shows a normal white blood cell count, hemoglobin low at 8.3 but is otherwise unremarkable.   will be requested for assistance with SNF placement.    Review of Systems   All pertinent negatives and positives are as above. All other systems have been reviewed and are negative unless otherwise stated.     Objective    Temp:  [97.9 øF (36.6 øC)-98.9 øF (37.2 øC)] 98.9 øF (37.2 øC)  Heart Rate:  [] 117  Resp:  [15-18] 16  BP: (122-140)/(52-83) 126/66  Physical Exam  Constitutional:       Appearance: Normal appearance. She is well-developed.   HENT:      Head: Normocephalic and atraumatic.      Right Ear: External ear normal.      Left Ear: External ear normal.      Nose: Nose normal.      Mouth: Mucous membranes are moist.   Eyes:      Conjunctiva/sclera: Conjunctivae normal.   Cardiovascular:      Rate and Rhythm: Normal rate and regular rhythm.      Pulses: Normal pulses.      Heart sounds: Normal heart sounds. No murmur heard.  Pulmonary:      Effort: Pulmonary effort is normal.      Comments: Few bibasilar crackles which are likely chronic in nature.  Abdominal:      General: Bowel sounds are normal. There is no distension.      Palpations: Abdomen is soft.      Tenderness: There is no abdominal tenderness.    Musculoskeletal:         General: Normal range of motion.      Cervical back: Normal range of motion and neck supple.   Skin:     General: Skin is warm and dry.   Neurological:      Mental Status: She is alert and oriented to person, place, and time.      Coordination: Coordination normal.   Psychiatric:         Mood and Affect: Mood normal.         Behavior: Behavior normal.     Results Review:  I have reviewed the labs, radiology results, and diagnostic studies.    Laboratory Data:   Results from last 7 days   Lab Units 07/31/23  0409 07/30/23  0356 07/29/23  0410   WBC 10*3/mm3 9.18 12.32* 16.45*   HEMOGLOBIN g/dL 8.3* 7.8* 8.2*   HEMATOCRIT % 27.7* 26.3* 26.8*   PLATELETS 10*3/mm3 318 318 330        Results from last 7 days   Lab Units 07/31/23  0409 07/30/23  0356 07/29/23  0410 07/28/23  0407 07/27/23  1427   SODIUM mmol/L 140 140 141 143 139   POTASSIUM mmol/L 4.3 4.1 4.0 4.4 4.5   CHLORIDE mmol/L 101 104 106 107 100   CO2 mmol/L 31.0* 29.0 27.0 27.0 29.0   BUN mg/dL 12 15 19 23 24*   CREATININE mg/dL 0.38* 0.51* 0.44* 0.56* 0.65   CALCIUM mg/dL 8.1* 8.3 8.3 8.4 9.3   BILIRUBIN mg/dL  --   --  0.2 0.2 0.3   ALK PHOS U/L  --   --  48 50 64   ALT (SGPT) U/L  --   --  10 14 21   AST (SGOT) U/L  --   --  16 19 45*   GLUCOSE mg/dL 99 94 81 144* 141*       Culture Data:   Blood Culture   Date Value Ref Range Status   07/27/2023 No growth at 4 days  Preliminary   07/27/2023 No growth at 4 days  Preliminary     Respiratory Culture   Date Value Ref Range Status   07/29/2023   Preliminary    Light growth (2+) The culture consists of normal respiratory daryn. This is a preliminary report; final report to follow.       Radiology Data:   Imaging Results (Last 24 Hours)       ** No results found for the last 24 hours. **            I have reviewed the patient's current medications.     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 twice daily x3 days  Walking oximetry in a.m.    Medical Decision Making  Number and Complexity of problems:   Right upper quadrant abdominal pain with cholelithiasis moderate complexity   Vomiting Moderate complexity  Diarrhea moderate complexity  Bronchiectasis, chronic, recurrent, moderate complexity     Differential Diagnosis:      Conditions and Status        Condition is improving.     MDM Data  External documents reviewed: No new  Cardiac tracing (EKG, telemetry) interpretation: reviewed  Radiology interpretation: reviewed  Labs reviewed: reviewed  Any tests that were considered but not ordered: none     Decision rules/scores evaluated (example [id number], Wells, etc): none     Discussed with: patient     Care Planning  Shared decision making: patient  Code status and discussions: dnr dni      Disposition  Social Determinants of Health that impact treatment or disposition: none  I expect the patient to be discharged to home in 2-3 days.     Electronically signed by Alexsander Chew DO, 07/31/23, 19:17 CDT.

## 2023-08-02 VITALS
BODY MASS INDEX: 14.92 KG/M2 | SYSTOLIC BLOOD PRESSURE: 134 MMHG | WEIGHT: 76 LBS | DIASTOLIC BLOOD PRESSURE: 75 MMHG | RESPIRATION RATE: 18 BRPM | HEIGHT: 60 IN | TEMPERATURE: 98.2 F | HEART RATE: 110 BPM | OXYGEN SATURATION: 95 %

## 2023-08-02 PROCEDURE — 94799 UNLISTED PULMONARY SVC/PX: CPT

## 2023-08-02 PROCEDURE — 99232 SBSQ HOSP IP/OBS MODERATE 35: CPT | Performed by: INTERNAL MEDICINE

## 2023-08-02 PROCEDURE — 63710000001 ONDANSETRON PER 8 MG: Performed by: FAMILY MEDICINE

## 2023-08-02 PROCEDURE — 94664 DEMO&/EVAL PT USE INHALER: CPT

## 2023-08-02 PROCEDURE — 25010000002 MORPHINE PER 10 MG: Performed by: FAMILY MEDICINE

## 2023-08-02 RX ADMIN — ONDANSETRON 4 MG: 4 TABLET, FILM COATED ORAL at 08:28

## 2023-08-02 RX ADMIN — IPRATROPIUM BROMIDE AND ALBUTEROL SULFATE 3 ML: .5; 3 SOLUTION RESPIRATORY (INHALATION) at 09:38

## 2023-08-02 RX ADMIN — CEFDINIR 300 MG: 300 CAPSULE ORAL at 10:28

## 2023-08-02 RX ADMIN — IPRATROPIUM BROMIDE AND ALBUTEROL SULFATE 3 ML: .5; 3 SOLUTION RESPIRATORY (INHALATION) at 05:37

## 2023-08-02 RX ADMIN — Medication 10 ML: at 08:29

## 2023-08-02 RX ADMIN — MORPHINE SULFATE 1 MG: 2 INJECTION, SOLUTION INTRAMUSCULAR; INTRAVENOUS at 02:17

## 2023-08-02 NOTE — PROGRESS NOTES
[facility] PULMONARY AND CRITICAL CARE PROGRESS NOTE - [facility]    Patient: Milton Mae  1/25/1926   MR# [id number]   Acct# [id number]  08/02/23   07:36 CDT  Referring Provider: Alexsander Chew DO    Chief Complaint: shortness of breath/cough   Interval history: She is seen awake and alert resting in bed.  She tells me she is having some issues having a bowel movement today.  She reports very little bowel movement over the last 3 days.  She is receiving some stool softeners.  Oxygen saturation stable on 1 L nasal cannula.  Plans for her to go to [facility] today. No new labs or imaging for review.  Afebrile. No other issues reported overnight.   Meds:  cefdinir, 300 mg, Oral, Q12H  ipratropium-albuterol, 3 mL, Nebulization, 4x Daily - RT  senna-docusate sodium, 2 tablet, Oral, BID  sodium chloride, 10 mL, Intravenous, Q12H           Physical Exam:  SpO2 Percentage    08/01/23 2045 08/02/23 0400 08/02/23 0537   SpO2: 97% 95% 96%     Body mass index is 14.84 kg/mý.   Temp:  [98 øF (36.7 øC)-98.3 øF (36.8 øC)] 98 øF (36.7 øC)  Heart Rate:  [] 105  Resp:  [16-18] 16  BP: (109-137)/(63-76) 135/75  Intake/Output Summary (Last 24 hours) at 8/2/2023 0736  Last data filed at 8/2/2023 0400  Gross per 24 hour   Intake 240 ml   Output 1350 ml   Net -1110 ml       Physical Exam  Constitutional:       General: She is not in acute distress.     Appearance: She is underweight.      Interventions: Nasal cannula in place.   HENT:      Head: Normocephalic.      Nose: Nose normal.      Mouth/Throat:      Mouth: Mucous membranes are moist.   Eyes:      General: No scleral icterus.  Cardiovascular:      Rate and Rhythm: Normal rate.   Pulmonary:      Effort: No respiratory distress.   Abdominal:      General: There is no distension.   Musculoskeletal:      Right lower leg: No edema.      Left lower leg: No edema.   Skin:     Findings: Bruising present.   Neurological:      Mental Status: She is alert  and oriented to person, place, and time.   Psychiatric:         Mood and Affect: Mood normal.         Behavior: Behavior is cooperative.       Electronically signed by LUIS MIGUEL Giraldo, 8/2/2023, 07:36 CDT      Physician substantive portion: medical decision making  Result Review  Laboratory Data:  Results from last 7 days   Lab Units 07/31/23  0409 07/30/23  0356 07/29/23  0410   WBC 10*3/mm3 9.18 12.32* 16.45*   HEMOGLOBIN g/dL 8.3* 7.8* 8.2*   PLATELETS 10*3/mm3 318 318 330     Results from last 7 days   Lab Units 07/31/23  0409 07/30/23  0356 07/29/23  0410 07/28/23  0407 07/27/23  1427   SODIUM mmol/L 140 140 141   < > 139   POTASSIUM mmol/L 4.3 4.1 4.0   < > 4.5   CO2 mmol/L 31.0* 29.0 27.0   < > 29.0   BUN mg/dL 12 15 19   < > 24*   CREATININE mg/dL 0.38* 0.51* 0.44*   < > 0.65   LACTATE mmol/L  --   --   --   --  1.3    < > = values in this interval not displayed.         Microbiology Results (last 10 days)       Procedure Component Value - Date/Time    Respiratory Culture - Sputum, Cough [[id number]] Collected: 07/29/23 1522    Lab Status: Final result Specimen: Sputum from Cough Updated: 08/01/23 1010     Respiratory Culture Light growth (2+) Normal respiratory daryn. No S. aureus or Pseudomonas aeruginosa detected. Final report.     Gram Stain Moderate (3+) WBCs per low power field      No Epithelial cells per low power field      Few (2+) Mixed gram positive daryn      Rare (1+) Yeast with hyphae seen    Blood Culture - Blood, Arm, Right [[id number]]  (Normal) Collected: 07/27/23 1550    Lab Status: Final result Specimen: Blood from Arm, Right Updated: 08/01/23 1631     Blood Culture No growth at 5 days    Blood Culture - Blood, Arm, Left [[id number]]  (Normal) Collected: 07/27/23 1530    Lab Status: Final result Specimen: Blood from Arm, Left Updated: 08/01/23 1631     Blood Culture No growth at 5 days    COVID PRE-OP / PRE-PROCEDURE SCREENING ORDER (NO ISOLATION) - Swab, Nasal Cavity [[id number]]   (Normal) Collected: 07/27/23 1430    Lab Status: Final result Specimen: Swab from Nasal Cavity Updated: 07/27/23 1506    Narrative:      The following orders were created for panel order COVID PRE-OP / PRE-PROCEDURE SCREENING ORDER (NO ISOLATION) - Swab, Nasal Cavity.  Procedure                               Abnormality         Status                     ---------                               -----------         ------                     COVID-19,CEPHEID/GARY,CO...[[id number]]  Normal              Final result                 Please view results for these tests on the individual orders.    COVID-19,CEPHEID/GARY,COR/SILVESTRE/PAD/SHARON/MAD IN-HOUSE(OR EMERGENT/ADD-ON),NP SWAB IN TRANSPORT MEDIA 3-4 HR TAT, RT-PCR - Swab, Nasopharynx [[id number]]  (Normal) Collected: 07/27/23 1430    Lab Status: Final result Specimen: Swab from Nasopharynx Updated: 07/27/23 1506     COVID19 Not Detected    Narrative:      Fact sheet for providers: https://www.fda.gov/media/263919/download     Fact sheet for patients: https://www.fda.gov/media/682550/download  Fact sheet for providers: https://www.fda.gov/media/863388/download    Fact sheet for patients: https://www.fda.gov/media/595533/download    Test performed by PCR.           Recent films:  No radiology results from the last 24 hrs   Personal review of imaging : CXR shows bronchiectasis and chronic pulmonary fibrosis and scarring.  No acute changes noted.      Pulmonary Assessment:  Acute on chronic hypoxic respiratory failure  Bronchiectasis with acute exacerbation  Pulmonary scarring  Severe cachexia and advanced age  Gallstone with duct dilatation.  Leukocytosis  Moderate anemia    Recommend/plan:   Patient was seen in the follow-up visit in pulm rounds in medical floor today.    She was on 1 L of oxygen and was getting ready for discharge to the rehab or skilled nursing facility.  She had a walking oximetry which confirmed the need for 1 L of oxygen.  No acute events overnight and she  is afebrile.  Continue bronchodilator treatment routine respiratory care.  She is on oral antibiotics cefdinir complete 7-day course.  DVT and stress ulcer person pain and anxiety control.  Nutritional support.  Is chronically malnourished and underweight.  For outpatient pulmonary clinic follow-up after discharge.  She is known to pulmonary clinic.  Planning reviewed and agree with current discharge planning.  We appreciate the consult and like to thank the hospitalist team for the referral.  Please call us if needed.    This visit was performed by both a physician and an Advanced Practice RN.  I performed all aspects of the medical decision making as documented.    Electronically signed by     Nataliia Quiñones MD,  Pulmonologist/Intensivist   8/2/2023, 19:34 CDT

## 2023-08-02 NOTE — PROGRESS NOTES
Enter Query Response Below      Query Response:     Acute respiratory failure was supported with additional clinical indicators.  Patient is bronchiectasis exacerbation.         If applicable, please update the problem list.   Patient: Milton Mae        : 1926  Account: [id number]           Admit Date: 23        How to Respond to this query:       a. Click New Note     b. Answer query within the yellow box.                c. Update the Problem List, if applicable.      If you have any questions about this query contact me at: [email or web address]     ,  Patient had chronic respiratory failure documented during this admission. On  progress note you listed Acute on chronic hypoxic respiratory failure. ED notes had stated the patients O2 sats dropped to 87% one time on 3L which was stated as being her normal baseline O2 use. She was briefly increased to 4L and quickly dropped to 2L within a couple hours.  During this admission the patient the patient did stay above 93% on 1-2L other than when initially in the ED. No respiratory distress and No ABG's during this admit.     After study, was Acute respiratory failure clinically supported during this admission?    -Acute respiratory failure was supported with additional clinical indicators:____________  -Acute respiratory failure was not supported  -Other- specify_____________  -Unable to determine          By submitting this query, we are merely seeking further clarification of documentation to accurately reflect all conditions that you are monitoring, evaluating, treating or that extend the hospitalization or utilize additional resources of care. Please utilize your independent clinical judgment when addressing the question(s) above.     This query and your response, once completed, will be entered into the legal medical record.    Sincerely,  Radha Jama  Clinical Documentation Integrity Program

## 2023-08-02 NOTE — THERAPY DISCHARGE NOTE
Acute Care - Occupational Therapy Discharge Summary  [facility]     Patient Name: Milton Mae  : 1926  MRN: [id number]    Today's Date: 2023                 Admit Date: 2023        OT Recommendation and Plan    Visit Dx:    ICD-10-CM ICD-9-CM   1. Pneumonia of both lower lobes due to infectious organism  J18.9 486   2. Hypoxia  R09.02 799.02   3. Failure of outpatient treatment  Z78.9 V49.89   4. Gallstones  K80.20 574.20   5. Renal cyst, left  N28.1 753.10   6. Gastric wall thickening  K31.89 537.89   7. Diverticulosis  K57.90 562.10   8. Decreased independence with activities of daily living [Z78.9 (ICD-10-CM)]  Z78.9 V49.89   9. Impaired mobility [Z74.09 (ICD-10-CM)]  Z74.09 799.89                OT Rehab Goals       Row Name 23 1400             Transfer Goal 1 (OT)    Activity/Assistive Device (Transfer Goal 1, OT) commode, bedside without drop arms  -AC      [place] Level/Cues Needed (Transfer Goal 1, OT) contact guard required  -AC      Time Frame (Transfer Goal 1, OT) long term goal (LTG);10 days  -AC      Progress/Outcome (Transfer Goal 1, OT) goal not met  -AC         Toileting Goal 1 (OT)    Activity/Device (Toileting Goal 1, OT) toileting skills, all;commode, bedside without drop arms  -AC      [place] Level/Cues Needed (Toileting Goal 1, OT) minimum assist (75% or more patient effort)  -AC      Time Frame (Toileting Goal 1, OT) long term goal (LTG);10 days  -AC      Progress/Outcome (Toileting Goal 1, OT) goal not met  -AC                User Key  (r) = Recorded By, (t) = Taken By, (c) = Cosigned By      Initials Name Provider Type Discipline    AC Jim Bustillos, OTR/L, CNT Occupational Therapist OT                        Timed Therapy Charges  Total Units: 3      Suggested Charges  Total Units: 3      Procedure Name Documented Minutes Units Code    HC OT SELF CARE/MGMT/TRAIN EA 15 MIN 40 3   14513 (CPTr)                 Documented Minutes  Total Minutes: 40       Therapy Provided Minutes    42989 - OT Self Care/Mgmt Minutes 40                        OT Discharge Summary  Anticipated Discharge Disposition (OT): skilled nursing facility  Reason for Discharge: Discharge from facility  Outcomes Achieved: Refer to plan of care for updates on goals achieved  Discharge Destination: SNF      Jim Bustillos, OTR/L, CNT  8/2/2023

## 2023-08-02 NOTE — PLAN OF CARE
Goal Outcome Evaluation:  Plan of Care Reviewed With: patient        Progress: no change  Outcome Evaluation: Pt had a moment of confusion where she thought the lint on her blankets was lice. Pt reassured and reoriented easily. Pt had c/o left side pain treated with prn medication. See MAR. Pt to be dc'd to SNF today. S/ST  on tele. Remains on 1L NC and up with assist 1-2. Call light in reach.